# Patient Record
Sex: FEMALE | Race: WHITE | Employment: OTHER
[De-identification: names, ages, dates, MRNs, and addresses within clinical notes are randomized per-mention and may not be internally consistent; named-entity substitution may affect disease eponyms.]

---

## 2018-01-12 LAB
CREAT ?TM UR-SCNC: 117.9 UMOL/L
EXT MICROALBUMIN URINE RANDOM: 10.3
HBA1C MFR BLD HPLC: 7 %
MICROALBUMIN/CREAT UR: 87.4 MG/G{CREAT}

## 2018-04-24 ENCOUNTER — RX ONLY (RX ONLY)
Age: 83
End: 2018-04-24

## 2018-04-24 ENCOUNTER — DOCTOR'S OFFICE (OUTPATIENT)
Dept: URBAN - METROPOLITAN AREA CLINIC 137 | Facility: CLINIC | Age: 83
Setting detail: OPHTHALMOLOGY
End: 2018-04-24
Payer: COMMERCIAL

## 2018-04-24 DIAGNOSIS — Z96.1: ICD-10-CM

## 2018-04-24 DIAGNOSIS — H26.40: ICD-10-CM

## 2018-04-24 DIAGNOSIS — E11.9: ICD-10-CM

## 2018-04-24 DIAGNOSIS — H52.4: ICD-10-CM

## 2018-04-24 PROCEDURE — 92015 DETERMINE REFRACTIVE STATE: CPT | Performed by: OPHTHALMOLOGY

## 2018-04-24 PROCEDURE — 92004 COMPRE OPH EXAM NEW PT 1/>: CPT | Performed by: OPHTHALMOLOGY

## 2018-04-24 ASSESSMENT — REFRACTION_OUTSIDERX
OD_SPHERE: PLANO
OS_VA2: 20/20(J1+)
OU_VA: 20/20+2
OD_AXIS: 30
OD_VA2: 20/20(J1+)
OS_AXIS: 85
OS_VA1: 20/25-1
OS_SPHERE: -1.25
OD_ADD: +2.50
OD_VA3: 20/
OS_ADD: +2.50
OS_CYLINDER: +0.75
OD_VA1: 20/25
OS_VA3: 20/
OD_CYLINDER: +0.75

## 2018-04-24 ASSESSMENT — REFRACTION_MANIFEST
OS_VA3: 20/
OU_VA: 20/
OD_VA2: 20/
OD_VA2: 20/
OU_VA: 20/
OD_VA3: 20/
OS_VA2: 20/
OS_VA3: 20/
OD_VA3: 20/
OS_VA1: 20/
OS_VA2: 20/
OD_VA1: 20/
OD_VA1: 20/
OS_VA1: 20/

## 2018-04-24 ASSESSMENT — REFRACTION_CURRENTRX
OS_AXIS: 85
OS_OVR_VA: 20/
OS_OVR_VA: 20/
OD_ADD: +2.25
OS_CYLINDER: +0.50
OD_VPRISM_DIRECTION: BF
OD_AXIS: 30
OS_VPRISM_DIRECTION: BF
OS_OVR_VA: 20/
OD_OVR_VA: 20/
OD_OVR_VA: 20/
OD_SPHERE: PLANO
OS_SPHERE: -0.75
OD_OVR_VA: 20/
OS_ADD: +2.25
OD_CYLINDER: +0.75

## 2018-04-24 ASSESSMENT — REFRACTION_AUTOREFRACTION
OS_AXIS: 91
OD_SPHERE: +0.25
OS_CYLINDER: +0.75
OS_SPHERE: -1.00
OD_CYLINDER: SPH

## 2018-04-24 ASSESSMENT — CONFRONTATIONAL VISUAL FIELD TEST (CVF)
OS_FINDINGS: FULL
OD_FINDINGS: FULL

## 2018-04-24 ASSESSMENT — VISUAL ACUITY
OS_BCVA: 20/30-2
OD_BCVA: 20/25-2

## 2018-04-24 ASSESSMENT — SPHEQUIV_DERIVED: OS_SPHEQUIV: -0.625

## 2018-05-14 ENCOUNTER — AMBUL SURGICAL CARE (OUTPATIENT)
Dept: URBAN - METROPOLITAN AREA SURGERY 32 | Facility: SURGERY | Age: 83
Setting detail: OPHTHALMOLOGY
End: 2018-05-14
Payer: COMMERCIAL

## 2018-05-14 DIAGNOSIS — H26.492: ICD-10-CM

## 2018-05-14 PROCEDURE — G8918 PT W/O PREOP ORDER IV AB PRO: HCPCS | Performed by: OPHTHALMOLOGY

## 2018-05-14 PROCEDURE — 66821 AFTER CATARACT LASER SURGERY: CPT | Performed by: OPHTHALMOLOGY

## 2018-05-14 PROCEDURE — G8907 PT DOC NO EVENTS ON DISCHARG: HCPCS | Performed by: OPHTHALMOLOGY

## 2018-05-21 ENCOUNTER — DOCTOR'S OFFICE (OUTPATIENT)
Dept: URBAN - METROPOLITAN AREA CLINIC 137 | Facility: CLINIC | Age: 83
Setting detail: OPHTHALMOLOGY
End: 2018-05-21
Payer: COMMERCIAL

## 2018-05-21 ENCOUNTER — RX ONLY (RX ONLY)
Age: 83
End: 2018-05-21

## 2018-05-21 DIAGNOSIS — E11.9: ICD-10-CM

## 2018-05-21 DIAGNOSIS — Z96.1: ICD-10-CM

## 2018-05-21 DIAGNOSIS — H26.40: ICD-10-CM

## 2018-05-21 DIAGNOSIS — H52.4: ICD-10-CM

## 2018-05-21 PROCEDURE — 99024 POSTOP FOLLOW-UP VISIT: CPT | Performed by: OPHTHALMOLOGY

## 2018-05-21 ASSESSMENT — REFRACTION_CURRENTRX
OS_VPRISM_DIRECTION: BF
OD_AXIS: 30
OD_OVR_VA: 20/
OS_AXIS: 85
OD_SPHERE: PLANO
OD_OVR_VA: 20/
OD_OVR_VA: 20/
OD_ADD: +2.25
OS_ADD: +2.25
OS_OVR_VA: 20/
OS_SPHERE: -0.75
OD_VPRISM_DIRECTION: BF
OS_OVR_VA: 20/
OS_CYLINDER: +0.50
OS_OVR_VA: 20/
OD_CYLINDER: +0.75

## 2018-05-21 ASSESSMENT — REFRACTION_OUTSIDERX
OD_VA2: 20/20(J1+)
OU_VA: 20/20+2
OD_SPHERE: PLANO
OS_CYLINDER: +0.75
OS_VA3: 20/
OD_AXIS: 30
OS_ADD: +2.50
OD_ADD: +2.50
OS_VA1: 20/25-1
OS_VA2: 20/20(J1+)
OD_VA1: 20/25
OS_SPHERE: -1.25
OD_VA3: 20/
OD_CYLINDER: +0.75
OS_AXIS: 85

## 2018-05-21 ASSESSMENT — REFRACTION_MANIFEST
OS_VA3: 20/
OS_VA2: 20/
OD_VA2: 20/
OD_VA2: 20/
OD_VA1: 20/
OU_VA: 20/
OD_VA1: 20/
OD_VA3: 20/
OS_VA1: 20/
OS_VA3: 20/
OS_VA2: 20/
OD_VA3: 20/
OU_VA: 20/
OS_VA1: 20/

## 2018-05-21 ASSESSMENT — REFRACTION_AUTOREFRACTION
OS_SPHERE: -1.00
OD_SPHERE: +0.25
OS_AXIS: 91
OS_CYLINDER: +0.75
OD_CYLINDER: SPH

## 2018-05-21 ASSESSMENT — CONFRONTATIONAL VISUAL FIELD TEST (CVF)
OD_FINDINGS: FULL
OS_FINDINGS: FULL

## 2018-05-21 ASSESSMENT — VISUAL ACUITY
OS_BCVA: 20/40
OD_BCVA: 20/30-2

## 2018-05-21 ASSESSMENT — SPHEQUIV_DERIVED: OS_SPHEQUIV: -0.625

## 2018-06-11 ENCOUNTER — AMBUL SURGICAL CARE (OUTPATIENT)
Dept: URBAN - METROPOLITAN AREA SURGERY 32 | Facility: SURGERY | Age: 83
Setting detail: OPHTHALMOLOGY
End: 2018-06-11
Payer: COMMERCIAL

## 2018-06-11 DIAGNOSIS — H26.491: ICD-10-CM

## 2018-06-11 PROCEDURE — G8907 PT DOC NO EVENTS ON DISCHARG: HCPCS | Performed by: OPHTHALMOLOGY

## 2018-06-11 PROCEDURE — G8918 PT W/O PREOP ORDER IV AB PRO: HCPCS | Performed by: OPHTHALMOLOGY

## 2018-06-11 PROCEDURE — 66821 AFTER CATARACT LASER SURGERY: CPT | Performed by: OPHTHALMOLOGY

## 2018-06-18 ENCOUNTER — RX ONLY (RX ONLY)
Age: 83
End: 2018-06-18

## 2018-06-18 ENCOUNTER — DOCTOR'S OFFICE (OUTPATIENT)
Dept: URBAN - METROPOLITAN AREA CLINIC 137 | Facility: CLINIC | Age: 83
Setting detail: OPHTHALMOLOGY
End: 2018-06-18
Payer: COMMERCIAL

## 2018-06-18 DIAGNOSIS — Z96.1: ICD-10-CM

## 2018-06-18 DIAGNOSIS — E11.9: ICD-10-CM

## 2018-06-18 DIAGNOSIS — H26.40: ICD-10-CM

## 2018-06-18 PROCEDURE — 99024 POSTOP FOLLOW-UP VISIT: CPT | Performed by: OPHTHALMOLOGY

## 2018-06-18 ASSESSMENT — CONFRONTATIONAL VISUAL FIELD TEST (CVF)
OS_FINDINGS: FULL
OD_FINDINGS: FULL

## 2018-06-18 ASSESSMENT — REFRACTION_CURRENTRX
OS_OVR_VA: 20/
OS_OVR_VA: 20/
OD_OVR_VA: 20/
OD_ADD: +2.25
OD_OVR_VA: 20/
OD_SPHERE: PLANO
OD_AXIS: 30
OS_SPHERE: -0.75
OD_CYLINDER: +0.75
OD_VPRISM_DIRECTION: BF
OS_OVR_VA: 20/
OS_VPRISM_DIRECTION: BF
OS_CYLINDER: +0.50
OS_AXIS: 85
OS_ADD: +2.25
OD_OVR_VA: 20/

## 2018-06-18 ASSESSMENT — REFRACTION_MANIFEST
OS_VA3: 20/
OD_VA3: 20/
OD_VA1: 20/
OU_VA: 20/
OD_VA2: 20/
OU_VA: 20/
OD_VA2: 20/
OS_VA3: 20/
OS_VA2: 20/
OD_VA3: 20/
OS_VA2: 20/
OS_VA1: 20/
OD_VA1: 20/
OS_VA1: 20/

## 2018-06-18 ASSESSMENT — REFRACTION_OUTSIDERX
OD_VA3: 20/
OD_ADD: +2.50
OD_SPHERE: PLANO
OD_AXIS: 30
OS_AXIS: 85
OS_VA1: 20/25-1
OS_CYLINDER: +0.75
OS_SPHERE: -1.25
OD_VA1: 20/25
OS_VA2: 20/20(J1+)
OD_CYLINDER: +0.75
OU_VA: 20/20+2
OS_ADD: +2.50
OS_VA3: 20/
OD_VA2: 20/20(J1+)

## 2018-06-18 ASSESSMENT — REFRACTION_AUTOREFRACTION
OS_SPHERE: -1.00
OS_AXIS: 91
OD_CYLINDER: SPH
OS_CYLINDER: +0.75
OD_SPHERE: +0.25

## 2018-06-18 ASSESSMENT — SPHEQUIV_DERIVED: OS_SPHEQUIV: -0.625

## 2018-06-18 ASSESSMENT — VISUAL ACUITY
OD_BCVA: 20/30
OS_BCVA: 20/40-2

## 2018-08-31 LAB
CREAT ?TM UR-SCNC: 123 UMOL/L
EXT MICROALBUMIN URINE RANDOM: 9.8
HBA1C MFR BLD HPLC: 6.8 %
MICROALBUMIN/CREAT UR: 79.7 MG/G{CREAT}

## 2018-12-11 ENCOUNTER — DOCTOR'S OFFICE (OUTPATIENT)
Dept: URBAN - METROPOLITAN AREA CLINIC 137 | Facility: CLINIC | Age: 83
Setting detail: OPHTHALMOLOGY
End: 2018-12-11
Payer: COMMERCIAL

## 2018-12-11 ENCOUNTER — RX ONLY (RX ONLY)
Age: 83
End: 2018-12-11

## 2018-12-11 DIAGNOSIS — H26.40: ICD-10-CM

## 2018-12-11 DIAGNOSIS — H04.123: ICD-10-CM

## 2018-12-11 DIAGNOSIS — Z96.1: ICD-10-CM

## 2018-12-11 DIAGNOSIS — E11.9: ICD-10-CM

## 2018-12-11 PROCEDURE — 99214 OFFICE O/P EST MOD 30 MIN: CPT | Performed by: OPTOMETRIST

## 2018-12-11 ASSESSMENT — REFRACTION_AUTOREFRACTION
OS_CYLINDER: +0.75
OS_AXIS: 91
OD_SPHERE: +0.25
OS_SPHERE: -1.00
OD_CYLINDER: SPH

## 2018-12-11 ASSESSMENT — REFRACTION_MANIFEST
OS_VA3: 20/
OD_VA1: 20/
OS_VA2: 20/20(J1+)
OS_VA2: 20/
OD_CYLINDER: +0.75
OD_VA3: 20/
OD_VA3: 20/
OS_VA3: 20/
OD_AXIS: 30
OD_VA1: 20/25
OS_VA1: 20/
OU_VA: 20/
OS_SPHERE: -1.25
OD_VA2: 20/20(J1+)
OD_SPHERE: PLANO
OD_ADD: +2.50
OS_AXIS: 85
OS_CYLINDER: +0.75
OS_VA1: 20/25-1
OU_VA: 20/20+2
OS_ADD: +2.50
OD_VA2: 20/

## 2018-12-11 ASSESSMENT — REFRACTION_CURRENTRX
OD_AXIS: 30
OD_VPRISM_DIRECTION: BF
OD_ADD: +2.25
OS_OVR_VA: 20/
OS_OVR_VA: 20/
OS_CYLINDER: +0.50
OD_OVR_VA: 20/
OS_OVR_VA: 20/
OD_OVR_VA: 20/
OS_SPHERE: -0.75
OS_VPRISM_DIRECTION: BF
OD_SPHERE: PLANO
OD_CYLINDER: +0.75
OS_ADD: +2.25
OD_OVR_VA: 20/
OS_AXIS: 85

## 2018-12-11 ASSESSMENT — VISUAL ACUITY
OS_BCVA: 20/30-1
OD_BCVA: 20/30-2

## 2018-12-11 ASSESSMENT — CONFRONTATIONAL VISUAL FIELD TEST (CVF)
OD_FINDINGS: FULL
OS_FINDINGS: FULL

## 2018-12-11 ASSESSMENT — SPHEQUIV_DERIVED
OS_SPHEQUIV: -0.625
OS_SPHEQUIV: -0.875

## 2019-05-10 LAB — HBA1C MFR BLD HPLC: 6.8 %

## 2019-06-07 ENCOUNTER — DOCTOR'S OFFICE (OUTPATIENT)
Dept: URBAN - METROPOLITAN AREA CLINIC 137 | Facility: CLINIC | Age: 84
Setting detail: OPHTHALMOLOGY
End: 2019-06-07
Payer: COMMERCIAL

## 2019-06-07 ENCOUNTER — OPTICAL OFFICE (OUTPATIENT)
Dept: URBAN - METROPOLITAN AREA CLINIC 146 | Facility: CLINIC | Age: 84
Setting detail: OPHTHALMOLOGY
End: 2019-06-07

## 2019-06-07 DIAGNOSIS — H04.123: ICD-10-CM

## 2019-06-07 DIAGNOSIS — H52.223: ICD-10-CM

## 2019-06-07 DIAGNOSIS — H26.40: ICD-10-CM

## 2019-06-07 DIAGNOSIS — E11.9: ICD-10-CM

## 2019-06-07 DIAGNOSIS — H52.4: ICD-10-CM

## 2019-06-07 DIAGNOSIS — Z96.1: ICD-10-CM

## 2019-06-07 PROCEDURE — V2750 ANTI-REFLECTIVE COATING: HCPCS | Performed by: OPTOMETRIST

## 2019-06-07 PROCEDURE — V2020 VISION SVCS FRAMES PURCHASES: HCPCS | Performed by: OPTOMETRIST

## 2019-06-07 PROCEDURE — V2744 TINT PHOTOCHROMATIC LENS/ES: HCPCS | Performed by: OPTOMETRIST

## 2019-06-07 PROCEDURE — 92015 DETERMINE REFRACTIVE STATE: CPT | Performed by: OPTOMETRIST

## 2019-06-07 PROCEDURE — 92014 COMPRE OPH EXAM EST PT 1/>: CPT | Performed by: OPTOMETRIST

## 2019-06-07 PROCEDURE — V2203 LENS SPHCYL BIFOCAL 4.00D/.1: HCPCS | Performed by: OPTOMETRIST

## 2019-06-07 ASSESSMENT — REFRACTION_MANIFEST
OS_VA1: 20/30
OS_VA3: 20/
OS_VA1: 20/
OS_ADD: +2.50
OD_ADD: +2.50
OU_VA: 20/
OS_VA3: 20/
OD_AXIS: 110
OD_SPHERE: +0.75
OS_CYLINDER: -0.75
OD_VA2: 20/20(J1+)
OD_CYLINDER: -0.75
OS_VA2: 20/
OD_VA1: 20/
OS_SPHERE: -0.75
OU_VA: 20/30
OD_VA3: 20/
OS_VA2: 20/20(J1+)
OD_VA1: 20/30
OS_AXIS: 175
OD_VA3: 20/
OD_VA2: 20/

## 2019-06-07 ASSESSMENT — REFRACTION_CURRENTRX
OD_ADD: +2.25
OD_SPHERE: +0.75
OS_SPHERE: -0.25
OS_OVR_VA: 20/
OS_OVR_VA: 20/
OS_VPRISM_DIRECTION: BF
OD_VPRISM_DIRECTION: BF
OD_OVR_VA: 20/
OS_OVR_VA: 20/
OS_CYLINDER: -0.50
OD_OVR_VA: 20/
OS_ADD: +2.25
OS_AXIS: 175
OD_CYLINDER: -0.75
OD_OVR_VA: 20/
OD_AXIS: 120

## 2019-06-07 ASSESSMENT — CONFRONTATIONAL VISUAL FIELD TEST (CVF)
OS_FINDINGS: FULL
OD_FINDINGS: FULL

## 2019-06-07 ASSESSMENT — REFRACTION_AUTOREFRACTION
OS_CYLINDER: -0.50
OD_AXIS: 103
OD_CYLINDER: -0.25
OS_AXIS: 19
OD_SPHERE: +0.75
OS_SPHERE: PLANO

## 2019-06-07 ASSESSMENT — VISUAL ACUITY
OS_BCVA: 20/60
OD_BCVA: 20/40

## 2019-06-07 ASSESSMENT — SPHEQUIV_DERIVED
OS_SPHEQUIV: -1.125
OD_SPHEQUIV: 0.625
OD_SPHEQUIV: 0.375

## 2019-09-05 ENCOUNTER — TRANSCRIBE ORDERS (OUTPATIENT)
Dept: ADMINISTRATIVE | Facility: HOSPITAL | Age: 84
End: 2019-09-05

## 2019-09-05 ENCOUNTER — APPOINTMENT (OUTPATIENT)
Dept: LAB | Facility: CLINIC | Age: 84
End: 2019-09-05
Payer: MEDICARE

## 2019-09-05 DIAGNOSIS — E78.5 HYPERLIPIDEMIA, UNSPECIFIED HYPERLIPIDEMIA TYPE: ICD-10-CM

## 2019-09-05 DIAGNOSIS — E11.9 TYPE 2 DIABETES MELLITUS WITHOUT COMPLICATION, UNSPECIFIED WHETHER LONG TERM INSULIN USE (HCC): ICD-10-CM

## 2019-09-05 DIAGNOSIS — I10 ESSENTIAL (PRIMARY) HYPERTENSION: ICD-10-CM

## 2019-09-05 DIAGNOSIS — I10 ESSENTIAL (PRIMARY) HYPERTENSION: Primary | ICD-10-CM

## 2019-09-05 LAB
ALBUMIN SERPL BCP-MCNC: 4.1 G/DL (ref 3.5–5)
ALP SERPL-CCNC: 60 U/L (ref 46–116)
ALT SERPL W P-5'-P-CCNC: 14 U/L (ref 12–78)
ANION GAP SERPL CALCULATED.3IONS-SCNC: 10 MMOL/L (ref 4–13)
AST SERPL W P-5'-P-CCNC: 12 U/L (ref 5–45)
BILIRUB SERPL-MCNC: 0.58 MG/DL (ref 0.2–1)
BUN SERPL-MCNC: 19 MG/DL (ref 5–25)
CALCIUM SERPL-MCNC: 10 MG/DL (ref 8.3–10.1)
CHLORIDE SERPL-SCNC: 101 MMOL/L (ref 100–108)
CHOLEST SERPL-MCNC: 145 MG/DL (ref 50–200)
CO2 SERPL-SCNC: 29 MMOL/L (ref 21–32)
CREAT SERPL-MCNC: 0.98 MG/DL (ref 0.6–1.3)
CREAT UR-MCNC: 88.8 MG/DL
ERYTHROCYTE [DISTWIDTH] IN BLOOD BY AUTOMATED COUNT: 14.6 % (ref 11.6–15.1)
GFR SERPL CREATININE-BSD FRML MDRD: 54 ML/MIN/1.73SQ M
GLUCOSE P FAST SERPL-MCNC: 126 MG/DL (ref 65–99)
HCT VFR BLD AUTO: 42.4 % (ref 34.8–46.1)
HDLC SERPL-MCNC: 59 MG/DL (ref 40–60)
HGB BLD-MCNC: 13.2 G/DL (ref 11.5–15.4)
LDLC SERPL CALC-MCNC: 68 MG/DL (ref 0–100)
MCH RBC QN AUTO: 28.3 PG (ref 26.8–34.3)
MCHC RBC AUTO-ENTMCNC: 31.1 G/DL (ref 31.4–37.4)
MCV RBC AUTO: 91 FL (ref 82–98)
MICROALBUMIN UR-MCNC: 119 MG/L (ref 0–20)
MICROALBUMIN/CREAT 24H UR: 134 MG/G CREATININE (ref 0–30)
NONHDLC SERPL-MCNC: 86 MG/DL
PLATELET # BLD AUTO: 139 THOUSANDS/UL (ref 149–390)
PMV BLD AUTO: 11.1 FL (ref 8.9–12.7)
POTASSIUM SERPL-SCNC: 3.9 MMOL/L (ref 3.5–5.3)
PROT SERPL-MCNC: 7.4 G/DL (ref 6.4–8.2)
RBC # BLD AUTO: 4.66 MILLION/UL (ref 3.81–5.12)
SODIUM SERPL-SCNC: 140 MMOL/L (ref 136–145)
TRIGL SERPL-MCNC: 92 MG/DL
TSH SERPL DL<=0.05 MIU/L-ACNC: 3.37 UIU/ML (ref 0.36–3.74)
WBC # BLD AUTO: 3.47 THOUSAND/UL (ref 4.31–10.16)

## 2019-09-05 PROCEDURE — 82570 ASSAY OF URINE CREATININE: CPT | Performed by: INTERNAL MEDICINE

## 2019-09-05 PROCEDURE — 85027 COMPLETE CBC AUTOMATED: CPT

## 2019-09-05 PROCEDURE — 80053 COMPREHEN METABOLIC PANEL: CPT

## 2019-09-05 PROCEDURE — 82043 UR ALBUMIN QUANTITATIVE: CPT | Performed by: INTERNAL MEDICINE

## 2019-09-05 PROCEDURE — 80061 LIPID PANEL: CPT

## 2019-09-05 PROCEDURE — 84443 ASSAY THYROID STIM HORMONE: CPT

## 2019-09-05 PROCEDURE — 36415 COLL VENOUS BLD VENIPUNCTURE: CPT

## 2019-09-13 LAB — HBA1C MFR BLD HPLC: 6.8 %

## 2019-11-24 ENCOUNTER — OFFICE VISIT (OUTPATIENT)
Dept: URGENT CARE | Facility: CLINIC | Age: 84
End: 2019-11-24
Payer: MEDICARE

## 2019-11-24 VITALS
HEIGHT: 62 IN | BODY MASS INDEX: 33.13 KG/M2 | WEIGHT: 180 LBS | SYSTOLIC BLOOD PRESSURE: 154 MMHG | DIASTOLIC BLOOD PRESSURE: 102 MMHG | RESPIRATION RATE: 18 BRPM | TEMPERATURE: 97.7 F | HEART RATE: 81 BPM | OXYGEN SATURATION: 96 %

## 2019-11-24 DIAGNOSIS — M79.602 LEFT ARM PAIN: Primary | ICD-10-CM

## 2019-11-24 PROCEDURE — 99203 OFFICE O/P NEW LOW 30 MIN: CPT | Performed by: PHYSICIAN ASSISTANT

## 2019-11-24 PROCEDURE — G0463 HOSPITAL OUTPT CLINIC VISIT: HCPCS | Performed by: PHYSICIAN ASSISTANT

## 2019-11-24 RX ORDER — METOPROLOL SUCCINATE 50 MG/1
50 TABLET, EXTENDED RELEASE ORAL DAILY
COMMUNITY
Start: 2019-11-21 | End: 2020-12-07

## 2019-11-24 RX ORDER — VALSARTAN AND HYDROCHLOROTHIAZIDE 160; 12.5 MG/1; MG/1
1 TABLET, FILM COATED ORAL DAILY
COMMUNITY
Start: 2019-11-21 | End: 2020-12-07

## 2019-11-24 RX ORDER — POTASSIUM CHLORIDE 20 MEQ/1
20 TABLET, EXTENDED RELEASE ORAL DAILY
COMMUNITY
Start: 2019-11-21 | End: 2020-12-07

## 2019-11-24 RX ORDER — EZETIMIBE AND SIMVASTATIN 10; 20 MG/1; MG/1
1 TABLET ORAL
COMMUNITY
Start: 2019-11-21 | End: 2020-12-07

## 2019-11-24 NOTE — PATIENT INSTRUCTIONS
1  Left arm pain  -EKG shows a normal sinus rhythm with possible age indeterminate lateral infarct  I have no records to compare to at this time  -Given patient's age and hx of DM and HTN I feel that patient requires further work-up than what can be provided here  The pain is somewhat reproducible however patient has significant risk factors and no mechanism of injury that should be causing the pain that she is in  -Therefore I recommend that you go to the ER for further evaluation and management  -Patient refuses ambulance and prefers to go via private vehicle with her    He states they are going to [facility] because that is where her PCP practices

## 2019-11-24 NOTE — PROGRESS NOTES
[facility]        NAME: Leandra Wolff is a 80 y o  female  : 1935    MRN: [id number]  DATE: 2019  TIME: 10:40 AM    Assessment and Plan   Left arm pain [M79 [id number]]  1  Left arm pain           Patient Instructions     Patient Instructions   1  Left arm pain  -EKG shows a normal sinus rhythm with possible age indeterminate lateral infarct  I have no records to compare to at this time  -Given patient's age and hx of DM and HTN I feel that patient requires further work-up than what can be provided here  -Therefore I recommend that you go to the ER for further evaluation and management     Follow up with PCP in 3-5 days  Proceed to  ER if symptoms worsen  Chief Complaint     Chief Complaint   Patient presents with    Arm Pain     pain in her L arm that started last evening while she was making dinner  concerned it may be cardiac related  denies any cardiac history  History of Present Illness       Patient is an 68-year-old female with a medical history of hypertension and diabetes who presents today for evaluation of left arm pain  Patient states that last night while cooking dinner, she suddenly started with left arm pain  She states that the pain starts in her left shoulder and goes all the way down her arm  She rates her pain as a 10/10  She denies any injury or trauma  She states she did not do any heavy lifting  Patient denies any chest pain or shortness of breath  Patient states she is concerned about a possible cardiac cause of the pain  She states that she has had no recent cardiac workup  No recent cardiac stress test       Review of Systems   Review of Systems   Constitutional: Negative for chills and fever  Respiratory: Negative for shortness of breath  Cardiovascular: Negative for chest pain  Gastrointestinal: Negative for abdominal pain and nausea  Musculoskeletal: Positive for arthralgias  Neurological: Negative for weakness and numbness     All other systems reviewed and are negative  Current Medications       Current Outpatient Medications:     ezetimibe-simvastatin (VYTORIN) 10-20 mg per tablet, , Disp: , Rfl:     metFORMIN (GLUCOPHAGE) 850 mg tablet, , Disp: , Rfl:     metoprolol succinate (TOPROL-XL) 50 mg 24 hr tablet, , Disp: , Rfl:     potassium chloride (K-DUR,KLOR-CON) 20 mEq tablet, , Disp: , Rfl:     valsartan-hydrochlorothiazide (DIOVAN-HCT) 160-12 5 MG per tablet, , Disp: , Rfl:     Current Allergies     Allergies as of 11/24/2019 - Reviewed 11/24/2019   Allergen Reaction Noted    Parabens  11/24/2019            The following portions of the patient's history were reviewed and updated as appropriate: allergies, current medications, past family history, past medical history, past social history, past surgical history and problem list      Past Medical History:   Diagnosis Date    Diabetes mellitus ([facility] [address] )     Hypertension        Past Surgical History:   Procedure Laterality Date    HERNIA REPAIR      HYSTERECTOMY         No family history on file  Medications have been verified  Objective   BP (!) 154/102 (BP Location: Left arm, Patient Position: Sitting)   Pulse 81   Temp 97 7 °F (36 5 °C) (Temporal)   Resp 18   Ht 5' 2" (1 575 m)   Wt 81 6 kg (180 lb)   SpO2 96%   BMI 32 92 kg/m²        Physical Exam     Physical Exam   Constitutional: She is oriented to person, place, and time  She appears well-developed and well-nourished  No distress  Cardiovascular: Normal rate, regular rhythm and normal heart sounds  Pulmonary/Chest: Effort normal and breath sounds normal    Musculoskeletal:        Arms:  Neurological: She is alert and oriented to person, place, and time  She has normal strength  No sensory deficit  2+ radial pulse   Skin: Skin is warm and dry  Psychiatric: She has a normal mood and affect  Nursing note and vitals reviewed

## 2019-12-19 ENCOUNTER — DOCTOR'S OFFICE (OUTPATIENT)
Dept: URBAN - METROPOLITAN AREA CLINIC 137 | Facility: CLINIC | Age: 84
Setting detail: OPHTHALMOLOGY
End: 2019-12-19
Payer: COMMERCIAL

## 2019-12-19 DIAGNOSIS — H26.40: ICD-10-CM

## 2019-12-19 DIAGNOSIS — H04.123: ICD-10-CM

## 2019-12-19 PROCEDURE — 92014 COMPRE OPH EXAM EST PT 1/>: CPT | Performed by: OPTOMETRIST

## 2019-12-19 ASSESSMENT — REFRACTION_MANIFEST
OS_VA2: 20/
OD_SPHERE: +0.75
OS_ADD: +2.50
OD_VA2: 20/
OD_VA1: 20/
OD_VA3: 20/
OD_VA1: 20/30
OS_VA1: 20/
OU_VA: 20/
OD_VA3: 20/
OD_AXIS: 110
OS_SPHERE: -0.75
OS_VA3: 20/
OS_AXIS: 175
OU_VA: 20/30
OD_VA2: 20/20(J1+)
OS_VA3: 20/
OS_CYLINDER: -0.75
OS_VA2: 20/20(J1+)
OD_ADD: +2.50
OD_CYLINDER: -0.75
OS_VA1: 20/30

## 2019-12-19 ASSESSMENT — VISUAL ACUITY
OS_BCVA: 20/25-2
OD_BCVA: 20/30-2

## 2019-12-19 ASSESSMENT — REFRACTION_CURRENTRX
OD_SPHERE: +0.75
OD_AXIS: 120
OD_OVR_VA: 20/
OS_SPHERE: -0.25
OS_AXIS: 175
OS_ADD: +2.25
OD_ADD: +2.25
OD_OVR_VA: 20/
OS_OVR_VA: 20/
OS_OVR_VA: 20/
OS_CYLINDER: -0.50
OD_VPRISM_DIRECTION: BF
OD_OVR_VA: 20/
OD_CYLINDER: -0.75
OS_VPRISM_DIRECTION: BF
OS_OVR_VA: 20/

## 2019-12-19 ASSESSMENT — REFRACTION_AUTOREFRACTION
OS_AXIS: 19
OS_CYLINDER: -0.50
OD_AXIS: 103
OD_SPHERE: +0.75
OS_SPHERE: PLANO
OD_CYLINDER: -0.25

## 2019-12-19 ASSESSMENT — SPHEQUIV_DERIVED
OS_SPHEQUIV: -1.125
OD_SPHEQUIV: 0.625
OD_SPHEQUIV: 0.375

## 2019-12-19 ASSESSMENT — CONFRONTATIONAL VISUAL FIELD TEST (CVF)
OD_FINDINGS: FULL
OS_FINDINGS: FULL

## 2020-01-10 LAB — HBA1C MFR BLD HPLC: 7.1 %

## 2020-05-07 ENCOUNTER — TRANSCRIBE ORDERS (OUTPATIENT)
Dept: ADMINISTRATIVE | Facility: HOSPITAL | Age: 85
End: 2020-05-07

## 2020-05-07 ENCOUNTER — APPOINTMENT (OUTPATIENT)
Dept: LAB | Facility: CLINIC | Age: 85
End: 2020-05-07
Payer: MEDICARE

## 2020-05-07 DIAGNOSIS — E78.5 HYPERLIPIDEMIA, UNSPECIFIED HYPERLIPIDEMIA TYPE: ICD-10-CM

## 2020-05-07 DIAGNOSIS — E11.9 DIABETES MELLITUS WITHOUT COMPLICATION (HCC): ICD-10-CM

## 2020-05-07 DIAGNOSIS — E78.5 HYPERLIPIDEMIA, UNSPECIFIED HYPERLIPIDEMIA TYPE: Primary | ICD-10-CM

## 2020-05-07 DIAGNOSIS — I10 ESSENTIAL HYPERTENSION, MALIGNANT: ICD-10-CM

## 2020-05-07 LAB
ALBUMIN SERPL BCP-MCNC: 3.8 G/DL (ref 3.5–5)
ALP SERPL-CCNC: 49 U/L (ref 46–116)
ALT SERPL W P-5'-P-CCNC: 15 U/L (ref 12–78)
ANION GAP SERPL CALCULATED.3IONS-SCNC: 6 MMOL/L (ref 4–13)
AST SERPL W P-5'-P-CCNC: 12 U/L (ref 5–45)
BASOPHILS # BLD AUTO: 0.04 THOUSANDS/ΜL (ref 0–0.1)
BASOPHILS NFR BLD AUTO: 1 % (ref 0–1)
BILIRUB SERPL-MCNC: 0.43 MG/DL (ref 0.2–1)
BUN SERPL-MCNC: 18 MG/DL (ref 5–25)
CALCIUM SERPL-MCNC: 9.4 MG/DL (ref 8.3–10.1)
CHLORIDE SERPL-SCNC: 102 MMOL/L (ref 100–108)
CHOLEST SERPL-MCNC: 129 MG/DL (ref 50–200)
CO2 SERPL-SCNC: 27 MMOL/L (ref 21–32)
CREAT SERPL-MCNC: 0.89 MG/DL (ref 0.6–1.3)
CREAT UR-MCNC: 56 MG/DL
EOSINOPHIL # BLD AUTO: 0.08 THOUSAND/ΜL (ref 0–0.61)
EOSINOPHIL NFR BLD AUTO: 2 % (ref 0–6)
ERYTHROCYTE [DISTWIDTH] IN BLOOD BY AUTOMATED COUNT: 14.4 % (ref 11.6–15.1)
GFR SERPL CREATININE-BSD FRML MDRD: 60 ML/MIN/1.73SQ M
GLUCOSE P FAST SERPL-MCNC: 137 MG/DL (ref 65–99)
HCT VFR BLD AUTO: 42.3 % (ref 34.8–46.1)
HDLC SERPL-MCNC: 56 MG/DL
HGB BLD-MCNC: 13.3 G/DL (ref 11.5–15.4)
IMM GRANULOCYTES # BLD AUTO: 0.01 THOUSAND/UL (ref 0–0.2)
IMM GRANULOCYTES NFR BLD AUTO: 0 % (ref 0–2)
LDLC SERPL CALC-MCNC: 56 MG/DL (ref 0–100)
LYMPHOCYTES # BLD AUTO: 0.94 THOUSANDS/ΜL (ref 0.6–4.47)
LYMPHOCYTES NFR BLD AUTO: 27 % (ref 14–44)
MCH RBC QN AUTO: 29 PG (ref 26.8–34.3)
MCHC RBC AUTO-ENTMCNC: 31.4 G/DL (ref 31.4–37.4)
MCV RBC AUTO: 92 FL (ref 82–98)
MICROALBUMIN UR-MCNC: 35.9 MG/L (ref 0–20)
MICROALBUMIN/CREAT 24H UR: 64 MG/G CREATININE (ref 0–30)
MONOCYTES # BLD AUTO: 0.31 THOUSAND/ΜL (ref 0.17–1.22)
MONOCYTES NFR BLD AUTO: 9 % (ref 4–12)
NEUTROPHILS # BLD AUTO: 2.16 THOUSANDS/ΜL (ref 1.85–7.62)
NEUTS SEG NFR BLD AUTO: 61 % (ref 43–75)
NONHDLC SERPL-MCNC: 73 MG/DL
NRBC BLD AUTO-RTO: 0 /100 WBCS
PLATELET # BLD AUTO: 122 THOUSANDS/UL (ref 149–390)
PMV BLD AUTO: 11.3 FL (ref 8.9–12.7)
POTASSIUM SERPL-SCNC: 3.8 MMOL/L (ref 3.5–5.3)
PROT SERPL-MCNC: 7 G/DL (ref 6.4–8.2)
RBC # BLD AUTO: 4.59 MILLION/UL (ref 3.81–5.12)
SODIUM SERPL-SCNC: 135 MMOL/L (ref 136–145)
TRIGL SERPL-MCNC: 87 MG/DL
TSH SERPL DL<=0.05 MIU/L-ACNC: 4.26 UIU/ML (ref 0.36–3.74)
WBC # BLD AUTO: 3.54 THOUSAND/UL (ref 4.31–10.16)

## 2020-05-07 PROCEDURE — 82570 ASSAY OF URINE CREATININE: CPT | Performed by: INTERNAL MEDICINE

## 2020-05-07 PROCEDURE — 36415 COLL VENOUS BLD VENIPUNCTURE: CPT

## 2020-05-07 PROCEDURE — 80053 COMPREHEN METABOLIC PANEL: CPT

## 2020-05-07 PROCEDURE — 82043 UR ALBUMIN QUANTITATIVE: CPT | Performed by: INTERNAL MEDICINE

## 2020-05-07 PROCEDURE — 85025 COMPLETE CBC W/AUTO DIFF WBC: CPT

## 2020-05-07 PROCEDURE — 84443 ASSAY THYROID STIM HORMONE: CPT

## 2020-05-07 PROCEDURE — 80061 LIPID PANEL: CPT

## 2020-05-08 DIAGNOSIS — R79.89 ELEVATED TSH: Primary | ICD-10-CM

## 2020-05-21 ENCOUNTER — APPOINTMENT (OUTPATIENT)
Dept: LAB | Facility: CLINIC | Age: 85
End: 2020-05-21
Payer: MEDICARE

## 2020-05-21 DIAGNOSIS — R79.89 ELEVATED TSH: ICD-10-CM

## 2020-05-21 LAB
T4 FREE SERPL-MCNC: 1.06 NG/DL (ref 0.76–1.46)
TSH SERPL DL<=0.05 MIU/L-ACNC: 2.97 UIU/ML (ref 0.36–3.74)

## 2020-05-21 PROCEDURE — 84443 ASSAY THYROID STIM HORMONE: CPT

## 2020-05-21 PROCEDURE — 84439 ASSAY OF FREE THYROXINE: CPT

## 2020-05-21 PROCEDURE — 36415 COLL VENOUS BLD VENIPUNCTURE: CPT

## 2020-06-08 ENCOUNTER — OFFICE VISIT (OUTPATIENT)
Dept: FAMILY MEDICINE CLINIC | Facility: CLINIC | Age: 85
End: 2020-06-08
Payer: MEDICARE

## 2020-06-08 VITALS
TEMPERATURE: 98.2 F | SYSTOLIC BLOOD PRESSURE: 128 MMHG | DIASTOLIC BLOOD PRESSURE: 84 MMHG | BODY MASS INDEX: 33.88 KG/M2 | WEIGHT: 184.13 LBS | HEART RATE: 68 BPM | HEIGHT: 62 IN | RESPIRATION RATE: 16 BRPM | OXYGEN SATURATION: 96 %

## 2020-06-08 DIAGNOSIS — R80.9 MICROALBUMINURIA: ICD-10-CM

## 2020-06-08 DIAGNOSIS — E11.9 TYPE 2 DIABETES MELLITUS WITHOUT COMPLICATION, WITHOUT LONG-TERM CURRENT USE OF INSULIN (HCC): Primary | ICD-10-CM

## 2020-06-08 DIAGNOSIS — I10 ESSENTIAL HYPERTENSION: ICD-10-CM

## 2020-06-08 DIAGNOSIS — E78.00 PURE HYPERCHOLESTEROLEMIA: ICD-10-CM

## 2020-06-08 LAB — SL AMB POCT HGB: 6.8

## 2020-06-08 PROCEDURE — 1036F TOBACCO NON-USER: CPT | Performed by: INTERNAL MEDICINE

## 2020-06-08 PROCEDURE — 4040F PNEUMOC VAC/ADMIN/RCVD: CPT | Performed by: INTERNAL MEDICINE

## 2020-06-08 PROCEDURE — 36416 COLLJ CAPILLARY BLOOD SPEC: CPT | Performed by: INTERNAL MEDICINE

## 2020-06-08 PROCEDURE — 85018 HEMOGLOBIN: CPT | Performed by: INTERNAL MEDICINE

## 2020-06-08 PROCEDURE — 3066F NEPHROPATHY DOC TX: CPT | Performed by: INTERNAL MEDICINE

## 2020-06-08 PROCEDURE — 99214 OFFICE O/P EST MOD 30 MIN: CPT | Performed by: INTERNAL MEDICINE

## 2020-06-08 PROCEDURE — 3079F DIAST BP 80-89 MM HG: CPT | Performed by: INTERNAL MEDICINE

## 2020-06-08 PROCEDURE — 3008F BODY MASS INDEX DOCD: CPT | Performed by: INTERNAL MEDICINE

## 2020-06-08 PROCEDURE — 1160F RVW MEDS BY RX/DR IN RCRD: CPT | Performed by: INTERNAL MEDICINE

## 2020-06-08 PROCEDURE — 3060F POS MICROALBUMINURIA REV: CPT | Performed by: INTERNAL MEDICINE

## 2020-06-08 PROCEDURE — 3074F SYST BP LT 130 MM HG: CPT | Performed by: INTERNAL MEDICINE

## 2020-06-18 ENCOUNTER — DOCTOR'S OFFICE (OUTPATIENT)
Dept: URBAN - METROPOLITAN AREA CLINIC 137 | Facility: CLINIC | Age: 85
Setting detail: OPHTHALMOLOGY
End: 2020-06-18
Payer: COMMERCIAL

## 2020-06-18 DIAGNOSIS — E11.9: ICD-10-CM

## 2020-06-18 DIAGNOSIS — H04.123: ICD-10-CM

## 2020-06-18 DIAGNOSIS — Z96.1: ICD-10-CM

## 2020-06-18 PROCEDURE — 92014 COMPRE OPH EXAM EST PT 1/>: CPT | Performed by: OPTOMETRIST

## 2020-06-18 ASSESSMENT — SPHEQUIV_DERIVED
OD_SPHEQUIV: 0.375
OD_SPHEQUIV: 0.625
OS_SPHEQUIV: -1.125

## 2020-06-18 ASSESSMENT — REFRACTION_CURRENTRX
OD_AXIS: 120
OS_SPHERE: -0.25
OD_VPRISM_DIRECTION: BF
OD_CYLINDER: -0.75
OS_AXIS: 175
OD_SPHERE: +0.75
OD_ADD: +2.25
OS_CYLINDER: -0.50
OS_OVR_VA: 20/
OD_OVR_VA: 20/
OS_VPRISM_DIRECTION: BF
OS_ADD: +2.25

## 2020-06-18 ASSESSMENT — REFRACTION_AUTOREFRACTION
OS_SPHERE: PLANO
OD_AXIS: 103
OS_CYLINDER: -0.50
OD_SPHERE: +0.75
OS_AXIS: 19
OD_CYLINDER: -0.25

## 2020-06-18 ASSESSMENT — REFRACTION_MANIFEST
OD_CYLINDER: -0.75
OS_ADD: +2.50
OD_SPHERE: +0.75
OU_VA: 20/30
OS_SPHERE: -0.75
OS_VA2: 20/20(J1+)
OD_VA2: 20/20(J1+)
OS_AXIS: 175
OS_CYLINDER: -0.75
OD_ADD: +2.50
OS_VA1: 20/30
OD_AXIS: 110
OD_VA1: 20/30

## 2020-06-18 ASSESSMENT — CONFRONTATIONAL VISUAL FIELD TEST (CVF)
OS_FINDINGS: FULL
OD_FINDINGS: FULL

## 2020-06-18 ASSESSMENT — VISUAL ACUITY
OS_BCVA: 20/30-1
OD_BCVA: 20/40

## 2020-10-08 ENCOUNTER — OFFICE VISIT (OUTPATIENT)
Dept: FAMILY MEDICINE CLINIC | Facility: CLINIC | Age: 85
End: 2020-10-08
Payer: MEDICARE

## 2020-10-08 VITALS
TEMPERATURE: 97.3 F | DIASTOLIC BLOOD PRESSURE: 70 MMHG | HEART RATE: 68 BPM | WEIGHT: 183.25 LBS | BODY MASS INDEX: 33.72 KG/M2 | HEIGHT: 62 IN | SYSTOLIC BLOOD PRESSURE: 130 MMHG | OXYGEN SATURATION: 97 % | RESPIRATION RATE: 16 BRPM

## 2020-10-08 DIAGNOSIS — I10 ESSENTIAL HYPERTENSION: ICD-10-CM

## 2020-10-08 DIAGNOSIS — E11.9 TYPE 2 DIABETES MELLITUS WITHOUT COMPLICATION, WITHOUT LONG-TERM CURRENT USE OF INSULIN (HCC): ICD-10-CM

## 2020-10-08 DIAGNOSIS — E78.00 PURE HYPERCHOLESTEROLEMIA: ICD-10-CM

## 2020-10-08 DIAGNOSIS — Z23 INFLUENZA VACCINE ADMINISTERED: Primary | ICD-10-CM

## 2020-10-08 LAB — SL AMB POCT HEMOGLOBIN AIC: 7 (ref ?–6.5)

## 2020-10-08 PROCEDURE — G0008 ADMIN INFLUENZA VIRUS VAC: HCPCS | Performed by: INTERNAL MEDICINE

## 2020-10-08 PROCEDURE — 99214 OFFICE O/P EST MOD 30 MIN: CPT | Performed by: INTERNAL MEDICINE

## 2020-10-08 PROCEDURE — 36416 COLLJ CAPILLARY BLOOD SPEC: CPT | Performed by: INTERNAL MEDICINE

## 2020-10-08 PROCEDURE — 83036 HEMOGLOBIN GLYCOSYLATED A1C: CPT | Performed by: INTERNAL MEDICINE

## 2020-10-08 PROCEDURE — 90662 IIV NO PRSV INCREASED AG IM: CPT | Performed by: INTERNAL MEDICINE

## 2020-12-07 DIAGNOSIS — Z76.0 MEDICATION REFILL: Primary | ICD-10-CM

## 2020-12-07 RX ORDER — METOPROLOL SUCCINATE 50 MG/1
TABLET, EXTENDED RELEASE ORAL
Qty: 90 TABLET | Refills: 3 | Status: SHIPPED | OUTPATIENT
Start: 2020-12-07 | End: 2022-01-03

## 2020-12-07 RX ORDER — VALSARTAN AND HYDROCHLOROTHIAZIDE 160; 12.5 MG/1; MG/1
TABLET, FILM COATED ORAL
Qty: 90 TABLET | Refills: 3 | Status: SHIPPED | OUTPATIENT
Start: 2020-12-07 | End: 2022-01-03

## 2020-12-07 RX ORDER — POTASSIUM CHLORIDE 20 MEQ/1
TABLET, EXTENDED RELEASE ORAL
Qty: 90 TABLET | Refills: 3 | Status: SHIPPED | OUTPATIENT
Start: 2020-12-07 | End: 2022-01-03

## 2020-12-07 RX ORDER — EZETIMIBE AND SIMVASTATIN 10; 20 MG/1; MG/1
TABLET ORAL
Qty: 90 TABLET | Refills: 3 | Status: SHIPPED | OUTPATIENT
Start: 2020-12-07 | End: 2022-01-03

## 2020-12-21 ENCOUNTER — DOCTOR'S OFFICE (OUTPATIENT)
Dept: URBAN - METROPOLITAN AREA CLINIC 137 | Facility: CLINIC | Age: 85
Setting detail: OPHTHALMOLOGY
End: 2020-12-21
Payer: COMMERCIAL

## 2020-12-21 VITALS — HEIGHT: 55 IN

## 2020-12-21 DIAGNOSIS — E11.9: ICD-10-CM

## 2020-12-21 DIAGNOSIS — H11.31: ICD-10-CM

## 2020-12-21 DIAGNOSIS — H04.123: ICD-10-CM

## 2020-12-21 PROBLEM — Z96.1 PSEUDOPHAKIA ; BOTH EYES: Status: ACTIVE | Noted: 2018-04-24

## 2020-12-21 PROBLEM — H26.40 POSTERIOR CAPSULAR OPACIFICATION ; BOTH EYES: Status: ACTIVE | Noted: 2018-04-24

## 2020-12-21 PROCEDURE — 92014 COMPRE OPH EXAM EST PT 1/>: CPT | Performed by: OPTOMETRIST

## 2020-12-21 ASSESSMENT — REFRACTION_AUTOREFRACTION
OD_SPHERE: +0.75
OD_AXIS: 103
OD_CYLINDER: -0.25
OS_CYLINDER: -0.50
OS_AXIS: 19
OS_SPHERE: PLANO

## 2020-12-21 ASSESSMENT — REFRACTION_MANIFEST
OS_VA1: 20/30
OD_VA2: 20/20(J1+)
OS_AXIS: 175
OS_VA2: 20/20(J1+)
OS_CYLINDER: -0.75
OD_VA1: 20/30
OU_VA: 20/30
OD_AXIS: 110
OD_SPHERE: +0.75
OS_ADD: +2.50
OD_ADD: +2.50
OS_SPHERE: -0.75
OD_CYLINDER: -0.75

## 2020-12-21 ASSESSMENT — SPHEQUIV_DERIVED
OS_SPHEQUIV: -1.125
OD_SPHEQUIV: 0.625
OD_SPHEQUIV: 0.375

## 2020-12-21 ASSESSMENT — REFRACTION_CURRENTRX
OD_CYLINDER: -0.75
OD_AXIS: 120
OS_OVR_VA: 20/
OD_ADD: +2.25
OS_VPRISM_DIRECTION: BF
OD_OVR_VA: 20/
OS_SPHERE: -0.25
OD_SPHERE: +0.75
OD_VPRISM_DIRECTION: BF
OS_ADD: +2.25
OS_AXIS: 175
OS_CYLINDER: -0.50

## 2020-12-21 ASSESSMENT — CONFRONTATIONAL VISUAL FIELD TEST (CVF)
OD_FINDINGS: FULL
OS_FINDINGS: FULL

## 2020-12-21 ASSESSMENT — VISUAL ACUITY
OS_BCVA: 20/30-2
OD_BCVA: 20/25-1

## 2021-02-08 ENCOUNTER — OFFICE VISIT (OUTPATIENT)
Dept: FAMILY MEDICINE CLINIC | Facility: CLINIC | Age: 86
End: 2021-02-08
Payer: MEDICARE

## 2021-02-08 VITALS
BODY MASS INDEX: 33.17 KG/M2 | DIASTOLIC BLOOD PRESSURE: 72 MMHG | TEMPERATURE: 97.3 F | SYSTOLIC BLOOD PRESSURE: 120 MMHG | HEIGHT: 62 IN | OXYGEN SATURATION: 98 % | HEART RATE: 91 BPM | RESPIRATION RATE: 16 BRPM | WEIGHT: 180.25 LBS

## 2021-02-08 DIAGNOSIS — Z00.00 MEDICARE ANNUAL WELLNESS VISIT, SUBSEQUENT: Primary | ICD-10-CM

## 2021-02-08 DIAGNOSIS — E78.00 PURE HYPERCHOLESTEROLEMIA: ICD-10-CM

## 2021-02-08 DIAGNOSIS — E11.9 TYPE 2 DIABETES MELLITUS WITHOUT COMPLICATION, WITHOUT LONG-TERM CURRENT USE OF INSULIN (HCC): ICD-10-CM

## 2021-02-08 DIAGNOSIS — I10 ESSENTIAL HYPERTENSION: ICD-10-CM

## 2021-02-08 LAB — SL AMB POCT HEMOGLOBIN AIC: 7.4 (ref ?–6.5)

## 2021-02-08 PROCEDURE — G0438 PPPS, INITIAL VISIT: HCPCS | Performed by: INTERNAL MEDICINE

## 2021-02-08 PROCEDURE — 1123F ACP DISCUSS/DSCN MKR DOCD: CPT | Performed by: INTERNAL MEDICINE

## 2021-02-08 PROCEDURE — 83036 HEMOGLOBIN GLYCOSYLATED A1C: CPT | Performed by: INTERNAL MEDICINE

## 2021-02-08 PROCEDURE — 36416 COLLJ CAPILLARY BLOOD SPEC: CPT | Performed by: INTERNAL MEDICINE

## 2021-02-08 NOTE — PROGRESS NOTES
BMI Counseling: Body mass index is 32 97 kg/m²  The BMI is above normal  Nutrition recommendations include reducing portion sizes, decreasing overall calorie intake, 3-5 servings of fruits/vegetables daily, reducing fast food intake, consuming healthier snacks, decreasing soda and/or juice intake and moderation in carbohydrate intake  Assessment and Plan:     Problem List Items Addressed This Visit        Endocrine    Type 2 diabetes mellitus without complication, without long-term current use of insulin ([address] )    Relevant Orders    POCT hemoglobin A1c (Completed)       Cardiovascular and Mediastinum    Essential hypertension       Other    Pure hypercholesterolemia      Other Visit Diagnoses     Medicare annual wellness visit, subsequent    -  Primary    BMI 32 0-32 9,adult          Obinna Ernandez doing very well-not planning on any vacations as of yet with the covid situation but she did receive her first covid vaccine dose and is planning on getting her second one-other conditions well controlled-     Preventive health issues were discussed with patient, and age appropriate screening tests were ordered as noted in patient's After Visit Summary  Personalized health advice and appropriate referrals for health education or preventive services given if needed, as noted in patient's After Visit Summary       History of Present Illness:     Patient presents for Medicare Annual Wellness visit    Patient Care Team:  Nilay Turner MD as PCP - General (Internal Medicine)     Problem List:     Patient Active Problem List   Diagnosis    Type 2 diabetes mellitus without complication, without long-term current use of insulin ([address] )    Essential hypertension    Pure hypercholesterolemia    Microalbuminuria      Past Medical and Surgical History:     Past Medical History:   Diagnosis Date    Allergy to sulfa drugs     H/O multiple allergies     Novocaine,Darvocet, Sulfa, Accupril    Hyperlipidemia     Hypertension     Nocturia     Palpitations     Thrombocytopenic disorder (HCC)     Type 2 diabetes mellitus without complication (HCC)     Wears glasses      Past Surgical History:   Procedure Laterality Date    HERNIA REPAIR      HYSTERECTOMY        Family History:     Family History   Problem Relation Age of Onset    Hypertension Mother     Diabetes Mother     Other Mother     Hypertension Father     Heart disease Father       Social History:     E-Cigarette/Vaping    E-Cigarette Use Never User      E-Cigarette/Vaping Substances    Nicotine No     THC No     CBD No     Flavoring No     Other No     Unknown No      Social History     Socioeconomic History    Marital status: /Civil Union     Spouse name: None    Number of children: None    Years of education: None    Highest education level: None   Occupational History    None   Social Needs    Financial resource strain: None    Food insecurity     Worry: None     Inability: None    Transportation needs     Medical: None     Non-medical: None   Tobacco Use    Smoking status: Never Smoker    Smokeless tobacco: Never Used   Substance and Sexual Activity    Alcohol use: Yes     Frequency: Monthly or less    Drug use: Never    Sexual activity: None   Lifestyle    Physical activity     Days per week: None     Minutes per session: None    Stress: None   Relationships    Social connections     Talks on phone: None     Gets together: None     Attends Sikhism service: None     Active member of club or organization: None     Attends meetings of clubs or organizations: None     Relationship status: None    Intimate partner violence     Fear of current or ex partner: None     Emotionally abused: None     Physically abused: None     Forced sexual activity: None   Other Topics Concern    None   Social History Narrative    Tobacco smoking status:   Never smoker      Smoking - how much:   None      Never used smokeless tobacco  Former smokeless tobacco user  Current snuff user  Currently chews tobacco  Currently uses moist powdered tobacco  ----  Not indicated  Not tolerated  Patient refused       Never used electronic cigarettes  Former user of electronic cigarettes  Current user of electronic cigarettes       Most recent tobacco use screenin-      Occupation:   retired teacher      Marital status:         Exercise level:   Occasional      Diet:   Regular       Alcohol intake:   Occasional      Caffeine intake:   Occasional      Seat belts used routinely:   Yes      Sunscreen used routinely:   Yes       Smoke alarm in home:   Yes      Advance directive:   No      Live alone or with others:   with others                                                                                                                                                                                                                                                                                                                                                                                                                                                                                                                                                                                                                                                                                                                                                                                                                                                                                                  Last modified by [id number]   2019, 03:29       Medications and Allergies:     Current Outpatient Medications   Medication Sig Dispense Refill    ezetimibe-simvastatin (VYTORIN) 10-20 mg per tablet TAKE 1 TABLET BY MOUTH  EVERY DAY AT BEDTIME 90 tablet 3    metFORMIN (GLUCOPHAGE) 850 mg tablet TAKE 1 TABLET BY MOUTH  TWICE A  tablet 3    metoprolol succinate (TOPROL-XL) 50 mg 24 hr tablet TAKE 1 TABLET BY MOUTH  DAILY 90 tablet 3    potassium chloride (K-DUR,KLOR-CON) 20 mEq tablet TAKE 1 TABLET BY MOUTH  EVERY DAY 90 tablet 3    valsartan-hydrochlorothiazide (DIOVAN-HCT) 160-12 5 MG per tablet TAKE 1 TABLET BY MOUTH  EVERY DAY 90 tablet 3     No current facility-administered medications for this visit  Allergies   Allergen Reactions    Accupril [Quinapril Hcl]     Novocain [Procaine]     Parabens     Sulfa Antibiotics       Immunizations:     Immunization History   Administered Date(s) Administered    INFLUENZA 05/04/2010, 08/24/2012, 01/06/2014, 12/18/2014, 09/09/2015, 01/11/2017, 09/15/2017, 09/07/2018, 09/13/2019    Influenza, high dose seasonal 0 7 mL 10/08/2020    Meningococcal MCV4P 09/13/2012    Pneumococcal Conjugate 13-Valent 05/04/2016    SARS-CoV-2 / COVID-19 mRNA IM (Elio Radish) 01/27/2021    Typhoid Live, oral 08/05/2014    influenza, injectable, quadrivalent 09/21/2018      Health Maintenance: There are no preventive care reminders to display for this patient  Topic Date Due    DTaP,Tdap,and Td Vaccines (1 - Tdap) 10/10/1956    Pneumococcal Vaccine: 65+ Years (2 of 2 - PPSV23) 05/04/2017      Medicare Health Risk Assessment:     /72 (BP Location: Left arm, Patient Position: Sitting, Cuff Size: Adult)   Pulse 91   Temp (!) 97 3 °F (36 3 °C) (Temporal)   Resp 16   Ht 5' 2" (1 575 m)   Wt 81 8 kg (180 lb 4 oz)   SpO2 98%   BMI 32 97 kg/m²      Ina Mendenhall is here for her Subsequent Wellness visit  Last Medicare Wellness visit information reviewed, patient interviewed and updates made to the record today  Health Risk Assessment:   Patient rates overall health as fair  Patient feels that their physical health rating is same  Eyesight was rated as same  Hearing was rated as same  Patient feels that their emotional and mental health rating is same  Pain experienced in the last 7 days has been some   Patient's pain rating has been 4/10  Patient states that she has experienced no weight loss or gain in last 6 months  Fall Risk Screening: In the past year, patient has experienced: no history of falling in past year      Urinary Incontinence Screening:   Patient has leaked urine accidently in the last six months  Pt wears a pessary     Home Safety:  Patient has trouble with stairs inside or outside of their home  Patient has working smoke alarms and has working carbon monoxide detector  Home safety hazards include: none  Nutrition:   Current diet is Regular and Diabetic  Medications:   Patient is not currently taking any over-the-counter supplements  Patient is able to manage medications  Activities of Daily Living (ADLs)/Instrumental Activities of Daily Living (IADLs):   Walk and transfer into and out of bed and chair?: Yes  Dress and groom yourself?: Yes    Bathe or shower yourself?: Yes    Feed yourself? Yes  Do your laundry/housekeeping?: Yes  Manage your money, pay your bills and track your expenses?: Yes  Make your own meals?: Yes    Do your own shopping?: Yes    Previous Hospitalizations:   Any hospitalizations or ED visits within the last 12 months?: No      Advance Care Planning:   Living will: No    Durable POA for healthcare:  Yes    Advanced directive: No    Advanced directive counseling given: Yes    Five wishes given: Yes      Cognitive Screening:   Provider or family/friend/caregiver concerned regarding cognition?: No    PREVENTIVE SCREENINGS      Cardiovascular Screening:    General: Screening Not Indicated and History Lipid Disorder      Diabetes Screening:     General: Screening Not Indicated and History Diabetes      Colorectal Cancer Screening:     General: Screening Not Indicated      Breast Cancer Screening:     General: Screening Not Indicated      Cervical Cancer Screening:    General: Screening Not Indicated      Abdominal Aortic Aneurysm (AAA) Screening:        General: Screening Not Indicated Lung Cancer Screening:     General: Screening Not Indicated      Hepatitis C Screening:    General: Screening Not Indicated      To Gonzalez MD

## 2021-02-08 NOTE — PATIENT INSTRUCTIONS
Medicare Preventive Visit Patient Instructions  Thank you for completing your Welcome to Medicare Visit or Medicare Annual Wellness Visit today  Your next wellness visit will be due in one year (2/8/2022)  The screening/preventive services that you may require over the next 5-10 years are detailed below  Some tests may not apply to you based off risk factors and/or age  Screening tests ordered at today's visit but not completed yet may show as past due  Also, please note that scanned in results may not display below  Preventive Screenings:  Service Recommendations Previous Testing/Comments   Colorectal Cancer Screening  * Colonoscopy    * Fecal Occult Blood Test (FOBT)/Fecal Immunochemical Test (FIT)  * Fecal DNA/Cologuard Test  * Flexible Sigmoidoscopy Age: 54-65 years old   Colonoscopy: every 10 years (may be performed more frequently if at higher risk)  OR  FOBT/FIT: every 1 year  OR  Cologuard: every 3 years  OR  Sigmoidoscopy: every 5 years  Screening may be recommended earlier than age 48 if at higher risk for colorectal cancer  Also, an individualized decision between you and your healthcare provider will decide whether screening between the ages of 74-80 would be appropriate  Colonoscopy: Not on file  FOBT/FIT: Not on file  Cologuard: Not on file  Sigmoidoscopy: Not on file         Breast Cancer Screening Age: 36 years old  Frequency: every 1-2 years  Not required if history of left and right mastectomy Mammogram: Not on file       Cervical Cancer Screening Between the ages of 21-29, pap smear recommended once every 3 years  Between the ages of 33-67, can perform pap smear with HPV co-testing every 5 years     Recommendations may differ for women with a history of total hysterectomy, cervical cancer, or abnormal pap smears in past  Pap Smear: Not on file       Hepatitis C Screening Once for adults born between [facility]  More frequently in patients at high risk for Hepatitis C Hep C Antibody: Not on file       Diabetes Screening 1-2 times per year if you're at risk for diabetes or have pre-diabetes Fasting glucose: 137 mg/dL   A1C: 7 0       Cholesterol Screening Once every 5 years if you don't have a lipid disorder  May order more often based on risk factors  Lipid panel: 05/07/2020         Other Preventive Screenings Covered by Medicare:  1  Abdominal Aortic Aneurysm (AAA) Screening: covered once if your at risk  You're considered to be at risk if you have a family history of AAA  2  Lung Cancer Screening: covers low dose CT scan once per year if you meet all of the following conditions: (1) Age 50-69; (2) No signs or symptoms of lung cancer; (3) Current smoker or have quit smoking within the last 15 years; (4) You have a tobacco smoking history of at least 30 pack years (packs per day multiplied by number of years you smoked); (5) You get a written order from a healthcare provider  3  Glaucoma Screening: covered annually if you're considered high risk: (1) You have diabetes OR (2) Family history of glaucoma OR (3)  aged 48 and older OR (3)  American aged 72 and older  3  Osteoporosis Screening: covered every 2 years if you meet one of the following conditions: (1) You're estrogen deficient and at risk for osteoporosis based off medical history and other findings; (2) Have a vertebral abnormality; (3) On glucocorticoid therapy for more than 3 months; (4) Have primary hyperparathyroidism; (5) On osteoporosis medications and need to assess response to drug therapy  · Last bone density test (DXA Scan): Not on file  5  HIV Screening: covered annually if you're between the age of 12-76  Also covered annually if you are younger than 13 and older than 72 with risk factors for HIV infection  For pregnant patients, it is covered up to 3 times per pregnancy      Immunizations:  Immunization Recommendations   Influenza Vaccine Annual influenza vaccination during flu season is recommended for all persons aged >= 6 months who do not have contraindications   Pneumococcal Vaccine (Prevnar and Pneumovax)  * Prevnar = PCV13  * Pneumovax = PPSV23   Adults 25-60 years old: 1-3 doses may be recommended based on certain risk factors  Adults 72 years old: Prevnar (PCV13) vaccine recommended followed by Pneumovax (PPSV23) vaccine  If already received PPSV23 since turning 65, then PCV13 recommended at least one year after PPSV23 dose  Hepatitis B Vaccine 3 dose series if at intermediate or high risk (ex: diabetes, end stage renal disease, liver disease)   Tetanus (Td) Vaccine - COST NOT COVERED BY MEDICARE PART B Following completion of primary series, a booster dose should be given every 10 years to maintain immunity against tetanus  Td may also be given as tetanus wound prophylaxis  Tdap Vaccine - COST NOT COVERED BY MEDICARE PART B Recommended at least once for all adults  For pregnant patients, recommended with each pregnancy  Shingles Vaccine (Shingrix) - COST NOT COVERED BY MEDICARE PART B  2 shot series recommended in those aged 48 and above     Health Maintenance Due:  There are no preventive care reminders to display for this patient  Immunizations Due:      Topic Date Due    DTaP,Tdap,and Td Vaccines (1 - Tdap) 10/10/1956    Pneumococcal Vaccine: 65+ Years (2 of 2 - PPSV23) 05/04/2017     Advance Directives   What are advance directives? Advance directives are legal documents that state your wishes and plans for medical care  These plans are made ahead of time in case you lose your ability to make decisions for yourself  Advance directives can apply to any medical decision, such as the treatments you want, and if you want to donate organs  What are the types of advance directives? There are many types of advance directives, and each state has rules about how to use them  You may choose a combination of any of the following:  · Living will:   This is a written record of the treatment you want  You can also choose which treatments you do not want, which to limit, and which to stop at a certain time  This includes surgery, medicine, IV fluid, and tube feedings  · Durable power of  for healthcare [facility]): This is a written record that states who you want to make healthcare choices for you when you are unable to make them for yourself  This person, called a proxy, is usually a family member or a friend  You may choose more than 1 proxy  · Do not resuscitate (DNR) order:  A DNR order is used in case your heart stops beating or you stop breathing  It is a request not to have certain forms of treatment, such as CPR  A DNR order may be included in other types of advance directives  · Medical directive: This covers the care that you want if you are in a coma, near death, or unable to make decisions for yourself  You can list the treatments you want for each condition  Treatment may include pain medicine, surgery, blood transfusions, dialysis, IV or tube feedings, and a ventilator (breathing machine)  · Values history: This document has questions about your views, beliefs, and how you feel and think about life  This information can help others choose the care that you would choose  Why are advance directives important? An advance directive helps you control your care  Although spoken wishes may be used, it is better to have your wishes written down  Spoken wishes can be misunderstood, or not followed  Treatments may be given even if you do not want them  An advance directive may make it easier for your family to make difficult choices about your care  Weight Management   Why it is important to manage your weight:  Being overweight increases your risk of health conditions such as heart disease, high blood pressure, type 2 diabetes, and certain types of cancer  It can also increase your risk for osteoarthritis, sleep apnea, and other respiratory problems  Aim for a slow, steady weight loss  Even a small amount of weight loss can lower your risk of health problems  How to lose weight safely:  A safe and healthy way to lose weight is to eat fewer calories and get regular exercise  You can lose up about 1 pound a week by decreasing the number of calories you eat by 500 calories each day  Healthy meal plan for weight management:  A healthy meal plan includes a variety of foods, contains fewer calories, and helps you stay healthy  A healthy meal plan includes the following:  · Eat whole-grain foods more often  A healthy meal plan should contain fiber  Fiber is the part of grains, fruits, and vegetables that is not broken down by your body  Whole-grain foods are healthy and provide extra fiber in your diet  Some examples of whole-grain foods are whole-wheat breads and pastas, oatmeal, brown rice, and bulgur  · Eat a variety of vegetables every day  Include dark, leafy greens such as spinach, kale, edward greens, and mustard greens  Eat yellow and orange vegetables such as carrots, sweet potatoes, and winter squash  · Eat a variety of fruits every day  Choose fresh or canned fruit (canned in its own juice or light syrup) instead of juice  Fruit juice has very little or no fiber  · Eat low-fat dairy foods  Drink fat-free (skim) milk or 1% milk  Eat fat-free yogurt and low-fat cottage cheese  Try low-fat cheeses such as mozzarella and other reduced-fat cheeses  · Choose meat and other protein foods that are low in fat  Choose beans or other legumes such as split peas or lentils  Choose fish, skinless poultry (chicken or turkey), or lean cuts of red meat (beef or pork)  Before you cook meat or poultry, cut off any visible fat  · Use less fat and oil  Try baking foods instead of frying them  Add less fat, such as margarine, sour cream, regular salad dressing and mayonnaise to foods  Eat fewer high-fat foods   Some examples of high-fat foods include french fries, doughnuts, ice cream, and cakes   · Eat fewer sweets  Limit foods and drinks that are high in sugar  This includes candy, cookies, regular soda, and sweetened drinks  Exercise:  Exercise at least 30 minutes per day on most days of the week  Some examples of exercise include walking, biking, dancing, and swimming  You can also fit in more physical activity by taking the stairs instead of the elevator or parking farther away from stores  Ask your healthcare provider about the best exercise plan for you  © Copyright [employer] 2018 Information is for End User's use only and may not be sold, redistributed or otherwise used for commercial purposes   All illustrations and images included in CareNotes® are the copyrighted property of [employer]  or [address] [place] [address]

## 2021-03-02 ENCOUNTER — LAB (OUTPATIENT)
Dept: LAB | Facility: CLINIC | Age: 86
End: 2021-03-02
Payer: MEDICARE

## 2021-03-02 DIAGNOSIS — E11.9 TYPE 2 DIABETES MELLITUS WITHOUT COMPLICATION, WITHOUT LONG-TERM CURRENT USE OF INSULIN (HCC): ICD-10-CM

## 2021-03-02 LAB
ALBUMIN SERPL BCP-MCNC: 3.8 G/DL (ref 3.5–5)
ALP SERPL-CCNC: 48 U/L (ref 46–116)
ALT SERPL W P-5'-P-CCNC: 13 U/L (ref 12–78)
ANION GAP SERPL CALCULATED.3IONS-SCNC: 4 MMOL/L (ref 4–13)
AST SERPL W P-5'-P-CCNC: 9 U/L (ref 5–45)
BASOPHILS # BLD AUTO: 0.04 THOUSANDS/ΜL (ref 0–0.1)
BASOPHILS NFR BLD AUTO: 1 % (ref 0–1)
BILIRUB SERPL-MCNC: 0.59 MG/DL (ref 0.2–1)
BUN SERPL-MCNC: 24 MG/DL (ref 5–25)
CALCIUM SERPL-MCNC: 9.6 MG/DL (ref 8.3–10.1)
CHLORIDE SERPL-SCNC: 100 MMOL/L (ref 100–108)
CHOLEST SERPL-MCNC: 130 MG/DL (ref 50–200)
CO2 SERPL-SCNC: 32 MMOL/L (ref 21–32)
CREAT SERPL-MCNC: 0.96 MG/DL (ref 0.6–1.3)
EOSINOPHIL # BLD AUTO: 0.08 THOUSAND/ΜL (ref 0–0.61)
EOSINOPHIL NFR BLD AUTO: 2 % (ref 0–6)
ERYTHROCYTE [DISTWIDTH] IN BLOOD BY AUTOMATED COUNT: 13.9 % (ref 11.6–15.1)
GFR SERPL CREATININE-BSD FRML MDRD: 54 ML/MIN/1.73SQ M
GLUCOSE P FAST SERPL-MCNC: 152 MG/DL (ref 65–99)
HCT VFR BLD AUTO: 44.1 % (ref 34.8–46.1)
HDLC SERPL-MCNC: 48 MG/DL
HGB BLD-MCNC: 13.5 G/DL (ref 11.5–15.4)
IMM GRANULOCYTES # BLD AUTO: 0.01 THOUSAND/UL (ref 0–0.2)
IMM GRANULOCYTES NFR BLD AUTO: 0 % (ref 0–2)
LDLC SERPL CALC-MCNC: 61 MG/DL (ref 0–100)
LYMPHOCYTES # BLD AUTO: 1.2 THOUSANDS/ΜL (ref 0.6–4.47)
LYMPHOCYTES NFR BLD AUTO: 28 % (ref 14–44)
MCH RBC QN AUTO: 28.1 PG (ref 26.8–34.3)
MCHC RBC AUTO-ENTMCNC: 30.6 G/DL (ref 31.4–37.4)
MCV RBC AUTO: 92 FL (ref 82–98)
MONOCYTES # BLD AUTO: 0.36 THOUSAND/ΜL (ref 0.17–1.22)
MONOCYTES NFR BLD AUTO: 8 % (ref 4–12)
NEUTROPHILS # BLD AUTO: 2.61 THOUSANDS/ΜL (ref 1.85–7.62)
NEUTS SEG NFR BLD AUTO: 61 % (ref 43–75)
NONHDLC SERPL-MCNC: 82 MG/DL
NRBC BLD AUTO-RTO: 0 /100 WBCS
PLATELET # BLD AUTO: 128 THOUSANDS/UL (ref 149–390)
PMV BLD AUTO: 11.9 FL (ref 8.9–12.7)
POTASSIUM SERPL-SCNC: 3.7 MMOL/L (ref 3.5–5.3)
PROT SERPL-MCNC: 7 G/DL (ref 6.4–8.2)
RBC # BLD AUTO: 4.81 MILLION/UL (ref 3.81–5.12)
SODIUM SERPL-SCNC: 136 MMOL/L (ref 136–145)
TRIGL SERPL-MCNC: 105 MG/DL
TSH SERPL DL<=0.05 MIU/L-ACNC: 3.47 UIU/ML (ref 0.36–3.74)
WBC # BLD AUTO: 4.3 THOUSAND/UL (ref 4.31–10.16)

## 2021-03-02 PROCEDURE — 80053 COMPREHEN METABOLIC PANEL: CPT

## 2021-03-02 PROCEDURE — 82043 UR ALBUMIN QUANTITATIVE: CPT | Performed by: INTERNAL MEDICINE

## 2021-03-02 PROCEDURE — 82570 ASSAY OF URINE CREATININE: CPT | Performed by: INTERNAL MEDICINE

## 2021-03-02 PROCEDURE — 85025 COMPLETE CBC W/AUTO DIFF WBC: CPT

## 2021-03-02 PROCEDURE — 84443 ASSAY THYROID STIM HORMONE: CPT

## 2021-03-02 PROCEDURE — 80061 LIPID PANEL: CPT

## 2021-03-02 PROCEDURE — 36415 COLL VENOUS BLD VENIPUNCTURE: CPT

## 2021-03-03 LAB
CREAT UR-MCNC: 108 MG/DL
MICROALBUMIN UR-MCNC: 132 MG/L (ref 0–20)
MICROALBUMIN/CREAT 24H UR: 122 MG/G CREATININE (ref 0–30)

## 2021-03-09 DIAGNOSIS — E11.9 TYPE 2 DIABETES MELLITUS WITHOUT COMPLICATION, WITHOUT LONG-TERM CURRENT USE OF INSULIN (HCC): Primary | ICD-10-CM

## 2021-03-09 DIAGNOSIS — Z76.0 MEDICATION REFILL: Primary | ICD-10-CM

## 2021-03-09 DIAGNOSIS — Z76.0 MEDICATION REFILL: ICD-10-CM

## 2021-03-09 RX ORDER — BLOOD SUGAR DIAGNOSTIC
STRIP MISCELLANEOUS
Qty: 100 EACH | Refills: 0 | Status: SHIPPED | OUTPATIENT
Start: 2021-03-09 | End: 2021-03-09 | Stop reason: SDUPTHER

## 2021-03-09 RX ORDER — BLOOD SUGAR DIAGNOSTIC
1 STRIP MISCELLANEOUS DAILY
Qty: 100 EACH | Refills: 5 | Status: SHIPPED | OUTPATIENT
Start: 2021-03-09 | End: 2021-03-16 | Stop reason: SDUPTHER

## 2021-03-16 DIAGNOSIS — E11.9 TYPE 2 DIABETES MELLITUS WITHOUT COMPLICATION, WITHOUT LONG-TERM CURRENT USE OF INSULIN (HCC): ICD-10-CM

## 2021-03-16 RX ORDER — BLOOD SUGAR DIAGNOSTIC
1 STRIP MISCELLANEOUS DAILY
Qty: 100 EACH | Refills: 5 | Status: SHIPPED | OUTPATIENT
Start: 2021-03-16 | End: 2021-03-22 | Stop reason: SDUPTHER

## 2021-03-16 NOTE — TELEPHONE ENCOUNTER
Can you resend a new script to [facility] for her diabetic strips,  You send 1 last week but did not have the correct ICD 10 code on it    Pharmacist wants it resent to them      Patient ph # [phone]

## 2021-03-22 DIAGNOSIS — E11.9 TYPE 2 DIABETES MELLITUS WITHOUT COMPLICATION, WITHOUT LONG-TERM CURRENT USE OF INSULIN (HCC): ICD-10-CM

## 2021-03-22 RX ORDER — BLOOD SUGAR DIAGNOSTIC
1 STRIP MISCELLANEOUS DAILY
Qty: 100 EACH | Refills: 5 | Status: SHIPPED | OUTPATIENT
Start: 2021-03-22 | End: 2022-06-14

## 2021-05-17 ENCOUNTER — TELEPHONE (OUTPATIENT)
Dept: URGENT CARE | Facility: CLINIC | Age: 86
End: 2021-05-17

## 2021-05-17 ENCOUNTER — OFFICE VISIT (OUTPATIENT)
Dept: URGENT CARE | Facility: CLINIC | Age: 86
End: 2021-05-17
Payer: MEDICARE

## 2021-05-17 ENCOUNTER — APPOINTMENT (OUTPATIENT)
Dept: RADIOLOGY | Facility: CLINIC | Age: 86
End: 2021-05-17
Payer: MEDICARE

## 2021-05-17 VITALS
SYSTOLIC BLOOD PRESSURE: 169 MMHG | DIASTOLIC BLOOD PRESSURE: 79 MMHG | OXYGEN SATURATION: 98 % | WEIGHT: 170 LBS | BODY MASS INDEX: 31.28 KG/M2 | TEMPERATURE: 98.1 F | HEART RATE: 94 BPM | RESPIRATION RATE: 18 BRPM | HEIGHT: 62 IN

## 2021-05-17 DIAGNOSIS — M54.41 ACUTE RIGHT-SIDED LOW BACK PAIN WITH RIGHT-SIDED SCIATICA: ICD-10-CM

## 2021-05-17 DIAGNOSIS — M54.41 ACUTE RIGHT-SIDED LOW BACK PAIN WITH RIGHT-SIDED SCIATICA: Primary | ICD-10-CM

## 2021-05-17 DIAGNOSIS — M54.89 MIDLINE BACK PAIN, UNSPECIFIED BACK LOCATION, UNSPECIFIED CHRONICITY: Primary | ICD-10-CM

## 2021-05-17 PROCEDURE — 99213 OFFICE O/P EST LOW 20 MIN: CPT | Performed by: PHYSICIAN ASSISTANT

## 2021-05-17 PROCEDURE — G0463 HOSPITAL OUTPT CLINIC VISIT: HCPCS | Performed by: PHYSICIAN ASSISTANT

## 2021-05-17 PROCEDURE — 72100 X-RAY EXAM L-S SPINE 2/3 VWS: CPT

## 2021-05-17 RX ORDER — NAPROXEN 500 MG/1
500 TABLET ORAL 2 TIMES DAILY WITH MEALS
Qty: 8 TABLET | Refills: 0 | Status: SHIPPED | OUTPATIENT
Start: 2021-05-17 | End: 2021-06-10

## 2021-05-17 NOTE — PROGRESS NOTES
[facility]        NAME: Tyson Guerra is a 80 y o  female  : 1935    MRN: [id number]  DATE: May 17, 2021  TIME: 12:06 PM    Assessment and Plan   Acute right-sided low back pain with right-sided sciatica [M54 41]  1  Acute right-sided low back pain with right-sided sciatica  XR spine lumbar 2 or 3 views injury    Ambulatory referral to Physical Therapy    naproxen (EC NAPROSYN) 500 MG EC tablet         Patient Instructions   Patient Instructions   The first line treatment for low back pain is NSAIDS such as Advil/Naproxen for two-four weeks  You can use Ibuprofen over the counter at the dose of 400-600 4x a day or Naproxen over the counter 250 2x a day  I am prescribing naproxen for 4 days  Take twice a day  Tylenol has not benefit for improving low back pain but may be used as an added pain relief  I recommend staying active and using heat for 20 minutes every 3-4 hours  Begin PT  You can try lidocaine patches over the counter  What are the parts of the back? -- The back is made up of   ? Vertebrae - A stack of bones that sit on top of one another like a stack of coins  Each of these bones has a hole in the center  When stacked, the bones form a hollow tube that protects the spinal cord  ? Discs - Rubbery discs sit in between each of the vertebrae to add cushion and allow movement  ? Spinal cord and nerves - The spinal cord is the highway of nerves that connects the brain to the rest of the body  It runs through the vertebrae within the spinal canal  Nerves branch from the spinal cord and pass in between the vertebrae  From there they connect to the arms, the legs, and the organs  (This is why problems in the back can cause leg pain or bladder or bowel problems )  ? Muscles, tendons, and ligaments - Together the muscles, tendons, and ligaments are called the "soft tissues" of the back  These soft tissues support the back and help hold it together      What causes low back pain? -- Many different things can cause low back pain  Most of the time, doctors do not know the exact cause  Back pain can happen if you strain a muscle  This is often what has happened when a person "throws out" their back  This refers to pain that starts suddenly after physical activity, like lifting something heavy or bending the back  Back pain can also happen if you have:  ?Damaged, bulging, or torn discs  ? Arthritis affecting the joints of the spine  ? Bony growths on the vertebrae that crowd nearby nerves  ? A vertebra out of place  ? Narrowing in the spinal canal    Should I get an imaging test? -- Most people do not need an imaging test such an X-ray, CT scan, or MRI  Most cases of back pain go away a few weeks  Doctors usually do not order imaging tests unless there are signs of something unusual   If your doctor does not order an imaging test, do not worry  They can still learn a lot about your pain just from looking you over and talking with you  How can the doctor or nurse tell what is wrong just by talking to me? -- Your symptoms tell your doctor or nurse a lot about the cause of your pain  For example:  ? If your pain started after you did something specific, like lifting a heavy object or twisting your back, you might have strained a muscle  ?If your pain spreads down the back of one thigh, it could be a sign that one of the nerves that go to your leg is being pinched by a bulging or torn disc  ?If your pain goes all the way down both legs, it could be a sign that you have a narrowed spinal canal  This is most often due to bony growths on your spine  How is back pain treated? -- Most people with an episode of low back pain do not have a serious medical problem, and can try simple treatments such as:  ?Staying active - The best thing you can do is to stay as active as possible  People with low back pain recover faster if they stay active  If your pain is severe, you might need to rest for a day or 2   But it's important to get back to walking and moving as soon as possible  While you should avoid heavy lifting and sports while your back hurts, try to keep doing your normal daily activities  Exercises you can try include walking, swimming, or using an exercise bike  Some people also find that [address] or yoga can help with their back pain  Finding activities you enjoy can help you stay active  ? Heat - Some people find that it helps to use a heating pad or heated wrap  Be careful to avoid high heat settings to prevent skin burns  ?Medicines - First, you can try pain medicines that you can get without a prescription  In many cases, doctors suggest first trying a nonsteroidal antiinflammatory drug, or "NSAID " NSAIDs include ibuprofen (sample brand names: Advil, Motrin) and naproxen (sample brand name: Aleve)  These might work better than acetaminophen (Tylenol) for back pain  If non-prescription medicines do not help, let your doctor or nurse know  In some cases, doctors prescribe a medicine to relax the muscles (called a "muscle relaxant")  But keep in mind that muscle relaxants are not generally used in people older than 65  In older people, these medicines can cause side effects such as trouble urinating or confusion  ? Reducing stress - Some people find that it helps to try something called "mindfulness-based stress reduction " This involves going to a group program to practice relaxation and meditation  If your back pain is making you feel anxious or depressed, talk to your doctor or nurse  There are other treatments that can help with these problems  Some people wonder if injections (shots) can help to relieve back pain  In some cases, doctors might recommend a shot of medicine to numb the area or reduce swelling  But this has only been proven to work in specific situations  Follow up with PCP in 3-5 days  Proceed to  ER if symptoms worsen      Chief Complaint     Chief Complaint   Patient presents with    Back Pain     shottes down right side, was carrying boxes of books yesterday          History of Present Illness       The pt is an 80-year-old female presenting with right sided low back pain with radiation down the right leg  No numbness down the legs or saddle anesthesia  Was carrying boxes yesterday when the pain began  She does have an extensive spinal hx  Review of Systems   Review of Systems   Constitutional: Negative for activity change, appetite change, chills, fatigue and fever  HENT: Negative for congestion, rhinorrhea, sinus pressure, sinus pain and sore throat  Respiratory: Negative for cough, chest tightness and shortness of breath  Cardiovascular: Negative for chest pain and palpitations  Gastrointestinal: Negative for diarrhea, nausea and vomiting  Musculoskeletal: Positive for back pain  Negative for arthralgias and myalgias  Skin: Negative for color change and pallor  Neurological: Negative for headaches           Current Medications       Current Outpatient Medications:     ezetimibe-simvastatin (VYTORIN) 10-20 mg per tablet, TAKE 1 TABLET BY MOUTH  EVERY DAY AT BEDTIME, Disp: 90 tablet, Rfl: 3    metFORMIN (GLUCOPHAGE) 850 mg tablet, TAKE 1 TABLET BY MOUTH  TWICE A DAY, Disp: 180 tablet, Rfl: 3    metoprolol succinate (TOPROL-XL) 50 mg 24 hr tablet, TAKE 1 TABLET BY MOUTH  DAILY, Disp: 90 tablet, Rfl: 3    potassium chloride (K-DUR,KLOR-CON) 20 mEq tablet, TAKE 1 TABLET BY MOUTH  EVERY DAY, Disp: 90 tablet, Rfl: 3    valsartan-hydrochlorothiazide (DIOVAN-HCT) 160-12 5 MG per tablet, TAKE 1 TABLET BY MOUTH  EVERY DAY, Disp: 90 tablet, Rfl: 3    glucose blood (FREESTYLE TEST STRIPS) test strip, Use 1 each daily Use as instructed, Disp: 100 each, Rfl: 5    naproxen (EC NAPROSYN) 500 MG EC tablet, Take 1 tablet (500 mg total) by mouth 2 (two) times a day with meals for 4 days, Disp: 8 tablet, Rfl: 0    Current Allergies     Allergies as of 05/17/2021 - Reviewed 05/17/2021   Allergen Reaction Noted    Accupril [quinapril hcl]  05/08/2020    Novocain [procaine]  06/08/2020    Parabens  11/24/2019    Sulfa antibiotics  05/08/2020            The following portions of the patient's history were reviewed and updated as appropriate: allergies, current medications, past family history, past medical history, past social history, past surgical history and problem list      Past Medical History:   Diagnosis Date    Allergy to sulfa drugs     H/O multiple allergies     Novocaine,Darvocet, Sulfa, Accupril    Hyperlipidemia     Hypertension     Nocturia     Palpitations     Thrombocytopenic disorder ([address] )     Type 2 diabetes mellitus without complication ([address] )     Wears glasses        Past Surgical History:   Procedure Laterality Date    HERNIA REPAIR      HYSTERECTOMY         Family History   Problem Relation Age of Onset    Hypertension Mother     Diabetes Mother     Other Mother     Hypertension Father     Heart disease Father          Medications have been verified  Objective   /79   Pulse 94   Temp 98 1 °F (36 7 °C) (Temporal)   Resp 18   Ht 5' 2" (1 575 m)   Wt 77 1 kg (170 lb)   SpO2 98%   BMI 31 09 kg/m²        Physical Exam     Physical Exam  Constitutional:       General: She is not in acute distress  Appearance: Normal appearance  She is normal weight  She is not ill-appearing, toxic-appearing or diaphoretic  HENT:      Head: Normocephalic and atraumatic  Cardiovascular:      Rate and Rhythm: Normal rate and regular rhythm  Heart sounds: Normal heart sounds  No murmur  No friction rub  No gallop  Pulmonary:      Effort: Pulmonary effort is normal  No respiratory distress  Breath sounds: Normal breath sounds  No stridor  No wheezing, rhonchi or rales  Chest:      Chest wall: No tenderness  Abdominal:      General: Abdomen is flat  Bowel sounds are normal  There is no distension  Palpations: Abdomen is soft  Tenderness: There is no abdominal tenderness  There is no guarding  Musculoskeletal: Normal range of motion  General: No tenderness  Comments: + SLR   No pain to palpation   Skin:     General: Skin is warm and dry  Capillary Refill: Capillary refill takes less than 2 seconds  Neurological:      Mental Status: She is alert

## 2021-05-17 NOTE — PATIENT INSTRUCTIONS
The first line treatment for low back pain is NSAIDS such as Advil/Naproxen for two-four weeks  You can use Ibuprofen over the counter at the dose of 400-600 4x a day or Naproxen over the counter 250 2x a day  I am prescribing naproxen for 4 days  Take twice a day  Tylenol has not benefit for improving low back pain but may be used as an added pain relief  I recommend staying active and using heat for 20 minutes every 3-4 hours  Begin PT  You can try lidocaine patches over the counter  What are the parts of the back? -- The back is made up of   ? Vertebrae - A stack of bones that sit on top of one another like a stack of coins  Each of these bones has a hole in the center  When stacked, the bones form a hollow tube that protects the spinal cord  ? Discs - Rubbery discs sit in between each of the vertebrae to add cushion and allow movement  ? Spinal cord and nerves - The spinal cord is the highway of nerves that connects the brain to the rest of the body  It runs through the vertebrae within the spinal canal  Nerves branch from the spinal cord and pass in between the vertebrae  From there they connect to the arms, the legs, and the organs  (This is why problems in the back can cause leg pain or bladder or bowel problems )  ? Muscles, tendons, and ligaments - Together the muscles, tendons, and ligaments are called the "soft tissues" of the back  These soft tissues support the back and help hold it together  What causes low back pain? -- Many different things can cause low back pain  Most of the time, doctors do not know the exact cause  Back pain can happen if you strain a muscle  This is often what has happened when a person "throws out" their back  This refers to pain that starts suddenly after physical activity, like lifting something heavy or bending the back  Back pain can also happen if you have:  ?Damaged, bulging, or torn discs  ? Arthritis affecting the joints of the spine  ? Bony growths on the vertebrae that crowd nearby nerves  ? A vertebra out of place  ? Narrowing in the spinal canal    Should I get an imaging test? -- Most people do not need an imaging test such an X-ray, CT scan, or MRI  Most cases of back pain go away a few weeks  Doctors usually do not order imaging tests unless there are signs of something unusual   If your doctor does not order an imaging test, do not worry  They can still learn a lot about your pain just from looking you over and talking with you  How can the doctor or nurse tell what is wrong just by talking to me? -- Your symptoms tell your doctor or nurse a lot about the cause of your pain  For example:  ? If your pain started after you did something specific, like lifting a heavy object or twisting your back, you might have strained a muscle  ?If your pain spreads down the back of one thigh, it could be a sign that one of the nerves that go to your leg is being pinched by a bulging or torn disc  ?If your pain goes all the way down both legs, it could be a sign that you have a narrowed spinal canal  This is most often due to bony growths on your spine  How is back pain treated? -- Most people with an episode of low back pain do not have a serious medical problem, and can try simple treatments such as:  ?Staying active - The best thing you can do is to stay as active as possible  People with low back pain recover faster if they stay active  If your pain is severe, you might need to rest for a day or 2  But it's important to get back to walking and moving as soon as possible  While you should avoid heavy lifting and sports while your back hurts, try to keep doing your normal daily activities  Exercises you can try include walking, swimming, or using an exercise bike  Some people also find that [address] or yoga can help with their back pain  Finding activities you enjoy can help you stay active  ? Heat - Some people find that it helps to use a heating pad or heated wrap   Be careful to avoid high heat settings to prevent skin burns  ?Medicines - First, you can try pain medicines that you can get without a prescription  In many cases, doctors suggest first trying a nonsteroidal antiinflammatory drug, or "NSAID " NSAIDs include ibuprofen (sample brand names: Advil, Motrin) and naproxen (sample brand name: Aleve)  These might work better than acetaminophen (Tylenol) for back pain  If non-prescription medicines do not help, let your doctor or nurse know  In some cases, doctors prescribe a medicine to relax the muscles (called a "muscle relaxant")  But keep in mind that muscle relaxants are not generally used in people older than 65  In older people, these medicines can cause side effects such as trouble urinating or confusion  ? Reducing stress - Some people find that it helps to try something called "mindfulness-based stress reduction " This involves going to a group program to practice relaxation and meditation  If your back pain is making you feel anxious or depressed, talk to your doctor or nurse  There are other treatments that can help with these problems  Some people wonder if injections (shots) can help to relieve back pain  In some cases, doctors might recommend a shot of medicine to numb the area or reduce swelling  But this has only been proven to work in specific situations

## 2021-06-10 ENCOUNTER — OFFICE VISIT (OUTPATIENT)
Dept: FAMILY MEDICINE CLINIC | Facility: CLINIC | Age: 86
End: 2021-06-10
Payer: MEDICARE

## 2021-06-10 VITALS
RESPIRATION RATE: 16 BRPM | HEART RATE: 82 BPM | SYSTOLIC BLOOD PRESSURE: 116 MMHG | BODY MASS INDEX: 32.57 KG/M2 | DIASTOLIC BLOOD PRESSURE: 70 MMHG | OXYGEN SATURATION: 99 % | TEMPERATURE: 97.2 F | WEIGHT: 177 LBS | HEIGHT: 62 IN

## 2021-06-10 DIAGNOSIS — E78.00 PURE HYPERCHOLESTEROLEMIA: ICD-10-CM

## 2021-06-10 DIAGNOSIS — I10 ESSENTIAL HYPERTENSION: ICD-10-CM

## 2021-06-10 DIAGNOSIS — Z13.31 STANDARDIZED ADULT DEPRESSION SCREENING TOOL COMPLETED: ICD-10-CM

## 2021-06-10 DIAGNOSIS — E11.9 TYPE 2 DIABETES MELLITUS WITHOUT COMPLICATION, WITHOUT LONG-TERM CURRENT USE OF INSULIN (HCC): Primary | ICD-10-CM

## 2021-06-10 DIAGNOSIS — Z92.29 COVID-19 VACCINE SERIES COMPLETED: ICD-10-CM

## 2021-06-10 LAB — SL AMB POCT HEMOGLOBIN AIC: 7.6 (ref ?–6.5)

## 2021-06-10 PROCEDURE — NC001 PR NO CHARGE: Performed by: INTERNAL MEDICINE

## 2021-06-10 PROCEDURE — 99214 OFFICE O/P EST MOD 30 MIN: CPT | Performed by: INTERNAL MEDICINE

## 2021-06-10 PROCEDURE — 83036 HEMOGLOBIN GLYCOSYLATED A1C: CPT | Performed by: INTERNAL MEDICINE

## 2021-06-10 NOTE — ASSESSMENT & PLAN NOTE
Lab Results   Component Value Date    HGBA1C 7 6 (A) 06/10/2021   Doing well A1c% is 7 6%, continue meds

## 2021-12-13 ENCOUNTER — OFFICE VISIT (OUTPATIENT)
Dept: FAMILY MEDICINE CLINIC | Facility: CLINIC | Age: 86
End: 2021-12-13
Payer: MEDICARE

## 2021-12-13 VITALS
HEIGHT: 61 IN | HEART RATE: 88 BPM | BODY MASS INDEX: 34.17 KG/M2 | OXYGEN SATURATION: 96 % | TEMPERATURE: 97.3 F | SYSTOLIC BLOOD PRESSURE: 120 MMHG | WEIGHT: 181 LBS | DIASTOLIC BLOOD PRESSURE: 80 MMHG | RESPIRATION RATE: 16 BRPM

## 2021-12-13 DIAGNOSIS — E78.00 PURE HYPERCHOLESTEROLEMIA: ICD-10-CM

## 2021-12-13 DIAGNOSIS — E78.5 HYPERLIPIDEMIA, UNSPECIFIED HYPERLIPIDEMIA TYPE: ICD-10-CM

## 2021-12-13 DIAGNOSIS — Z23 INFLUENZA VACCINE ADMINISTERED: ICD-10-CM

## 2021-12-13 DIAGNOSIS — I10 ESSENTIAL HYPERTENSION: ICD-10-CM

## 2021-12-13 DIAGNOSIS — N18.31 STAGE 3A CHRONIC KIDNEY DISEASE (HCC): ICD-10-CM

## 2021-12-13 DIAGNOSIS — E11.9 TYPE 2 DIABETES MELLITUS WITHOUT COMPLICATION, WITHOUT LONG-TERM CURRENT USE OF INSULIN (HCC): Primary | ICD-10-CM

## 2021-12-13 DIAGNOSIS — Z23 23-POLYVALENT PNEUMOCOCCAL POLYSACCHARIDE VACCINE ADMINISTERED: ICD-10-CM

## 2021-12-13 LAB — SL AMB POCT HEMOGLOBIN AIC: 8 (ref ?–6.5)

## 2021-12-13 PROCEDURE — 99214 OFFICE O/P EST MOD 30 MIN: CPT | Performed by: INTERNAL MEDICINE

## 2021-12-13 PROCEDURE — G0008 ADMIN INFLUENZA VIRUS VAC: HCPCS | Performed by: INTERNAL MEDICINE

## 2021-12-13 PROCEDURE — 90662 IIV NO PRSV INCREASED AG IM: CPT | Performed by: INTERNAL MEDICINE

## 2021-12-13 PROCEDURE — 90732 PPSV23 VACC 2 YRS+ SUBQ/IM: CPT | Performed by: INTERNAL MEDICINE

## 2021-12-13 PROCEDURE — G0009 ADMIN PNEUMOCOCCAL VACCINE: HCPCS | Performed by: INTERNAL MEDICINE

## 2021-12-13 PROCEDURE — 83036 HEMOGLOBIN GLYCOSYLATED A1C: CPT | Performed by: INTERNAL MEDICINE

## 2021-12-31 DIAGNOSIS — Z76.0 MEDICATION REFILL: ICD-10-CM

## 2022-01-03 RX ORDER — METOPROLOL SUCCINATE 50 MG/1
TABLET, EXTENDED RELEASE ORAL
Qty: 90 TABLET | Refills: 3 | Status: SHIPPED | OUTPATIENT
Start: 2022-01-03 | End: 2022-06-13 | Stop reason: SDUPTHER

## 2022-01-03 RX ORDER — POTASSIUM CHLORIDE 20 MEQ/1
TABLET, EXTENDED RELEASE ORAL
Qty: 90 TABLET | Refills: 3 | Status: SHIPPED | OUTPATIENT
Start: 2022-01-03 | End: 2022-06-13 | Stop reason: SDUPTHER

## 2022-01-03 RX ORDER — VALSARTAN AND HYDROCHLOROTHIAZIDE 160; 12.5 MG/1; MG/1
TABLET, FILM COATED ORAL
Qty: 90 TABLET | Refills: 3 | Status: SHIPPED | OUTPATIENT
Start: 2022-01-03 | End: 2022-06-13 | Stop reason: SDUPTHER

## 2022-01-03 RX ORDER — EZETIMIBE AND SIMVASTATIN 10; 20 MG/1; MG/1
TABLET ORAL
Qty: 90 TABLET | Refills: 3 | Status: SHIPPED | OUTPATIENT
Start: 2022-01-03 | End: 2022-06-13 | Stop reason: SDUPTHER

## 2022-03-10 ENCOUNTER — OFFICE VISIT (OUTPATIENT)
Dept: URGENT CARE | Facility: CLINIC | Age: 87
End: 2022-03-10
Payer: MEDICARE

## 2022-03-10 VITALS
WEIGHT: 168 LBS | OXYGEN SATURATION: 94 % | SYSTOLIC BLOOD PRESSURE: 175 MMHG | BODY MASS INDEX: 31.74 KG/M2 | DIASTOLIC BLOOD PRESSURE: 83 MMHG | HEART RATE: 77 BPM

## 2022-03-10 DIAGNOSIS — S46.911A MUSCLE STRAIN OF RIGHT UPPER ARM, INITIAL ENCOUNTER: Primary | ICD-10-CM

## 2022-03-10 DIAGNOSIS — M79.601 RIGHT ARM PAIN: ICD-10-CM

## 2022-03-10 PROCEDURE — 99213 OFFICE O/P EST LOW 20 MIN: CPT | Performed by: PHYSICIAN ASSISTANT

## 2022-03-10 PROCEDURE — G0463 HOSPITAL OUTPT CLINIC VISIT: HCPCS | Performed by: PHYSICIAN ASSISTANT

## 2022-03-10 NOTE — PROGRESS NOTES
[facility]      NAME: Lashanda Magallon is a 80 y o  female  : 1935    MRN: [id number]  DATE: March 10, 2022  TIME: 12:06 PM    Assessment and Plan   Muscle strain of right upper arm, initial encounter [O61 446H]  1  Muscle strain of right upper arm, initial encounter     2  Right arm pain         Patient Instructions   Discussed risk vs benefit of xray without any injury and will defer xray at present  Suspect muscle strain  To rest as able  Ice as able  Alternate IBU/Tylenol for pain with food  Follow up with PCP/ortho in next 5-7 days if no improvement  Patient agreeable to plan  To present to the ER if symptoms worsen  Chief Complaint     Chief Complaint   Patient presents with    Arm Pain     pt c/o right arm pain from shoulder down since yesterday, does not recall doing anything to it  History of Present Illness   Lashanda Magallon presents to the clinic c/o    Denies any chest pain or SOB  Arm Pain   Incident onset: yesterday  The incident occurred at home  There was no injury mechanism (unknown but does admit she does daily house chores)  Pain location: right upper arm to elbow  Quality: sharp with movement, no pain with rest  The pain has been intermittent since the incident  Pertinent negatives include no chest pain, numbness or tingling  The symptoms are aggravated by movement  Treatments tried: aspirin last night  The treatment provided no relief  Review of Systems   Review of Systems   Constitutional: Negative for chills, diaphoresis, fatigue and fever  HENT: Negative for congestion, ear discharge, ear pain and facial swelling  Eyes: Negative for photophobia, pain, discharge, redness, itching and visual disturbance  Respiratory: Negative for apnea, cough, chest tightness, shortness of breath and wheezing  Cardiovascular: Negative for chest pain and palpitations  Gastrointestinal: Negative for abdominal pain  Musculoskeletal: Positive for arthralgias     Skin: Negative for color change, rash and wound  Neurological: Negative for dizziness, tingling, numbness and headaches  Hematological: Negative for adenopathy           Current Medications     Long-Term Medications   Medication Sig Dispense Refill    ezetimibe-simvastatin (VYTORIN) 10-20 mg per tablet TAKE 1 TABLET BY MOUTH  DAILY AT BEDTIME 90 tablet 3    metFORMIN (GLUCOPHAGE) 850 mg tablet TAKE 1 TABLET BY MOUTH  TWICE DAILY 180 tablet 3    metoprolol succinate (TOPROL-XL) 50 mg 24 hr tablet TAKE 1 TABLET BY MOUTH  DAILY 90 tablet 3    potassium chloride (K-DUR,KLOR-CON) 20 mEq tablet TAKE 1 TABLET BY MOUTH  DAILY 90 tablet 3    valsartan-hydrochlorothiazide (DIOVAN-HCT) 160-12 5 MG per tablet TAKE 1 TABLET BY MOUTH  DAILY 90 tablet 3       Current Allergies     Allergies as of 03/10/2022 - Reviewed 03/10/2022   Allergen Reaction Noted    Accupril [quinapril hcl]  05/08/2020    Novocain [procaine]  06/08/2020    Parabens  11/24/2019    Sulfa antibiotics  05/08/2020            The following portions of the patient's history were reviewed and updated as appropriate: allergies, current medications, past family history, past medical history, past social history, past surgical history and problem list   Past Medical History:   Diagnosis Date    Allergy to sulfa drugs     H/O multiple allergies     Novocaine,Darvocet, Sulfa, Accupril    Hyperlipidemia     Hypertension     Nocturia     Palpitations     Thrombocytopenic disorder (HCC)     Type 2 diabetes mellitus without complication ([facility] [address] )     Wears glasses      Past Surgical History:   Procedure Laterality Date    HERNIA REPAIR      HYSTERECTOMY       Social History     Socioeconomic History    Marital status: /Civil Union     Spouse name: Not on file    Number of children: Not on file    Years of education: Not on file    Highest education level: Not on file   Occupational History    Not on file   Tobacco Use    Smoking status: Never Smoker    Smokeless tobacco: Never Used   Vaping Use    Vaping Use: Never used   Substance and Sexual Activity    Alcohol use:  Yes    Drug use: Never    Sexual activity: Not on file   Other Topics Concern    Not on file   Social History Narrative    Tobacco smoking status:   Never smoker      Smoking - how much:   None      Never used smokeless tobacco  Former smokeless tobacco user  Current snuff user  Currently chews tobacco  Currently uses moist powdered tobacco  ----  Not indicated  Not tolerated  Patient refused       Never used electronic cigarettes  Former user of electronic cigarettes  Current user of electronic cigarettes       Occupation:   retired teacher      Marital status:         Exercise level:   Occasional      Diet:   Regular       Alcohol intake:   Occasional      Caffeine intake:   Occasional      Seat belts used routinely:   Yes      Sunscreen used routinely:   Yes       Smoke alarm in home:   Yes      Advance directive:   No      Live alone or with others:   with others                                                                                                                                                                                                                                                                                                                                                                                                                                                                                                                                                                                                                                                                                                                                                                                                                                                                                                  Last modified by [id number]   07-, 03:29 Social Determinants of Health     Financial Resource Strain: Not on file   Food Insecurity: Not on file   Transportation Needs: Not on file   Physical Activity: Not on file   Stress: Not on file   Social Connections: Not on file   Intimate Partner Violence: Not on file   Housing Stability: Not on file       Objective   BP (!) 175/83   Pulse 77   Wt 76 2 kg (168 lb)   SpO2 94%   BMI 31 74 kg/m²      Physical Exam     Physical Exam  Vitals and nursing note reviewed  Constitutional:       General: She is not in acute distress  Appearance: She is well-developed  She is not diaphoretic  HENT:      Head: Normocephalic and atraumatic  Right Ear: External ear normal       Left Ear: External ear normal       Nose: Nose normal    Eyes:      General: No scleral icterus  Right eye: No discharge  Left eye: No discharge  Conjunctiva/sclera: Conjunctivae normal    Cardiovascular:      Rate and Rhythm: Normal rate and regular rhythm  Heart sounds: Normal heart sounds  No murmur heard  No friction rub  No gallop  Pulmonary:      Effort: Pulmonary effort is normal  No respiratory distress  Breath sounds: Normal breath sounds  No decreased breath sounds, wheezing, rhonchi or rales  Musculoskeletal:      Right shoulder: Normal  No tenderness or bony tenderness  Normal range of motion  Normal strength  Normal pulse  Right upper arm: Normal  No swelling, deformity, tenderness or bony tenderness  Right elbow: Normal  No swelling, deformity or effusion  Normal range of motion  No tenderness  Comments: Mild tenderness to palpation of bicep muscle and pain worsened with arm flexion   Skin:     General: Skin is warm and dry  Coloration: Skin is not pale  Findings: No erythema or rash  Neurological:      Mental Status: She is alert and oriented to person, place, and time  Psychiatric:         Behavior: Behavior normal          Thought Content:  Thought content normal          Judgment: Judgment normal          [employer] PA-C

## 2022-03-15 ENCOUNTER — OFFICE VISIT (OUTPATIENT)
Dept: FAMILY MEDICINE CLINIC | Facility: CLINIC | Age: 87
End: 2022-03-15
Payer: MEDICARE

## 2022-03-15 VITALS
DIASTOLIC BLOOD PRESSURE: 88 MMHG | OXYGEN SATURATION: 98 % | HEIGHT: 61 IN | TEMPERATURE: 97.1 F | RESPIRATION RATE: 16 BRPM | HEART RATE: 91 BPM | SYSTOLIC BLOOD PRESSURE: 140 MMHG | WEIGHT: 175.38 LBS | BODY MASS INDEX: 33.11 KG/M2

## 2022-03-15 DIAGNOSIS — M79.601 RIGHT ARM PAIN: Primary | ICD-10-CM

## 2022-03-15 DIAGNOSIS — F03.90 DEMENTIA WITHOUT BEHAVIORAL DISTURBANCE, UNSPECIFIED DEMENTIA TYPE (HCC): ICD-10-CM

## 2022-03-15 DIAGNOSIS — D69.6 PLATELETS DECREASED (HCC): ICD-10-CM

## 2022-03-15 DIAGNOSIS — R41.89 COGNITIVE DECLINE: ICD-10-CM

## 2022-03-15 DIAGNOSIS — E11.9 TYPE 2 DIABETES MELLITUS WITHOUT COMPLICATION, WITHOUT LONG-TERM CURRENT USE OF INSULIN (HCC): ICD-10-CM

## 2022-03-15 DIAGNOSIS — H91.93 BILATERAL HEARING LOSS, UNSPECIFIED HEARING LOSS TYPE: ICD-10-CM

## 2022-03-15 DIAGNOSIS — N18.31 STAGE 3A CHRONIC KIDNEY DISEASE (HCC): ICD-10-CM

## 2022-03-15 PROCEDURE — 99214 OFFICE O/P EST MOD 30 MIN: CPT | Performed by: INTERNAL MEDICINE

## 2022-03-15 RX ORDER — SENNOSIDES 8.6 MG
650 CAPSULE ORAL EVERY 8 HOURS PRN
Qty: 30 TABLET | Refills: 3 | Status: SHIPPED | OUTPATIENT
Start: 2022-03-15

## 2022-03-15 RX ORDER — NAPROXEN 375 MG/1
375 TABLET ORAL 2 TIMES DAILY WITH MEALS
Qty: 40 TABLET | Refills: 3 | Status: SHIPPED | OUTPATIENT
Start: 2022-03-15 | End: 2022-05-02 | Stop reason: SDUPTHER

## 2022-03-15 NOTE — ASSESSMENT & PLAN NOTE
Administered MMSE today and got 26/30-pt would like to hold off on MRI, PT/OT referral and Neurology referral for now-has been referred to Audiology for her hearing loss

## 2022-03-15 NOTE — PROGRESS NOTES
BMI Counseling: Body mass index is 33 14 kg/m²  The BMI is above normal  Nutrition recommendations include reducing portion sizes, decreasing overall calorie intake, 3-5 servings of fruits/vegetables daily, reducing fast food intake, consuming healthier snacks, decreasing soda and/or juice intake, moderation in carbohydrate intake, increasing intake of lean protein, reducing intake of saturated fat and trans fat and reducing intake of cholesterol  Exercise recommendations include strength training exercises  Assessment/Plan:    Cognitive decline  Administered MMSE today and got 26/30-pt would like to hold off on MRI, PT/OT referral and Neurology referral for now-has been referred to Audiology for her hearing loss    Essential hypertension  BP running a bit high today-will monitor       Diagnoses and all orders for this visit:    Right arm pain  Comments:  Xrays shoulder/arm done, tylenol, naproxen, and voltaren gel ordered -went over with patient how to take  Orders:  -     XR humerus right; Future  -     XR shoulder 2+ vw right; Future  -     acetaminophen (Acetaminophen 8 Hour) 650 mg CR tablet; Take 1 tablet (650 mg total) by mouth every 8 (eight) hours as needed for mild pain  -     naproxen (NAPROSYN) 375 mg tablet; Take 1 tablet (375 mg total) by mouth 2 (two) times a day with meals  -     Diclofenac Sodium (VOLTAREN) 1 %; Apply 2 g topically 3 (three) times a day    BMI 33 0-33 9,adult    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HCC)    Type 2 diabetes mellitus without complication, without long-term current use of insulin (HCC)    Stage 3a chronic kidney disease (HCC)    Cognitive decline  Comments:  Pt wants to hold off on MRI, neuro referral-reads, does puzzles, etc-will contact her daughter David Good to go over things  Orders:  -     Vitamin B12; Future  -     TSH, 3rd generation; Future  -     Folate; Future  -     RPR;  Future    Dementia without behavioral disturbance, unspecified dementia type ([facility] )   -     Vitamin B12; Future  -     TSH, 3rd generation; Future  -     Folate; Future    Bilateral hearing loss, unspecified hearing loss type  Comments:  Referred to Audiology          Subjective:      Patient ID: Jaswant Freire is a 80 y o  female  Lillian Araiza here with pain in her right arm and shoulder and neck-didn't fall or injure her arm recently-went to urgent care but hasn't had any imaging-I also adressed with Lillian Araiza her daughter Hazel's concerns about her balance being off and her short term memory being off-causing confusion and frustration-Dayana agrees that there are issues with short term memory-although she is very well traveled, and reads a lot and tries to do crosswords and soduko-acknowledges that there are issues with her hearing as well-she admits to one fall-bp a bit elevated today as well      The following portions of the patient's history were reviewed and updated as appropriate: current medications, past family history, past medical history, past social history, past surgical history and problem list     Review of Systems   Constitutional: Negative for fatigue  Respiratory: Negative  Cardiovascular: Negative  Gastrointestinal: Negative  Genitourinary: Negative  Musculoskeletal: Positive for arthralgias  Skin: Negative  Neurological: Negative for dizziness, tremors and weakness  Falls X 1   Psychiatric/Behavioral: Positive for behavioral problems  Negative for sleep disturbance  Objective:      /88   Pulse 91   Temp (!) 97 1 °F (36 2 °C) (Temporal)   Resp 16   Ht 5' 1" (1 549 m)   Wt 79 5 kg (175 lb 6 oz)   SpO2 98%   BMI 33 14 kg/m²          Physical Exam  Constitutional:       Appearance: She is obese  HENT:      Head: Normocephalic and atraumatic  Right Ear: Tympanic membrane, ear canal and external ear normal  There is no impacted cerumen        Left Ear: Tympanic membrane, ear canal and external ear normal       Nose: Nose normal       Mouth/Throat: Mouth: Mucous membranes are moist    Eyes:      Extraocular Movements: Extraocular movements intact  Cardiovascular:      Rate and Rhythm: Normal rate and regular rhythm  Pulmonary:      Effort: Pulmonary effort is normal    Musculoskeletal:         General: Normal range of motion  Cervical back: Normal range of motion  Neurological:      General: No focal deficit present  Mental Status: She is alert and oriented to person, place, and time  Sensory: No sensory deficit  Motor: No weakness  Gait: Gait normal    Psychiatric:         Mood and Affect: Mood normal          Thought Content:  Thought content normal

## 2022-03-16 ENCOUNTER — APPOINTMENT (OUTPATIENT)
Dept: LAB | Facility: CLINIC | Age: 87
End: 2022-03-16
Payer: MEDICARE

## 2022-03-16 ENCOUNTER — APPOINTMENT (OUTPATIENT)
Dept: RADIOLOGY | Facility: CLINIC | Age: 87
End: 2022-03-16
Payer: MEDICARE

## 2022-03-16 DIAGNOSIS — R41.89 COGNITIVE DECLINE: ICD-10-CM

## 2022-03-16 DIAGNOSIS — F03.90 DEMENTIA WITHOUT BEHAVIORAL DISTURBANCE, UNSPECIFIED DEMENTIA TYPE (HCC): ICD-10-CM

## 2022-03-16 DIAGNOSIS — E11.9 TYPE 2 DIABETES MELLITUS WITHOUT COMPLICATION, WITHOUT LONG-TERM CURRENT USE OF INSULIN (HCC): ICD-10-CM

## 2022-03-16 DIAGNOSIS — M79.601 RIGHT ARM PAIN: ICD-10-CM

## 2022-03-16 DIAGNOSIS — E78.5 HYPERLIPIDEMIA, UNSPECIFIED HYPERLIPIDEMIA TYPE: ICD-10-CM

## 2022-03-16 LAB
CREAT UR-MCNC: 103 MG/DL
FOLATE SERPL-MCNC: 15.2 NG/ML (ref 3.1–17.5)
MICROALBUMIN UR-MCNC: 222 MG/L (ref 0–20)
MICROALBUMIN/CREAT 24H UR: 216 MG/G CREATININE (ref 0–30)
TSH SERPL DL<=0.05 MIU/L-ACNC: 1.97 UIU/ML (ref 0.36–3.74)
VIT B12 SERPL-MCNC: 195 PG/ML (ref 100–900)

## 2022-03-16 PROCEDURE — 82607 VITAMIN B-12: CPT

## 2022-03-16 PROCEDURE — 82746 ASSAY OF FOLIC ACID SERUM: CPT

## 2022-03-16 PROCEDURE — 84443 ASSAY THYROID STIM HORMONE: CPT

## 2022-03-16 PROCEDURE — 73060 X-RAY EXAM OF HUMERUS: CPT

## 2022-03-16 PROCEDURE — 36415 COLL VENOUS BLD VENIPUNCTURE: CPT

## 2022-03-16 PROCEDURE — 73030 X-RAY EXAM OF SHOULDER: CPT

## 2022-03-16 PROCEDURE — 86592 SYPHILIS TEST NON-TREP QUAL: CPT

## 2022-03-16 PROCEDURE — 82043 UR ALBUMIN QUANTITATIVE: CPT | Performed by: INTERNAL MEDICINE

## 2022-03-16 PROCEDURE — 82570 ASSAY OF URINE CREATININE: CPT | Performed by: INTERNAL MEDICINE

## 2022-03-17 LAB — RPR SER QL: NORMAL

## 2022-05-02 DIAGNOSIS — M79.601 RIGHT ARM PAIN: ICD-10-CM

## 2022-05-02 RX ORDER — NAPROXEN 375 MG/1
375 TABLET ORAL 2 TIMES DAILY WITH MEALS
Qty: 180 TABLET | Refills: 1 | Status: SHIPPED | OUTPATIENT
Start: 2022-05-02 | End: 2022-06-13 | Stop reason: SDUPTHER

## 2022-06-13 ENCOUNTER — OFFICE VISIT (OUTPATIENT)
Dept: FAMILY MEDICINE CLINIC | Facility: CLINIC | Age: 87
End: 2022-06-13
Payer: MEDICARE

## 2022-06-13 VITALS
RESPIRATION RATE: 16 BRPM | TEMPERATURE: 97.2 F | HEART RATE: 85 BPM | SYSTOLIC BLOOD PRESSURE: 130 MMHG | BODY MASS INDEX: 33.25 KG/M2 | DIASTOLIC BLOOD PRESSURE: 98 MMHG | WEIGHT: 176.13 LBS | OXYGEN SATURATION: 98 % | HEIGHT: 61 IN

## 2022-06-13 DIAGNOSIS — Z76.0 MEDICATION REFILL: ICD-10-CM

## 2022-06-13 DIAGNOSIS — Z00.00 MEDICARE ANNUAL WELLNESS VISIT, SUBSEQUENT: Primary | ICD-10-CM

## 2022-06-13 DIAGNOSIS — E11.9 ENCOUNTER FOR DIABETIC FOOT EXAM (HCC): ICD-10-CM

## 2022-06-13 DIAGNOSIS — M79.601 RIGHT ARM PAIN: ICD-10-CM

## 2022-06-13 DIAGNOSIS — N18.31 STAGE 3A CHRONIC KIDNEY DISEASE (HCC): ICD-10-CM

## 2022-06-13 DIAGNOSIS — E11.9 TYPE 2 DIABETES MELLITUS WITHOUT COMPLICATION, WITHOUT LONG-TERM CURRENT USE OF INSULIN (HCC): ICD-10-CM

## 2022-06-13 DIAGNOSIS — I10 ESSENTIAL HYPERTENSION: ICD-10-CM

## 2022-06-13 DIAGNOSIS — Z13.31 STANDARDIZED ADULT DEPRESSION SCREENING TOOL COMPLETED: ICD-10-CM

## 2022-06-13 DIAGNOSIS — E78.00 PURE HYPERCHOLESTEROLEMIA: ICD-10-CM

## 2022-06-13 PROBLEM — H91.93 BILATERAL HEARING LOSS: Status: ACTIVE | Noted: 2022-06-13

## 2022-06-13 PROBLEM — M15.9 OSTEOARTHRITIS OF MULTIPLE JOINTS: Status: ACTIVE | Noted: 2022-06-13

## 2022-06-13 LAB — SL AMB POCT HEMOGLOBIN AIC: 7.7 (ref ?–6.5)

## 2022-06-13 PROCEDURE — 99213 OFFICE O/P EST LOW 20 MIN: CPT | Performed by: INTERNAL MEDICINE

## 2022-06-13 PROCEDURE — 83036 HEMOGLOBIN GLYCOSYLATED A1C: CPT | Performed by: INTERNAL MEDICINE

## 2022-06-13 PROCEDURE — 1123F ACP DISCUSS/DSCN MKR DOCD: CPT | Performed by: INTERNAL MEDICINE

## 2022-06-13 PROCEDURE — G0439 PPPS, SUBSEQ VISIT: HCPCS | Performed by: INTERNAL MEDICINE

## 2022-06-13 RX ORDER — VALSARTAN AND HYDROCHLOROTHIAZIDE 160; 12.5 MG/1; MG/1
1 TABLET, FILM COATED ORAL DAILY
Qty: 90 TABLET | Refills: 3 | Status: SHIPPED | OUTPATIENT
Start: 2022-06-13

## 2022-06-13 RX ORDER — NAPROXEN 375 MG/1
375 TABLET ORAL 2 TIMES DAILY WITH MEALS
Qty: 180 TABLET | Refills: 1 | Status: SHIPPED | OUTPATIENT
Start: 2022-06-13

## 2022-06-13 RX ORDER — EZETIMIBE AND SIMVASTATIN 10; 20 MG/1; MG/1
1 TABLET ORAL
Qty: 90 TABLET | Refills: 3 | Status: SHIPPED | OUTPATIENT
Start: 2022-06-13

## 2022-06-13 RX ORDER — POTASSIUM CHLORIDE 20 MEQ/1
20 TABLET, EXTENDED RELEASE ORAL DAILY
Qty: 90 TABLET | Refills: 3 | Status: SHIPPED | OUTPATIENT
Start: 2022-06-13

## 2022-06-13 RX ORDER — METOPROLOL SUCCINATE 50 MG/1
50 TABLET, EXTENDED RELEASE ORAL DAILY
Qty: 90 TABLET | Refills: 3 | Status: SHIPPED | OUTPATIENT
Start: 2022-06-13

## 2022-06-13 NOTE — ASSESSMENT & PLAN NOTE
Lab Results   Component Value Date    HGBA1C 7 7 (A) 06/13/2022   A1c went down to 7 7%, which is improved from 8 0%-continue metformin

## 2022-06-13 NOTE — PROGRESS NOTES
Assessment and Plan:     Problem List Items Addressed This Visit        Endocrine    Type 2 diabetes mellitus without complication, without long-term current use of insulin ([facility] )       Lab Results   Component Value Date    HGBA1C 7 7 (A) 06/13/2022   A1c went down to 7 7%, which is improved from 8 0%-continue metformin           Relevant Medications    metFORMIN (GLUCOPHAGE) 850 mg tablet    Other Relevant Orders    POCT hemoglobin A1c (Completed)    CBC and differential    Comprehensive metabolic panel    Lipid panel    TSH, 3rd generation    Microalbumin / creatinine urine ratio       Cardiovascular and Mediastinum    Essential hypertension     BP a bit elevated today but pt says it's from ride [place] [address]-will continue bp meds for now           Relevant Medications    valsartan-hydrochlorothiazide (DIOVAN-HCT) 160-12 5 MG per tablet    metoprolol succinate (TOPROL-XL) 50 mg 24 hr tablet    Other Relevant Orders    CBC and differential    Comprehensive metabolic panel    Lipid panel    TSH, 3rd generation       Genitourinary    Stage 3a chronic kidney disease ([place] [address] )       Other    Pure hypercholesterolemia    Relevant Medications    ezetimibe-simvastatin (VYTORIN) 10-20 mg per tablet      Other Visit Diagnoses     Medicare annual wellness visit, subsequent    -  Primary    Encounter for diabetic foot exam ([place] [address] )        Standardized adult depression screening tool completed        BMI 33 0-33 9,adult        Medication refill        Relevant Medications    valsartan-hydrochlorothiazide (DIOVAN-HCT) 160-12 5 MG per tablet    potassium chloride (K-DUR,KLOR-CON) 20 mEq tablet    metFORMIN (GLUCOPHAGE) 850 mg tablet    ezetimibe-simvastatin (VYTORIN) 10-20 mg per tablet    metoprolol succinate (TOPROL-XL) 50 mg 24 hr tablet    Right arm pain        Xrays shoulder/arm done, tylenol, naproxen, and voltaren gel ordered -went over with patient how to take    Relevant Medications    naproxen (NAPROSYN) 375 mg tablet Preventive health issues were discussed with patient, and age appropriate screening tests were ordered as noted in patient's After Visit Summary  Personalized health advice and appropriate referrals for health education or preventive services given if needed, as noted in patient's After Visit Summary  History of Present Illness:     Patient presents for Medicare Annual Wellness visit    Patient Care Team:  Gregory Goldstein MD as PCP - General (Internal Medicine)     Problem List:     Patient Active Problem List   Diagnosis    Type 2 diabetes mellitus without complication, without long-term current use of insulin ([facility] )    Essential hypertension    Pure hypercholesterolemia    Microalbuminuria    Stage 3a chronic kidney disease ([facility] )    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[facility] )    Cognitive decline      Past Medical and Surgical History:     Past Medical History:   Diagnosis Date    Allergy to sulfa drugs     H/O multiple allergies     Novocaine,Darvocet, Sulfa, Accupril    HL (hearing loss)     Hyperlipidemia     Hypertension     Nocturia     Palpitations     Thrombocytopenic disorder ([facility] )     Type 2 diabetes mellitus without complication ([facility] )     Wears glasses      Past Surgical History:   Procedure Laterality Date    HERNIA REPAIR      HYSTERECTOMY        Family History:     Family History   Problem Relation Age of Onset    Hypertension Mother     Diabetes Mother     Other Mother     Hypertension Father     Heart disease Father       Social History:     Social History     Socioeconomic History    Marital status: /Civil Union     Spouse name: None    Number of children: None    Years of education: None    Highest education level: None   Occupational History    None   Tobacco Use    Smoking status: Never Smoker    Smokeless tobacco: Never Used   Vaping Use    Vaping Use: Never used   Substance and Sexual Activity    Alcohol use:  Yes    Drug use: Never    Sexual activity: None   Other Topics Concern    None   Social History Narrative    Tobacco smoking status:   Never smoker      Smoking - how much:   None      Never used smokeless tobacco  Former smokeless tobacco user  Current snuff user  Currently chews tobacco  Currently uses moist powdered tobacco  ----  Not indicated  Not tolerated  Patient refused       Never used electronic cigarettes  Former user of electronic cigarettes  Current user of electronic cigarettes       Occupation:   retired teacher      Marital status:         Exercise level:   Occasional      Diet:   Regular       Alcohol intake:   Occasional      Caffeine intake:   Occasional      Seat belts used routinely:   Yes      Sunscreen used routinely:   Yes       Smoke alarm in home:   Yes      Advance directive:   No      Live alone or with others:   with others                                                                                                                                                                                                                                                                                                                                                                                                                                                                                                                                                                                                                                                                                                                                                                                                                                                                                                  Last modified by [id number]   07-, 03:29      Social Determinants of Health     Financial Resource Strain: Not on file   Food Insecurity: Not on file   Transportation Needs: Not on file   Physical Activity: Not on file   Stress: Not on file Social Connections: Not on file   Intimate Partner Violence: Not on file   Housing Stability: Not on file      Medications and Allergies:     Current Outpatient Medications   Medication Sig Dispense Refill    acetaminophen (Acetaminophen 8 Hour) 650 mg CR tablet Take 1 tablet (650 mg total) by mouth every 8 (eight) hours as needed for mild pain 30 tablet 3    Diclofenac Sodium (VOLTAREN) 1 % Apply 2 g topically 3 (three) times a day 50 g 3    ezetimibe-simvastatin (VYTORIN) 10-20 mg per tablet Take 1 tablet by mouth daily at bedtime 90 tablet 3    Glucosamine HCl (GLUCOSAMINE PO) 1 po daily      glucose blood (FREESTYLE TEST STRIPS) test strip Use 1 each daily Use as instructed 100 each 5    metFORMIN (GLUCOPHAGE) 850 mg tablet Take 1 tablet (850 mg total) by mouth 2 (two) times a day 180 tablet 3    metoprolol succinate (TOPROL-XL) 50 mg 24 hr tablet Take 1 tablet (50 mg total) by mouth daily 90 tablet 3    naproxen (NAPROSYN) 375 mg tablet Take 1 tablet (375 mg total) by mouth 2 (two) times a day with meals 180 tablet 1    potassium chloride (K-DUR,KLOR-CON) 20 mEq tablet Take 1 tablet (20 mEq total) by mouth daily 90 tablet 3    valsartan-hydrochlorothiazide (DIOVAN-HCT) 160-12 5 MG per tablet Take 1 tablet by mouth daily 90 tablet 3     No current facility-administered medications for this visit       Allergies   Allergen Reactions    Accupril [Quinapril Hcl]     Novocain [Procaine]     Parabens     Sulfa Antibiotics       Immunizations:     Immunization History   Administered Date(s) Administered    COVID-19 MODERNA VACC 0 5 ML IM 01/27/2021, 02/24/2021, 10/29/2021, 04/21/2022    INFLUENZA 05/04/2010, 08/24/2012, 01/06/2014, 12/18/2014, 09/09/2015, 01/11/2017, 09/15/2017, 09/07/2018, 09/13/2019    Influenza, high dose seasonal 0 7 mL 10/08/2020, 12/13/2021    Meningococcal MCV4P 09/13/2012    Pneumococcal Conjugate 13-Valent 05/04/2016    Pneumococcal Polysaccharide PPV23 12/13/2021    Typhoid Live, oral 08/05/2014    influenza, injectable, quadrivalent 09/21/2018      Health Maintenance: There are no preventive care reminders to display for this patient  There are no preventive care reminders to display for this patient  Medicare Health Risk Assessment:     /98   Pulse 85   Temp (!) 97 2 °F (36 2 °C) (Temporal)   Resp 16   Ht 5' 1" (1 549 m)   Wt 79 9 kg (176 lb 2 oz)   SpO2 98%   BMI 33 28 kg/m²      Betsy Nunez is here for her Subsequent Wellness visit  Last Medicare Wellness visit information reviewed, patient interviewed and updates made to the record today  Health Risk Assessment:   Patient rates overall health as good  Patient feels that their physical health rating is slightly worse  Patient is satisfied with their life  Eyesight was rated as same  Hearing was rated as slightly worse  Patient feels that their emotional and mental health rating is same  Patients states they are never, rarely angry  Patient states they are often unusually tired/fatigued  Pain experienced in the last 7 days has been some  Patient's pain rating has been 4/10  Patient states that she has experienced no weight loss or gain in last 6 months  Depression Screening:   PHQ-2 Score: 0      Fall Risk Screening: In the past year, patient has experienced: history of falling in past year    Number of falls: 2 or more  Injured during fall?: No    Feels unsteady when standing or walking?: Yes    Worried about falling?: Yes      Urinary Incontinence Screening:   Patient has leaked urine accidently in the last six months  Home Safety:  Patient has trouble with stairs inside or outside of their home  Patient has working smoke alarms and has no working carbon monoxide detector  Home safety hazards include: none  Nutrition:   Current diet is Diabetic, Low Cholesterol, No Added Salt and Limited junk food  Medications:   Patient is currently taking over-the-counter supplements   OTC medications include: 2000mg Glucosamine, 600 mg Calcium  Patient is able to manage medications  Activities of Daily Living (ADLs)/Instrumental Activities of Daily Living (IADLs):   Walk and transfer into and out of bed and chair?: Yes  Dress and groom yourself?: Yes    Bathe or shower yourself?: Yes    Feed yourself? Yes  Do your laundry/housekeeping?: Yes  Manage your money, pay your bills and track your expenses?: Yes  Make your own meals?: Yes    Do your own shopping?: Yes    ADL comments: hosuekeeping and laundry are tiring    Previous Hospitalizations:   Any hospitalizations or ED visits within the last 12 months?: No      Advance Care Planning:   Living will: Yes    Durable POA for healthcare: Yes    Advanced directive: Yes    Advanced directive counseling given: No      Cognitive Screening:   Provider or family/friend/caregiver concerned regarding cognition?: No    PREVENTIVE SCREENINGS      Cardiovascular Screening:    General: History Lipid Disorder    Due for: Lipid Panel      Diabetes Screening:     General: History Diabetes    Due for: Blood Glucose      Colorectal Cancer Screening:     General: Screening Not Indicated      Breast Cancer Screening:     General: Patient Declines      Cervical Cancer Screening:    General: Screening Not Indicated      Abdominal Aortic Aneurysm (AAA) Screening:        General: Screening Not Indicated      Lung Cancer Screening:     General: Screening Not Indicated    Screening, Brief Intervention, and Referral to Treatment (SBIRT)    Screening  Typical number of drinks in a day: 0  Typical number of drinks in a week: 1  Interpretation: Low risk drinking behavior  AUDIT-C Screenin) How often did you have a drink containing alcohol in the past year? monthly or less  2) How many drinks did you have on a typical day when you were drinking in the past year?  1 to 2  3) How often did you have 6 or more drinks on one occasion in the past year? never    AUDIT-C Score: 1  Interpretation: Score 0-2 (female): Negative screen for alcohol misuse    Single Item Drug Screening:  How often have you used an illegal drug (including marijuana) or a prescription medication for non-medical reasons in the past year? never    Single Item Drug Screen Score: 0  Interpretation: Negative screen for possible drug use disorder  Patient's shoes and socks removed  Right Foot/Ankle   Right Foot Inspection  Skin Exam: skin normal and skin intact  No dry skin, no warmth, no callus, no erythema, no maceration, no abnormal color, no pre-ulcer, no ulcer and no callus  Toe Exam: ROM and strength within normal limits  Sensory   Proprioception: intact  Monofilament testing: intact    Vascular  Capillary refills: < 3 seconds  The right DP pulse is 2+  The right PT pulse is 2+  Left Foot/Ankle  Left Foot Inspection  Skin Exam: skin normal and skin intact  No dry skin, no warmth, no erythema, no maceration, normal color, no pre-ulcer, no ulcer and no callus  Toe Exam: ROM and strength within normal limits  Sensory   Proprioception: intact  Monofilament testing: intact    Vascular  Capillary refills: < 3 seconds  The left DP pulse is 2+  The left PT pulse is 2+       Assign Risk Category  No deformity present  No loss of protective sensation  No weak pulses  Risk: 0      Serge Gallegos MD

## 2022-06-13 NOTE — ASSESSMENT & PLAN NOTE
Cautious use of naproxen-has some baseline renal insufficiency-will continue to monitor-advised to alternate with 8 hour tylenol and take with food-only take if needed

## 2022-06-13 NOTE — PATIENT INSTRUCTIONS
Medicare Preventive Visit Patient Instructions  Thank you for completing your Welcome to Medicare Visit or Medicare Annual Wellness Visit today  Your next wellness visit will be due in one year (6/14/2023)  The screening/preventive services that you may require over the next 5-10 years are detailed below  Some tests may not apply to you based off risk factors and/or age  Screening tests ordered at today's visit but not completed yet may show as past due  Also, please note that scanned in results may not display below  Preventive Screenings:  Service Recommendations Previous Testing/Comments   Colorectal Cancer Screening  * Colonoscopy    * Fecal Occult Blood Test (FOBT)/Fecal Immunochemical Test (FIT)  * Fecal DNA/Cologuard Test  * Flexible Sigmoidoscopy Age: 54-65 years old   Colonoscopy: every 10 years (may be performed more frequently if at higher risk)  OR  FOBT/FIT: every 1 year  OR  Cologuard: every 3 years  OR  Sigmoidoscopy: every 5 years  Screening may be recommended earlier than age 48 if at higher risk for colorectal cancer  Also, an individualized decision between you and your healthcare provider will decide whether screening between the ages of 74-80 would be appropriate  Colonoscopy: Not on file  FOBT/FIT: Not on file  Cologuard: Not on file  Sigmoidoscopy: Not on file          Breast Cancer Screening Age: 36 years old  Frequency: every 1-2 years  Not required if history of left and right mastectomy Mammogram: 08/05/2011        Cervical Cancer Screening Between the ages of 21-29, pap smear recommended once every 3 years  Between the ages of 33-67, can perform pap smear with HPV co-testing every 5 years     Recommendations may differ for women with a history of total hysterectomy, cervical cancer, or abnormal pap smears in past  Pap Smear: Not on file        Hepatitis C Screening Once for adults born between [facility]  More frequently in patients at high risk for Hepatitis C Hep C Antibody: Not on file        Diabetes Screening 1-2 times per year if you're at risk for diabetes or have pre-diabetes Fasting glucose: 152 mg/dL   A1C: 8 0        Cholesterol Screening Once every 5 years if you don't have a lipid disorder  May order more often based on risk factors  Lipid panel: 03/02/2021          Other Preventive Screenings Covered by Medicare:  1  Abdominal Aortic Aneurysm (AAA) Screening: covered once if your at risk  You're considered to be at risk if you have a family history of AAA  2  Lung Cancer Screening: covers low dose CT scan once per year if you meet all of the following conditions: (1) Age 50-69; (2) No signs or symptoms of lung cancer; (3) Current smoker or have quit smoking within the last 15 years; (4) You have a tobacco smoking history of at least 30 pack years (packs per day multiplied by number of years you smoked); (5) You get a written order from a healthcare provider  3  Glaucoma Screening: covered annually if you're considered high risk: (1) You have diabetes OR (2) Family history of glaucoma OR (3)  aged 48 and older OR (3)  American aged 72 and older  3  Osteoporosis Screening: covered every 2 years if you meet one of the following conditions: (1) You're estrogen deficient and at risk for osteoporosis based off medical history and other findings; (2) Have a vertebral abnormality; (3) On glucocorticoid therapy for more than 3 months; (4) Have primary hyperparathyroidism; (5) On osteoporosis medications and need to assess response to drug therapy  · Last bone density test (DXA Scan): Not on file  5  HIV Screening: covered annually if you're between the age of 12-76  Also covered annually if you are younger than 13 and older than 72 with risk factors for HIV infection  For pregnant patients, it is covered up to 3 times per pregnancy      Immunizations:  Immunization Recommendations   Influenza Vaccine Annual influenza vaccination during flu season is recommended for all persons aged >= 6 months who do not have contraindications   Pneumococcal Vaccine (Prevnar and Pneumovax)  * Prevnar = PCV13  * Pneumovax = PPSV23   Adults 25-60 years old: 1-3 doses may be recommended based on certain risk factors  Adults 72 years old: Prevnar (PCV13) vaccine recommended followed by Pneumovax (PPSV23) vaccine  If already received PPSV23 since turning 65, then PCV13 recommended at least one year after PPSV23 dose  Hepatitis B Vaccine 3 dose series if at intermediate or high risk (ex: diabetes, end stage renal disease, liver disease)   Tetanus (Td) Vaccine - COST NOT COVERED BY MEDICARE PART B Following completion of primary series, a booster dose should be given every 10 years to maintain immunity against tetanus  Td may also be given as tetanus wound prophylaxis  Tdap Vaccine - COST NOT COVERED BY MEDICARE PART B Recommended at least once for all adults  For pregnant patients, recommended with each pregnancy  Shingles Vaccine (Shingrix) - COST NOT COVERED BY MEDICARE PART B  2 shot series recommended in those aged 48 and above     Health Maintenance Due:  There are no preventive care reminders to display for this patient  Immunizations Due:      Topic Date Due    DTaP,Tdap,and Td Vaccines (1 - Tdap) Never done     Advance Directives   What are advance directives? Advance directives are legal documents that state your wishes and plans for medical care  These plans are made ahead of time in case you lose your ability to make decisions for yourself  Advance directives can apply to any medical decision, such as the treatments you want, and if you want to donate organs  What are the types of advance directives? There are many types of advance directives, and each state has rules about how to use them  You may choose a combination of any of the following:  · Living will: This is a written record of the treatment you want   You can also choose which treatments you do not want, which to limit, and which to stop at a certain time  This includes surgery, medicine, IV fluid, and tube feedings  · Durable power of  for healthcare [facility]): This is a written record that states who you want to make healthcare choices for you when you are unable to make them for yourself  This person, called a proxy, is usually a family member or a friend  You may choose more than 1 proxy  · Do not resuscitate (DNR) order:  A DNR order is used in case your heart stops beating or you stop breathing  It is a request not to have certain forms of treatment, such as CPR  A DNR order may be included in other types of advance directives  · Medical directive: This covers the care that you want if you are in a coma, near death, or unable to make decisions for yourself  You can list the treatments you want for each condition  Treatment may include pain medicine, surgery, blood transfusions, dialysis, IV or tube feedings, and a ventilator (breathing machine)  · Values history: This document has questions about your views, beliefs, and how you feel and think about life  This information can help others choose the care that you would choose  Why are advance directives important? An advance directive helps you control your care  Although spoken wishes may be used, it is better to have your wishes written down  Spoken wishes can be misunderstood, or not followed  Treatments may be given even if you do not want them  An advance directive may make it easier for your family to make difficult choices about your care  Weight Management   Why it is important to manage your weight:  Being overweight increases your risk of health conditions such as heart disease, high blood pressure, type 2 diabetes, and certain types of cancer  It can also increase your risk for osteoarthritis, sleep apnea, and other respiratory problems  Aim for a slow, steady weight loss   Even a small amount of weight loss can lower your risk of health problems  How to lose weight safely:  A safe and healthy way to lose weight is to eat fewer calories and get regular exercise  You can lose up about 1 pound a week by decreasing the number of calories you eat by 500 calories each day  Healthy meal plan for weight management:  A healthy meal plan includes a variety of foods, contains fewer calories, and helps you stay healthy  A healthy meal plan includes the following:  · Eat whole-grain foods more often  A healthy meal plan should contain fiber  Fiber is the part of grains, fruits, and vegetables that is not broken down by your body  Whole-grain foods are healthy and provide extra fiber in your diet  Some examples of whole-grain foods are whole-wheat breads and pastas, oatmeal, brown rice, and bulgur  · Eat a variety of vegetables every day  Include dark, leafy greens such as spinach, kale, edward greens, and mustard greens  Eat yellow and orange vegetables such as carrots, sweet potatoes, and winter squash  · Eat a variety of fruits every day  Choose fresh or canned fruit (canned in its own juice or light syrup) instead of juice  Fruit juice has very little or no fiber  · Eat low-fat dairy foods  Drink fat-free (skim) milk or 1% milk  Eat fat-free yogurt and low-fat cottage cheese  Try low-fat cheeses such as mozzarella and other reduced-fat cheeses  · Choose meat and other protein foods that are low in fat  Choose beans or other legumes such as split peas or lentils  Choose fish, skinless poultry (chicken or turkey), or lean cuts of red meat (beef or pork)  Before you cook meat or poultry, cut off any visible fat  · Use less fat and oil  Try baking foods instead of frying them  Add less fat, such as margarine, sour cream, regular salad dressing and mayonnaise to foods  Eat fewer high-fat foods  Some examples of high-fat foods include french fries, doughnuts, ice cream, and cakes  · Eat fewer sweets    Limit foods and drinks that are high in sugar  This includes candy, cookies, regular soda, and sweetened drinks  Exercise:  Exercise at least 30 minutes per day on most days of the week  Some examples of exercise include walking, biking, dancing, and swimming  You can also fit in more physical activity by taking the stairs instead of the elevator or parking farther away from stores  Ask your healthcare provider about the best exercise plan for you  © Copyright [employer] 2018 Information is for End User's use only and may not be sold, redistributed or otherwise used for commercial purposes   All illustrations and images included in CareNotes® are the copyrighted property of A [employer] , [employer]  or [address] [place] [address]

## 2022-06-21 LAB
LEFT EYE DIABETIC RETINOPATHY: NORMAL
RIGHT EYE DIABETIC RETINOPATHY: NORMAL

## 2022-09-03 DIAGNOSIS — M25.562 CHRONIC PAIN OF LEFT KNEE: Primary | ICD-10-CM

## 2022-09-03 DIAGNOSIS — G89.29 CHRONIC PAIN OF LEFT KNEE: Primary | ICD-10-CM

## 2022-09-06 ENCOUNTER — APPOINTMENT (OUTPATIENT)
Dept: RADIOLOGY | Facility: CLINIC | Age: 87
End: 2022-09-06
Payer: MEDICARE

## 2022-09-06 ENCOUNTER — OFFICE VISIT (OUTPATIENT)
Dept: OBGYN CLINIC | Facility: CLINIC | Age: 87
End: 2022-09-06
Payer: MEDICARE

## 2022-09-06 VITALS
DIASTOLIC BLOOD PRESSURE: 76 MMHG | SYSTOLIC BLOOD PRESSURE: 128 MMHG | WEIGHT: 176.6 LBS | BODY MASS INDEX: 33.34 KG/M2 | HEART RATE: 88 BPM | HEIGHT: 61 IN

## 2022-09-06 DIAGNOSIS — M17.12 PRIMARY OSTEOARTHRITIS OF LEFT KNEE: Primary | ICD-10-CM

## 2022-09-06 DIAGNOSIS — G89.29 CHRONIC PAIN OF LEFT KNEE: ICD-10-CM

## 2022-09-06 DIAGNOSIS — M25.562 CHRONIC PAIN OF LEFT KNEE: ICD-10-CM

## 2022-09-06 PROCEDURE — 73562 X-RAY EXAM OF KNEE 3: CPT

## 2022-09-06 PROCEDURE — 99203 OFFICE O/P NEW LOW 30 MIN: CPT | Performed by: ORTHOPAEDIC SURGERY

## 2022-09-06 PROCEDURE — 20610 DRAIN/INJ JOINT/BURSA W/O US: CPT | Performed by: ORTHOPAEDIC SURGERY

## 2022-09-06 RX ORDER — BETAMETHASONE SODIUM PHOSPHATE AND BETAMETHASONE ACETATE 3; 3 MG/ML; MG/ML
12 INJECTION, SUSPENSION INTRA-ARTICULAR; INTRALESIONAL; INTRAMUSCULAR; SOFT TISSUE
Status: COMPLETED | OUTPATIENT
Start: 2022-09-06 | End: 2022-09-06

## 2022-09-06 RX ADMIN — BETAMETHASONE SODIUM PHOSPHATE AND BETAMETHASONE ACETATE 12 MG: 3; 3 INJECTION, SUSPENSION INTRA-ARTICULAR; INTRALESIONAL; INTRAMUSCULAR; SOFT TISSUE at 15:58

## 2022-09-06 NOTE — PROGRESS NOTES
Orthopaedics Office Visit - 1st Patient Visit    ASSESSMENT/PLAN:    Assessment:   Left knee severe osteoarthritis      Plan:   - Discussed conservative treatment with patient at length  - Offered patient cortisone injection  Patient accepted and tolerated well  - Offered patient brace and physical therapy  Patient declined at this time  - Over the counter analgesics as needed / directed   - Ice / heat as directed   - discussed that patient showed monitor blood glucose level does after cortisone injection  - Discussed that cortisone injections can be repeated every 3 months as needed  - Follow up 6 weeks       To Do Next Visit:  Evaluate knee pain     _____________________________________________________  CHIEF COMPLAINT:  Chief Complaint   Patient presents with    Left Knee - Pain         SUBJECTIVE:  Latia Azevedo is a 80 y o  female who presents to the office for a initial evaluation of her left knee  Patient states that her symptoms have been ongoing the past 3 weeks in duration with no injury of note  Patient states that her pain is on the lateral aspect of the knee with that becomes worse with walking  Patient states she had a arthroscopic knee surgery performed approximately 20 years ago for the left knee  Patient states she has been using a over-the-counter pain cream for her knee which has provided minimal relief of symptoms  Patient has not been icing or heating the knee at all  Patient has a history of type 2 diabetes hypertension stage 3 kidney disease  Patient offers no other complaints at this time        PAST MEDICAL HISTORY:  Past Medical History:   Diagnosis Date    Allergy to sulfa drugs     H/O multiple allergies     Novocaine,Darvocet, Sulfa, Accupril    HL (hearing loss)     Hyperlipidemia     Hypertension     Nocturia     Palpitations     Thrombocytopenic disorder (HCC)     Type 2 diabetes mellitus without complication (HCC)     Wears glasses        PAST SURGICAL HISTORY:  Past Surgical History:   Procedure Laterality Date    HERNIA REPAIR      HYSTERECTOMY         FAMILY HISTORY:  Family History   Problem Relation Age of Onset    Hypertension Mother     Diabetes Mother     Other Mother     Hypertension Father     Heart disease Father        SOCIAL HISTORY:  Social History     Tobacco Use    Smoking status: Never Smoker    Smokeless tobacco: Never Used   Vaping Use    Vaping Use: Never used   Substance Use Topics    Alcohol use: Yes    Drug use: Never       MEDICATIONS:    Current Outpatient Medications:     acetaminophen (Acetaminophen 8 Hour) 650 mg CR tablet, Take 1 tablet (650 mg total) by mouth every 8 (eight) hours as needed for mild pain, Disp: 30 tablet, Rfl: 3    Diclofenac Sodium (VOLTAREN) 1 %, Apply 2 g topically 3 (three) times a day, Disp: 50 g, Rfl: 3    ezetimibe-simvastatin (VYTORIN) 10-20 mg per tablet, Take 1 tablet by mouth daily at bedtime, Disp: 90 tablet, Rfl: 3    FREESTYLE TEST STRIPS test strip, TEST DAILY  AS DIRECTED, Disp: 100 each, Rfl: 5    Glucosamine HCl (GLUCOSAMINE PO), 1 po daily, Disp: , Rfl:     metFORMIN (GLUCOPHAGE) 850 mg tablet, Take 1 tablet (850 mg total) by mouth 2 (two) times a day, Disp: 180 tablet, Rfl: 3    metoprolol succinate (TOPROL-XL) 50 mg 24 hr tablet, Take 1 tablet (50 mg total) by mouth daily, Disp: 90 tablet, Rfl: 3    naproxen (NAPROSYN) 375 mg tablet, Take 1 tablet (375 mg total) by mouth 2 (two) times a day with meals, Disp: 180 tablet, Rfl: 1    potassium chloride (K-DUR,KLOR-CON) 20 mEq tablet, Take 1 tablet (20 mEq total) by mouth daily, Disp: 90 tablet, Rfl: 3    valsartan-hydrochlorothiazide (DIOVAN-HCT) 160-12 5 MG per tablet, Take 1 tablet by mouth daily, Disp: 90 tablet, Rfl: 3    ALLERGIES:  Allergies   Allergen Reactions    Accupril [Quinapril Hcl]     Novocain [Procaine]     Parabens     Sulfa Antibiotics        REVIEW OF SYSTEMS:  MSK: left knee pain   Neuro:  WNL   Pertinent items are otherwise noted in HPI  A comprehensive review of systems was otherwise negative  LABS:  HgA1c:   Lab Results   Component Value Date    HGBA1C 7 7 (A) 06/13/2022     BMP:   Lab Results   Component Value Date    CALCIUM 9 6 03/02/2021    K 3 7 03/02/2021    CO2 32 03/02/2021     03/02/2021    BUN 24 03/02/2021    CREATININE 0 96 03/02/2021     CBC: No components found for: CBC    _____________________________________________________  PHYSICAL EXAMINATION:  Vital signs: /76   Pulse 88   Ht 5' 1" (1 549 m)   Wt 80 1 kg (176 lb 9 6 oz)   BMI 33 37 kg/m²   General: No acute distress, awake and alert  Psychiatric: Mood and affect appear appropriate  HEENT: Trachea Midline, No torticollis, no apparent facial trauma  Cardiovascular: No audible murmurs; Extremities appear perfused  Pulmonary: No audible wheezing or stridor  Skin: No open lesions; see further details (if any) below      MUSCULOSKELETAL EXAMINATION:  Left knee examination:  - Patient sitting comfortably in the office in no acute distress   - Soft tissue swelling present in the medial left knee with no erythema or ecchymosis present  The extremity appears well-perfused overall   - tenderness palpation noted over the medial and lateral joint lines  No other bony or soft tissue tenderness to palpation noted at this time  - 0-105 degrees range of motion present with minimal pain  - NV intact    _____________________________________________________  STUDIES REVIEWED:  I personally reviewed the images and interpretation is as follows:  Left knee XR 3 views:  Severe medial and patellofemoral joint space narrowing with osteophyte formation present  No acute osseous abnormalities present  PROCEDURES PERFORMED:  Large joint arthrocentesis: L knee  Universal Protocol:  Consent: Verbal consent obtained    Risks and benefits: risks, benefits and alternatives were discussed  Consent given by: patient  Patient understanding: patient states understanding of the procedure being performed  Site marked: the operative site was marked  Patient identity confirmed: verbally with patient    Supporting Documentation  Indications: pain   Procedure Details  Location: knee - L knee  Preparation: Patient was prepped and draped in the usual sterile fashion  Needle size: 22 G  Ultrasound guidance: no  Approach: anterolateral  Medications administered: 12 mg betamethasone acetate-betamethasone sodium phosphate 6 (3-3) mg/mL    Patient tolerance: patient tolerated the procedure well with no immediate complications  Dressing:  Sterile dressing applied              ALAN Avila MD

## 2022-09-06 NOTE — PATIENT INSTRUCTIONS
- Discussed conservative treatment with patient at length  - Offered patient cortisone injection  Patient accepted and tolerated well  - Offered patient brace and physical therapy    Patient declined at this time  - Over the counter analgesics as needed / directed   - Ice / heat as directed   - discussed that patient showed monitor blood glucose level does after cortisone injection  - Discussed that cortisone injections can be repeated every 3 months as needed  - Follow up 6 weeks

## 2022-09-27 DIAGNOSIS — E11.9 TYPE 2 DIABETES MELLITUS WITHOUT COMPLICATION, WITHOUT LONG-TERM CURRENT USE OF INSULIN (HCC): ICD-10-CM

## 2022-09-27 RX ORDER — BLOOD-GLUCOSE METER
KIT MISCELLANEOUS
Qty: 100 STRIP | Refills: 5 | Status: SHIPPED | OUTPATIENT
Start: 2022-09-27

## 2022-10-18 ENCOUNTER — OFFICE VISIT (OUTPATIENT)
Dept: OBGYN CLINIC | Facility: CLINIC | Age: 87
End: 2022-10-18
Payer: MEDICARE

## 2022-10-18 VITALS
HEIGHT: 61 IN | WEIGHT: 175 LBS | DIASTOLIC BLOOD PRESSURE: 105 MMHG | HEART RATE: 75 BPM | BODY MASS INDEX: 33.04 KG/M2 | SYSTOLIC BLOOD PRESSURE: 172 MMHG

## 2022-10-18 DIAGNOSIS — M17.12 PRIMARY OSTEOARTHRITIS OF LEFT KNEE: Primary | ICD-10-CM

## 2022-10-18 PROCEDURE — 99213 OFFICE O/P EST LOW 20 MIN: CPT | Performed by: ORTHOPAEDIC SURGERY

## 2022-10-18 NOTE — PROGRESS NOTES
Orthopaedics Office Visit - Follow Up Patient Visit    ASSESSMENT/PLAN:    Assessment:   Severe osteoarthritis of the left knee   Pain refractory to CSI    Plan:   I explained my findings with the patient  At this point, she may try a euflexxa injection or meet with a total joint replacement surgeon  She would like to proceed with the euflexxa  We will call patient following authorization  To Do Next Visit:  Ferny Coral injection; first dose     _____________________________________________________  CHIEF COMPLAINT:  Chief Complaint   Patient presents with   • Left Knee - Follow-up         SUBJECTIVE:  Obed Morley is a 80 y o  female who presents today for 6 week f/u regarding left knee pain  She previously has a cortisone injection performed on 09/06/2022  She states it helped relieve a majority of her pain for the first few weeks and now she feels like it is wearing off  Patient has a hx of type II diabetes, hypertension and stage III kidney disease  PAST MEDICAL HISTORY:  Past Medical History:   Diagnosis Date   • Allergy to sulfa drugs    • H/O multiple allergies     Novocaine,Darvocet, Sulfa, Accupril   • HL (hearing loss)    • Hyperlipidemia    • Hypertension    • Nocturia    • Palpitations    • Thrombocytopenic disorder (HCC)    • Type 2 diabetes mellitus without complication (HCC)    • Wears glasses        PAST SURGICAL HISTORY:  Past Surgical History:   Procedure Laterality Date   • HERNIA REPAIR     • HYSTERECTOMY         FAMILY HISTORY:  Family History   Problem Relation Age of Onset   • Hypertension Mother    • Diabetes Mother    • Other Mother    • Hypertension Father    • Heart disease Father        SOCIAL HISTORY:  Social History     Tobacco Use   • Smoking status: Never Smoker   • Smokeless tobacco: Never Used   Vaping Use   • Vaping Use: Never used   Substance Use Topics   • Alcohol use:  Yes   • Drug use: Never       MEDICATIONS:    Current Outpatient Medications:   •  acetaminophen (Acetaminophen 8 Hour) 650 mg CR tablet, Take 1 tablet (650 mg total) by mouth every 8 (eight) hours as needed for mild pain, Disp: 30 tablet, Rfl: 3  •  Diclofenac Sodium (VOLTAREN) 1 %, Apply 2 g topically 3 (three) times a day, Disp: 50 g, Rfl: 3  •  ezetimibe-simvastatin (VYTORIN) 10-20 mg per tablet, Take 1 tablet by mouth daily at bedtime, Disp: 90 tablet, Rfl: 3  •  FREESTYLE LITE test strip, USE 1 EACH DAILY USE AS INSTRUCTED, Disp: 100 strip, Rfl: 5  •  Glucosamine HCl (GLUCOSAMINE PO), 1 po daily, Disp: , Rfl:   •  metFORMIN (GLUCOPHAGE) 850 mg tablet, Take 1 tablet (850 mg total) by mouth 2 (two) times a day, Disp: 180 tablet, Rfl: 3  •  metoprolol succinate (TOPROL-XL) 50 mg 24 hr tablet, Take 1 tablet (50 mg total) by mouth daily, Disp: 90 tablet, Rfl: 3  •  naproxen (NAPROSYN) 375 mg tablet, Take 1 tablet (375 mg total) by mouth 2 (two) times a day with meals, Disp: 180 tablet, Rfl: 1  •  potassium chloride (K-DUR,KLOR-CON) 20 mEq tablet, Take 1 tablet (20 mEq total) by mouth daily, Disp: 90 tablet, Rfl: 3  •  valsartan-hydrochlorothiazide (DIOVAN-HCT) 160-12 5 MG per tablet, Take 1 tablet by mouth daily, Disp: 90 tablet, Rfl: 3    ALLERGIES:  Allergies   Allergen Reactions   • Accupril [Quinapril Hcl]    • Novocain [Procaine]    • Parabens    • Sulfa Antibiotics        REVIEW OF SYSTEMS:  MSK: left knee   Neuro: intact  Pertinent items are otherwise noted in HPI  A comprehensive review of systems was otherwise negative      LABS:  HgA1c:   Lab Results   Component Value Date    HGBA1C 7 7 (A) 06/13/2022     BMP:   Lab Results   Component Value Date    CALCIUM 9 6 03/02/2021    K 3 7 03/02/2021    CO2 32 03/02/2021     03/02/2021    BUN 24 03/02/2021    CREATININE 0 96 03/02/2021     CBC: No components found for: CBC    _____________________________________________________  PHYSICAL EXAMINATION:  Vital signs: BP (!) 172/105   Pulse 75   Ht 5' 1" (1 549 m)   Wt 79 4 kg (175 lb)   BMI 33 07 kg/m²   General: No acute distress, awake and alert  Psychiatric: Mood and affect appear appropriate  HEENT: Trachea Midline, No torticollis, no apparent facial trauma  Cardiovascular: No audible murmurs; Extremities appear perfused  Pulmonary: No audible wheezing or stridor  Skin: No open lesions; see further details (if any) below    MUSCULOSKELETAL EXAMINATION:  Extremities: Left Knee   - swelling present over the left knee joint   - sensation intact  Medial joint line tednerness present  Expected bony enlargement present  - pulse intact   + ankl df/pf, ehl/fhl motor function      _____________________________________________________  STUDIES REVIEWED:  I personally reviewed the images and interpretation is as follows: No new images performed during today's visit  XR performed on 09/06/2022 were reviewed and XR demonstrates severe arthritis of the left knee     PROCEDURES PERFORMED:  No procedures performed during today's visit           Scribe Attestation    I,:  Phylliss Goldberg am acting as a scribe while in the presence of the attending physician :       I,:  Phill Villanueva MD personally performed the services described in this documentation    as scribed in my presence :

## 2022-11-08 ENCOUNTER — PROCEDURE VISIT (OUTPATIENT)
Dept: OBGYN CLINIC | Facility: CLINIC | Age: 87
End: 2022-11-08

## 2022-11-08 VITALS
BODY MASS INDEX: 32.89 KG/M2 | DIASTOLIC BLOOD PRESSURE: 84 MMHG | HEIGHT: 61 IN | HEART RATE: 80 BPM | WEIGHT: 174.2 LBS | SYSTOLIC BLOOD PRESSURE: 158 MMHG

## 2022-11-08 DIAGNOSIS — M17.12 PRIMARY OSTEOARTHRITIS OF LEFT KNEE: Primary | ICD-10-CM

## 2022-11-08 RX ORDER — HYALURONATE SODIUM 10 MG/ML
20 SYRINGE (ML) INTRAARTICULAR
Status: COMPLETED | OUTPATIENT
Start: 2022-11-08 | End: 2022-11-08

## 2022-11-08 RX ADMIN — Medication 20 MG: at 10:49

## 2022-11-08 NOTE — PROGRESS NOTES
[facility]      NAME: Darleen Cardozo is a 80 y o  female  : 1935    MRN: [id number]  DATE: 2022  TIME: 10:49 AM    Assessment and Plan   Primary osteoarthritis of left knee [M17 12]  1  Primary osteoarthritis of left knee  Large joint arthrocentesis: L knee         Large joint arthrocentesis: L knee  Universal Protocol:  Consent: Verbal consent obtained  Risks and benefits: risks, benefits and alternatives were discussed  Consent given by: patient  Patient understanding: patient states understanding of the procedure being performed  Site marked: the operative site was marked  Patient identity confirmed: verbally with patient    Supporting Documentation  Indications: pain   Procedure Details  Location: knee - L knee  Preparation: Patient was prepped and draped in the usual sterile fashion  Needle size: 22 G  Ultrasound guidance: no  Approach: anterolateral  Medications administered: 20 mg Sodium Hyaluronate 20 MG/2ML    Patient tolerance: patient tolerated the procedure well with no immediate complications  Dressing:  Sterile dressing applied            Patient Instructions     - Follow up 1 week for Euflexxa #2 left knee   - Over the counter analgesics as needed / directed   - Ice / heat as directed       Chief Complaint     Chief Complaint   Patient presents with   • Left Knee - Follow-up         History of Present Illness       Patient is a an 29-year-old female presenting to the office for viscosupplementation  Euflexxa #1 left knee  Patient denies any new worsening symptoms in the knee  Patient offers no other complaints at this time            Current Medications       Current Outpatient Medications:   •  acetaminophen (Acetaminophen 8 Hour) 650 mg CR tablet, Take 1 tablet (650 mg total) by mouth every 8 (eight) hours as needed for mild pain, Disp: 30 tablet, Rfl: 3  •  Diclofenac Sodium (VOLTAREN) 1 %, Apply 2 g topically 3 (three) times a day, Disp: 50 g, Rfl: 3  • ezetimibe-simvastatin (VYTORIN) 10-20 mg per tablet, Take 1 tablet by mouth daily at bedtime, Disp: 90 tablet, Rfl: 3  •  FREESTYLE LITE test strip, USE 1 EACH DAILY USE AS INSTRUCTED, Disp: 100 strip, Rfl: 5  •  Glucosamine HCl (GLUCOSAMINE PO), 1 po daily, Disp: , Rfl:   •  metFORMIN (GLUCOPHAGE) 850 mg tablet, Take 1 tablet (850 mg total) by mouth 2 (two) times a day, Disp: 180 tablet, Rfl: 3  •  metoprolol succinate (TOPROL-XL) 50 mg 24 hr tablet, Take 1 tablet (50 mg total) by mouth daily, Disp: 90 tablet, Rfl: 3  •  naproxen (NAPROSYN) 375 mg tablet, Take 1 tablet (375 mg total) by mouth 2 (two) times a day with meals, Disp: 180 tablet, Rfl: 1  •  potassium chloride (K-DUR,KLOR-CON) 20 mEq tablet, Take 1 tablet (20 mEq total) by mouth daily, Disp: 90 tablet, Rfl: 3  •  valsartan-hydrochlorothiazide (DIOVAN-HCT) 160-12 5 MG per tablet, Take 1 tablet by mouth daily, Disp: 90 tablet, Rfl: 3    Current Allergies     Allergies as of 11/08/2022 - Reviewed 11/08/2022   Allergen Reaction Noted   • Accupril [quinapril hcl]  05/08/2020   • Novocain [procaine]  06/08/2020   • Parabens  11/24/2019   • Sulfa antibiotics  05/08/2020            The following portions of the patient's history were reviewed and updated as appropriate: allergies, current medications, past family history, past medical history, past social history, past surgical history and problem list      Past Medical History:   Diagnosis Date   • Allergy to sulfa drugs    • H/O multiple allergies     Novocaine,Darvocet, Sulfa, Accupril   • HL (hearing loss)    • Hyperlipidemia    • Hypertension    • Nocturia    • Palpitations    • Thrombocytopenic disorder (HCC)    • Type 2 diabetes mellitus without complication (HCC)    • Wears glasses        Past Surgical History:   Procedure Laterality Date   • HERNIA REPAIR     • HYSTERECTOMY         Family History   Problem Relation Age of Onset   • Hypertension Mother    • Diabetes Mother    • Other Mother    • Hypertension Father    • Heart disease Father          Medications have been verified  Objective   /84   Pulse 80   Ht 5' 1" (1 549 m)   Wt 79 kg (174 lb 3 2 oz)   BMI 32 91 kg/m²   No LMP recorded  Patient has had a hysterectomy

## 2022-11-15 ENCOUNTER — PROCEDURE VISIT (OUTPATIENT)
Dept: OBGYN CLINIC | Facility: CLINIC | Age: 87
End: 2022-11-15

## 2022-11-15 VITALS — BODY MASS INDEX: 32.85 KG/M2 | HEIGHT: 61 IN | WEIGHT: 174 LBS

## 2022-11-15 DIAGNOSIS — M79.601 RIGHT ARM PAIN: ICD-10-CM

## 2022-11-15 DIAGNOSIS — M17.12 PRIMARY OSTEOARTHRITIS OF LEFT KNEE: Primary | ICD-10-CM

## 2022-11-15 RX ORDER — HYALURONATE SODIUM 10 MG/ML
20 SYRINGE (ML) INTRAARTICULAR
Status: COMPLETED | OUTPATIENT
Start: 2022-11-15 | End: 2022-11-15

## 2022-11-15 RX ORDER — NAPROXEN 375 MG/1
TABLET ORAL
Qty: 180 TABLET | Refills: 1 | Status: SHIPPED | OUTPATIENT
Start: 2022-11-15

## 2022-11-15 RX ADMIN — Medication 20 MG: at 14:08

## 2022-11-15 NOTE — PROGRESS NOTES
[facility]      NAME: Obed Morley is a 80 y o  female  : 1935    MRN: [id number]  DATE: November 15, 2022  TIME: 2:09 PM    Assessment and Plan   Primary osteoarthritis of left knee [M17 12]  1  Primary osteoarthritis of left knee  Large joint arthrocentesis: L knee       Large joint arthrocentesis: L knee  Universal Protocol:  Consent: Verbal consent obtained  Risks and benefits: risks, benefits and alternatives were discussed  Consent given by: patient  Patient understanding: patient states understanding of the procedure being performed  Site marked: the operative site was marked  Patient identity confirmed: verbally with patient    Supporting Documentation  Indications: pain   Procedure Details  Location: knee - L knee  Preparation: Patient was prepped and draped in the usual sterile fashion  Needle size: 22 G  Ultrasound guidance: no  Approach: anterolateral  Medications administered: 20 mg Sodium Hyaluronate 20 MG/2ML    Patient tolerance: patient tolerated the procedure well with no immediate complications  Dressing:  Sterile dressing applied        Patient Instructions     - Follow up Euflexxa #3 left knee   - Over the counter analgesics as needed / directed   - Ice / heat as directed       Chief Complaint     Chief Complaint   Patient presents with   • Left Knee - Follow-up         History of Present Illness       Patient is 77-year-old female presenting to the office for viscosupplementation  Euflexxa #2 left knee  Patient denies any new worsening symptoms in the knee  Patient offers no other complaints at this time        Current Medications       Current Outpatient Medications:   •  acetaminophen (Acetaminophen 8 Hour) 650 mg CR tablet, Take 1 tablet (650 mg total) by mouth every 8 (eight) hours as needed for mild pain, Disp: 30 tablet, Rfl: 3  •  ezetimibe-simvastatin (VYTORIN) 10-20 mg per tablet, Take 1 tablet by mouth daily at bedtime, Disp: 90 tablet, Rfl: 3  •  FREESTYLE LITE test strip, USE 1 EACH DAILY USE AS INSTRUCTED, Disp: 100 strip, Rfl: 5  •  Glucosamine HCl (GLUCOSAMINE PO), 1 po daily, Disp: , Rfl:   •  metFORMIN (GLUCOPHAGE) 850 mg tablet, Take 1 tablet (850 mg total) by mouth 2 (two) times a day, Disp: 180 tablet, Rfl: 3  •  metoprolol succinate (TOPROL-XL) 50 mg 24 hr tablet, Take 1 tablet (50 mg total) by mouth daily, Disp: 90 tablet, Rfl: 3  •  naproxen (NAPROSYN) 375 mg tablet, TAKE 1 TABLET BY MOUTH  TWICE DAILY WITH MEALS, Disp: 180 tablet, Rfl: 1  •  potassium chloride (K-DUR,KLOR-CON) 20 mEq tablet, Take 1 tablet (20 mEq total) by mouth daily, Disp: 90 tablet, Rfl: 3  •  valsartan-hydrochlorothiazide (DIOVAN-HCT) 160-12 5 MG per tablet, Take 1 tablet by mouth daily, Disp: 90 tablet, Rfl: 3  •  Diclofenac Sodium (VOLTAREN) 1 %, Apply 2 g topically 3 (three) times a day, Disp: 50 g, Rfl: 3    Current Allergies     Allergies as of 11/15/2022 - Reviewed 11/15/2022   Allergen Reaction Noted   • Accupril [quinapril hcl]  05/08/2020   • Novocain [procaine]  06/08/2020   • Parabens  11/24/2019   • Sulfa antibiotics  05/08/2020            The following portions of the patient's history were reviewed and updated as appropriate: allergies, current medications, past family history, past medical history, past social history, past surgical history and problem list      Past Medical History:   Diagnosis Date   • Allergy to sulfa drugs    • H/O multiple allergies     Novocaine,Darvocet, Sulfa, Accupril   • HL (hearing loss)    • Hyperlipidemia    • Hypertension    • Nocturia    • Palpitations    • Thrombocytopenic disorder (HCC)    • Type 2 diabetes mellitus without complication (HCC)    • Wears glasses        Past Surgical History:   Procedure Laterality Date   • HERNIA REPAIR     • HYSTERECTOMY         Family History   Problem Relation Age of Onset   • Hypertension Mother    • Diabetes Mother    • Other Mother    • Hypertension Father    • Heart disease Father Medications have been verified  Objective   Ht 5' 1" (1 549 m)   Wt 78 9 kg (174 lb)   BMI 32 88 kg/m²   No LMP recorded  Patient has had a hysterectomy

## 2022-11-22 ENCOUNTER — PROCEDURE VISIT (OUTPATIENT)
Dept: OBGYN CLINIC | Facility: CLINIC | Age: 87
End: 2022-11-22

## 2022-11-22 VITALS
DIASTOLIC BLOOD PRESSURE: 98 MMHG | SYSTOLIC BLOOD PRESSURE: 159 MMHG | BODY MASS INDEX: 32.85 KG/M2 | HEART RATE: 99 BPM | HEIGHT: 61 IN | WEIGHT: 174 LBS

## 2022-11-22 DIAGNOSIS — M17.12 PRIMARY OSTEOARTHRITIS OF LEFT KNEE: Primary | ICD-10-CM

## 2022-11-22 RX ORDER — HYALURONATE SODIUM 10 MG/ML
20 SYRINGE (ML) INTRAARTICULAR
Status: COMPLETED | OUTPATIENT
Start: 2022-11-22 | End: 2022-11-22

## 2022-11-22 RX ADMIN — Medication 20 MG: at 13:23

## 2022-11-22 NOTE — PROGRESS NOTES
[facility]      NAME: Zuleyma Hurtado is a 80 y o  female  : 1935    MRN: [id number]  DATE: 2022  TIME: 1:23 PM    Assessment and Plan   Primary osteoarthritis of left knee [M17 12]  1  Primary osteoarthritis of left knee  Large joint arthrocentesis: L knee          Large joint arthrocentesis: L knee  Universal Protocol:  Consent: Verbal consent obtained  Risks and benefits: risks, benefits and alternatives were discussed  Consent given by: patient  Patient understanding: patient states understanding of the procedure being performed  Site marked: the operative site was marked  Patient identity confirmed: verbally with patient    Supporting Documentation  Indications: pain   Procedure Details  Location: knee - L knee  Preparation: Patient was prepped and draped in the usual sterile fashion  Needle size: 22 G  Ultrasound guidance: no  Approach: anterolateral  Medications administered: 20 mg Sodium Hyaluronate 20 MG/2ML    Patient tolerance: patient tolerated the procedure well with no immediate complications  Dressing:  Sterile dressing applied          Patient Instructions     - follow-up 6 weeks  - discussed that Visco injections can be repeated every 6 months as needed  - Over the counter analgesics as needed / directed   - Ice / heat as directed       Chief Complaint     Chief Complaint   Patient presents with   • Left Knee - Follow-up         History of Present Illness       Patient is an 59-year-old female presenting to the office for viscosupplementation  Euflexxa #3 left knee  Patient denies any new worsening symptoms in the knee  Patient offers no other complaints at this time            Current Medications       Current Outpatient Medications:   •  acetaminophen (Acetaminophen 8 Hour) 650 mg CR tablet, Take 1 tablet (650 mg total) by mouth every 8 (eight) hours as needed for mild pain, Disp: 30 tablet, Rfl: 3  •  ezetimibe-simvastatin (VYTORIN) 10-20 mg per tablet, Take 1 tablet by mouth daily at bedtime, Disp: 90 tablet, Rfl: 3  •  FREESTYLE LITE test strip, USE 1 EACH DAILY USE AS INSTRUCTED, Disp: 100 strip, Rfl: 5  •  Glucosamine HCl (GLUCOSAMINE PO), 1 po daily, Disp: , Rfl:   •  metFORMIN (GLUCOPHAGE) 850 mg tablet, Take 1 tablet (850 mg total) by mouth 2 (two) times a day, Disp: 180 tablet, Rfl: 3  •  metoprolol succinate (TOPROL-XL) 50 mg 24 hr tablet, Take 1 tablet (50 mg total) by mouth daily, Disp: 90 tablet, Rfl: 3  •  naproxen (NAPROSYN) 375 mg tablet, TAKE 1 TABLET BY MOUTH  TWICE DAILY WITH MEALS, Disp: 180 tablet, Rfl: 1  •  potassium chloride (K-DUR,KLOR-CON) 20 mEq tablet, Take 1 tablet (20 mEq total) by mouth daily, Disp: 90 tablet, Rfl: 3  •  valsartan-hydrochlorothiazide (DIOVAN-HCT) 160-12 5 MG per tablet, Take 1 tablet by mouth daily, Disp: 90 tablet, Rfl: 3  •  Diclofenac Sodium (VOLTAREN) 1 %, Apply 2 g topically 3 (three) times a day, Disp: 50 g, Rfl: 3    Current Allergies     Allergies as of 11/22/2022 - Reviewed 11/22/2022   Allergen Reaction Noted   • Accupril [quinapril hcl]  05/08/2020   • Novocain [procaine]  06/08/2020   • Parabens  11/24/2019   • Sulfa antibiotics  05/08/2020            The following portions of the patient's history were reviewed and updated as appropriate: allergies, current medications, past family history, past medical history, past social history, past surgical history and problem list      Past Medical History:   Diagnosis Date   • Allergy to sulfa drugs    • H/O multiple allergies     Novocaine,Darvocet, Sulfa, Accupril   • HL (hearing loss)    • Hyperlipidemia    • Hypertension    • Nocturia    • Palpitations    • Thrombocytopenic disorder (HCC)    • Type 2 diabetes mellitus without complication (HCC)    • Wears glasses        Past Surgical History:   Procedure Laterality Date   • HERNIA REPAIR     • HYSTERECTOMY         Family History   Problem Relation Age of Onset   • Hypertension Mother    • Diabetes Mother    • Other Mother    • Hypertension Father    • Heart disease Father          Medications have been verified  Objective   /98   Pulse 99   Ht 5' 1" (1 549 m)   Wt 78 9 kg (174 lb)   BMI 32 88 kg/m²   No LMP recorded  Patient has had a hysterectomy

## 2022-12-08 ENCOUNTER — APPOINTMENT (OUTPATIENT)
Dept: LAB | Facility: CLINIC | Age: 87
End: 2022-12-08

## 2022-12-08 DIAGNOSIS — I10 ESSENTIAL HYPERTENSION: ICD-10-CM

## 2022-12-08 DIAGNOSIS — E11.9 TYPE 2 DIABETES MELLITUS WITHOUT COMPLICATION, WITHOUT LONG-TERM CURRENT USE OF INSULIN (HCC): ICD-10-CM

## 2022-12-08 LAB
ALBUMIN SERPL BCP-MCNC: 3.7 G/DL (ref 3.5–5)
ALP SERPL-CCNC: 55 U/L (ref 46–116)
ALT SERPL W P-5'-P-CCNC: 18 U/L (ref 12–78)
ANION GAP SERPL CALCULATED.3IONS-SCNC: 5 MMOL/L (ref 4–13)
AST SERPL W P-5'-P-CCNC: 11 U/L (ref 5–45)
BASOPHILS # BLD AUTO: 0.03 THOUSANDS/ÂΜL (ref 0–0.1)
BASOPHILS NFR BLD AUTO: 1 % (ref 0–1)
BILIRUB SERPL-MCNC: 0.56 MG/DL (ref 0.2–1)
BUN SERPL-MCNC: 21 MG/DL (ref 5–25)
CALCIUM SERPL-MCNC: 9 MG/DL (ref 8.3–10.1)
CHLORIDE SERPL-SCNC: 100 MMOL/L (ref 96–108)
CHOLEST SERPL-MCNC: 113 MG/DL
CO2 SERPL-SCNC: 29 MMOL/L (ref 21–32)
CREAT SERPL-MCNC: 0.92 MG/DL (ref 0.6–1.3)
CREAT UR-MCNC: 37.8 MG/DL
EOSINOPHIL # BLD AUTO: 0.1 THOUSAND/ÂΜL (ref 0–0.61)
EOSINOPHIL NFR BLD AUTO: 2 % (ref 0–6)
ERYTHROCYTE [DISTWIDTH] IN BLOOD BY AUTOMATED COUNT: 14.2 % (ref 11.6–15.1)
GFR SERPL CREATININE-BSD FRML MDRD: 56 ML/MIN/1.73SQ M
GLUCOSE P FAST SERPL-MCNC: 159 MG/DL (ref 65–99)
HCT VFR BLD AUTO: 41.1 % (ref 34.8–46.1)
HDLC SERPL-MCNC: 53 MG/DL
HGB BLD-MCNC: 12.5 G/DL (ref 11.5–15.4)
IMM GRANULOCYTES # BLD AUTO: 0.01 THOUSAND/UL (ref 0–0.2)
IMM GRANULOCYTES NFR BLD AUTO: 0 % (ref 0–2)
LDLC SERPL CALC-MCNC: 43 MG/DL (ref 0–100)
LYMPHOCYTES # BLD AUTO: 0.98 THOUSANDS/ÂΜL (ref 0.6–4.47)
LYMPHOCYTES NFR BLD AUTO: 19 % (ref 14–44)
MCH RBC QN AUTO: 28.9 PG (ref 26.8–34.3)
MCHC RBC AUTO-ENTMCNC: 30.4 G/DL (ref 31.4–37.4)
MCV RBC AUTO: 95 FL (ref 82–98)
MICROALBUMIN UR-MCNC: 150 MG/L (ref 0–20)
MICROALBUMIN/CREAT 24H UR: 397 MG/G CREATININE (ref 0–30)
MONOCYTES # BLD AUTO: 0.43 THOUSAND/ÂΜL (ref 0.17–1.22)
MONOCYTES NFR BLD AUTO: 8 % (ref 4–12)
NEUTROPHILS # BLD AUTO: 3.57 THOUSANDS/ÂΜL (ref 1.85–7.62)
NEUTS SEG NFR BLD AUTO: 70 % (ref 43–75)
NONHDLC SERPL-MCNC: 60 MG/DL
NRBC BLD AUTO-RTO: 0 /100 WBCS
PLATELET # BLD AUTO: 114 THOUSANDS/UL (ref 149–390)
PMV BLD AUTO: 12.6 FL (ref 8.9–12.7)
POTASSIUM SERPL-SCNC: 3.8 MMOL/L (ref 3.5–5.3)
PROT SERPL-MCNC: 6.7 G/DL (ref 6.4–8.4)
RBC # BLD AUTO: 4.33 MILLION/UL (ref 3.81–5.12)
SODIUM SERPL-SCNC: 134 MMOL/L (ref 135–147)
TRIGL SERPL-MCNC: 84 MG/DL
TSH SERPL DL<=0.05 MIU/L-ACNC: 4.09 UIU/ML (ref 0.45–4.5)
WBC # BLD AUTO: 5.12 THOUSAND/UL (ref 4.31–10.16)

## 2022-12-10 LAB
EST. AVERAGE GLUCOSE BLD GHB EST-MCNC: 186 MG/DL
HBA1C MFR BLD: 8.1 %

## 2022-12-13 ENCOUNTER — OFFICE VISIT (OUTPATIENT)
Dept: FAMILY MEDICINE CLINIC | Facility: CLINIC | Age: 87
End: 2022-12-13

## 2022-12-13 VITALS
TEMPERATURE: 97.3 F | WEIGHT: 173.25 LBS | RESPIRATION RATE: 16 BRPM | DIASTOLIC BLOOD PRESSURE: 80 MMHG | HEIGHT: 61 IN | SYSTOLIC BLOOD PRESSURE: 120 MMHG | OXYGEN SATURATION: 98 % | HEART RATE: 74 BPM | BODY MASS INDEX: 32.71 KG/M2

## 2022-12-13 DIAGNOSIS — R41.89 COGNITIVE DECLINE: ICD-10-CM

## 2022-12-13 DIAGNOSIS — E78.00 PURE HYPERCHOLESTEROLEMIA: ICD-10-CM

## 2022-12-13 DIAGNOSIS — E11.9 TYPE 2 DIABETES MELLITUS WITHOUT COMPLICATION, WITHOUT LONG-TERM CURRENT USE OF INSULIN (HCC): Primary | ICD-10-CM

## 2022-12-13 DIAGNOSIS — E78.5 HYPERLIPIDEMIA, UNSPECIFIED HYPERLIPIDEMIA TYPE: ICD-10-CM

## 2022-12-13 DIAGNOSIS — D17.1 LIPOMA OF TORSO: ICD-10-CM

## 2022-12-13 DIAGNOSIS — I10 ESSENTIAL HYPERTENSION: ICD-10-CM

## 2022-12-13 DIAGNOSIS — M15.9 OSTEOARTHRITIS OF MULTIPLE JOINTS, UNSPECIFIED OSTEOARTHRITIS TYPE: ICD-10-CM

## 2022-12-13 DIAGNOSIS — R80.9 MICROALBUMINURIA: ICD-10-CM

## 2022-12-13 DIAGNOSIS — D69.6 PLATELETS DECREASED (HCC): ICD-10-CM

## 2022-12-13 DIAGNOSIS — N18.31 STAGE 3A CHRONIC KIDNEY DISEASE (HCC): ICD-10-CM

## 2022-12-13 NOTE — ASSESSMENT & PLAN NOTE
Lab Results   Component Value Date    EGFR 56 12/08/2022    EGFR 54 03/02/2021    EGFR 60 05/07/2020    CREATININE 0 92 12/08/2022    CREATININE 0 96 03/02/2021    CREATININE 0 89 05/07/2020   At baseline-does have elevated alb/cr and is on ARB

## 2022-12-13 NOTE — ASSESSMENT & PLAN NOTE
Lab Results   Component Value Date    HGBA1C 8 1 (H) 12/08/2022   Dayana's A1c on most recent labs was 8 1%, not perfect, but for her being almost 89 not bad either-advised to watch sweets intake and will continue metformin

## 2022-12-13 NOTE — PATIENT INSTRUCTIONS
Fall Prevention   AMBULATORY CARE:   Fall prevention  includes ways to make your home and other areas safer  It also includes ways you can move more carefully to prevent a fall  Health conditions that cause changes in your blood pressure, vision, or muscle strength and coordination may increase your risk for falls  Medicines may also increase your risk for falls if they make you dizzy, weak, or sleepy  Call 911 or have someone else call if:   · You have fallen and are unconscious  · You have fallen and cannot move part of your body  Contact your healthcare provider if:   · You have fallen and have pain or a headache  · You have questions or concerns about your condition or care  Fall prevention tips:   · Stand or sit up slowly  This may help you keep your balance and prevent falls  · Use assistive devices as directed  Your healthcare provider may suggest that you use a cane or walker to help you keep your balance  You may need to have grab bars put in your bathroom near the toilet or in the shower  · Wear shoes that fit well and have soles that   Wear shoes both inside and outside  Use slippers with good   Do not wear shoes with high heels  · Wear a personal alarm  This is a device that allows you to call 911 if you fall and need help  Ask your healthcare provider for more information  · Stay active  Exercise can help strengthen your muscles and improve your balance  Your healthcare provider may recommend water aerobics or walking  He or she may also recommend physical therapy to improve your coordination  Never start an exercise program without talking to your healthcare provider first          · Manage your medical conditions  Keep all appointments with your healthcare providers  Visit your eye doctor as directed  Home safety tips:       · Add items to prevent falls in the bathroom  Put nonslip strips on your bath or shower floor to prevent you from slipping   Use a bath mat if you do not have carpet in the bathroom  This will prevent you from falling when you step out of the bath or shower  Use a shower seat so you do not need to stand while you shower  Sit on the toilet or a chair in your bathroom to dry yourself and put on clothing  This will prevent you from losing your balance from drying or dressing yourself while you are standing  · Keep paths clear  Remove books, shoes, and other objects from walkways and stairs  Place cords for telephones and lamps out of the way so that you do not need to walk over them  Tape them down if you cannot move them  Remove small rugs  If you cannot remove a rug, secure it with double-sided tape  This will prevent you from tripping  · Install bright lights in your home  Use night lights to help light paths to the bathroom or kitchen  Always turn on the light before you start walking  · Keep items you use often on shelves within reach  Do not use a step stool to help you reach an item  · Paint or place reflective tape on the edges of your stairs  This will help you see the stairs better  Follow up with your doctor as directed:  Write down your questions so you remember to ask them during your visits  © Copyright [employer] 2022 Information is for End User's use only and may not be sold, redistributed or otherwise used for commercial purposes  All illustrations and images included in CareNotes® are the copyrighted property of A D A M , Inc  or Alix Carnes   The above information is an  only  It is not intended as medical advice for individual conditions or treatments  Talk to your doctor, nurse or pharmacist before following any medical regimen to see if it is safe and effective for you

## 2022-12-13 NOTE — PROGRESS NOTES
Name: Madison       : 1935      MRN: [id number]  Encounter Provider: Amaury Swann MD  Encounter Date: 2022   Encounter department: [address] [place] [address] MEDICINE    Assessment & Plan     1  Type 2 diabetes mellitus without complication, without long-term current use of insulin ([facility])  Assessment & Plan:    Lab Results   Component Value Date    HGBA1C 8 1 (H) 2022   Dayana's A1c on most recent labs was 8 1%, not perfect, but for her being almost 89 not bad either-advised to watch sweets intake and will continue metformin      2  Essential hypertension  Assessment & Plan:  BP is good today is on valsartan-hctz      3  Hyperlipidemia, unspecified hyperlipidemia type  Comments:  On Vytorin and well controlled    4  Stage 3a chronic kidney disease [facility])  Assessment & Plan:  Lab Results   Component Value Date    EGFR 56 2022    EGFR 54 2021    EGFR 60 2020    CREATININE 0 92 2022    CREATININE 0 96 2021    CREATININE 0 89 2020   At baseline-does have elevated alb/cr and is on ARB      5  Osteoarthritis of multiple joints, unspecified osteoarthritis type    6  Microalbuminuria    7  Pure hypercholesterolemia  Assessment & Plan:  On vytorin and is well controlled      8  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[address] )  Assessment & Plan:  chronic      9  Cognitive decline  Assessment & Plan:  Had rather long discussion with Parmjit Berrios and Keely Anand about this-I've gotten My Chart messages from Dayana's daughter Abdias Nobles in the past as well regarding some concerns about her memory and mood swings  Keely Anand says he's noticed Parmjit Berrios getting a bit more memory impaired, and mixing up days etc-we discussed her driving and Keely Anand says she is only allowed to drive on very local roads that she knows well, such as to the grocery store, and to her friend's house, but otherwise she's not driving distance at all  Parmjit Berrios recognizes her forgetfullness too    She does get tired anymore when she has to make a large meal like thanksgiving as well  We discussed possible use of meds like Aricept etc as well as natural supplements but Fazal Forman is not interested  Also decline Neurology referral at this time  They are hoping to travel out [facility] this summer to visit friends and go birding, as they were always very active and avid travelers      10  Lipoma of torso  Comments: On her right upper back-not bothering her-declined Surgical referral at this time-also declined Derm referral for dark keratosis on back/bra line         Subjective      Fazal Forman here with a hx of multiple chronic issues-here with her  Gene Guzman this time-Dayana has a hx of DM II, obesity, HTN, HL, OA-just had labwork done-her  brought up a lump that she's had on her back for awhile and he asked me to look at it-all recently done labs discussed with patient    Review of Systems   Constitutional: Positive for fatigue  HENT: Negative  Respiratory: Negative  Cardiovascular: Negative  Gastrointestinal: Negative  Neurological: Negative  Psychiatric/Behavioral: Positive for confusion and decreased concentration         Current Outpatient Medications on File Prior to Visit   Medication Sig   • ezetimibe-simvastatin (VYTORIN) 10-20 mg per tablet Take 1 tablet by mouth daily at bedtime   • Glucosamine HCl (GLUCOSAMINE PO) 1 po daily   • metFORMIN (GLUCOPHAGE) 850 mg tablet Take 1 tablet (850 mg total) by mouth 2 (two) times a day   • metoprolol succinate (TOPROL-XL) 50 mg 24 hr tablet Take 1 tablet (50 mg total) by mouth daily   • naproxen (NAPROSYN) 375 mg tablet TAKE 1 TABLET BY MOUTH  TWICE DAILY WITH MEALS (Patient taking differently: Take 375 mg by mouth once as needed)   • potassium chloride (K-DUR,KLOR-CON) 20 mEq tablet Take 1 tablet (20 mEq total) by mouth daily   • valsartan-hydrochlorothiazide (DIOVAN-HCT) 160-12 5 MG per tablet Take 1 tablet by mouth daily   • FREESTYLE LITE test strip USE 1 EACH DAILY USE AS INSTRUCTED   • [DISCONTINUED] acetaminophen (Acetaminophen 8 Hour) 650 mg CR tablet Take 1 tablet (650 mg total) by mouth every 8 (eight) hours as needed for mild pain (Patient not taking: Reported on 12/13/2022)   • [DISCONTINUED] Diclofenac Sodium (VOLTAREN) 1 % Apply 2 g topically 3 (three) times a day       Objective     /80 (BP Location: Right arm, Patient Position: Sitting, Cuff Size: Large)   Pulse 74   Temp (!) 97 3 °F (36 3 °C) (Temporal)   Resp 16   Ht 5' 1" (1 549 m)   Wt 78 6 kg (173 lb 4 oz)   SpO2 98%   BMI 32 74 kg/m²     Physical Exam  Constitutional:       Appearance: She is obese  HENT:      Head: Normocephalic and atraumatic  Right Ear: External ear normal       Left Ear: External ear normal       Nose: Nose normal       Mouth/Throat:      Mouth: Mucous membranes are moist    Eyes:      Extraocular Movements: Extraocular movements intact  Pupils: Pupils are equal, round, and reactive to light  Neck:      Vascular: No carotid bruit  Cardiovascular:      Rate and Rhythm: Normal rate and regular rhythm  Heart sounds: Normal heart sounds  Pulmonary:      Effort: Pulmonary effort is normal       Breath sounds: Normal breath sounds  Abdominal:      Palpations: Abdomen is soft  Musculoskeletal:         General: Normal range of motion  Cervical back: Normal range of motion and neck supple  Right lower leg: No edema  Left lower leg: No edema  Skin:     Comments: Lump on back consistent with lipoma-also has keratosis at bra line with darkened area -did discuss/offer derm referral but pt declines at this time   Neurological:      General: No focal deficit present  Mental Status: She is alert and oriented to person, place, and time  Mental status is at baseline  Psychiatric:         Mood and Affect: Mood normal          Behavior: Behavior normal        John Talbot MD     BMI Counseling: Body mass index is 32 74 kg/m²   The BMI is above normal  Nutrition recommendations include reducing portion sizes, decreasing overall calorie intake, 3-5 servings of fruits/vegetables daily, reducing fast food intake, consuming healthier snacks, decreasing soda and/or juice intake, moderation in carbohydrate intake, increasing intake of lean protein, reducing intake of saturated fat and trans fat and reducing intake of cholesterol  Exercise recommendations include strength training exercises  Falls Plan of Care: Balance, strength, and gait training instructions were provided

## 2022-12-13 NOTE — ASSESSMENT & PLAN NOTE
Had rather long discussion with Geovanna Whitaker and Diamond Keith about this-I've gotten My Chart messages from Dayana's daughter Candace Husbands in the past as well regarding some concerns about her memory and mood swings  Diamond Keith says he's noticed Geovanna Whitaker getting a bit more memory impaired, and mixing up days etc-we discussed her driving and Diamond Keith says she is only allowed to drive on very local roads that she knows well, such as to the grocery store, and to her friend's house, but otherwise she's not driving distance at all  Geovanna Whitaker recognizes her forgetfullness too  She does get tired anymore when she has to make a large meal like thanksgiving as well  We discussed possible use of meds like Aricept etc as well as natural supplements but Geovanna Whitaker is not interested  Also decline Neurology referral at this time    They are hoping to travel out [facility] this summer to visit friends and go birding, as they were always very active and avid travelers

## 2023-01-03 ENCOUNTER — OFFICE VISIT (OUTPATIENT)
Dept: OBGYN CLINIC | Facility: CLINIC | Age: 88
End: 2023-01-03

## 2023-01-03 VITALS — HEIGHT: 61 IN | WEIGHT: 172.4 LBS | BODY MASS INDEX: 32.55 KG/M2

## 2023-01-03 DIAGNOSIS — M17.12 PRIMARY OSTEOARTHRITIS OF LEFT KNEE: Primary | ICD-10-CM

## 2023-01-03 NOTE — PROGRESS NOTES
Orthopaedics Office Visit - Follow up Patient Visit    ASSESSMENT/PLAN:    Assessment:   Severe left knee osteoarthritis   Pain refractory to CSI and Euflexxa injections     Plan:   · Referral provided to Dr Cynthia Garcia to discuss a TKA, she understands her Hemoglobin A1C must be under 7 in order to proceed with surgical intervention   · She was fit with a hinged brace for comfort and support   · Follow up with Dr Cynthia Garcia in [facility] for further discussion     To Do Next Visit:  PRN    _____________________________________________________  CHIEF COMPLAINT:  Chief Complaint   Patient presents with   • Left Knee - Follow-up         SUBJECTIVE:  Colton Parnell is a 80 y o  female who presents to the office today for a follow up regarding left knee pain secondary to severe osteoarthritis  She was last seen in the office on 11/22/22, at which time she completed Euflexxa # 3 series  She feels that the first 2 Euflexxa injections were beneficial for her  She feels the 3rd injection caused increased pain, like she never underwent the injections at all  She notes that her knee feels like it wants to give out at times  She is not currently taking anything for pain control        PAST MEDICAL HISTORY:  Past Medical History:   Diagnosis Date   • Allergy to sulfa drugs    • Arthritis 2000   • Diabetes mellitus ([address] ) 2000   • H/O multiple allergies     Novocaine,Darvocet, Sulfa, Accupril   • HL (hearing loss)    • Hyperlipidemia    • Hypertension    • Memory loss 2020   • Nocturia    • Palpitations    • Thrombocytopenic disorder ([address] )    • Type 2 diabetes mellitus without complication ([address] )    • Wears glasses        PAST SURGICAL HISTORY:  Past Surgical History:   Procedure Laterality Date   • HERNIA REPAIR     • HYSTERECTOMY         FAMILY HISTORY:  Family History   Problem Relation Age of Onset   • Hypertension Mother    • Diabetes Mother    • Other Mother    • Hypertension Father    • Heart disease Father        SOCIAL HISTORY:  Social History     Tobacco Use   • Smoking status: Never   • Smokeless tobacco: Never   Vaping Use   • Vaping Use: Never used   Substance Use Topics   • Alcohol use: Yes     Alcohol/week: 1 0 standard drink     Types: 1 Glasses of wine per week     Comment: on holidays add an extra glass of wine   • Drug use: Never       MEDICATIONS:    Current Outpatient Medications:   •  ezetimibe-simvastatin (VYTORIN) 10-20 mg per tablet, Take 1 tablet by mouth daily at bedtime, Disp: 90 tablet, Rfl: 3  •  FREESTYLE LITE test strip, USE 1 EACH DAILY USE AS INSTRUCTED, Disp: 100 strip, Rfl: 5  •  Glucosamine HCl (GLUCOSAMINE PO), 1 po daily, Disp: , Rfl:   •  metFORMIN (GLUCOPHAGE) 850 mg tablet, Take 1 tablet (850 mg total) by mouth 2 (two) times a day, Disp: 180 tablet, Rfl: 3  •  metoprolol succinate (TOPROL-XL) 50 mg 24 hr tablet, Take 1 tablet (50 mg total) by mouth daily, Disp: 90 tablet, Rfl: 3  •  naproxen (NAPROSYN) 375 mg tablet, TAKE 1 TABLET BY MOUTH  TWICE DAILY WITH MEALS (Patient taking differently: Take 375 mg by mouth once as needed), Disp: 180 tablet, Rfl: 1  •  potassium chloride (K-DUR,KLOR-CON) 20 mEq tablet, Take 1 tablet (20 mEq total) by mouth daily, Disp: 90 tablet, Rfl: 3  •  valsartan-hydrochlorothiazide (DIOVAN-HCT) 160-12 5 MG per tablet, Take 1 tablet by mouth daily, Disp: 90 tablet, Rfl: 3    ALLERGIES:  Allergies   Allergen Reactions   • Accupril [Quinapril Hcl]    • Novocain [Procaine]    • Parabens    • Sulfa Antibiotics        REVIEW OF SYSTEMS:  MSK: as noted in HPI  Neuro: WNL  Pertinent items are otherwise noted in HPI  A comprehensive review of systems was otherwise negative      LABS:  HgA1c:   Lab Results   Component Value Date    HGBA1C 8 1 (H) 12/08/2022     BMP:   Lab Results   Component Value Date    CALCIUM 9 0 12/08/2022    K 3 8 12/08/2022    CO2 29 12/08/2022     12/08/2022    BUN 21 12/08/2022    CREATININE 0 92 12/08/2022     CBC: No components found for: CBC    _____________________________________________________  PHYSICAL EXAMINATION:  Vital signs: Ht 5' 1" (1 549 m)   Wt 78 2 kg (172 lb 6 4 oz)   BMI 32 57 kg/m²   General: No acute distress, awake and alert  Psychiatric: Mood and affect appear appropriate  HEENT: Trachea Midline, No torticollis, no apparent facial trauma  Cardiovascular: No audible murmurs; Extremities appear perfused  Pulmonary: No audible wheezing or stridor  Skin: No open lesions; see further details (if any) below    MUSCULOSKELETAL EXAMINATION:    Extremities:  Left knee     No erythema, ecchymosis or edema  Joint line tenderness   Bony enlargement noted   ROM 0-100 degrees   Ambulates with a cane   Extremity appears warm and well perfused     _____________________________________________________  STUDIES REVIEWED:  I personally reviewed the images and interpretation is as follows:   No new imaging to review       PROCEDURES PERFORMED:  Procedures    Scribe Attestation    I,:  Darryle Filippo am acting as a scribe while in the presence of the attending physician :       I,:  Faith Fry MD personally performed the services described in this documentation    as scribed in my presence :

## 2023-02-13 ENCOUNTER — HOSPITAL ENCOUNTER (INPATIENT)
Facility: HOSPITAL | Age: 88
LOS: 2 days | Discharge: HOME HEALTH | End: 2023-02-15
Attending: EMERGENCY MEDICINE | Admitting: STUDENT IN AN ORGANIZED HEALTH CARE EDUCATION/TRAINING PROGRAM

## 2023-02-13 ENCOUNTER — APPOINTMENT (EMERGENCY)
Dept: RADIOLOGY | Facility: HOSPITAL | Age: 88
End: 2023-02-13

## 2023-02-13 ENCOUNTER — APPOINTMENT (EMERGENCY)
Dept: CT IMAGING | Facility: HOSPITAL | Age: 88
End: 2023-02-13

## 2023-02-13 ENCOUNTER — OFFICE VISIT (OUTPATIENT)
Dept: URGENT CARE | Facility: CLINIC | Age: 88
End: 2023-02-13

## 2023-02-13 VITALS
WEIGHT: 169.5 LBS | HEART RATE: 113 BPM | TEMPERATURE: 97.1 F | DIASTOLIC BLOOD PRESSURE: 82 MMHG | OXYGEN SATURATION: 98 % | BODY MASS INDEX: 32.03 KG/M2 | SYSTOLIC BLOOD PRESSURE: 140 MMHG

## 2023-02-13 DIAGNOSIS — R73.9 HYPERGLYCEMIA: ICD-10-CM

## 2023-02-13 DIAGNOSIS — R81 GLUCOSE FOUND IN URINE ON EXAMINATION: ICD-10-CM

## 2023-02-13 DIAGNOSIS — R41.82 ALTERED MENTAL STATUS: Primary | ICD-10-CM

## 2023-02-13 DIAGNOSIS — N39.0 UTI (URINARY TRACT INFECTION): ICD-10-CM

## 2023-02-13 DIAGNOSIS — E86.0 DEHYDRATION: ICD-10-CM

## 2023-02-13 DIAGNOSIS — R41.0 CONFUSION: Primary | ICD-10-CM

## 2023-02-13 PROBLEM — I16.0 HYPERTENSIVE URGENCY: Status: ACTIVE | Noted: 2023-02-13

## 2023-02-13 PROBLEM — E87.1 HYPONATREMIA: Status: ACTIVE | Noted: 2023-02-13

## 2023-02-13 LAB
2HR DELTA HS TROPONIN: 1 NG/L
4HR DELTA HS TROPONIN: 2 NG/L
ALBUMIN SERPL BCP-MCNC: 4.1 G/DL (ref 3.5–5)
ALP SERPL-CCNC: 76 U/L (ref 46–116)
ALT SERPL W P-5'-P-CCNC: 18 U/L (ref 12–78)
ANION GAP SERPL CALCULATED.3IONS-SCNC: 11 MMOL/L (ref 4–13)
APTT PPP: 22 SECONDS (ref 23–37)
AST SERPL W P-5'-P-CCNC: 23 U/L (ref 5–45)
BACTERIA UR QL AUTO: ABNORMAL /HPF
BASE EX.OXY STD BLDV CALC-SCNC: 58.9 % (ref 60–80)
BASE EXCESS BLDV CALC-SCNC: 2.4 MMOL/L
BASOPHILS # BLD AUTO: 0.04 THOUSANDS/ÂΜL (ref 0–0.1)
BASOPHILS NFR BLD AUTO: 1 % (ref 0–1)
BILIRUB SERPL-MCNC: 0.6 MG/DL (ref 0.2–1)
BILIRUB UR QL STRIP: NEGATIVE
BUN SERPL-MCNC: 33 MG/DL (ref 5–25)
CALCIUM SERPL-MCNC: 9.7 MG/DL (ref 8.3–10.1)
CARDIAC TROPONIN I PNL SERPL HS: 13 NG/L
CARDIAC TROPONIN I PNL SERPL HS: 14 NG/L
CARDIAC TROPONIN I PNL SERPL HS: 15 NG/L
CHLORIDE SERPL-SCNC: 92 MMOL/L (ref 96–108)
CLARITY UR: ABNORMAL
CO2 SERPL-SCNC: 26 MMOL/L (ref 21–32)
COLOR UR: YELLOW
CREAT SERPL-MCNC: 1.21 MG/DL (ref 0.6–1.3)
EOSINOPHIL # BLD AUTO: 0.05 THOUSAND/ÂΜL (ref 0–0.61)
EOSINOPHIL NFR BLD AUTO: 1 % (ref 0–6)
ERYTHROCYTE [DISTWIDTH] IN BLOOD BY AUTOMATED COUNT: 13.8 % (ref 11.6–15.1)
FLUAV RNA RESP QL NAA+PROBE: NEGATIVE
FLUBV RNA RESP QL NAA+PROBE: NEGATIVE
GFR SERPL CREATININE-BSD FRML MDRD: 40 ML/MIN/1.73SQ M
GLUCOSE SERPL-MCNC: 253 MG/DL (ref 65–140)
GLUCOSE SERPL-MCNC: 288 MG/DL (ref 65–140)
GLUCOSE SERPL-MCNC: 316 MG/DL (ref 65–140)
GLUCOSE SERPL-MCNC: 324 MG/DL (ref 65–140)
GLUCOSE SERPL-MCNC: 370 MG/DL (ref 65–140)
GLUCOSE UR STRIP-MCNC: ABNORMAL MG/DL
HCO3 BLDV-SCNC: 29.5 MMOL/L (ref 24–30)
HCT VFR BLD AUTO: 50.1 % (ref 34.8–46.1)
HGB BLD-MCNC: 15.9 G/DL (ref 11.5–15.4)
HGB UR QL STRIP.AUTO: NEGATIVE
IMM GRANULOCYTES # BLD AUTO: 0.02 THOUSAND/UL (ref 0–0.2)
IMM GRANULOCYTES NFR BLD AUTO: 0 % (ref 0–2)
INR PPP: 1.05 (ref 0.84–1.19)
KETONES UR STRIP-MCNC: NEGATIVE MG/DL
LACTATE SERPL-SCNC: 1.8 MMOL/L (ref 0.5–2)
LEUKOCYTE ESTERASE UR QL STRIP: ABNORMAL
LYMPHOCYTES # BLD AUTO: 1.21 THOUSANDS/ÂΜL (ref 0.6–4.47)
LYMPHOCYTES NFR BLD AUTO: 21 % (ref 14–44)
MCH RBC QN AUTO: 28.9 PG (ref 26.8–34.3)
MCHC RBC AUTO-ENTMCNC: 31.7 G/DL (ref 31.4–37.4)
MCV RBC AUTO: 91 FL (ref 82–98)
MONOCYTES # BLD AUTO: 0.59 THOUSAND/ÂΜL (ref 0.17–1.22)
MONOCYTES NFR BLD AUTO: 10 % (ref 4–12)
MUCOUS THREADS UR QL AUTO: ABNORMAL
NEUTROPHILS # BLD AUTO: 3.77 THOUSANDS/ÂΜL (ref 1.85–7.62)
NEUTS SEG NFR BLD AUTO: 67 % (ref 43–75)
NITRITE UR QL STRIP: NEGATIVE
NON-SQ EPI CELLS URNS QL MICRO: ABNORMAL /HPF
NRBC BLD AUTO-RTO: 0 /100 WBCS
O2 CT BLDV-SCNC: 13 ML/DL
PCO2 BLDV: 55.1 MM HG (ref 42–50)
PH BLDV: 7.35 [PH] (ref 7.3–7.4)
PH UR STRIP.AUTO: 5.5 [PH]
PLATELET # BLD AUTO: 138 THOUSANDS/UL (ref 149–390)
PMV BLD AUTO: 11.5 FL (ref 8.9–12.7)
PO2 BLDV: 32.7 MM HG (ref 35–45)
POTASSIUM SERPL-SCNC: 4 MMOL/L (ref 3.5–5.3)
PROCALCITONIN SERPL-MCNC: <0.05 NG/ML
PROT SERPL-MCNC: 7.9 G/DL (ref 6.4–8.4)
PROT UR STRIP-MCNC: ABNORMAL MG/DL
PROTHROMBIN TIME: 13.5 SECONDS (ref 11.6–14.5)
RBC # BLD AUTO: 5.5 MILLION/UL (ref 3.81–5.12)
RBC #/AREA URNS AUTO: ABNORMAL /HPF
RSV RNA RESP QL NAA+PROBE: NEGATIVE
SARS-COV-2 RNA RESP QL NAA+PROBE: NEGATIVE
SL AMB  POCT GLUCOSE, UA: 2000
SL AMB LEUKOCYTE ESTERASE,UA: ABNORMAL
SL AMB POCT BILIRUBIN,UA: ABNORMAL
SL AMB POCT BLOOD,UA: ABNORMAL
SL AMB POCT CLARITY,UA: ABNORMAL
SL AMB POCT COLOR,UA: YELLOW
SL AMB POCT GLUCOSE BLD: 370
SL AMB POCT KETONES,UA: ABNORMAL
SL AMB POCT NITRITE,UA: ABNORMAL
SL AMB POCT PH,UA: 5
SL AMB POCT SPECIFIC GRAVITY,UA: 1.02
SL AMB POCT URINE PROTEIN: 100
SL AMB POCT UROBILINOGEN: 0.2
SODIUM SERPL-SCNC: 129 MMOL/L (ref 135–147)
SP GR UR STRIP.AUTO: 1.02 (ref 1–1.03)
UROBILINOGEN UR STRIP-ACNC: <2 MG/DL
WBC # BLD AUTO: 5.68 THOUSAND/UL (ref 4.31–10.16)
WBC #/AREA URNS AUTO: ABNORMAL /HPF
WBC CLUMPS # UR AUTO: PRESENT /UL

## 2023-02-13 RX ORDER — ONDANSETRON 2 MG/ML
4 INJECTION INTRAMUSCULAR; INTRAVENOUS EVERY 6 HOURS PRN
Status: DISCONTINUED | OUTPATIENT
Start: 2023-02-13 | End: 2023-02-15 | Stop reason: HOSPADM

## 2023-02-13 RX ORDER — HYDROCHLOROTHIAZIDE 12.5 MG/1
12.5 TABLET ORAL DAILY
Status: DISCONTINUED | OUTPATIENT
Start: 2023-02-14 | End: 2023-02-15 | Stop reason: HOSPADM

## 2023-02-13 RX ORDER — HEPARIN SODIUM 5000 [USP'U]/ML
5000 INJECTION, SOLUTION INTRAVENOUS; SUBCUTANEOUS EVERY 8 HOURS SCHEDULED
Status: DISCONTINUED | OUTPATIENT
Start: 2023-02-13 | End: 2023-02-15 | Stop reason: HOSPADM

## 2023-02-13 RX ORDER — METOPROLOL SUCCINATE 50 MG/1
50 TABLET, EXTENDED RELEASE ORAL DAILY
Status: DISCONTINUED | OUTPATIENT
Start: 2023-02-14 | End: 2023-02-15 | Stop reason: HOSPADM

## 2023-02-13 RX ORDER — INSULIN GLARGINE 100 [IU]/ML
10 INJECTION, SOLUTION SUBCUTANEOUS
Status: DISCONTINUED | OUTPATIENT
Start: 2023-02-13 | End: 2023-02-15 | Stop reason: HOSPADM

## 2023-02-13 RX ORDER — INSULIN LISPRO 100 [IU]/ML
5 INJECTION, SOLUTION INTRAVENOUS; SUBCUTANEOUS
Status: DISCONTINUED | OUTPATIENT
Start: 2023-02-13 | End: 2023-02-15 | Stop reason: HOSPADM

## 2023-02-13 RX ORDER — CALCIUM CARBONATE 200(500)MG
1000 TABLET,CHEWABLE ORAL DAILY PRN
Status: DISCONTINUED | OUTPATIENT
Start: 2023-02-13 | End: 2023-02-15 | Stop reason: HOSPADM

## 2023-02-13 RX ORDER — INSULIN LISPRO 100 [IU]/ML
1-6 INJECTION, SOLUTION INTRAVENOUS; SUBCUTANEOUS
Status: DISCONTINUED | OUTPATIENT
Start: 2023-02-13 | End: 2023-02-15 | Stop reason: HOSPADM

## 2023-02-13 RX ORDER — SODIUM CHLORIDE 9 MG/ML
3 INJECTION INTRAVENOUS EVERY 12 HOURS SCHEDULED
Status: DISCONTINUED | OUTPATIENT
Start: 2023-02-13 | End: 2023-02-15 | Stop reason: HOSPADM

## 2023-02-13 RX ORDER — SENNOSIDES 8.6 MG
1 TABLET ORAL DAILY
Status: DISCONTINUED | OUTPATIENT
Start: 2023-02-14 | End: 2023-02-15 | Stop reason: HOSPADM

## 2023-02-13 RX ORDER — DOCUSATE SODIUM 100 MG/1
100 CAPSULE, LIQUID FILLED ORAL 2 TIMES DAILY
Status: DISCONTINUED | OUTPATIENT
Start: 2023-02-13 | End: 2023-02-15 | Stop reason: HOSPADM

## 2023-02-13 RX ORDER — LOSARTAN POTASSIUM 50 MG/1
100 TABLET ORAL DAILY
Status: DISCONTINUED | OUTPATIENT
Start: 2023-02-14 | End: 2023-02-15 | Stop reason: HOSPADM

## 2023-02-13 RX ORDER — ACETAMINOPHEN 325 MG/1
650 TABLET ORAL EVERY 6 HOURS PRN
Status: DISCONTINUED | OUTPATIENT
Start: 2023-02-13 | End: 2023-02-15 | Stop reason: HOSPADM

## 2023-02-13 RX ADMIN — INSULIN GLARGINE 10 UNITS: 100 INJECTION, SOLUTION SUBCUTANEOUS at 23:26

## 2023-02-13 RX ADMIN — CEFTRIAXONE SODIUM 1000 MG: 10 INJECTION, POWDER, FOR SOLUTION INTRAVENOUS at 18:38

## 2023-02-13 RX ADMIN — HEPARIN SODIUM 5000 UNITS: 5000 INJECTION INTRAVENOUS; SUBCUTANEOUS at 23:26

## 2023-02-13 RX ADMIN — SODIUM CHLORIDE 1000 ML: 0.9 INJECTION, SOLUTION INTRAVENOUS at 17:17

## 2023-02-13 RX ADMIN — INSULIN LISPRO 4 UNITS: 100 INJECTION, SOLUTION INTRAVENOUS; SUBCUTANEOUS at 20:18

## 2023-02-13 RX ADMIN — INSULIN LISPRO 3 UNITS: 100 INJECTION, SOLUTION INTRAVENOUS; SUBCUTANEOUS at 23:27

## 2023-02-13 RX ADMIN — INSULIN LISPRO 5 UNITS: 100 INJECTION, SOLUTION INTRAVENOUS; SUBCUTANEOUS at 20:18

## 2023-02-13 NOTE — ASSESSMENT & PLAN NOTE
· Pseudohyponatremia in setting of hyperglycemia  · Monitor sodium levels with correction of hyperglycemia

## 2023-02-13 NOTE — ASSESSMENT & PLAN NOTE
· Present on admission- UA with leuk esterase and bacteria  · With associated confusion, referred from urgent care  · Given dose of IV ceftriaxone in ED  · Continue antibiotics, follow up infectious work up

## 2023-02-13 NOTE — PROGRESS NOTES
[address] [employer] [facility]        NAME: Ramiro Campoverde is a 80 y o  female  : 1935    MRN: [id number]  DATE: 2023  TIME: 3:00 PM    Assessment and Plan   Confusion [R41 0]  1  Confusion  POCT urine dip      2  Glucose found in urine on examination  POCT blood glucose            Patient Instructions   There are no Patient Instructions on file for this visit  Follow up with PCP in 3-5 days  Proceed to  ER if symptoms worsen  Chief Complaint     Chief Complaint   Patient presents with   • Confusion     Forgetful and confusion x 2 weeks  Spouse states its getting worse  Denies illness  Denies pain/discomfort  History of Present Illness       Patient presents with confusion that has been getting progressively worse since last week  Also having a difficult time remembering anything  Did have one dizzy spell that caused her to fall onto carpet next to the bed  Fall occurred 5 days ago  Denies hitting head, LOC  Fall was unwitnessed  Has been drinking normal amounts of fluid  Has not been eating as much recently  Denies excessive thirst, abdominal pains, urinary frequency, dysuria, changes to color or odor of urine  Has a PCP visit but soonest was on the   Unsure what her sugars were at this morning  Review of Systems   Review of Systems   Gastrointestinal: Negative for abdominal pain  Genitourinary: Negative for difficulty urinating, dysuria and frequency  Neurological: Positive for dizziness and light-headedness  Negative for facial asymmetry, speech difficulty and numbness  Psychiatric/Behavioral: Positive for confusion           Current Medications       Current Outpatient Medications:   •  ezetimibe-simvastatin (VYTORIN) 10-20 mg per tablet, Take 1 tablet by mouth daily at bedtime, Disp: 90 tablet, Rfl: 3  •  FREESTYLE LITE test strip, USE 1 EACH DAILY USE AS INSTRUCTED, Disp: 100 strip, Rfl: 5  •  Glucosamine HCl (GLUCOSAMINE PO), 1 po daily, Disp: , Rfl:   •  metFORMIN (GLUCOPHAGE) 850 mg tablet, Take 1 tablet (850 mg total) by mouth 2 (two) times a day, Disp: 180 tablet, Rfl: 3  •  metoprolol succinate (TOPROL-XL) 50 mg 24 hr tablet, Take 1 tablet (50 mg total) by mouth daily, Disp: 90 tablet, Rfl: 3  •  naproxen (NAPROSYN) 375 mg tablet, TAKE 1 TABLET BY MOUTH  TWICE DAILY WITH MEALS (Patient taking differently: Take 375 mg by mouth once as needed), Disp: 180 tablet, Rfl: 1  •  potassium chloride (K-DUR,KLOR-CON) 20 mEq tablet, Take 1 tablet (20 mEq total) by mouth daily, Disp: 90 tablet, Rfl: 3  •  valsartan-hydrochlorothiazide (DIOVAN-HCT) 160-12 5 MG per tablet, Take 1 tablet by mouth daily, Disp: 90 tablet, Rfl: 3    Current Allergies     Allergies as of 02/13/2023 - Reviewed 02/13/2023   Allergen Reaction Noted   • Accupril [quinapril hcl]  05/08/2020   • Novocain [procaine]  06/08/2020   • Parabens  11/24/2019   • Sulfa antibiotics  05/08/2020            The following portions of the patient's history were reviewed and updated as appropriate: allergies, current medications, past family history, past medical history, past social history, past surgical history and problem list      Past Medical History:   Diagnosis Date   • Allergy to sulfa drugs    • Arthritis 2000   • Diabetes mellitus ([facility] [address] ) 2000   • H/O multiple allergies     Novocaine,Darvocet, Sulfa, Accupril   • HL (hearing loss)    • Hyperlipidemia    • Hypertension    • Memory loss 2020   • Nocturia    • Palpitations    • Thrombocytopenic disorder (HCC)    • Type 2 diabetes mellitus without complication (HCC)    • Wears glasses        Past Surgical History:   Procedure Laterality Date   • HERNIA REPAIR     • HYSTERECTOMY         Family History   Problem Relation Age of Onset   • Hypertension Mother    • Diabetes Mother    • Other Mother    • Hypertension Father    • Heart disease Father          Medications have been verified          Objective   /82   Pulse (!) 113   Temp (!) 97 1 °F (36 2 °C)   Wt 76 9 kg (169 lb 8 oz)   SpO2 98%   BMI 32 03 kg/m²        Physical Exam     Physical Exam  Constitutional:       Appearance: Normal appearance  Eyes:      Extraocular Movements: Extraocular movements intact  Conjunctiva/sclera: Conjunctivae normal       Pupils: Pupils are equal, round, and reactive to light  Cardiovascular:      Rate and Rhythm: Regular rhythm  Tachycardia present  Heart sounds: Normal heart sounds  Pulmonary:      Effort: Pulmonary effort is normal       Breath sounds: Normal breath sounds  Abdominal:      General: Abdomen is flat  Bowel sounds are normal       Palpations: Abdomen is soft  Tenderness: There is no abdominal tenderness  There is no right CVA tenderness, left CVA tenderness, guarding or rebound  Musculoskeletal:      Cervical back: Normal range of motion  Right lower leg: No edema  Left lower leg: No edema  Neurological:      General: No focal deficit present  Mental Status: She is alert  Cranial Nerves: Cranial nerves 2-12 are intact  Sensory: Sensation is intact  Motor: Motor function is intact  Coordination: Coordination is intact  Deep Tendon Reflexes: Reflexes are normal and symmetric     Psychiatric:         Mood and Affect: Mood normal          Behavior: Behavior normal

## 2023-02-13 NOTE — ASSESSMENT & PLAN NOTE
Lab Results   Component Value Date    HGBA1C 8 1 (H) 12/08/2022       Recent Labs     02/13/23  1432 02/13/23  1557   POCGLU 370* 316*       Blood Sugar Average: Last 72 hrs:  (P) 316   · Uncontrolled on arrival  · Start sliding scale #3, add lantus 10 units qhs, add lispro 5 units with meals  · Monitor glucose levels

## 2023-02-13 NOTE — ED PROVIDER NOTES
Pt Name: Nataliia Lyons  MRN: [id number]  [place] 1935  Age/Sex: 80 y o  female  Date of evaluation: 2/13/2023  PCP: Ronni Cummins MD    [address] [place] [address]    Chief Complaint   Patient presents with   • Altered Mental Status     Pt sent here by urgent care for confusion that has been progressively getting worse  Pt has been having dizzy spells and might have a possible uti  Pt A & O x's 3 upon arrival            HPI    80 y o  female presenting with confusion  Patient was seen by an urgent care earlier today due to progressive confusion as well as an episode of lightheadedness, some concern for possible UTI  Patient states that over the past week she has had gradually worsening confusion, with periods where she has difficulty understanding where she is  She also had an episode of lightheadedness 2 days ago, states that she stood up from bed, felt very lightheaded and fell although she did not strike her head or pass out  Patient notes that she has had some urinary frequency and her blood sugar has been increased over the past 1 to 2 days    She denies fever, nausea, vomiting, diarrhea, chest pain, shortness of breath, focal numbness or weakness, change in speech or vision other symptoms      HPI      Past Medical and Surgical History    Past Medical History:   Diagnosis Date   • Allergy to sulfa drugs    • Arthritis 2000   • Diabetes mellitus ([facility] [address] ) 2000   • H/O multiple allergies     Novocaine,Darvocet, Sulfa, Accupril   • HL (hearing loss)    • Hyperlipidemia    • Hypertension    • Memory loss 2020   • Nocturia    • Palpitations    • Thrombocytopenic disorder (HCC)    • Type 2 diabetes mellitus without complication ([address] )    • Wears glasses        Past Surgical History:   Procedure Laterality Date   • HERNIA REPAIR     • HYSTERECTOMY         Family History   Problem Relation Age of Onset   • Hypertension Mother    • Diabetes Mother    • Other Mother    • Hypertension Father    • Heart disease Father Social History     Tobacco Use   • Smoking status: Never     Passive exposure: Never   • Smokeless tobacco: Never   Vaping Use   • Vaping Use: Never used   Substance Use Topics   • Alcohol use: Not Currently     Alcohol/week: 1 0 standard drink     Types: 1 Glasses of wine per week     Comment: on holidays add an extra glass of wine   • Drug use: Never           Allergies    Allergies   Allergen Reactions   • Accupril [Quinapril Hcl]    • Novocain [Procaine]    • Parabens    • Sulfa Antibiotics        Home Medications    Prior to Admission medications    Medication Sig Start Date End Date Taking? Authorizing Provider   ezetimibe-simvastatin (VYTORIN) 10-20 mg per tablet Take 1 tablet by mouth daily at bedtime 6/13/22   Amandeep Aranda MD   FREESTYLE LITE test strip USE 1 EACH DAILY USE AS INSTRUCTED 9/27/22   Amandeep Aranda MD   Glucosamine HCl (GLUCOSAMINE PO) 1 po daily    Historical Provider, MD   metFORMIN (GLUCOPHAGE) 850 mg tablet Take 1 tablet (850 mg total) by mouth 2 (two) times a day 6/13/22   Amandeep Aranda MD   metoprolol succinate (TOPROL-XL) 50 mg 24 hr tablet Take 1 tablet (50 mg total) by mouth daily 6/13/22   Amandeep Aranda MD   naproxen (NAPROSYN) 375 mg tablet TAKE 1 TABLET BY MOUTH  TWICE DAILY WITH MEALS  Patient taking differently: Take 375 mg by mouth once as needed 11/15/22   Amandeep Aranda MD   potassium chloride (K-DUR,KLOR-CON) 20 mEq tablet Take 1 tablet (20 mEq total) by mouth daily 6/13/22   Amandeep Aranda MD   valsartan-hydrochlorothiazide (DIOVAN-HCT) 160-12 5 MG per tablet Take 1 tablet by mouth daily 6/13/22   Amandeep Aranda MD           Review of Systems    Review of Systems   Constitutional: Positive for fatigue  Negative for activity change, chills and fever  HENT: Negative for drooling and facial swelling  Eyes: Negative for pain, discharge and visual disturbance  Respiratory: Negative for apnea, cough, chest tightness, shortness of breath and wheezing  Cardiovascular: Negative for chest pain and leg swelling  Gastrointestinal: Negative for abdominal pain, constipation, diarrhea, nausea and vomiting  Genitourinary: Positive for frequency  Negative for difficulty urinating, dysuria and urgency  Musculoskeletal: Negative for arthralgias, back pain and gait problem  Skin: Negative for color change and rash  Neurological: Positive for light-headedness  Negative for dizziness, speech difficulty, weakness and headaches  Psychiatric/Behavioral: Positive for confusion  Negative for agitation and behavioral problems  All other systems reviewed and negative  Physical Exam      ED Triage Vitals   Temperature Pulse Respirations Blood Pressure SpO2   02/13/23 1534 02/13/23 1534 02/13/23 1534 02/13/23 1534 02/13/23 1534   98 °F (36 7 °C) 77 18 (!) 189/93 100 %      Temp Source Heart Rate Source Patient Position - Orthostatic VS BP Location FiO2 (%)   02/13/23 1534 02/13/23 1534 02/13/23 1534 02/13/23 1732 --   Tympanic Monitor Sitting Left arm       Pain Score       --                      Physical Exam  Vitals and nursing note reviewed  Constitutional:       General: She is not in acute distress  Appearance: She is well-developed  She is not ill-appearing, toxic-appearing or diaphoretic  HENT:      Head: Normocephalic and atraumatic  Right Ear: External ear normal       Left Ear: External ear normal       Nose: Nose normal       Mouth/Throat:      Mouth: Mucous membranes are dry  Pharynx: Oropharynx is clear  Eyes:      Conjunctiva/sclera: Conjunctivae normal       Pupils: Pupils are equal, round, and reactive to light  Cardiovascular:      Rate and Rhythm: Normal rate and regular rhythm  Heart sounds: Normal heart sounds  Pulmonary:      Effort: Pulmonary effort is normal  No respiratory distress  Breath sounds: Normal breath sounds  No wheezing or rales  Abdominal:      General: There is no distension  Palpations: Abdomen is soft  Tenderness: There is no abdominal tenderness  There is no guarding or rebound  Musculoskeletal:         General: No deformity  Normal range of motion  Cervical back: Normal range of motion and neck supple  Skin:     General: Skin is warm and dry  Capillary Refill: Capillary refill takes less than 2 seconds  Findings: No erythema or rash  Neurological:      Mental Status: She is alert and oriented to person, place, and time  Cranial Nerves: No cranial nerve deficit  Sensory: No sensory deficit  Motor: No weakness  Coordination: Coordination normal    Psychiatric:         Behavior: Behavior normal          Thought Content: Thought content normal          Judgment: Judgment normal               Diagnostic Results      Labs:    Results Reviewed     Procedure Component Value Units Date/Time    Fingerstick Glucose (POCT) [[id number]]  (Abnormal) Collected: 02/13/23 2008    Lab Status: Final result Updated: 02/13/23 2010     POC Glucose 288 mg/dl     HS Troponin I 2hr [[id number]]  (Normal) Collected: 02/13/23 1840    Lab Status: Final result Specimen: Blood from Arm, Right Updated: 02/13/23 1924     hs TnI 2hr 14 ng/L      Delta 2hr hsTnI 1 ng/L     Urine Microscopic [[id number]]  (Abnormal) Collected: 02/13/23 1732    Lab Status: Final result Specimen: Urine, Clean Catch Updated: 02/13/23 1753     RBC, UA None Seen /hpf      WBC, UA Innumerable /hpf      Epithelial Cells Occasional /hpf      Bacteria, UA Innumerable /hpf      MUCUS THREADS Occasional     WBC Clumps Present    Urine culture [[id number]] Collected: 02/13/23 1732    Lab Status:  In process Specimen: Urine, Clean Catch Updated: 02/13/23 1753    UA w Reflex to Microscopic w Reflex to Culture [[id number]]  (Abnormal) Collected: 02/13/23 1732    Lab Status: Final result Specimen: Urine, Clean Catch Updated: 02/13/23 1747     Color, UA Yellow     Clarity, UA Turbid     Specific Gravity, UA 1 018     pH, UA 5 5     Leukocytes, UA Large     Nitrite, UA Negative     Protein, UA 50 (1+) mg/dl      Glucose,  (1/2%) mg/dl      Ketones, UA Negative mg/dl      Urobilinogen, UA <2 0 mg/dl      Bilirubin, UA Negative     Occult Blood, UA Negative    FLU/RSV/COVID - if FLU/RSV clinically relevant [[id number]]  (Normal) Collected: 02/13/23 1559    Lab Status: Final result Specimen: Nares from Nose Updated: 02/13/23 1645     SARS-CoV-2 Negative     INFLUENZA A PCR Negative     INFLUENZA B PCR Negative     RSV PCR Negative    Narrative:      FOR PEDIATRIC PATIENTS - copy/paste COVID Guidelines URL to browser: [email or web address]  [email or web address]    SARS-CoV-2 assay is a Nucleic Acid Amplification assay intended for the  qualitative detection of nucleic acid from SARS-CoV-2 in nasopharyngeal  swabs  Results are for the presumptive identification of SARS-CoV-2 RNA  Positive results are indicative of infection with SARS-CoV-2, the virus  causing COVID-19, but do not rule out bacterial infection or co-infection  with other viruses  Laboratories within the [place] and its  territories are required to report all positive results to the appropriate  public health authorities  Negative results do not preclude SARS-CoV-2  infection and should not be used as the sole basis for treatment or other  patient management decisions  Negative results must be combined with  clinical observations, patient history, and epidemiological information  This test has not been FDA cleared or approved  This test has been authorized by FDA under an Emergency Use Authorization  (EUA)  This test is only authorized for the duration of time the  declaration that circumstances exist justifying the authorization of the  emergency use of an in vitro diagnostic tests for detection of SARS-CoV-2  virus and/or diagnosis of COVID-19 infection under section 564(b)(1) of  the Act, 21 U  [place] C  360bbb-3(b)(1), unless the authorization is terminated  or revoked sooner  The test has been validated but independent review by FDA  and CLIA is pending  Test performed using [employer]: This RT-PCR assay targets N2,  a region unique to SARS-CoV-2  A conserved region in the E-gene was chosen  for pan-Sarbecovirus detection which includes SARS-CoV-2  According to CMS-2020-01-R, this platform meets the definition of high-throughput technology      Procalcitonin [[id number]]  (Normal) Collected: 02/13/23 1557    Lab Status: Final result Specimen: Blood from Arm, Left Updated: 02/13/23 1638     Procalcitonin <0 05 ng/ml     Comprehensive metabolic panel [[id number]]  (Abnormal) Collected: 02/13/23 1557    Lab Status: Final result Specimen: Blood from Arm, Left Updated: 02/13/23 1637     Sodium 129 mmol/L      Potassium 4 0 mmol/L      Chloride 92 mmol/L      CO2 26 mmol/L      ANION GAP 11 mmol/L      BUN 33 mg/dL      Creatinine 1 21 mg/dL      Glucose 324 mg/dL      Calcium 9 7 mg/dL      AST 23 U/L      ALT 18 U/L      Alkaline Phosphatase 76 U/L      Total Protein 7 9 g/dL      Albumin 4 1 g/dL      Total Bilirubin 0 60 mg/dL      eGFR 40 ml/min/1 73sq m     Narrative:      [place] guidelines for Chronic Kidney Disease (CKD):   •  Stage 1 with normal or high GFR (GFR > 90 mL/min/1 73 square meters)  •  Stage 2 Mild CKD (GFR = 60-89 mL/min/1 73 square meters)  •  Stage 3A Moderate CKD (GFR = 45-59 mL/min/1 73 square meters)  •  Stage 3B Moderate CKD (GFR = 30-44 mL/min/1 73 square meters)  •  Stage 4 Severe CKD (GFR = 15-29 mL/min/1 73 square meters)  •  Stage 5 End Stage CKD (GFR <15 mL/min/1 73 square meters)  Note: GFR calculation is accurate only with a steady state creatinine    HS Troponin I 4hr [[id number]]     Lab Status: No result Specimen: Blood     HS Troponin 0hr (reflex protocol) [[id number]]  (Normal) Collected: 02/13/23 1557    Lab Status: Final result Specimen: Blood from Arm, Left Updated: 02/13/23 1636     hs TnI 0hr 13 ng/L     Blood gas, Venous [[id number]]  (Abnormal) Collected: 02/13/23 1557    Lab Status: Final result Specimen: Blood from Arm, Left Updated: 02/13/23 1631     pH, Dmitriy 7 347     pCO2, Dmitriy 55 1 mm Hg      pO2, Dmitriy 32 7 mm Hg      HCO3, Dmitriy 29 5 mmol/L      Base Excess, Dmitriy 2 4 mmol/L      O2 Content, Dmitriy 13 0 ml/dL      O2 HGB, VENOUS 58 9 %     Lactic acid [[id number]]  (Normal) Collected: 02/13/23 1557    Lab Status: Final result Specimen: Blood from Arm, Left Updated: 02/13/23 1628     LACTIC ACID 1 8 mmol/L     Narrative:      Result may be elevated if tourniquet was used during collection  Protime-INR [[id number]]  (Normal) Collected: 02/13/23 1556    Lab Status: Final result Specimen: Blood from Arm, Left Updated: 02/13/23 1624     Protime 13 5 seconds      INR 1 05    APTT [[id number]]  (Abnormal) Collected: 02/13/23 1556    Lab Status: Final result Specimen: Blood from Arm, Left Updated: 02/13/23 1624     PTT 22 seconds     CBC and differential [[id number]]  (Abnormal) Collected: 02/13/23 1557    Lab Status: Final result Specimen: Blood from Arm, Left Updated: 02/13/23 1609     WBC 5 68 Thousand/uL      RBC 5 50 Million/uL      Hemoglobin 15 9 g/dL      Hematocrit 50 1 %      MCV 91 fL      MCH 28 9 pg      MCHC 31 7 g/dL      RDW 13 8 %      MPV 11 5 fL      Platelets 905 Thousands/uL      nRBC 0 /100 WBCs      Neutrophils Relative 67 %      Immat GRANS % 0 %      Lymphocytes Relative 21 %      Monocytes Relative 10 %      Eosinophils Relative 1 %      Basophils Relative 1 %      Neutrophils Absolute 3 77 Thousands/µL      Immature Grans Absolute 0 02 Thousand/uL      Lymphocytes Absolute 1 21 Thousands/µL      Monocytes Absolute 0 59 Thousand/µL      Eosinophils Absolute 0 05 Thousand/µL      Basophils Absolute 0 04 Thousands/µL     Blood culture #2 [[id number]] Collected: 02/13/23 1556    Lab Status:  In process Specimen: Blood from Arm, Left Updated: 02/13/23 1602    Blood culture #1 [[id number]] Collected: 02/13/23 1557    Lab Status: In process Specimen: Blood from Arm, Left Updated: 02/13/23 1602    Fingerstick Glucose (POCT) [[id number]]  (Abnormal) Collected: 02/13/23 1557    Lab Status: Final result Updated: 02/13/23 1600     POC Glucose 316 mg/dl           All labs reviewed and utilized in the medical decision making process    Radiology:    CT head without contrast   Final Result      No acute intracranial abnormality  Workstation performed: [id number]         XR chest portable    (Results Pending)       All radiology studies independently viewed by me and interpreted by the radiologist     Procedure    Procedures        ED Course of Care and Re-Assessments      Resuscitation begun with IV fluids in emergency department    Started on ceftriaxone for presumed UTI based on symptoms and results of UA    Medications   sodium chloride (PF) 0 9 % injection 3 mL (has no administration in time range)   acetaminophen (TYLENOL) tablet 650 mg (has no administration in time range)   calcium carbonate (TUMS) chewable tablet 1,000 mg (has no administration in time range)   docusate sodium (COLACE) capsule 100 mg (100 mg Oral Not Given 2/13/23 1956)   senna (SENOKOT) tablet 8 6 mg (has no administration in time range)   ondansetron (ZOFRAN) injection 4 mg (has no administration in time range)   heparin (porcine) subcutaneous injection 5,000 Units (has no administration in time range)   insulin lispro (HumaLOG) 100 units/mL subcutaneous injection 1-6 Units (has no administration in time range)   insulin lispro (HumaLOG) 100 units/mL subcutaneous injection 1-6 Units (has no administration in time range)   insulin glargine (LANTUS) subcutaneous injection 10 Units 0 1 mL (has no administration in time range)   insulin lispro (HumaLOG) 100 units/mL subcutaneous injection 5 Units (has no administration in time range)   metoprolol succinate (TOPROL-XL) 24 hr tablet 50 mg (has no administration in time range)   losartan (COZAAR) tablet 100 mg (has no administration in time range)     And   hydrochlorothiazide (HYDRODIURIL) tablet 12 5 mg (has no administration in time range)   sodium chloride 0 9 % bolus 1,000 mL (1,000 mL Intravenous New Bag 2/13/23 1717)   ceftriaxone (ROCEPHIN) 1 g/50 mL in dextrose IVPB (0 mg Intravenous Stopped 2/13/23 1908)           FINAL IMPRESSION    Final diagnoses: Altered mental status   UTI (urinary tract infection)   Hyperglycemia   Dehydration         DISPOSITION/PLAN    Presentation as above with altered mental status in the setting of UTI hyperglycemia and dehydration  Vital signs reassuring, examination nonspecific but being concerning for dehydration  Low suspicion for stroke based on nonfocal neurologic exam, likewise low suspicion for meningitis or encephalitis  Strong suspicion for delirium due to urinary tract infection and dehydration, with hyperglycemia likely secondary to infection a potential contributor  After initial resuscitation and treatment in the emergency department, admitted to internal medicine for further care, hemodynamically stable and comfortable at that time  Time reflects when diagnosis was documented in both MDM as applicable and the Disposition within this note     Time User Action Codes Description Comment    2/13/2023  6:19 PM Chalo Gentleman T Add [R41 82] Altered mental status     2/13/2023  6:19 PM Chalo Gentleman T Add [N39 0] UTI (urinary tract infection)     2/13/2023  6:20 PM Chalo Gentleman T Add [R73 9] Hyperglycemia     2/13/2023  6:20 PM Papi Nash Add [E86 0] Dehydration       ED Disposition     ED Disposition   Admit    Condition   Stable    Date/Time   Mon Feb 13, 2023  6:19 PM    Comment   Case was discussed with AMANDA and the patient's admission status was agreed to be Admission Status: inpatient status to the service of Dr Scarlet Otero              Follow-up Information    None           PATIENT REFERRED TO:    No follow-up provider specified  DISCHARGE MEDICATIONS:    Patient's Medications   Discharge Prescriptions    No medications on file       No discharge procedures on file  Jose Peterson MD    Portions of the record may have been created with voice recognition software  Occasional wrong word or "sound alike" substitutions may have occurred due to the inherent limitations of voice recognition software    Please read the chart carefully and recognize, using context, where substitutions have occurred     Jose Peterson MD  02/13/23 2017

## 2023-02-13 NOTE — H&P
[facility]  H&P- Evelin Meeks 1935, 80 y o  female MRN: [id number]  Unit/Bed#: S[facility]y [facility] 18 Encounter: [id number]  Primary Care Provider: Gisselle Watts MD   Date and time admitted to hospital: 2/13/2023  3:38 PM    * UTI (urinary tract infection)  Assessment & Plan  · Present on admission- UA with leuk esterase and bacteria  · With associated confusion, referred from urgent care  · Given dose of IV ceftriaxone in ED  · Continue antibiotics, follow up infectious work up    Hyponatremia  Assessment & Plan  · Pseudohyponatremia in setting of hyperglycemia  · Monitor sodium levels with correction of hyperglycemia     Hypertensive urgency  Assessment & Plan  · Present on admission  · Now within normal limitis  · Resume home meds  · Monitor blood pressure     Type 2 diabetes mellitus without complication, without long-term current use of insulin ([facility] )  Assessment & Plan  Lab Results   Component Value Date    HGBA1C 8 1 (H) 12/08/2022       Recent Labs     02/13/23  1432 02/13/23  1557   POCGLU 370* 316*       Blood Sugar Average: Last 72 hrs:  (P) 316   · Uncontrolled on arrival  · Start sliding scale #3, add lantus 10 units qhs, add lispro 5 units with meals  · Monitor glucose levels     VTE Pharmacologic Prophylaxis: VTE Score: 5 High Risk (Score >/= 5) - Pharmacological DVT Prophylaxis Ordered: heparin  Sequential Compression Devices Ordered  Code Status: Level 1 - Full Code     Anticipated Length of Stay: Patient will be admitted on an inpatient basis with an anticipated length of stay of greater than 2 midnights secondary to UTI  Total Time for Visit, including Counseling / Coordination of Care: 30 minutes Greater than 50% of this total time spent on direct patient counseling and coordination of care  Chief Complaint: confusion    History of Present Illness:  Evelin Meeks is a 80 y o  female with a PMH of htn, diabetes who presents with confusion from urgent care   Reports of confusion that has been worsening over last 1 week in association with dizziness and mechanical falls  Went to urgent care for further evaluation, found to have hyperglycemia and hypertensive urgency  There was concerns for UTI and patient was admitted to internal medicine for further management  Review of Systems:  Review of Systems   Constitutional: Negative for chills and fever  HENT: Negative for ear pain and sore throat  Eyes: Negative for pain and visual disturbance  Respiratory: Negative for cough and shortness of breath  Cardiovascular: Negative for chest pain and palpitations  Gastrointestinal: Negative for abdominal pain and vomiting  Genitourinary: Negative for dysuria and hematuria  Musculoskeletal: Negative for arthralgias and back pain  Skin: Negative for color change and rash  Neurological: Negative for seizures and syncope  Psychiatric/Behavioral: Positive for confusion  All other systems reviewed and are negative  Past Medical and Surgical History:   Past Medical History:   Diagnosis Date   • Allergy to sulfa drugs    • Arthritis 2000   • Diabetes mellitus ([facility] ) 2000   • H/O multiple allergies     Novocaine,Darvocet, Sulfa, Accupril   • HL (hearing loss)    • Hyperlipidemia    • Hypertension    • Memory loss 2020   • Nocturia    • Palpitations    • Thrombocytopenic disorder ([facility] )    • Type 2 diabetes mellitus without complication ([facility] )    • Wears glasses        Past Surgical History:   Procedure Laterality Date   • HERNIA REPAIR     • HYSTERECTOMY         Meds/Allergies:  Prior to Admission medications    Medication Sig Start Date End Date Taking?  Authorizing Provider   ezetimibe-simvastatin (VYTORIN) 10-20 mg per tablet Take 1 tablet by mouth daily at bedtime 6/13/22   Lionel Hernadez MD   FREESTYLE LITE test strip USE 1 EACH DAILY USE AS INSTRUCTED 9/27/22   Lionel Hernadez MD   Glucosamine HCl (GLUCOSAMINE PO) 1 po daily    Historical Provider, MD   metFORMIN (GLUCOPHAGE) 850 mg tablet Take 1 tablet (850 mg total) by mouth 2 (two) times a day 6/13/22   Willem Sauer MD   metoprolol succinate (TOPROL-XL) 50 mg 24 hr tablet Take 1 tablet (50 mg total) by mouth daily 6/13/22   Willem Sauer MD   naproxen (NAPROSYN) 375 mg tablet TAKE 1 TABLET BY MOUTH  TWICE DAILY WITH MEALS  Patient taking differently: Take 375 mg by mouth once as needed 11/15/22   Willem Sauer MD   potassium chloride (K-DUR,KLOR-CON) 20 mEq tablet Take 1 tablet (20 mEq total) by mouth daily 6/13/22   Willem Sauer MD   valsartan-hydrochlorothiazide (DIOVAN-HCT) 160-12 5 MG per tablet Take 1 tablet by mouth daily 6/13/22   Willem Sauer MD     I have reviewed home medications using recent Epic encounter  Allergies:    Allergies   Allergen Reactions   • Accupril [Quinapril Hcl]    • Novocain [Procaine]    • Parabens    • Sulfa Antibiotics        Social History:  Marital Status: /Civil Union   Occupation: na  Patient Pre-hospital Living Situation: Home  Patient Pre-hospital Level of Mobility: unable to be assessed at time of evaluation  Patient Pre-hospital Diet Restrictions: diabetic diet  Substance Use History:   Social History     Substance and Sexual Activity   Alcohol Use Not Currently   • Alcohol/week: 1 0 standard drink   • Types: 1 Glasses of wine per week    Comment: on holidays add an extra glass of wine     Social History     Tobacco Use   Smoking Status Never   • Passive exposure: Never   Smokeless Tobacco Never     Social History     Substance and Sexual Activity   Drug Use Never       Family History:  Family History   Problem Relation Age of Onset   • Hypertension Mother    • Diabetes Mother    • Other Mother    • Hypertension Father    • Heart disease Father        Physical Exam:     Vitals:   Blood Pressure: 143/81 (02/13/23 1732)  Pulse: 78 (02/13/23 1732)  Temperature: 98 °F (36 7 °C) (02/13/23 1534)  Temp Source: Tympanic (02/13/23 1534)  Respirations: 16 (02/13/23 1732)  Height: 5' 2" (157 5 cm) (02/13/23 1534)  Weight - Scale: 77 1 kg (170 lb) (02/13/23 1534)  SpO2: 97 % (02/13/23 1732)    Physical Exam  Constitutional:       General: She is not in acute distress  Appearance: Normal appearance  She is not toxic-appearing  Cardiovascular:      Rate and Rhythm: Normal rate and regular rhythm  Heart sounds: Normal heart sounds  No murmur heard  Pulmonary:      Effort: Pulmonary effort is normal  No respiratory distress  Breath sounds: Normal breath sounds  No wheezing  Abdominal:      General: Abdomen is flat  There is no distension  Palpations: Abdomen is soft  Tenderness: There is no abdominal tenderness  Neurological:      General: No focal deficit present  Mental Status: She is alert  Mental status is at baseline  Motor: No weakness            Additional Data:     Lab Results:  Results from last 7 days   Lab Units 02/13/23  1557   WBC Thousand/uL 5 68   HEMOGLOBIN g/dL 15 9*   HEMATOCRIT % 50 1*   PLATELETS Thousands/uL 138*   NEUTROS PCT % 67   LYMPHS PCT % 21   MONOS PCT % 10   EOS PCT % 1     Results from last 7 days   Lab Units 02/13/23  1557   SODIUM mmol/L 129*   POTASSIUM mmol/L 4 0   CHLORIDE mmol/L 92*   CO2 mmol/L 26   BUN mg/dL 33*   CREATININE mg/dL 1 21   ANION GAP mmol/L 11   CALCIUM mg/dL 9 7   ALBUMIN g/dL 4 1   TOTAL BILIRUBIN mg/dL 0 60   ALK PHOS U/L 76   ALT U/L 18   AST U/L 23   GLUCOSE RANDOM mg/dL 324*     Results from last 7 days   Lab Units 02/13/23  1556   INR  1 05     Results from last 7 days   Lab Units 02/13/23 2008 02/13/23  1557 02/13/23  1432   POC GLUCOSE mg/dl 288* 316* 370*         Results from last 7 days   Lab Units 02/13/23  1557   LACTIC ACID mmol/L 1 8   PROCALCITONIN ng/ml <0 05       Lines/Drains:  Invasive Devices     Peripheral Intravenous Line  Duration           Peripheral IV 02/13/23 Right Antecubital <1 day                    Imaging: Reviewed radiology reports from this admission including: CT head  CT head without contrast   Final Result by Marli Hatfield MD (02/13 1747)      No acute intracranial abnormality  Workstation performed: [id number]         XR chest portable    (Results Pending)       EKG and Other Studies Reviewed on Admission:   · EKG: pending scan into epic chart  ** Please Note: This note has been constructed using a voice recognition system   **

## 2023-02-14 PROBLEM — G93.41 METABOLIC ENCEPHALOPATHY: Status: ACTIVE | Noted: 2023-02-14

## 2023-02-14 LAB
ANION GAP SERPL CALCULATED.3IONS-SCNC: 11 MMOL/L (ref 4–13)
BUN SERPL-MCNC: 29 MG/DL (ref 5–25)
CALCIUM SERPL-MCNC: 9.3 MG/DL (ref 8.3–10.1)
CHLORIDE SERPL-SCNC: 98 MMOL/L (ref 96–108)
CO2 SERPL-SCNC: 29 MMOL/L (ref 21–32)
CREAT SERPL-MCNC: 1.09 MG/DL (ref 0.6–1.3)
ERYTHROCYTE [DISTWIDTH] IN BLOOD BY AUTOMATED COUNT: 13.8 % (ref 11.6–15.1)
GFR SERPL CREATININE-BSD FRML MDRD: 45 ML/MIN/1.73SQ M
GLUCOSE SERPL-MCNC: 164 MG/DL (ref 65–140)
GLUCOSE SERPL-MCNC: 169 MG/DL (ref 65–140)
GLUCOSE SERPL-MCNC: 176 MG/DL (ref 65–140)
GLUCOSE SERPL-MCNC: 259 MG/DL (ref 65–140)
GLUCOSE SERPL-MCNC: 278 MG/DL (ref 65–140)
GLUCOSE SERPL-MCNC: 69 MG/DL (ref 65–140)
HCT VFR BLD AUTO: 44.4 % (ref 34.8–46.1)
HGB BLD-MCNC: 14.5 G/DL (ref 11.5–15.4)
MAGNESIUM SERPL-MCNC: 1.7 MG/DL (ref 1.6–2.6)
MCH RBC QN AUTO: 28.7 PG (ref 26.8–34.3)
MCHC RBC AUTO-ENTMCNC: 32.7 G/DL (ref 31.4–37.4)
MCV RBC AUTO: 88 FL (ref 82–98)
PLATELET # BLD AUTO: 147 THOUSANDS/UL (ref 149–390)
PMV BLD AUTO: 11.3 FL (ref 8.9–12.7)
POTASSIUM SERPL-SCNC: 3.7 MMOL/L (ref 3.5–5.3)
RBC # BLD AUTO: 5.06 MILLION/UL (ref 3.81–5.12)
SODIUM SERPL-SCNC: 138 MMOL/L (ref 135–147)
WBC # BLD AUTO: 4.86 THOUSAND/UL (ref 4.31–10.16)

## 2023-02-14 RX ADMIN — METOPROLOL SUCCINATE 50 MG: 50 TABLET, EXTENDED RELEASE ORAL at 09:51

## 2023-02-14 RX ADMIN — HEPARIN SODIUM 5000 UNITS: 5000 INJECTION INTRAVENOUS; SUBCUTANEOUS at 22:01

## 2023-02-14 RX ADMIN — SODIUM CHLORIDE, PRESERVATIVE FREE 3 ML: 5 INJECTION INTRAVENOUS at 10:08

## 2023-02-14 RX ADMIN — INSULIN LISPRO 5 UNITS: 100 INJECTION, SOLUTION INTRAVENOUS; SUBCUTANEOUS at 12:33

## 2023-02-14 RX ADMIN — SODIUM CHLORIDE, PRESERVATIVE FREE 3 ML: 5 INJECTION INTRAVENOUS at 22:02

## 2023-02-14 RX ADMIN — HEPARIN SODIUM 5000 UNITS: 5000 INJECTION INTRAVENOUS; SUBCUTANEOUS at 09:57

## 2023-02-14 RX ADMIN — INSULIN GLARGINE 10 UNITS: 100 INJECTION, SOLUTION SUBCUTANEOUS at 22:01

## 2023-02-14 RX ADMIN — HEPARIN SODIUM 5000 UNITS: 5000 INJECTION INTRAVENOUS; SUBCUTANEOUS at 14:44

## 2023-02-14 RX ADMIN — INSULIN LISPRO 3 UNITS: 100 INJECTION, SOLUTION INTRAVENOUS; SUBCUTANEOUS at 10:05

## 2023-02-14 RX ADMIN — INSULIN LISPRO 1 UNITS: 100 INJECTION, SOLUTION INTRAVENOUS; SUBCUTANEOUS at 12:33

## 2023-02-14 RX ADMIN — HYDROCHLOROTHIAZIDE 12.5 MG: 12.5 TABLET ORAL at 09:51

## 2023-02-14 RX ADMIN — LOSARTAN POTASSIUM 100 MG: 50 TABLET, FILM COATED ORAL at 09:51

## 2023-02-14 RX ADMIN — INSULIN LISPRO 5 UNITS: 100 INJECTION, SOLUTION INTRAVENOUS; SUBCUTANEOUS at 10:06

## 2023-02-14 RX ADMIN — INSULIN LISPRO 4 UNITS: 100 INJECTION, SOLUTION INTRAVENOUS; SUBCUTANEOUS at 23:00

## 2023-02-14 RX ADMIN — CEFTRIAXONE SODIUM 1000 MG: 10 INJECTION, POWDER, FOR SOLUTION INTRAVENOUS at 14:44

## 2023-02-14 NOTE — PHYSICAL THERAPY NOTE
Physical Therapy Evaluation     Patient's Name: Haydee Bey    Admitting Diagnosis  Altered mental status [R41 82]    Problem List  Patient Active Problem List   Diagnosis    Type 2 diabetes mellitus without complication, without long-term current use of insulin ([address] )    Essential hypertension    Pure hypercholesterolemia    Microalbuminuria    Stage 3a chronic kidney disease (HCC)    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HCC)    Cognitive decline    Osteoarthritis of multiple joints    Bilateral hearing loss    Primary osteoarthritis of left knee    Hypertensive urgency    UTI (urinary tract infection)    Hyponatremia    Metabolic encephalopathy       Past Medical History  Past Medical History:   Diagnosis Date    Allergy to sulfa drugs     Arthritis 2000    Diabetes mellitus ([address] ) 2000    H/O multiple allergies     Novocaine,Darvocet, Sulfa, Accupril    HL (hearing loss)     Hyperlipidemia     Hypertension     Memory loss 2020    Nocturia     Palpitations     Thrombocytopenic disorder (HCC)     Type 2 diabetes mellitus without complication ([address] )     Wears glasses        Past Surgical History  Past Surgical History:   Procedure Laterality Date    HERNIA REPAIR      HYSTERECTOMY            02/14/23 1254   PT Last Visit   PT Visit Date 02/14/23   Note Type   Note type Evaluation   Pain Assessment   Pain Assessment Tool 0-10   Pain Score No Pain   Restrictions/Precautions   Weight Bearing Precautions Per Order No   Other Precautions Cognitive; Chair Alarm; Bed Alarm;Multiple lines; Fall Risk   Home Living   Type of [address] [place] [address] One level; Laundry in basement;Stairs to enter without rails  (1 half step front door)   Bathroom Shower/Tub Tub/shower unit   Bathroom Equipment   (no per pt)   Home Equipment [facility] used indoors/outdoors at baseline prn)   Prior Function   Level of [place] Independent with ADLs; Independent with functional mobility; Needs assistance with IADLS   Lives With Spouse   Receives Help From [facility] IADLs Family/Friend/Other provides transportation; Independent with meal prep; Family/Friend/Other provides medication management   Falls in the last 6 months 1 to 4  (4 falls per pte)   Vocational Retired  (teacher)   Comments  reports pt has been needing increased supervision/assistance for medications   at bedside assisted with home set up confirmation   General   Family/Caregiver Present Yes   Cognition   Overall Cognitive Status Impaired   Arousal/Participation Alert   Orientation Level Oriented to person;Disoriented to place; Disoriented to time;Disoriented to situation   Memory Decreased short term memory;Decreased recall of recent events;Decreased recall of precautions   Following Commands Follows one step commands with increased time or repetition   Comments pt agreeable to PT evaluation   RLE Assessment   RLE Assessment   (grossly 4-/5 observed with functional mobility)   LLE Assessment   LLE Assessment   (grossly 4-/5 observed with functional mobility)   Coordination   Movements are Fluid and Coordinated 1   Sensation WFL   Bed Mobility   Supine to Sit 5  Supervision   Additional items Assist x 1; Increased time required;Verbal cues;HOB elevated   Sit to Supine 5  Supervision   Additional items Assist x 1; Increased time required;Verbal cues;HOB elevated   Transfers   Sit to Stand 4  Minimal assistance  (CGA)   Additional items Assist x 1; Increased time required;Verbal cues;Armrests   Stand to Sit 4  Minimal assistance  (CGA)   Additional items Assist x 1; Increased time required;Verbal cues;Armrests   Ambulation/Elevation   Gait pattern Decreased foot clearance; Short stride; Inconsistent jeny  (no overt LOB)   Gait Assistance 4  Minimal assist  (CGA)   Additional items Assist x 1;Verbal cues   Assistive Device Straight cane   Distance 50'   Balance   Static Sitting Good   Dynamic Sitting Fair +   Static Standing Fair   Dynamic Standing Fair -   Ambulatory Fair -   Endurance Deficit Endurance Deficit Yes   Activity Tolerance   Activity Tolerance Patient limited by fatigue   Medical Staff Made Aware ALMA Mo[place]   Nurse Made Aware ASPEN Beltre   Assessment   Prognosis Good   Problem List Decreased strength;Decreased endurance; Impaired balance;Decreased mobility; Decreased cognition   Assessment Pt is 80 y o  female seen for PT evaluation on 2/14/2023 s/p admit to [facility] on 2/13/2023 w/ UTI (urinary tract infection)  PT was consulted to assess pt's functional mobility and d/c needs  Order placed for PT eval and tx  PTA, pt resides with spouse in [facility] with 1 DAVID, ambulates with SPC, +fall history  At time of eval, pt performing bed mobility SBA, transfers and level surface household distance gait trial CGA with use of SPC  Upon evaluation, pt presenting with impaired functional mobility d/t decreased strength, decreased endurance, impaired balance, decreased mobility, decreased cognition and activity intolerance  Pertinent PMHx and current co-morbidities affecting pt's physical performance at time of assessment include: UTI, type 2DM, hyponatremia, hypertensive urgency, metabolic encephalopathy, microalbuminuria, platelets decreased, cognitive decline, OA, b/l hearing loss  Personal factors affecting pt at time of eval include: ambulating w/ assistive device, stairs to enter home, inability to navigate level surfaces w/o external assistance, decreased cognition and positive fall history  The following objective measures performed on IE also reveal limitations: Barthel Index: 50/100, Modified Hurricane: 3 (moderate disability) and AM-PAC 6-Clicks: 47/12  Pt's clinical presentation is currently unstable/unpredictable seen in pt's presentation of advanced age, abnormal lab value(s), need for input for task focus and mobility technique and ongoing medical assessment   Overall, pt's rehab potential and prognosis to return to PLOF is good as impacted by objective findings, warranting pt to receive further skilled PT interventions to address identified impairments, activity limitation(s), and participation restriction(s)  Pt to benefit from continued PT tx to address deficits as defined above and maximize level of functional independent mobility  From PT/mobility standpoint, recommendation at time of d/c would be home with home health rehabilitation vs OPPT pending progress in order to facilitate return to PLOF  Barriers to Discharge None   Goals   Patient Goals to go home   STG Expiration Date 02/24/23   Short Term Goal #1 In 7-10 days: Increase bilateral LE strength 1/2 grade to facilitate independent mobility, Perform all bed mobility tasks modified independent to decrease caregiver burden, Perform all transfers modified independent to improve independence, Ambulate > 150 ft  with least restrictive assistive device modified independent w/o LOB and w/ normalized gait pattern 100% of the time, Navigate 1 stair modified independent with unilateral handrail to facilitate return to previous living environment, Increase all balance 1/2 grade to decrease risk for falls, Improve Barthel Index score to 65 or greater to facilitate independence and PT provider will perform functional balance assessment to determine fall risk   PT Treatment Day 0   Plan   Treatment/Interventions Functional transfer training;LE strengthening/ROM; Elevations; Therapeutic exercise; Endurance training;Patient/family training;Equipment eval/education; Bed mobility;Gait training;Continued evaluation;Spoke to nursing;Cognitive reorientation;Spoke to case management   PT Frequency 2-3x/wk   Recommendation   PT Discharge Recommendation Home with home health rehabilitation  (vs OPPT pending progress/ transportation)   AM-PAC Basic Mobility Inpatient   Turning in Flat Bed Without Bedrails 3   Lying on Back to Sitting on Edge of Flat Bed Without Bedrails 3   Moving Bed to Chair 3   Standing Up From Chair Using Arms 3   Walk in Room 3   Climb 3-5 Stairs With Railing 3   Basic Mobility Inpatient Raw Score 18   Basic Mobility Standardized Score 41 05   Highest Level Of Mobility   -HLM Goal 6: Walk 10 steps or more   -HLM Achieved 7: Walk 25 feet or more   Modified Justin Scale   Modified [place] Scale 3   Barthel Index   Feeding 10   Bathing 0   Grooming Score 5   Dressing Score 5   Bladder Score 5   Bowels Score 10   Toilet Use Score 5   Transfers (Bed/Chair) Score 10   Mobility (Level Surface) Score 0   Stairs Score 0   Barthel Index Score 50         Darryl Murray PT

## 2023-02-14 NOTE — PROGRESS NOTES
[facility]  Progress Note - Kayden Ruffin 1935, 80 y o  female MRN: [id number]  Unit/Bed#: FT 05 Encounter: [id number]  Primary Care Provider: Pooja Lanier MD   Date and time admitted to hospital: 2/13/2023  3:38 PM    * UTI (urinary tract infection)  Assessment & Plan  · Present on admission- UA with leuk esterase and bacteria  · With associated confusion, referred from urgent care  · Given dose of IV ceftriaxone in ED  · Continue antibiotics, follow up infectious work up    Metabolic encephalopathy  Assessment & Plan  · Likely secondary to UTI  · Initially presented with confusion according to , continues to have intermittent confusion  · Head CT obtained, showed no acute abnormalities  · Pending blood and urine culture  · 1 L normal saline bolus  · Given IV Rocephin, will continue    Hyponatremia  Assessment & Plan  · Pseudohyponatremia in setting of hyperglycemia  · Monitor sodium levels with correction of hyperglycemia     Hypertensive urgency  Assessment & Plan  · Present on admission  · Now within normal limitis  · Resume home meds  · Monitor blood pressure     Type 2 diabetes mellitus without complication, without long-term current use of insulin [facility])  Assessment & Plan  Lab Results   Component Value Date    HGBA1C 8 1 (H) 12/08/2022       Recent Labs     02/13/23  2219 02/14/23  0744 02/14/23  1005 02/14/23  1222   POCGLU 253* 176* 259* 164*       Blood Sugar Average: Last 72 hrs:  (P) 375 [id number]   · Uncontrolled on arrival  · Start sliding scale #3, add lantus 10 units qhs, add lispro 5 units with meals  · Monitor glucose levels         VTE Pharmacologic Prophylaxis: VTE Score: 5 High Risk (Score >/= 5) - Pharmacological DVT Prophylaxis Ordered: heparin  Sequential Compression Devices Ordered  Patient Centered Rounds: I performed bedside rounds with nursing staff today    Discussions with Specialists or Other Care Team Provider: Cm    Education and Discussions with Family / Patient: Attempted to update  () via phone  Unable to contact  Called both cell phone and home phone    Total Time Spent on Date of Encounter in care of patient: 35 minutes This time was spent on one or more of the following: performing physical exam; counseling and coordination of care; obtaining or reviewing history; documenting in the medical record; reviewing/ordering tests, medications or procedures; communicating with other healthcare professionals and discussing with patient's family/caregivers  Current Length of Stay: 1 day(s)  Current Patient Status: Inpatient   Certification Statement: The patient will continue to require additional inpatient hospital stay due to Continues to have intermittent confusion, additional day for IV antibiotics and awaiting urine culture  Discharge Plan: Anticipate discharge tomorrow to home  Code Status: Level 1 - Full Code    Subjective:   Patient reports no physical complaints at this time  She is frustrated that her breakfast arrived at 1030 which she believes is too late for breakfast   She is unsure why she is admitted to the hospital   She was informed the current time 7:45 AM   She denies lightheadedness, nausea, chest pressure/pain, or palpitations  Objective:     Vitals:   Temp (24hrs), Av 6 °F (36 4 °C), Min:97 1 °F (36 2 °C), Max:98 °F (36 7 °C)    Temp:  [97 1 °F (36 2 °C)-98 °F (36 7 °C)] 98 °F (36 7 °C)  HR:  [] 77  Resp:  [16-19] 19  BP: (140-189)/() 152/76  SpO2:  [95 %-100 %] 95 %  Body mass index is 31 09 kg/m²  Input and Output Summary (last 24 hours): Intake/Output Summary (Last 24 hours) at 2023 1358  Last data filed at 2023 0532  Gross per 24 hour   Intake 1050 ml   Output --   Net 1050 ml       Physical Exam:   Physical Exam  Vitals and nursing note reviewed  Constitutional:       Appearance: She is normal weight  HENT:      Head: Normocephalic        Nose: Nose normal       Mouth/Throat:      Mouth: Mucous membranes are moist       Pharynx: Oropharynx is clear  Eyes:      General: No scleral icterus  Conjunctiva/sclera: Conjunctivae normal       Pupils: Pupils are equal, round, and reactive to light  Cardiovascular:      Rate and Rhythm: Normal rate and regular rhythm  Heart sounds: No murmur heard  No friction rub  No gallop  Pulmonary:      Effort: Pulmonary effort is normal  No respiratory distress  Breath sounds: Normal breath sounds  No stridor  No wheezing, rhonchi or rales  Abdominal:      General: Abdomen is flat  Palpations: Abdomen is soft  Musculoskeletal:         General: Normal range of motion  Cervical back: Normal range of motion and neck supple  Right lower leg: No edema  Left lower leg: No edema  Lymphadenopathy:      Cervical: No cervical adenopathy  Skin:     General: Skin is warm  Coloration: Skin is not jaundiced or pale  Findings: No bruising, erythema or lesion  Neurological:      Mental Status: She is alert  Cranial Nerves: No cranial nerve deficit  Motor: No weakness  Comments: She was initially under the assumption that it was much later in the day then actually was  She was unsure why she was admitted to the hospital   After brief discussion she remembered her  believes she was confused  Psychiatric:         Mood and Affect: Mood normal          Behavior: Behavior normal          Thought Content:  Thought content normal           Additional Data:     Labs:  Results from last 7 days   Lab Units 02/14/23  0533 02/13/23  1557   WBC Thousand/uL 4 86 5 68   HEMOGLOBIN g/dL 14 5 15 9*   HEMATOCRIT % 44 4 50 1*   PLATELETS Thousands/uL 147* 138*   NEUTROS PCT %  --  67   LYMPHS PCT %  --  21   MONOS PCT %  --  10   EOS PCT %  --  1     Results from last 7 days   Lab Units 02/14/23  0533 02/13/23  1557   SODIUM mmol/L 138 129*   POTASSIUM mmol/L 3 7 4 0   CHLORIDE mmol/L 98 92*   CO2 mmol/L 29 26   BUN mg/dL 29* 33*   CREATININE mg/dL 1 09 1 21   ANION GAP mmol/L 11 11   CALCIUM mg/dL 9 3 9 7   ALBUMIN g/dL  --  4 1   TOTAL BILIRUBIN mg/dL  --  0 60   ALK PHOS U/L  --  76   ALT U/L  --  18   AST U/L  --  23   GLUCOSE RANDOM mg/dL 169* 324*     Results from last 7 days   Lab Units 02/13/23  1556   INR  1 05     Results from last 7 days   Lab Units 02/14/23  1222 02/14/23  1005 02/14/23  0744 02/13/23  2219 02/13/23  2008 02/13/23  1557 02/13/23  1432   POC GLUCOSE mg/dl 164* 259* 176* 253* 288* 316* 370*         Results from last 7 days   Lab Units 02/13/23  1557   LACTIC ACID mmol/L 1 8   PROCALCITONIN ng/ml <0 05       Lines/Drains:  Invasive Devices     Peripheral Intravenous Line  Duration           Peripheral IV 02/14/23 Left Antecubital <1 day                      Imaging: Reviewed radiology reports from this admission including: chest xray and CT head    Recent Cultures (last 7 days):   Results from last 7 days   Lab Units 02/13/23  1557 02/13/23  1556   BLOOD CULTURE  Received in Microbiology Lab  Culture in Progress  Received in Microbiology Lab  Culture in Progress         Last 24 Hours Medication List:   Current Facility-Administered Medications   Medication Dose Route Frequency Provider Last Rate   • acetaminophen  650 mg Oral Q6H PRN Tara Favre, MD     • calcium carbonate  1,000 mg Oral Daily PRN Tara Favre, MD     • cefTRIAXone  1,000 mg Intravenous Q24H Jason Perales PA-C     • docusate sodium  100 mg Oral BID Tara Favre, MD     • heparin (porcine)  5,000 Units Subcutaneous [facility] Tara Favre, MD     • losartan  100 mg Oral Daily Tara Favre, MD      And   • hydrochlorothiazide  12 5 mg Oral Daily Tara Favre, MD     • insulin glargine  10 Units Subcutaneous HS Tara Favre, MD     • insulin lispro  1-6 Units Subcutaneous TID With Meals Tara Favre, MD     • insulin lispro  1-6 Units Subcutaneous HS Albino Torres Velia Sorto MD     • insulin lispro  5 Units Subcutaneous TID With Meals Sarah Castillo MD     • metoprolol succinate  50 mg Oral Daily Sarah Castillo MD     • ondansetron  4 mg Intravenous Q6H PRN Sarah Castillo MD     • senna  1 tablet Oral Daily Sarah Castillo MD     • sodium chloride (PF)  3 mL Intravenous Q12H [facility] Luis Wright MD          Today, Patient Was Seen By: Ramirez Perales PA-C    **Please Note: This note may have been constructed using a voice recognition system  **

## 2023-02-14 NOTE — PLAN OF CARE
Problem: PHYSICAL THERAPY ADULT  Goal: Performs mobility at highest level of function for planned discharge setting  See evaluation for individualized goals  Description: Treatment/Interventions: Functional transfer training, LE strengthening/ROM, Elevations, Therapeutic exercise, Endurance training, Patient/family training, Equipment eval/education, Bed mobility, Gait training, Continued evaluation, Spoke to nursing, Cognitive reorientation, Spoke to case management          See flowsheet documentation for full assessment, interventions and recommendations  2/14/2023 1423 by Renae Gallegos, PT  Note: Prognosis: Good  Problem List: Decreased strength, Decreased endurance, Impaired balance, Decreased mobility, Decreased cognition  Assessment: Pt is 80 y o  female seen for PT evaluation on 2/14/2023 s/p admit to [facility] on 2/13/2023 w/ UTI (urinary tract infection)  PT was consulted to assess pt's functional mobility and d/c needs  Order placed for PT eval and tx  PTA, pt resides with spouse in [facility] with 1 DAVID, ambulates with SPC, +fall history  At time of eval, pt performing bed mobility SBA, transfers and level surface household distance gait trial CGA with use of SPC  Upon evaluation, pt presenting with impaired functional mobility d/t decreased strength, decreased endurance, impaired balance, decreased mobility, decreased cognition and activity intolerance  Pertinent PMHx and current co-morbidities affecting pt's physical performance at time of assessment include: UTI, type 2DM, hyponatremia, hypertensive urgency, metabolic encephalopathy, microalbuminuria, platelets decreased, cognitive decline, OA, b/l hearing loss  Personal factors affecting pt at time of eval include: ambulating w/ assistive device, stairs to enter home, inability to navigate level surfaces w/o external assistance, decreased cognition and positive fall history   The following objective measures performed on IE also reveal limitations: Barthel Index: 50/100, Modified [place]: 3 (moderate disability) and AM-PAC 6-Clicks: 66/10  Pt's clinical presentation is currently unstable/unpredictable seen in pt's presentation of advanced age, abnormal lab value(s), need for input for task focus and mobility technique and ongoing medical assessment  Overall, pt's rehab potential and prognosis to return to PLOF is good as impacted by objective findings, warranting pt to receive further skilled PT interventions to address identified impairments, activity limitation(s), and participation restriction(s)  Pt to benefit from continued PT tx to address deficits as defined above and maximize level of functional independent mobility  From PT/mobility standpoint, recommendation at time of d/c would be home with home health rehabilitation vs OPPT pending progress in order to facilitate return to PLOF  Barriers to Discharge: None     PT Discharge Recommendation: Home with home health rehabilitation (vs OPPT pending progress/ transportation)    See flowsheet documentation for full assessment  2/14/2023 1422 by Lloyd Fisher PT  Note: Prognosis: Good  Problem List: Decreased strength, Decreased endurance, Impaired balance, Decreased mobility, Decreased cognition  Assessment: Pt is 80 y o  female seen for PT evaluation on 2/14/2023 s/p admit to [facility] on 2/13/2023 w/ UTI (urinary tract infection)  PT was consulted to assess pt's functional mobility and d/c needs  Order placed for PT eval and tx  PTA, pt resides with spouse in [facility] with 1 DAVID, ambulates with SPC, +fall history  At time of eval, pt performing bed mobility SBA, transfers and level surface household distance gait trial CGA with use of SPC  Upon evaluation, pt presenting with impaired functional mobility d/t decreased strength, decreased endurance, impaired balance, decreased mobility, decreased cognition and activity intolerance   Pertinent PMHx and current co-morbidities affecting pt's physical performance at time of assessment include: UTI, type 2DM, hyponatremia, hypertensive urgency, metabolic encephalopathy, microalbuminuria, platelets decreased, cognitive decline, OA, b/l hearing loss  Personal factors affecting pt at time of eval include: ambulating w/ assistive device, stairs to enter home, inability to navigate level surfaces w/o external assistance, decreased cognition and positive fall history  The following objective measures performed on IE also reveal limitations: Barthel Index: 50/100, Modified [place]: 3 (moderate disability) and AM-PAC 6-Clicks: 23/96  Pt's clinical presentation is currently unstable/unpredictable seen in pt's presentation of advanced age, abnormal lab value(s), need for input for task focus and mobility technique and ongoing medical assessment  Overall, pt's rehab potential and prognosis to return to PLOF is good as impacted by objective findings, warranting pt to receive further skilled PT interventions to address identified impairments, activity limitation(s), and participation restriction(s)  Pt to benefit from continued PT tx to address deficits as defined above and maximize level of functional independent mobility  From PT/mobility standpoint, recommendation at time of d/c would be home with home health rehabilitation vs OPPT pending progress in order to facilitate return to PLOF  Barriers to Discharge: None     PT Discharge Recommendation: Home with home health rehabilitation (vs OPPT pending progress/ transportation)    See flowsheet documentation for full assessment

## 2023-02-14 NOTE — CASE MANAGEMENT
Case Management Assessment & Discharge Planning Note    Patient name Christal Rank  Location / MRN [id number]  : 1935 Date 2023       Current Admission Date: 2023  Current Admission Diagnosis:UTI (urinary tract infection)   Patient Active Problem List    Diagnosis Date Noted   • Hypertensive urgency 2023   • UTI (urinary tract infection) 2023   • Hyponatremia 2023   • Primary osteoarthritis of left knee 2022   • Osteoarthritis of multiple joints 2022   • Bilateral hearing loss 2022   • 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[facility] ) 03/15/2022   • Cognitive decline 03/15/2022   • Stage 3a chronic kidney disease ([facility] ) 2021   • Type 2 diabetes mellitus without complication, without long-term current use of insulin ([facility] ) 2020   • Essential hypertension 2020   • Pure hypercholesterolemia 2020   • Microalbuminuria 2020      LOS (days): 1  Geometric Mean LOS (GMLOS) (days):   Days to GMLOS:     OBJECTIVE:    Risk of Unplanned Readmission Score: 8 19         Current admission status: Inpatient       Preferred Pharmacy:   [facility] [address] [facility], [address] [facility]  [address] [address]  Phone: [phone] Fax: [phone]    [facility] ([address] [place] [address],   [address] [place] [address]  [address] [place] [id number]  Phone: [phone] Fax: [phone]    [facility] ([facility] ) - [address] [place] [address]  Phone: [phone] Fax: [phone]    Primary Care Provider: Sorin Devlin MD    Primary Insurance: MEDICARE  Secondary Insurance: [facility] HEALTH OPTIONS PROGRAM    ASSESSMENT:  [address] Proxies    There are no active Health Care Proxies on file         Advance Directives  Does patient have a Health Care POA?: Yes  Does patient have Advance Directives?: Yes  Primary Contact: RAJ Kennedy         Readmission Root Cause  30 Day Readmission: No    Patient Information  Mental Status: Alert  During Assessment patient was accompanied by: Spouse  Assessment information provided by[de-identified] Spouse, Patient  Primary Caregiver: Self  Support Systems: Spouse/significant other, Family members, Daughter  [place] of Residence: [place] [address] do you live in?: [address] entry access options   Select all that apply : Stairs  Number of steps to enter home : 1  Type of Current Residence: 2 story home  Upon entering residence, is there a bedroom on the main floor (no further steps)?: Yes  Upon entering residence, is there a bathroom on the main floor (no further steps)?: Yes  In the last 12 months, was there a time when you were not able to pay the mortgage or rent on time?: No  In the last 12 months, how many places have you lived?: 1  In the last 12 months, was there a time when you did not have a steady place to sleep or slept in a shelter (including now)?: No  Homeless/housing insecurity resource given?: N/A  Living Arrangements: Lives w/ Spouse/significant other  Is patient a ?: No    Activities of Daily Living Prior to Admission  Functional Status: Independent  Completes ADLs independently?: Yes  Ambulates independently?: Yes  Does patient use assisted devices?: Yes  Assisted Devices (DME) used: [employer]  Does patient currently own DME?: Yes  What DME does the patient currently own?: [employer]  Does patient have a history of Outpatient Therapy (PT/OT)?: No  Does the patient have a history of Short-Term Rehab?: No  Does patient have a history of HHC?: No  Does patient currently have [facility]?: No         Patient Information Continued  Income Source: Pension/[employer]  Does patient have prescription coverage?: Yes  Within the past 12 months, you worried that your food would run out before you got the money to buy more : Never true  Within the past 12 months, the food you bought just didn't last and you didn't have money to get more : Never true  Food insecurity resource given?: N/A  Does patient receive dialysis treatments?: No  Does patient have a history of substance abuse?: No  Does patient have a history of Mental Health Diagnosis?: No    PHQ 2/9 Screening   Reviewed PHQ 2/9 Depression Screening Score?: No    Means of Transportation  Means of Transport to Appts[de-identified] Family transport  In the past 12 months, has lack of transportation kept you from medical appointments or from getting medications?: No  In the past 12 months, has lack of transportation kept you from meetings, work, or from getting things needed for daily living?: No  Was application for public transport provided?: N/A        DISCHARGE DETAILS:    Discharge planning discussed with[de-identified] Patient and spouse at bedside  Freedom of Choice: Yes  Comments - Freedom of Choice: CM discussed freedom of choice as it pertains to discharge planning,  CM contacted family/caregiver?: Yes  Were Treatment Team discharge recommendations reviewed with patient/caregiver?: Yes  Did patient/caregiver verbalize understanding of patient care needs?: Yes  Were patient/caregiver advised of the risks associated with not following Treatment Team discharge recommendations?: Yes         Requested [address]         Is the patient interested in [address] at discharge?: Yes  [address] requested[de-identified] [address], Nursing, Occupational Therapy, Physical [address] Name[de-identified] Other (TBD)  [address] Provider[de-identified] PCP  Home Health Services Needed[de-identified] Evaluate Functional Status and Safety, Strengthening/Theraputic Exercises to Improve Function, Diabetes Management  Supporting Clincal Findings[de-identified] Limited Endurance, Fatigues Easliy in Short Distances    DME Referral Provided  Referral made for DME?: No    Other Referral/Resources/Interventions Provided:  Interventions: HHC  Referral Comments: CM sent referral for [facility]    Would you like to participate in our [address] service program?  : No - Declined    Treatment Team Recommendation: Other (TBD)  Discharge Destination Plan[de-identified] Home with [place] at Discharge : Family

## 2023-02-14 NOTE — ASSESSMENT & PLAN NOTE
· Likely secondary to UTI  · Initially presented with confusion according to , continues to have intermittent confusion  · Head CT obtained, showed no acute abnormalities  · Pending blood and urine culture  · 1 L normal saline bolus  · Given IV Rocephin, will continue

## 2023-02-14 NOTE — ASSESSMENT & PLAN NOTE
Lab Results   Component Value Date    HGBA1C 8 1 (H) 12/08/2022       Recent Labs     02/13/23  2219 02/14/23  0744 02/14/23  1005 02/14/23  1222   POCGLU 253* 176* 259* 164*       Blood Sugar Average: Last 72 hrs:  (P) 625 [id number]   · Uncontrolled on arrival  · Start sliding scale #3, add lantus 10 units qhs, add lispro 5 units with meals  · Monitor glucose levels

## 2023-02-14 NOTE — ASSESSMENT & PLAN NOTE
· Present on admission - UA with leuk esterase and bacteria  · With associated confusion, referred from urgent care  · Given dose of IV ceftriaxone in ED  · Continue antibiotics, follow up infectious work up

## 2023-02-15 ENCOUNTER — HOME HEALTH ADMISSION (OUTPATIENT)
Dept: HOME HEALTH SERVICES | Facility: HOME HEALTHCARE | Age: 88
End: 2023-02-15

## 2023-02-15 VITALS
SYSTOLIC BLOOD PRESSURE: 145 MMHG | RESPIRATION RATE: 17 BRPM | HEART RATE: 84 BPM | WEIGHT: 167.33 LBS | DIASTOLIC BLOOD PRESSURE: 95 MMHG | BODY MASS INDEX: 30.79 KG/M2 | TEMPERATURE: 97.5 F | OXYGEN SATURATION: 96 % | HEIGHT: 62 IN

## 2023-02-15 LAB
ANION GAP SERPL CALCULATED.3IONS-SCNC: 8 MMOL/L (ref 4–13)
BACTERIA UR CULT: NORMAL
BUN SERPL-MCNC: 30 MG/DL (ref 5–25)
CALCIUM SERPL-MCNC: 9.5 MG/DL (ref 8.4–10.2)
CHLORIDE SERPL-SCNC: 99 MMOL/L (ref 96–108)
CO2 SERPL-SCNC: 30 MMOL/L (ref 21–32)
CREAT SERPL-MCNC: 1.01 MG/DL (ref 0.6–1.3)
ERYTHROCYTE [DISTWIDTH] IN BLOOD BY AUTOMATED COUNT: 13.7 % (ref 11.6–15.1)
GFR SERPL CREATININE-BSD FRML MDRD: 50 ML/MIN/1.73SQ M
GLUCOSE SERPL-MCNC: 120 MG/DL (ref 65–140)
GLUCOSE SERPL-MCNC: 148 MG/DL (ref 65–140)
GLUCOSE SERPL-MCNC: 178 MG/DL (ref 65–140)
GLUCOSE SERPL-MCNC: 208 MG/DL (ref 65–140)
HCT VFR BLD AUTO: 41.9 % (ref 34.8–46.1)
HGB BLD-MCNC: 13.7 G/DL (ref 11.5–15.4)
MCH RBC QN AUTO: 28.8 PG (ref 26.8–34.3)
MCHC RBC AUTO-ENTMCNC: 32.7 G/DL (ref 31.4–37.4)
MCV RBC AUTO: 88 FL (ref 82–98)
PLATELET # BLD AUTO: 127 THOUSANDS/UL (ref 149–390)
PMV BLD AUTO: 11 FL (ref 8.9–12.7)
POTASSIUM SERPL-SCNC: 3.7 MMOL/L (ref 3.5–5.3)
RBC # BLD AUTO: 4.75 MILLION/UL (ref 3.81–5.12)
SODIUM SERPL-SCNC: 137 MMOL/L (ref 135–147)
WBC # BLD AUTO: 4.73 THOUSAND/UL (ref 4.31–10.16)

## 2023-02-15 RX ORDER — CEPHALEXIN 500 MG/1
500 CAPSULE ORAL EVERY 6 HOURS SCHEDULED
Qty: 16 CAPSULE | Refills: 0 | Status: SHIPPED | OUTPATIENT
Start: 2023-02-15 | End: 2023-02-19

## 2023-02-15 RX ADMIN — HEPARIN SODIUM 5000 UNITS: 5000 INJECTION INTRAVENOUS; SUBCUTANEOUS at 06:12

## 2023-02-15 RX ADMIN — DOCUSATE SODIUM 100 MG: 100 CAPSULE, LIQUID FILLED ORAL at 17:23

## 2023-02-15 RX ADMIN — HEPARIN SODIUM 5000 UNITS: 5000 INJECTION INTRAVENOUS; SUBCUTANEOUS at 16:15

## 2023-02-15 RX ADMIN — SODIUM CHLORIDE, PRESERVATIVE FREE 3 ML: 5 INJECTION INTRAVENOUS at 08:37

## 2023-02-15 RX ADMIN — METOPROLOL SUCCINATE 50 MG: 50 TABLET, EXTENDED RELEASE ORAL at 08:32

## 2023-02-15 RX ADMIN — CEFTRIAXONE SODIUM 1000 MG: 10 INJECTION, POWDER, FOR SOLUTION INTRAVENOUS at 16:04

## 2023-02-15 RX ADMIN — HYDROCHLOROTHIAZIDE 12.5 MG: 12.5 TABLET ORAL at 08:33

## 2023-02-15 RX ADMIN — SENNOSIDES 8.6 MG: 8.6 TABLET, FILM COATED ORAL at 08:33

## 2023-02-15 RX ADMIN — INSULIN LISPRO 5 UNITS: 100 INJECTION, SOLUTION INTRAVENOUS; SUBCUTANEOUS at 08:37

## 2023-02-15 RX ADMIN — INSULIN LISPRO 5 UNITS: 100 INJECTION, SOLUTION INTRAVENOUS; SUBCUTANEOUS at 12:01

## 2023-02-15 RX ADMIN — INSULIN LISPRO 1 UNITS: 100 INJECTION, SOLUTION INTRAVENOUS; SUBCUTANEOUS at 16:18

## 2023-02-15 RX ADMIN — INSULIN LISPRO 2 UNITS: 100 INJECTION, SOLUTION INTRAVENOUS; SUBCUTANEOUS at 12:01

## 2023-02-15 RX ADMIN — INSULIN LISPRO 5 UNITS: 100 INJECTION, SOLUTION INTRAVENOUS; SUBCUTANEOUS at 17:20

## 2023-02-15 RX ADMIN — DOCUSATE SODIUM 100 MG: 100 CAPSULE, LIQUID FILLED ORAL at 08:32

## 2023-02-15 RX ADMIN — LOSARTAN POTASSIUM 100 MG: 50 TABLET, FILM COATED ORAL at 08:31

## 2023-02-15 NOTE — ASSESSMENT & PLAN NOTE
· Resolved  · Present on admission  · Now within normal limitis  · Resume home meds  · Monitor blood pressure

## 2023-02-15 NOTE — PLAN OF CARE
Problem: INFECTION - ADULT  Goal: Absence or prevention of progression during hospitalization  Description: INTERVENTIONS:  - Assess and monitor for signs and symptoms of infection  - Monitor lab/diagnostic results  - Monitor all insertion sites, i e  indwelling lines, tubes, and drains  - Monitor endotracheal if appropriate and nasal secretions for changes in amount and color  - [place] appropriate cooling/warming therapies per order  - Administer medications as ordered  - Instruct and encourage patient and family to use good hand hygiene technique  - Identify and instruct in appropriate isolation precautions for identified infection/condition  Outcome: Progressing     Problem: SAFETY ADULT  Goal: Patient will remain free of falls  Description: INTERVENTIONS:  - Educate patient/family on patient safety including physical limitations  - Instruct patient to call for assistance with activity   - Consult OT/PT to assist with strengthening/mobility   - Keep Call bell within reach  - Keep bed low and locked with side rails adjusted as appropriate  - Keep care items and personal belongings within reach  Problem: DISCHARGE PLANNING  Goal: Discharge to home or other facility with appropriate resources  Description: INTERVENTIONS:  - Identify barriers to discharge w/patient and caregiver  - Arrange for needed discharge resources and transportation as appropriate  - Identify discharge learning needs (meds, wound care, etc )  - Arrange for interpretive services to assist at discharge as needed  - Refer to Case Management Department for coordinating discharge planning if the patient needs post-hospital services based on physician/advanced practitioner order or complex needs related to functional status, cognitive ability, or social support system  Outcome: Progressing     - Consider moving patient to room near nurses station  Outcome: Progressing

## 2023-02-15 NOTE — PLAN OF CARE
Problem: INFECTION - ADULT  Goal: Absence or prevention of progression during hospitalization  Description: INTERVENTIONS:  - Assess and monitor for signs and symptoms of infection  - Monitor lab/diagnostic results  - Monitor all insertion sites, i e  indwelling lines, tubes, and drains  - Monitor endotracheal if appropriate and nasal secretions for changes in amount and color  - Stephenson appropriate cooling/warming therapies per order  - Administer medications as ordered  - Instruct and encourage patient and family to use good hand hygiene technique  - Identify and instruct in appropriate isolation precautions for identified infection/condition  Outcome: Progressing  Goal: Absence of fever/infection during neutropenic period  Description: INTERVENTIONS:  - Monitor WBC    Outcome: Progressing

## 2023-02-15 NOTE — DISCHARGE SUMMARY
[facility]  Discharge- Delaney Dolan 1935, 80 y o  female MRN: [id number]  Unit/Bed#: -01 Encounter: [id number]  Primary Care Provider: Zay Gonzales MD   Date and time admitted to hospital: 2/13/2023  3:38 PM    * UTI (urinary tract infection)  Assessment & Plan  · Present on admission - UA with leuk esterase and bacteria  · With associated confusion, referred from urgent care  · Given dose of IV ceftriaxone in ED  · Continue antibiotics, follow up infectious work up    Metabolic encephalopathy  Assessment & Plan  · Likely secondary to UTI  · Initially presented with confusion according to , continues to have intermittent confusion  · Head CT obtained, showed no acute abnormalities  · Pending blood and urine culture  · 1 L normal saline bolus  · Given IV Rocephin, will continue    Hyponatremia  Assessment & Plan  · Pseudohyponatremia in setting of hyperglycemia  · Monitor sodium levels with correction of hyperglycemia     Hypertensive urgency  Assessment & Plan  · Resolved  · Present on admission  · Now within normal limitis  · Resume home meds  · Monitor blood pressure     Type 2 diabetes mellitus without complication, without long-term current use of insulin [facility])  Assessment & Plan  Lab Results   Component Value Date    HGBA1C 8 1 (H) 12/08/2022       Recent Labs     02/14/23  1005 02/14/23  1222 02/14/23  1729 02/14/23  2050   POCGLU 259* 164* 69 278*       Blood Sugar Average: Last 72 hrs:  (P) 225 375   · Uncontrolled on arrival  · Start sliding scale #3, add lantus 10 units qhs, add lispro 5 units with meals  · Monitor glucose levels     Medical Problems     Resolved Problems  Date Reviewed: 2/15/2023   None       Discharging Physician / Practitioner: JOÃO Harper  PCP: Zay Gonzales MD  Admission Date:   Admission Orders (From admission, onward)     Ordered        02/13/23 [address]  Inpatient Admission  Once                      Discharge Date: 02/15/23    Consultations During Hospital Stay:  None    Procedures Performed:   · None    Significant Findings / Test Results:   · Chest x-ray (2/13/23); No active pulmonary disease  · CT Head without contrast (2/13/23): No acute intracranial abnormality  Incidental Findings:   · None    Test Results Pending at Discharge (will require follow up): · None     Outpatient Tests Requested:  · Follow up with PCP within 1 week    Complications:  None    Reason for Admission: Altered Mental Status, UTI    Hospital Course:   Christal Kaplan is a 80 y o  female patient with past medical history of DM, HLD, HTN with who originally presented to the hospital on 2/13/2023 due to confusion from urgent care  On arrival to the ER, UA was noted to be positive for leukocytes and bacteria and was initiated on IV Ceftriaxone with improvement of her symptoms  She was transitioned to PO Keflex for the rest of her duration and discharged home with her   Please see above list of diagnoses and related plan for additional information  Condition at Discharge: stable    Discharge Day Visit / Exam:   Subjective:  Patient resting in the recliner, denies complaints of chest pain, shortness of breath, fever, or chills  Vitals: Blood Pressure: 145/95 (02/15/23 1522)  Pulse: 84 (02/15/23 1522)  Temperature: 97 5 °F (36 4 °C) (02/15/23 1522)  Temp Source: Tympanic (02/13/23 1534)  Respirations: 17 (02/15/23 0732)  Height: 5' 2" (157 5 cm) (02/13/23 1534)  Weight - Scale: 75 9 kg (167 lb 5 3 oz) (02/14/23 1920)  SpO2: 96 % (02/15/23 1522)     Exam:   Physical Exam  Vitals and nursing note reviewed  Constitutional:       General: She is not in acute distress  Appearance: She is normal weight  She is not ill-appearing  Cardiovascular:      Rate and Rhythm: Normal rate  Pulses: Normal pulses     Pulmonary:      Effort: Pulmonary effort is normal    Abdominal:      General: Bowel sounds are normal    Musculoskeletal: General: Normal range of motion  Skin:     General: Skin is warm and dry  Coloration: Skin is pale  Neurological:      Mental Status: She is alert  Mental status is at baseline  Psychiatric:         Mood and Affect: Mood normal         Discussion with Family: Attempted to update  () via phone  Left voicemail  Discharge instructions/Information to patient and family:   See after visit summary for information provided to patient and family  Provisions for Follow-Up Care:  See after visit summary for information related to follow-up care and any pertinent home health orders  Disposition:   Home    Planned Readmission: None     Discharge Statement:  I spent 35 minutes discharging the patient  This time was spent on the day of discharge  I had direct contact with the patient on the day of discharge  Greater than 50% of the total time was spent examining patient, answering all patient questions, arranging and discussing plan of care with patient as well as directly providing post-discharge instructions  Additional time then spent on discharge activities  Discharge Medications:  See after visit summary for reconciled discharge medications provided to patient and/or family        **Please Note: This note may have been constructed using a voice recognition system**

## 2023-02-15 NOTE — CASE MANAGEMENT
Case Management Discharge Planning Note    Patient name Roselyn Gutierrez  Location /-94 MRN [id number]  : 1935 Date 2/15/2023       Current Admission Date: 2023  Current Admission Diagnosis:UTI (urinary tract infection)   Patient Active Problem List    Diagnosis Date Noted   • Metabolic encephalopathy 22/10/0014   • Hypertensive urgency 2023   • UTI (urinary tract infection) 2023   • Hyponatremia 2023   • Primary osteoarthritis of left knee 2022   • Osteoarthritis of multiple joints 2022   • Bilateral hearing loss 2022   • 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[facility] ) 03/15/2022   • Cognitive decline 03/15/2022   • Stage 3a chronic kidney disease ([facility] ) 2021   • Type 2 diabetes mellitus without complication, without long-term current use of insulin ([facility] ) 2020   • Essential hypertension 2020   • Pure hypercholesterolemia 2020   • Microalbuminuria 2020      LOS (days): 2  Geometric Mean LOS (GMLOS) (days): 2 80  Days to GMLOS:1     OBJECTIVE:  Risk of Unplanned Readmission Score: 8 44         Current admission status: Inpatient   Preferred Pharmacy:   [facility], [address] [place] [address]  [place] [address]  Phone: [phone] Fax: [phone]    [facility] ([address] [facility]  [address] [facility]  [address] [place] [address]  Phone: [phone] Fax: [phone]     [address] [facility] ([facility] ) - [address]ie [address]  Phone: [phone] Fax: [phone]    Primary Care Provider: Hayley Kinsey MD    Primary Insurance: MEDICARE  Secondary Insurance: [facility] HEALTH OPTIONS PROGRAM    DISCHARGE DETAILS:                                          Other Referral/Resources/Interventions Provided:  Referral Comments: Pt for d/c today to home    Visited pt at bedside; reviewed IMM and provided copy  Discussed home health and asked pt for her street address;  pt unable to provide  Told her I would call spouse to discuss d/c;  pt reports he is down in cafeteria for lunch  Returned to room later;  spouse present  Discussed home health, obtained street address ([place], [place], [address]) and reviewed IMM  Made pt and spouse aware that CM would send address to all home health referrals and return w available agencies  Both agreeable                                               IMM Given (Date):: 02/15/23  IMM Given to[de-identified] Patient  Family notified[de-identified] spouse at bedside

## 2023-02-15 NOTE — ASSESSMENT & PLAN NOTE
Lab Results   Component Value Date    HGBA1C 8 1 (H) 12/08/2022       Recent Labs     02/14/23  1005 02/14/23  1222 02/14/23  1729 02/14/23 2050   POCGLU 259* 164* 69 278*       Blood Sugar Average: Last 72 hrs:  (P) 225 375   · Uncontrolled on arrival  · Start sliding scale #3, add lantus 10 units qhs, add lispro 5 units with meals  · Monitor glucose levels

## 2023-02-16 ENCOUNTER — HOME CARE VISIT (OUTPATIENT)
Dept: HOME HEALTH SERVICES | Facility: HOME HEALTHCARE | Age: 88
End: 2023-02-16

## 2023-02-16 LAB
BACTERIA UR CULT: ABNORMAL
BACTERIA UR CULT: ABNORMAL

## 2023-02-17 ENCOUNTER — TRANSITIONAL CARE MANAGEMENT (OUTPATIENT)
Dept: FAMILY MEDICINE CLINIC | Facility: CLINIC | Age: 88
End: 2023-02-17

## 2023-02-17 ENCOUNTER — HOME CARE VISIT (OUTPATIENT)
Dept: HOME HEALTH SERVICES | Facility: HOME HEALTHCARE | Age: 88
End: 2023-02-17

## 2023-02-17 NOTE — OCCUPATIONAL THERAPY NOTE
Occupational Therapy Evaluation        Patient Name: Courtenay Alpers ZULOK'N Date: 2/17/2023       02/15/23 1046   OT Last Visit   OT Visit Date 02/15/23   Note Type   Note type Evaluation   Pain Assessment   Pain Assessment Tool 0-10   Pain Score No Pain   Restrictions/Precautions   Weight Bearing Precautions Per Order No   Braces or Orthoses Other (Comment)  (none at baseline)   Other Precautions Cognitive; Fall Risk   Home Living   Type of [address] One level; Laundry in basement;Stairs to enter without rails  (1 [address])   Bathroom Shower/Tub Tub/shower unit   Bathroom Toilet Standard   Bathroom Equipment Other (Comment)  (none at baseline)   [address]   Additional Comments ambulatory with [facility]   Prior Function   Level of [place] Independent with ADLs; Independent with functional mobility; Needs assistance with IADLS   Lives With Spouse   Receives Help From Family   IADLs Family/Friend/Other provides transportation; Independent with meal prep; Family/Friend/Other provides medication management   Falls in the last 6 months 1 to 4  (4 falls per pte)   Vocational Retired  (teacher)   Lifestyle   Autonomy Patient and  reported independent with basic self care, ambulatory with a Cane  Patient lives with her  in a one story house, 1 [address]     Reciprocal Relationships Supportive family   Service to Others Retired teacher   Intrinsic Gratification enjoys sewing, not engaged lately   ADL   Eating Assistance 7  Independent   Grooming Assistance 6  [address] [place] [address]  5  [address] [facility] 5  [address]  5  [address] [facility] 4  Minimal Assistance   Bed Mobility   Additional Comments received patient OOB to [facility] to Stand 4  Minimal assistance   Additional items Other  (contact guard)   Stand to Sit 4  Minimal assistance   Additional items Assist x 1; Armrests; Verbal cues  (contact guard)   Functional Mobility   Functional Mobility 4  Minimal assistance   Additional items SPC   Balance   Static Sitting Good   Dynamic Sitting Fair +   Static Standing Fair   Dynamic Standing Fair -   Activity Tolerance   Activity Tolerance Patient limited by fatigue;Patient tolerated treatment well   RUE Assessment   RUE Assessment WFL   LUE Assessment   LUE Assessment WFL   Hand Function   Gross Motor Coordination Functional   Fine Motor Coordination Functional   Sensation   Light Touch No apparent deficits  (BUEs)   Vision-Basic Assessment   Current Vision Wears glasses all the time   Psychosocial   Psychosocial (WDL) WDL   Cognition   Overall Cognitive Status Impaired   Arousal/Participation Alert; Responsive; Cooperative   Attention Within functional limits   Orientation Level Oriented to person;Disoriented to place; Disoriented to time;Disoriented to situation   Memory Decreased short term memory;Decreased recall of recent events;Decreased recall of precautions   Following Commands Follows one step commands with increased time or repetition   Comments Therapist completed Short Blessed test, patient obtained a score of 9 (5-9) Questionable impairment- evaluate for early dementing disorder) Neuro assessemnt recommended  Assessment   Limitation Decreased ADL status; Decreased endurance;Decreased self-care trans;Decreased high-level ADLs   Prognosis Good   Assessment Patient is a 80 y o  female seen for OT evaluation s/p admit to [address] [facility] on 2/13/2023 w/UTI (urinary tract infection)  Commorbidities affecting patient's functional performance at time of assessment include: Hypertensive urgency, hyponatremia, Type 2 DM, UTI, presented to ED with confusion  Orders placed for OT evaluation and treatment    Performed at least two patient identifiers during session including name and wristband  Prior to admission, Patient and  reported independent with basic self care, ambulatory with a Cane  Patient lives with her  in a one story house, 1 [address]  Personal factors affecting patient at time of initial evaluation include: limited insight into deficits, difficulty performing ADLs and difficulty performing IADLs  Upon evaluation, patient requires supervision and set up assist for UB ADLs, minimal  assist for LB ADLs, transfers and functional ambulation in room and bathroom with contact guard and minimal  assist, with the use of SPC  Patient is alert and oriented to name,, presents with limited ability to focus on a task over time (attention span), limited ability to recall information after a brief period of time (short term memory) / working memory  and limited ability to place information, concepts and actions in order (sequencing)  Therapist completed Short Blessed test, patient obtained a score of 9 (5-9) Questionable impairment- evaluate for early dementing disorder) Neuro assessemnt recommended  Occupational performance is affected by the following deficits: attention span, dynamic sit/ stand balance deficit with poor standing tolerance time for self care and functional mobility, decreased activity tolerance, impaired memory, impaired problem solving and decreased safety awareness  Patient to benefit from continued Occupational Therapy treatment while in the hospital to address deficits as defined above and maximize level of functional independence with ADLs and functional mobility  Occupational Performance areas to address include: transfer to all surfaces, functional mobility, emergency response, health maintenance and IADLs: safety procedures  From OT standpoint, recommendation at time of d/c would be Home with family support, [address] and Home OT  / OP Neuro evaluation     Plan   Treatment Interventions ADL retraining;Functional transfer training; Endurance training;Cognitive reorientation;Patient/family training;Equipment evaluation/education; Compensatory technique education; Energy conservation; Activityengagement;Continued evaluation   Goal Expiration Date 03/01/23   OT Frequency 2-3x/wk   Recommendation   OT Discharge Recommendation Home with home health rehabilitation   AM-PAC Daily Activity Inpatient   Lower Body Dressing 3   Bathing 3   Toileting 3   Upper Body Dressing 3   Grooming 4   Eating 4   Daily Activity Raw Score 20   Daily Activity Standardized Score (Calc for Raw Score >=11) 42 03   AM-PAC Applied Cognition Inpatient   Following a Speech/Presentation 3   Understanding Ordinary Conversation 3   Taking Medications 2   Remembering Where Things Are Placed or Put Away 2   Remembering List of 4-5 Errands 2   Taking Care of Complicated Tasks 2   Applied Cognition Raw Score 14   Applied Cognition Standardized Score 32 02   Barthel Index   Feeding 10   Bathing 0   Grooming Score 5   Dressing Score 5   Bladder Score 10   Bowels Score 10   Toilet Use Score 5   Transfers (Bed/Chair) Score 10   Mobility (Level Surface) Score 0   Stairs Score 0   Barthel Index Score 55       Patient  will engage in ongoing cognitive assessment  to assist with safe d/c planning/recommendations  Patient will identify 3 positive coping strategies to assist with emotional regulation and management  Patient will engage in depression screen/ leisure interest check list to identify s/s of depression and facilitate patients involvement in play/ leisure tasks  Patient will engage in ongoing visual perceptual assessments, screens and activities to r/o visual perceptual impairments affecting functional performance  Patient  will engage in activity configuration activity with good participation and mod I to increase time management skills and improve participation in a structured routine to improve overall quality of life

## 2023-02-17 NOTE — PLAN OF CARE
Problem: OCCUPATIONAL THERAPY ADULT  Goal: Performs self-care activities at highest level of function for planned discharge setting  See evaluation for individualized goals  Description: Treatment Interventions: ADL retraining, Functional transfer training, Endurance training, Cognitive reorientation, Patient/family training, Equipment evaluation/education, Compensatory technique education, Energy conservation, Activityengagement, Continued evaluation          See flowsheet documentation for full assessment, interventions and recommendations  Note: Limitation: Decreased ADL status, Decreased endurance, Decreased self-care trans, Decreased high-level ADLs  Prognosis: Good  Assessment: Patient is a 80 y o  female seen for OT evaluation s/p admit to [id number] [place] [address] on 2/13/2023 w/UTI (urinary tract infection)  Commorbidities affecting patient's functional performance at time of assessment include: Hypertensive urgency, hyponatremia, Type 2 DM, UTI, presented to ED with confusion  Orders placed for OT evaluation and treatment  Performed at least two patient identifiers during session including name and wristband  Prior to admission, Patient and  reported independent with basic self care, ambulatory with a Cane  Patient lives with her  in a one story house, [facility]  Personal factors affecting patient at time of initial evaluation include: limited insight into deficits, difficulty performing ADLs and difficulty performing IADLs  Upon evaluation, patient requires supervision and set up assist for UB ADLs, minimal  assist for LB ADLs, transfers and functional ambulation in room and bathroom with contact guard and minimal  assist, with the use of SPC    Patient is alert and oriented to name,, presents with limited ability to focus on a task over time (attention span), limited ability to recall information after a brief period of time (short term memory) / working memory  and limited ability to place information, concepts and actions in order (sequencing)  Therapist completed Short Blessed test, patient obtained a score of 9 (5-9) Questionable impairment- evaluate for early dementing disorder) Neuro assessemnt recommended  Occupational performance is affected by the following deficits: attention span, dynamic sit/ stand balance deficit with poor standing tolerance time for self care and functional mobility, decreased activity tolerance, impaired memory, impaired problem solving and decreased safety awareness  Patient to benefit from continued Occupational Therapy treatment while in the hospital to address deficits as defined above and maximize level of functional independence with ADLs and functional mobility  Occupational Performance areas to address include: transfer to all surfaces, functional mobility, emergency response, health maintenance and IADLs: safety procedures  From OT standpoint, recommendation at time of d/c would be Home with family support, [address] and Home OT  / OP Neuro evaluation       OT Discharge Recommendation: Home with home health rehabilitation

## 2023-02-17 NOTE — HOME HEALTH
BS today was 260     Normally runs between upper and mid 100s    1 W 1, 2 W 2 1 W 2    SEE PATIENT NEXT mONDAY AND THURSDAY

## 2023-02-18 VITALS — HEART RATE: 72 BPM | OXYGEN SATURATION: 97 % | DIASTOLIC BLOOD PRESSURE: 68 MMHG | SYSTOLIC BLOOD PRESSURE: 112 MMHG

## 2023-02-18 LAB
BACTERIA BLD CULT: NORMAL
BACTERIA BLD CULT: NORMAL

## 2023-02-18 NOTE — CASE COMMUNICATION
[facility] PT evaluation completed with  present  History of mental decline and mobility decline discussed as well as recent falls  Patient did not recall falls and much of history taken from   Patient noted to have narrow base of support and instability with transfers and with gait  Fall risk high with recent history of falling, tinettis assessment score 17/28 and Single leg stand test less kirk n1 sec on either leg  SPC introdu tram and balance immediately improved so patient was encouraged to use SPC at all times  Will plan to work to improve safety in home and teach HEP for strength, endurance, balance  1 x 1 week, then 2 x 2 weeks to achieve goals

## 2023-02-19 VITALS
SYSTOLIC BLOOD PRESSURE: 110 MMHG | OXYGEN SATURATION: 96 % | HEART RATE: 72 BPM | DIASTOLIC BLOOD PRESSURE: 64 MMHG | TEMPERATURE: 97 F | RESPIRATION RATE: 20 BRPM

## 2023-02-19 LAB
ATRIAL RATE: 77 BPM
QRS AXIS: -68 DEGREES
QRSD INTERVAL: 80 MS
QT INTERVAL: 420 MS
QTC INTERVAL: 450 MS
T WAVE AXIS: 66 DEGREES
VENTRICULAR RATE: 69 BPM

## 2023-02-20 ENCOUNTER — HOME CARE VISIT (OUTPATIENT)
Dept: HOME HEALTH SERVICES | Facility: HOME HEALTHCARE | Age: 88
End: 2023-02-20

## 2023-02-20 VITALS
RESPIRATION RATE: 20 BRPM | DIASTOLIC BLOOD PRESSURE: 72 MMHG | HEART RATE: 64 BPM | TEMPERATURE: 97.2 F | SYSTOLIC BLOOD PRESSURE: 110 MMHG | OXYGEN SATURATION: 96 %

## 2023-02-20 VITALS — HEART RATE: 61 BPM | OXYGEN SATURATION: 100 % | DIASTOLIC BLOOD PRESSURE: 62 MMHG | SYSTOLIC BLOOD PRESSURE: 108 MMHG

## 2023-02-20 VITALS — DIASTOLIC BLOOD PRESSURE: 82 MMHG | OXYGEN SATURATION: 98 % | HEART RATE: 67 BPM | SYSTOLIC BLOOD PRESSURE: 124 MMHG

## 2023-02-20 NOTE — CASE COMMUNICATION
[facility] has admitted your patient to [address] [place] [address] service with the following disciplines:      SN, PT and OT  This report is informational only, no responses is needed  Primary focus of home health care    Pt recently hospitalized with UTI  Patient stated goals of care To travel again  Anticipated visit pattern and next visit date 2 week 2, 1week 2  See medication list - All medications reviewed and reconciled  Significant clini manny findings  Increased forgetfulness  Potential barriers to goal achievement  FOrgetful  Other pertinent information  NA    Thank you for allowing us to participate in the care of your patient        Brgiid Holloway RN

## 2023-02-23 ENCOUNTER — HOME CARE VISIT (OUTPATIENT)
Dept: HOME HEALTH SERVICES | Facility: HOME HEALTHCARE | Age: 88
End: 2023-02-23

## 2023-02-27 ENCOUNTER — HOME CARE VISIT (OUTPATIENT)
Dept: HOME HEALTH SERVICES | Facility: HOME HEALTHCARE | Age: 88
End: 2023-02-27

## 2023-02-27 VITALS
SYSTOLIC BLOOD PRESSURE: 130 MMHG | DIASTOLIC BLOOD PRESSURE: 74 MMHG | OXYGEN SATURATION: 96 % | TEMPERATURE: 97.1 F | RESPIRATION RATE: 18 BRPM | HEART RATE: 66 BPM

## 2023-02-27 VITALS — DIASTOLIC BLOOD PRESSURE: 78 MMHG | SYSTOLIC BLOOD PRESSURE: 126 MMHG

## 2023-03-02 ENCOUNTER — HOME CARE VISIT (OUTPATIENT)
Dept: HOME HEALTH SERVICES | Facility: HOME HEALTHCARE | Age: 88
End: 2023-03-02

## 2023-03-02 ENCOUNTER — TELEPHONE (OUTPATIENT)
Dept: HOME HEALTH SERVICES | Facility: HOME HEALTHCARE | Age: 88
End: 2023-03-02

## 2023-03-03 ENCOUNTER — OFFICE VISIT (OUTPATIENT)
Dept: FAMILY MEDICINE CLINIC | Facility: CLINIC | Age: 88
End: 2023-03-03

## 2023-03-03 ENCOUNTER — HOME CARE VISIT (OUTPATIENT)
Dept: HOME HEALTH SERVICES | Facility: HOME HEALTHCARE | Age: 88
End: 2023-03-03

## 2023-03-03 VITALS
RESPIRATION RATE: 16 BRPM | DIASTOLIC BLOOD PRESSURE: 100 MMHG | SYSTOLIC BLOOD PRESSURE: 170 MMHG | HEIGHT: 62 IN | OXYGEN SATURATION: 96 % | WEIGHT: 180 LBS | TEMPERATURE: 97.9 F | BODY MASS INDEX: 33.13 KG/M2 | HEART RATE: 52 BPM

## 2023-03-03 VITALS — SYSTOLIC BLOOD PRESSURE: 134 MMHG | DIASTOLIC BLOOD PRESSURE: 84 MMHG

## 2023-03-03 DIAGNOSIS — N18.31 STAGE 3A CHRONIC KIDNEY DISEASE (HCC): ICD-10-CM

## 2023-03-03 DIAGNOSIS — Z86.79 HISTORY OF ATRIAL FIBRILLATION: ICD-10-CM

## 2023-03-03 DIAGNOSIS — I10 ESSENTIAL HYPERTENSION: ICD-10-CM

## 2023-03-03 DIAGNOSIS — F03.90 DEMENTIA WITHOUT BEHAVIORAL DISTURBANCE (HCC): ICD-10-CM

## 2023-03-03 DIAGNOSIS — N39.0 URINARY TRACT INFECTION WITHOUT HEMATURIA, SITE UNSPECIFIED: ICD-10-CM

## 2023-03-03 DIAGNOSIS — E11.9 TYPE 2 DIABETES MELLITUS WITHOUT COMPLICATION, WITHOUT LONG-TERM CURRENT USE OF INSULIN (HCC): ICD-10-CM

## 2023-03-03 DIAGNOSIS — R00.2 PALPITATIONS: ICD-10-CM

## 2023-03-03 DIAGNOSIS — Z76.89 ENCOUNTER FOR SUPPORT AND COORDINATION OF TRANSITION OF CARE: Primary | ICD-10-CM

## 2023-03-03 NOTE — ASSESSMENT & PLAN NOTE
BP ok at home when PT/OT has been measuring it-here today it was high-may end up having to adjust the bp meds she's on but we'll see how it goes

## 2023-03-03 NOTE — ASSESSMENT & PLAN NOTE
We repeated the EKG here today as she was showing a fib while in the ER and it does show slow rate controlled atrial fib  Had a long conversation with Josias Perkins and her daughter about to anticoagulate or not, and I did tell them that by way of her CHADS-VASC score of 5 she probably should be on something but it's a bit complicated with her history of dementia and falls-we are going to refer to Cardiology (as well as Neurology) to see what their recommendations are    In the meantime she'll take a baby ASA (81 mg) daily which may help with the microangiopathic changes too

## 2023-03-03 NOTE — ASSESSMENT & PLAN NOTE
Not sure if this is vascular dementia or Alzheimer's but will refer to Neurology to see if they have any recommendations

## 2023-03-03 NOTE — PROGRESS NOTES
Assessment & Plan     1  Encounter for support and coordination of transition of care  Comments:  UTI seems to have resolved-will repeat u/a and culture    2  History of atrial fibrillation  Assessment & Plan: We repeated the EKG here today as she was showing a fib while in the ER and it does show slow rate controlled atrial fib  Had a long conversation with Miller Ronquillo and her daughter about to anticoagulate or not, and I did tell them that by way of her CHADS-VASC score of 5 she probably should be on something but it's a bit complicated with her history of dementia and falls-we are going to refer to Cardiology (as well as Neurology) to see what their recommendations are  In the meantime she'll take a baby ASA (81 mg) daily which may help with the microangiopathic changes too    Orders:  -     POCT ECG  -     Ambulatory Referral to Cardiology; Future  -     CBC and Platelet; Future  -     TSH, 3rd generation; Future    3  Stage 3a chronic kidney disease ([facility] )    4  Dementia without behavioral disturbance [facility])  Assessment & Plan:  Not sure if this is vascular dementia or Alzheimer's but will refer to Neurology to see if they have any recommendations    Orders:  -     Ambulatory Referral to Neurology; Future    5  Type 2 diabetes mellitus without complication, without long-term current use of insulin ([facility] )  Assessment & Plan:    Lab Results   Component Value Date    HGBA1C 8 1 (H) 12/08/2022   Relatively well controlled for her age-continue metformin and try to watch diet-will be moving to [facility] in April where they will cook for her and give her a diabetic diet    Orders:  -     CBC and Platelet; Future    6  Essential hypertension  Assessment & Plan:  BP ok at home when PT/OT has been measuring it-here today it was high-may end up having to adjust the bp meds she's on but we'll see how it goes    Orders:  -     CBC and Platelet; Future  -     Comprehensive metabolic panel; Future    7   Urinary tract infection without hematuria, site unspecified  Assessment & Plan:  Was hospitalized for AMS and s/p fall and seems to have cleared up-will repeat urine    Orders:  -     UA w Reflex to Microscopic w Reflex to Culture -Lab Collect; Future; Expected date: 03/03/2023    8  Palpitations  -     TSH, 3rd generation; Future       Subjective     Transitional Care Management Review:   Karla Kurtz is a 80 y o  female here for TCM follow up  During the TCM phone call patient stated:  TCM Call     Date and time call was made  2/17/2023  4:02 PM    Hospital care reviewed  Records reviewed    Patient was hospitialized at  [facility]    Date of Admission  02/13/23    Date of discharge  02/15/23    Diagnosis  UTI (urinary tract infection)    Disposition  Home    Were the patients medications reviewed and updated  Yes    Current Symptoms  None      TCM Call     Post hospital issues  None    Scheduled for follow up? Yes    Did you obtain your prescribed medications  Yes    Do you need help managing your prescriptions or medications  No    Is transportation to your appointment needed  No    I have advised the patient to call PCP with any new or worsening symptoms  Jed Whitlock        Claribel Gordon here with her daughter, Ashely Esteban, for TCM -was hospitalized for altered mental status, UTI, metabolic encephalopathy-hx of obesity, DM II, dementia, HTN, CKD-has been doing ok at home-her daughter is staying with her and her  for a little while, and, ultimately in April they are moving to [place]  Claribel Gordon has PT/OT and VNA coming to the house and she's been doing well with that  BP when they take it is usually pretty good, although today here it was elevated  Claribel Gordon has had a lot of memory decline over the past year and her daughter is requesting a Neurology consult    In looking at the information from her hospitalization, it looks like her CT head didn't show anything acute but did show evidence of microangiopathic changes and the EKG from the ER does show rate controlled atrial fibrillation  Urine culture showed >100,000 E coli and she was treated with IV Rocephin and outpatient Keflex with good response-she did also have a fall at home prior to the hospitalization    Review of Systems   Constitutional: Positive for fatigue  HENT: Negative  Respiratory: Negative  Cardiovascular: Negative  Gastrointestinal: Negative  Genitourinary: Negative  Musculoskeletal: Negative  Neurological: Negative for dizziness, facial asymmetry, speech difficulty, weakness and headaches  Psychiatric/Behavioral: Positive for confusion  Objective     /100   Pulse (!) 52   Temp 97 9 °F (36 6 °C) (Tympanic)   Resp 16   Ht 5' 2" (1 575 m)   Wt 81 6 kg (180 lb)   SpO2 96%   BMI 32 92 kg/m²      Physical Exam  Constitutional:       Appearance: She is obese  HENT:      Head: Normocephalic and atraumatic  Right Ear: External ear normal       Left Ear: External ear normal       Nose: Nose normal       Mouth/Throat:      Mouth: Mucous membranes are moist    Eyes:      Pupils: Pupils are equal, round, and reactive to light  Cardiovascular:      Rate and Rhythm: Normal rate  Pulmonary:      Effort: Pulmonary effort is normal       Breath sounds: Normal breath sounds  Musculoskeletal:         General: Normal range of motion  Cervical back: Normal range of motion and neck supple  Neurological:      Mental Status: She is alert  Mental status is at baseline     Psychiatric:         Mood and Affect: Mood normal        Medications have been reviewed by provider in current encounter    Scarlett Logan MD

## 2023-03-03 NOTE — ASSESSMENT & PLAN NOTE
Lab Results   Component Value Date    HGBA1C 8 1 (H) 12/08/2022   Relatively well controlled for her age-continue metformin and try to watch diet-will be moving to [address] in April where they will cook for her and give her a diabetic diet

## 2023-03-08 ENCOUNTER — HOME CARE VISIT (OUTPATIENT)
Dept: HOME HEALTH SERVICES | Facility: HOME HEALTHCARE | Age: 88
End: 2023-03-08

## 2023-03-10 VITALS
TEMPERATURE: 95.8 F | SYSTOLIC BLOOD PRESSURE: 142 MMHG | HEART RATE: 71 BPM | OXYGEN SATURATION: 100 % | RESPIRATION RATE: 20 BRPM | DIASTOLIC BLOOD PRESSURE: 80 MMHG

## 2023-03-26 ENCOUNTER — APPOINTMENT (OUTPATIENT)
Dept: RADIOLOGY | Facility: CLINIC | Age: 88
End: 2023-03-26

## 2023-03-26 ENCOUNTER — OFFICE VISIT (OUTPATIENT)
Dept: URGENT CARE | Facility: CLINIC | Age: 88
End: 2023-03-26

## 2023-03-26 VITALS — DIASTOLIC BLOOD PRESSURE: 101 MMHG | HEART RATE: 81 BPM | SYSTOLIC BLOOD PRESSURE: 175 MMHG | OXYGEN SATURATION: 97 %

## 2023-03-26 DIAGNOSIS — M54.50 ACUTE LEFT-SIDED LOW BACK PAIN WITHOUT SCIATICA: Primary | ICD-10-CM

## 2023-03-26 DIAGNOSIS — M54.50 ACUTE LEFT-SIDED LOW BACK PAIN WITHOUT SCIATICA: ICD-10-CM

## 2023-03-26 DIAGNOSIS — S32.020K CLOSED COMPRESSION FRACTURE OF L2 LUMBAR VERTEBRA WITH NONUNION, SUBSEQUENT ENCOUNTER: ICD-10-CM

## 2023-03-26 RX ORDER — LIDOCAINE 50 MG/G
1 PATCH TOPICAL DAILY
Qty: 30 PATCH | Refills: 0 | Status: SHIPPED | OUTPATIENT
Start: 2023-03-26

## 2023-03-26 NOTE — PATIENT INSTRUCTIONS
Your blood pressure is extremely high today but you are in pain so it could be from that  Follow-up with your family doctor about this  Apply the lidocaine patch to the area that hurts  Follow-up with orthopedics since you have a history of worsening compression fracture in the lumbar spine

## 2023-03-26 NOTE — PROGRESS NOTES
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, subsequent encounter  Ambulatory referral to Orthopedic Surgery        Slight worsening of the compression fracture  F/u with Ortho  Patient Instructions   Patient Instructions   Your blood pressure is extremely high today but you are in pain so it could be from that  Follow-up with your family doctor about this  Apply the lidocaine patch to the area that hurts  Follow-up with orthopedics since you have a history of worsening compression fracture in the lumbar spine  Follow up with PCP in 3-5 days  Proceed to  ER if symptoms worsen  Chief Complaint     Chief Complaint   Patient presents with   • Fall     Pt c/o fell on either Monday or Tuesday, she has lower back hurts  Pain is 5/10  Depending on the way she moves depends on the type of pain, can be sharp and shooting  History of Present Illness       The patient is an 80-year-old female presenting today for left sided low back pain  She reports a fall either 5 or 6 days ago  She now has pain in the left lower back but no spinal tenderness  Depending on the way she moves causes different types of pain  Occasionally it is sharp and shooting  She does have a history of a L2 compression fracture  She is unsure how she fell but thinks she went from standing to sitting on the floor  Saddle anesthesia or incontinence  Review of Systems   Review of Systems   Constitutional: Negative for activity change, appetite change, chills, fatigue and fever     HENT: Negative for congestion, ear pain, rhinorrhea, sinus pressure, sinus pain and sore throat  Eyes: Negative for pain and visual disturbance  Respiratory: Negative for cough, chest tightness and shortness of breath  Cardiovascular: Negative for chest pain and palpitations  Gastrointestinal: Negative for abdominal pain, diarrhea, nausea and vomiting  Genitourinary: Negative for dysuria and hematuria  Musculoskeletal: Positive for back pain  Negative for arthralgias and myalgias  Skin: Negative for color change, pallor and rash  Neurological: Negative for seizures, syncope and headaches  All other systems reviewed and are negative  Current Medications       Current Outpatient Medications:   •  lidocaine (Lidoderm) 5 %, Apply 1 patch topically over 12 hours daily Remove & Discard patch within 12 hours or as directed by MD, Disp: 30 patch, Rfl: 0  •  ASPIRIN 81 PO, Take 1 tablet by mouth daily   Indications: antiplatelet, Disp: , Rfl:   •  ezetimibe-simvastatin (VYTORIN) 10-20 mg per tablet, Take 1 tablet by mouth daily at bedtime, Disp: 90 tablet, Rfl: 3  •  FREESTYLE LITE test strip, USE 1 EACH DAILY USE AS INSTRUCTED, Disp: 100 strip, Rfl: 5  •  Glucosamine HCl (GLUCOSAMINE PO), 1 po daily, Disp: , Rfl:   •  metFORMIN (GLUCOPHAGE) 850 mg tablet, Take 1 tablet (850 mg total) by mouth 2 (two) times a day, Disp: 180 tablet, Rfl: 3  •  metoprolol succinate (TOPROL-XL) 50 mg 24 hr tablet, Take 1 tablet (50 mg total) by mouth daily, Disp: 90 tablet, Rfl: 3  •  naproxen (NAPROSYN) 375 mg tablet, TAKE 1 TABLET BY MOUTH  TWICE DAILY WITH MEALS (Patient taking differently: Take 375 mg by mouth once as needed), Disp: 180 tablet, Rfl: 1  •  potassium chloride (K-DUR,KLOR-CON) 20 mEq tablet, Take 1 tablet (20 mEq total) by mouth daily, Disp: 90 tablet, Rfl: 3  •  valsartan-hydrochlorothiazide (DIOVAN-HCT) 160-12 5 MG per tablet, Take 1 tablet by mouth daily, Disp: 90 tablet, Rfl: 3    Current Allergies     Allergies as of 03/26/2023 - Reviewed 03/26/2023   Allergen Reaction Noted   • Accupril [quinapril hcl]  05/08/2020   • Novocain [procaine]  06/08/2020   • Parabens  11/24/2019   • Sulfa antibiotics  05/08/2020            The following portions of the patient's history were reviewed and updated as appropriate: allergies, current medications, past family history, past medical history, past social history, past surgical history and problem list      Past Medical History:   Diagnosis Date   • Allergy to sulfa drugs    • Arthritis 2000   • Diabetes mellitus ([facility] [address] ) 2000   • H/O multiple allergies     Novocaine,Darvocet, Sulfa, Accupril   • HL (hearing loss)    • Hyperlipidemia    • Hypertension    • Memory loss 2020   • Nocturia    • Palpitations    • Thrombocytopenic disorder ([facility] [address] )    • Type 2 diabetes mellitus without complication ([facility] )    • Wears glasses        Past Surgical History:   Procedure Laterality Date   • HERNIA REPAIR     • HYSTERECTOMY         Family History   Problem Relation Age of Onset   • Hypertension Mother    • Diabetes Mother    • Other Mother    • Hypertension Father    • Heart disease Father          Medications have been verified  Objective   BP (!) 175/101   Pulse 81   SpO2 97%        Physical Exam     Physical Exam  Vitals and nursing note reviewed  Constitutional:       General: She is not in acute distress  Appearance: Normal appearance  She is normal weight  She is not ill-appearing, toxic-appearing or diaphoretic  HENT:      Head: Normocephalic and atraumatic  Cardiovascular:      Rate and Rhythm: Normal rate and regular rhythm  Heart sounds: Normal heart sounds  No murmur heard  No friction rub  No gallop  Pulmonary:      Effort: Pulmonary effort is normal  No respiratory distress  Breath sounds: Normal breath sounds  No stridor  No wheezing, rhonchi or rales  Chest:      Chest wall: No tenderness  Musculoskeletal:         General: Tenderness (lumbar spine and left side of back  - SLR) present  No swelling   Normal range of motion  Skin:     General: Skin is warm and dry  Capillary Refill: Capillary refill takes less than 2 seconds  Neurological:      Mental Status: She is alert

## 2023-03-27 ENCOUNTER — TELEPHONE (OUTPATIENT)
Dept: PAIN MEDICINE | Facility: MEDICAL CENTER | Age: 88
End: 2023-03-27

## 2023-03-27 NOTE — TELEPHONE ENCOUNTER
Caller: patient spouse    Doctor/Office:     Call regarding :  orth     Call was transferred to: ortho

## 2023-04-03 ENCOUNTER — OFFICE VISIT (OUTPATIENT)
Dept: FAMILY MEDICINE CLINIC | Facility: CLINIC | Age: 88
End: 2023-04-03

## 2023-04-03 VITALS
DIASTOLIC BLOOD PRESSURE: 80 MMHG | BODY MASS INDEX: 32.76 KG/M2 | OXYGEN SATURATION: 96 % | WEIGHT: 178 LBS | HEIGHT: 62 IN | TEMPERATURE: 97.6 F | SYSTOLIC BLOOD PRESSURE: 120 MMHG | HEART RATE: 91 BPM | RESPIRATION RATE: 16 BRPM

## 2023-04-03 DIAGNOSIS — F33.9 DEPRESSION, RECURRENT (HCC): ICD-10-CM

## 2023-04-03 DIAGNOSIS — S32.039A CLOSED FRACTURE OF THIRD LUMBAR VERTEBRA, UNSPECIFIED FRACTURE MORPHOLOGY, INITIAL ENCOUNTER (HCC): ICD-10-CM

## 2023-04-03 DIAGNOSIS — Z09 HOSPITAL DISCHARGE FOLLOW-UP: Primary | ICD-10-CM

## 2023-04-03 DIAGNOSIS — S32.030A COMPRESSION FRACTURE OF L3 VERTEBRA, INITIAL ENCOUNTER (HCC): ICD-10-CM

## 2023-04-03 DIAGNOSIS — N18.31 STAGE 3A CHRONIC KIDNEY DISEASE (HCC): ICD-10-CM

## 2023-04-03 DIAGNOSIS — D69.6 PLATELETS DECREASED (HCC): ICD-10-CM

## 2023-04-03 DIAGNOSIS — E55.9 VITAMIN D DEFICIENCY: ICD-10-CM

## 2023-04-03 DIAGNOSIS — Z86.79 HISTORY OF ATRIAL FIBRILLATION: ICD-10-CM

## 2023-04-03 DIAGNOSIS — I48.0 PAROXYSMAL ATRIAL FIBRILLATION (HCC): ICD-10-CM

## 2023-04-03 DIAGNOSIS — F03.90 DEMENTIA WITHOUT BEHAVIORAL DISTURBANCE (HCC): ICD-10-CM

## 2023-04-03 RX ORDER — AMOXICILLIN 250 MG
1 CAPSULE ORAL 2 TIMES DAILY
COMMUNITY
Start: 2023-03-31 | End: 2023-04-03

## 2023-04-03 RX ORDER — WARFARIN SODIUM 5 MG/1
5 TABLET ORAL
Qty: 30 TABLET | Refills: 5 | Status: SHIPPED | OUTPATIENT
Start: 2023-04-03 | End: 2023-04-03

## 2023-04-03 RX ORDER — OXYCODONE HYDROCHLORIDE 5 MG/1
5 TABLET ORAL EVERY 6 HOURS PRN
Qty: 60 TABLET | Refills: 0 | Status: SHIPPED | OUTPATIENT
Start: 2023-04-03

## 2023-04-03 RX ORDER — WARFARIN SODIUM 5 MG/1
5 TABLET ORAL
COMMUNITY
Start: 2023-03-31 | End: 2023-04-03 | Stop reason: SDUPTHER

## 2023-04-03 RX ORDER — OXYCODONE HYDROCHLORIDE 5 MG/1
2.5 TABLET ORAL EVERY 6 HOURS PRN
COMMUNITY
Start: 2023-04-01 | End: 2023-04-03

## 2023-04-03 NOTE — PROGRESS NOTES
Assessment/Plan:had lengthy discussion with patient and her  regarding recent fall, compression fracture, atrial fib, their adjustment to [employer] try to switch to Eliquis, discussed pain regimen         Problem List Items Addressed This Visit        Nervous and Auditory    Dementia without behavioral disturbance (HCC)       Musculoskeletal and Integument    Compression fracture of L3 vertebra (HCC)     Due to fall, and most likely osteoporosis-will up the roxicodone to 5 mg every 6 hours PRN, and advised use of 650 mg tylenol and lidocaine patchest-will order vitamin D level as well as DXA scan-advised oscal D tid            Genitourinary    Stage 3a chronic kidney disease ([facility] [address] )       Other    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[facility] [address] )    History of atrial fibrillation     Noted to have PAF in the hospital-due to high CHADSVAS score was started on anticoagulation-coumadin started but pt and her  would like to switch to Eliquis bacause of better safety data and her hx of falls -will order INR to be done tomorrow and if below 2 will start Eliquis-gave them some samples and hopefully it will be covered at the pharmacy-dosage of metoprolol was increased in the hospital for better rate control-also had Echo done showing EF 50-55%         Depression, recurrent ([facility] [address] )   Other Visit Diagnoses     Hospital discharge follow-up    -  Primary    Paroxysmal atrial fibrillation (HCC)        Relevant Medications    apixaban (ELIQUIS) 5 mg    Other Relevant Orders    Protime-INR    Closed fracture of third lumbar vertebra, unspecified fracture morphology, initial encounter ([facility] [address] )        Relevant Medications    oxyCODONE (Roxicodone) 5 immediate release tablet    Other Relevant Orders    DXA bone density spine hip and pelvis    Vitamin D deficiency        Relevant Orders    Vitamin D 25 hydroxy            Subjective:      Patient ID: Sorin Neri is a 80 y o  female      Dayana Phan here s/p hospitalization for fall, s/p fracture of lumbar vertebrae-burst fracture-while in the hospital found to have PAF also-was started on Coumadin-she and her  Wanda Durham recently moved to [place] and [place] fell in the shower-they have a walk in shower there with grab bars etc but she wasn't using the shower chair-slipped and fell-doing ok now that she's home-she's in a fair bit of pain secondary to back fracture-Dayana also has a hx of DM II, obesity, dementia, HTN, HL, has had several falls in the past year      The following portions of the patient's history were reviewed and updated as appropriate:   Past Medical History:  She has a past medical history of Allergy to sulfa drugs, Arthritis (2000), Closed fracture of third lumbar vertebra ([address] ), Closed wedge compression fracture of T12 vertebra ([address] ), Diabetes mellitus ([address] ) (2000), H/O multiple allergies, HL (hearing loss), Hyperlipidemia, Hypertension, Memory loss (2020), Nocturia, Palpitations, Paroxysmal atrial fibrillation ([address] ), Thrombocytopenic disorder ([address] ), Type 2 diabetes mellitus without complication ([address] ), and Wears glasses  ,  _______________________________________________________________________  Medical Problems:  does not have any pertinent problems on file ,  _______________________________________________________________________  Past Surgical History:   has a past surgical history that includes Hysterectomy and Hernia repair  ,  _______________________________________________________________________  Family History:  family history includes Diabetes in her mother; Heart disease in her father; Hypertension in her father and mother; Other in her mother ,  _______________________________________________________________________  Social History:   reports that she has never smoked  She has never been exposed to tobacco smoke   She has never used smokeless tobacco  She reports that she does not currently use alcohol after a past usage of about 1 0 standard drink per week  She reports that she does not use drugs  ,  _______________________________________________________________________  Allergies:  is allergic to accupril [quinapril hcl], novocain [procaine], parabens, and sulfa antibiotics     _______________________________________________________________________  Current Outpatient Medications   Medication Sig Dispense Refill   • apixaban (ELIQUIS) 5 mg Take 1 tablet (5 mg total) by mouth 2 (two) times a day 180 tablet 3   • ezetimibe-simvastatin (VYTORIN) 10-20 mg per tablet Take 1 tablet by mouth daily at bedtime 90 tablet 3   • FREESTYLE LITE test strip USE 1 EACH DAILY USE AS INSTRUCTED 100 strip 5   • Glucosamine HCl (GLUCOSAMINE PO) 1 po daily     • lidocaine (Lidoderm) 5 % Apply 1 patch topically over 12 hours daily Remove & Discard patch within 12 hours or as directed by MD 30 patch 0   • metFORMIN (GLUCOPHAGE) 850 mg tablet Take 1 tablet (850 mg total) by mouth 2 (two) times a day 180 tablet 3   • metoprolol succinate (TOPROL-XL) 50 mg 24 hr tablet Take 1 tablet (50 mg total) by mouth daily 90 tablet 3   • naproxen (NAPROSYN) 375 mg tablet TAKE 1 TABLET BY MOUTH  TWICE DAILY WITH MEALS (Patient taking differently: Take 375 mg by mouth once as needed) 180 tablet 1   • oxyCODONE (Roxicodone) 5 immediate release tablet Take 1 tablet (5 mg total) by mouth every 6 (six) hours as needed for moderate pain Max Daily Amount: 20 mg 60 tablet 0   • potassium chloride (K-DUR,KLOR-CON) 20 mEq tablet Take 1 tablet (20 mEq total) by mouth daily 90 tablet 3   • valsartan-hydrochlorothiazide (DIOVAN-HCT) 160-12 5 MG per tablet Take 1 tablet by mouth daily 90 tablet 3     No current facility-administered medications for this visit      _______________________________________________________________________  Review of Systems   Constitutional: Negative  HENT: Negative  Eyes: Negative  Respiratory: Negative  Cardiovascular: Negative  Gastrointestinal: Negative  "  Musculoskeletal: Positive for back pain and gait problem  Psychiatric/Behavioral: Positive for confusion  Objective:  Vitals:    04/03/23 0921   BP: 120/80   BP Location: Left arm   Patient Position: Sitting   Cuff Size: Large   Pulse: 91   Resp: 16   Temp: 97 6 °F (36 4 °C)   TempSrc: Tympanic   SpO2: 96%   Weight: 80 7 kg (178 lb)   Height: 5' 2\" (1 575 m)     Body mass index is 32 56 kg/m²  Physical Exam  Constitutional:       Appearance: She is obese  HENT:      Head: Normocephalic and atraumatic  Right Ear: External ear normal       Left Ear: External ear normal       Nose: Nose normal       Mouth/Throat:      Mouth: Mucous membranes are moist    Eyes:      Extraocular Movements: Extraocular movements intact  Cardiovascular:      Rate and Rhythm: Normal rate and regular rhythm  Heart sounds: Normal heart sounds  Pulmonary:      Effort: Pulmonary effort is normal       Breath sounds: Normal breath sounds  Musculoskeletal:         General: Signs of injury present  Cervical back: Normal range of motion and neck supple  Skin:     General: Skin is warm  Capillary Refill: Capillary refill takes less than 2 seconds  Neurological:      Mental Status: She is alert  Mental status is at baseline           "

## 2023-04-03 NOTE — ASSESSMENT & PLAN NOTE
Noted to have PAF in the hospital-due to high CHADSVAS score was started on anticoagulation-coumadin started but pt and her  would like to switch to Eliquis bacause of better safety data and her hx of falls -will order INR to be done tomorrow and if below 2 will start Eliquis-gave them some samples and hopefully it will be covered at the pharmacy-dosage of metoprolol was increased in the hospital for better rate control-also had Echo done showing EF 50-55%

## 2023-04-03 NOTE — ASSESSMENT & PLAN NOTE
Due to fall, and most likely osteoporosis-will up the roxicodone to 5 mg every 6 hours PRN, and advised use of 650 mg tylenol and lidocaine patchest-will order vitamin D level as well as DXA scan-advised oscal D tid

## 2023-04-05 LAB — INR PPP: 2.4 (ref 0.84–1.19)

## 2023-04-06 ENCOUNTER — ANTICOAG VISIT (OUTPATIENT)
Dept: FAMILY MEDICINE CLINIC | Facility: CLINIC | Age: 88
End: 2023-04-06

## 2023-04-06 NOTE — PROGRESS NOTES
INR is 2 4, still a little bit high to start Eliquis, pts  made aware to continue to hold coumadin and we'll get INR on Monday 4/10/23 and if below 2, can start Eliquis 5 mg BID

## 2023-04-07 DIAGNOSIS — I48.0 PAROXYSMAL ATRIAL FIBRILLATION (HCC): Primary | ICD-10-CM

## 2023-04-13 ENCOUNTER — APPOINTMENT (EMERGENCY)
Dept: CT IMAGING | Facility: HOSPITAL | Age: 88
End: 2023-04-13

## 2023-04-13 ENCOUNTER — HOSPITAL ENCOUNTER (EMERGENCY)
Facility: HOSPITAL | Age: 88
Discharge: HOME | End: 2023-04-14
Attending: STUDENT IN AN ORGANIZED HEALTH CARE EDUCATION/TRAINING PROGRAM | Admitting: STUDENT IN AN ORGANIZED HEALTH CARE EDUCATION/TRAINING PROGRAM
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
## 2023-04-13 LAB
BASE EXCESS BLDA CALC-SCNC: 6 MMOL/L (ref -2–3)
BASOPHILS # BLD AUTO: 0.04 THOUSANDS/ÂΜL (ref 0–0.1)
BASOPHILS NFR BLD AUTO: 1 % (ref 0–1)
CA-I BLD-SCNC: 1.27 MMOL/L (ref 1.12–1.32)
EOSINOPHIL # BLD AUTO: 0.1 THOUSAND/ÂΜL (ref 0–0.61)
EOSINOPHIL NFR BLD AUTO: 2 % (ref 0–6)
ERYTHROCYTE [DISTWIDTH] IN BLOOD BY AUTOMATED COUNT: 14.6 % (ref 11.6–15.1)
GLUCOSE SERPL-MCNC: 328 MG/DL (ref 65–140)
HCO3 BLDA-SCNC: 32.4 MMOL/L (ref 24–30)
HCT VFR BLD AUTO: 41.1 % (ref 34.8–46.1)
HCT VFR BLD CALC: 38 % (ref 34.8–46.1)
HGB BLD-MCNC: 13 G/DL (ref 11.5–15.4)
HGB BLDA-MCNC: 12.9 G/DL (ref 11.5–15.4)
IMM GRANULOCYTES # BLD AUTO: 0.01 THOUSAND/UL (ref 0–0.2)
IMM GRANULOCYTES NFR BLD AUTO: 0 % (ref 0–2)
LYMPHOCYTES # BLD AUTO: 1.07 THOUSANDS/ÂΜL (ref 0.6–4.47)
LYMPHOCYTES NFR BLD AUTO: 22 % (ref 14–44)
MCH RBC QN AUTO: 28.4 PG (ref 26.8–34.3)
MCHC RBC AUTO-ENTMCNC: 31.6 G/DL (ref 31.4–37.4)
MCV RBC AUTO: 90 FL (ref 82–98)
MONOCYTES # BLD AUTO: 0.5 THOUSAND/ÂΜL (ref 0.17–1.22)
MONOCYTES NFR BLD AUTO: 10 % (ref 4–12)
NEUTROPHILS # BLD AUTO: 3.07 THOUSANDS/ÂΜL (ref 1.85–7.62)
NEUTS SEG NFR BLD AUTO: 65 % (ref 43–75)
NRBC BLD AUTO-RTO: 0 /100 WBCS
PCO2 BLD: 34 MMOL/L (ref 21–32)
PCO2 BLD: 54.3 MM HG (ref 42–50)
PH BLD: 7.38 [PH] (ref 7.3–7.4)
PLATELET # BLD AUTO: 177 THOUSANDS/UL (ref 149–390)
PMV BLD AUTO: 10.8 FL (ref 8.9–12.7)
PO2 BLD: 23 MM HG (ref 35–45)
POTASSIUM BLD-SCNC: 4.1 MMOL/L (ref 3.5–5.3)
RBC # BLD AUTO: 4.57 MILLION/UL (ref 3.81–5.12)
SAO2 % BLD FROM PO2: 39 % (ref 60–85)
SODIUM BLD-SCNC: 134 MMOL/L (ref 136–145)
SPECIMEN SOURCE: ABNORMAL
WBC # BLD AUTO: 4.79 THOUSAND/UL (ref 4.31–10.16)

## 2023-04-13 RX ADMIN — TETANUS TOXOID, REDUCED DIPHTHERIA TOXOID AND ACELLULAR PERTUSSIS VACCINE, ADSORBED 0.5 ML: 5; 2.5; 8; 8; 2.5 SUSPENSION INTRAMUSCULAR at 23:43

## 2023-04-14 ENCOUNTER — APPOINTMENT (EMERGENCY)
Dept: RADIOLOGY | Facility: HOSPITAL | Age: 88
End: 2023-04-14

## 2023-04-14 ENCOUNTER — HOSPITAL ENCOUNTER (INPATIENT)
Facility: HOSPITAL | Age: 88
LOS: 4 days | Discharge: NURSING, REHAB OR LONG-TERM CARE | End: 2023-04-19
Attending: EMERGENCY MEDICINE | Admitting: INTERNAL MEDICINE

## 2023-04-14 VITALS
TEMPERATURE: 98.1 F | DIASTOLIC BLOOD PRESSURE: 94 MMHG | WEIGHT: 170.19 LBS | RESPIRATION RATE: 20 BRPM | OXYGEN SATURATION: 94 % | SYSTOLIC BLOOD PRESSURE: 149 MMHG | HEART RATE: 83 BPM

## 2023-04-14 DIAGNOSIS — E11.65 HYPERGLYCEMIA DUE TO TYPE 2 DIABETES MELLITUS (HCC): ICD-10-CM

## 2023-04-14 DIAGNOSIS — M25.462 EFFUSION OF LEFT KNEE JOINT: ICD-10-CM

## 2023-04-14 DIAGNOSIS — F03.90 DEMENTIA (HCC): ICD-10-CM

## 2023-04-14 DIAGNOSIS — M17.12 PRIMARY OSTEOARTHRITIS OF LEFT KNEE: ICD-10-CM

## 2023-04-14 DIAGNOSIS — R26.2 AMBULATORY DYSFUNCTION: Primary | ICD-10-CM

## 2023-04-14 DIAGNOSIS — E87.1 HYPONATREMIA: ICD-10-CM
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
## 2023-04-14 LAB
ALBUMIN SERPL BCP-MCNC: 3.8 G/DL (ref 3.5–5)
ALP SERPL-CCNC: 169 U/L (ref 34–104)
ALT SERPL W P-5'-P-CCNC: 11 U/L (ref 7–52)
ANION GAP SERPL CALCULATED.3IONS-SCNC: 10 MMOL/L (ref 4–13)
ANION GAP SERPL CALCULATED.3IONS-SCNC: 6 MMOL/L (ref 4–13)
AST SERPL W P-5'-P-CCNC: 13 U/L (ref 13–39)
BACTERIA UR QL AUTO: NORMAL /HPF
BASOPHILS # BLD AUTO: 0.05 THOUSANDS/ÂΜL (ref 0–0.1)
BASOPHILS NFR BLD AUTO: 1 % (ref 0–1)
BILIRUB SERPL-MCNC: 0.42 MG/DL (ref 0.2–1)
BILIRUB UR QL STRIP: NEGATIVE
BUN SERPL-MCNC: 34 MG/DL (ref 5–25)
BUN SERPL-MCNC: 34 MG/DL (ref 5–25)
CALCIUM SERPL-MCNC: 9.4 MG/DL (ref 8.4–10.2)
CALCIUM SERPL-MCNC: 9.7 MG/DL (ref 8.4–10.2)
CHLORIDE SERPL-SCNC: 94 MMOL/L (ref 96–108)
CHLORIDE SERPL-SCNC: 95 MMOL/L (ref 96–108)
CLARITY UR: CLEAR
CO2 SERPL-SCNC: 28 MMOL/L (ref 21–32)
CO2 SERPL-SCNC: 31 MMOL/L (ref 21–32)
COLOR UR: ABNORMAL
CREAT SERPL-MCNC: 1.24 MG/DL (ref 0.6–1.3)
CREAT SERPL-MCNC: 1.45 MG/DL (ref 0.6–1.3)
EOSINOPHIL # BLD AUTO: 0.12 THOUSAND/ÂΜL (ref 0–0.61)
EOSINOPHIL NFR BLD AUTO: 2 % (ref 0–6)
ERYTHROCYTE [DISTWIDTH] IN BLOOD BY AUTOMATED COUNT: 14.6 % (ref 11.6–15.1)
GFR SERPL CREATININE-BSD FRML MDRD: 32 ML/MIN/1.73SQ M
GFR SERPL CREATININE-BSD FRML MDRD: 39 ML/MIN/1.73SQ M
GLUCOSE SERPL-MCNC: 274 MG/DL (ref 65–140)
GLUCOSE SERPL-MCNC: 346 MG/DL (ref 65–140)
GLUCOSE SERPL-MCNC: 363 MG/DL (ref 65–140)
GLUCOSE UR STRIP-MCNC: ABNORMAL MG/DL
HCT VFR BLD AUTO: 40.5 % (ref 34.8–46.1)
HGB BLD-MCNC: 13.1 G/DL (ref 11.5–15.4)
HGB UR QL STRIP.AUTO: NEGATIVE
IMM GRANULOCYTES # BLD AUTO: 0.02 THOUSAND/UL (ref 0–0.2)
IMM GRANULOCYTES NFR BLD AUTO: 0 % (ref 0–2)
KETONES UR STRIP-MCNC: NEGATIVE MG/DL
LEUKOCYTE ESTERASE UR QL STRIP: ABNORMAL
LYMPHOCYTES # BLD AUTO: 0.82 THOUSANDS/ÂΜL (ref 0.6–4.47)
LYMPHOCYTES NFR BLD AUTO: 12 % (ref 14–44)
MCH RBC QN AUTO: 29.5 PG (ref 26.8–34.3)
MCHC RBC AUTO-ENTMCNC: 32.3 G/DL (ref 31.4–37.4)
MCV RBC AUTO: 91 FL (ref 82–98)
MONOCYTES # BLD AUTO: 0.7 THOUSAND/ÂΜL (ref 0.17–1.22)
MONOCYTES NFR BLD AUTO: 10 % (ref 4–12)
NEUTROPHILS # BLD AUTO: 5.1 THOUSANDS/ÂΜL (ref 1.85–7.62)
NEUTS SEG NFR BLD AUTO: 75 % (ref 43–75)
NITRITE UR QL STRIP: NEGATIVE
NON-SQ EPI CELLS URNS QL MICRO: NORMAL /HPF
NRBC BLD AUTO-RTO: 0 /100 WBCS
PH UR STRIP.AUTO: 6.5 [PH]
PLATELET # BLD AUTO: 193 THOUSANDS/UL (ref 149–390)
PMV BLD AUTO: 11.3 FL (ref 8.9–12.7)
POTASSIUM SERPL-SCNC: 4.1 MMOL/L (ref 3.5–5.3)
POTASSIUM SERPL-SCNC: 4.1 MMOL/L (ref 3.5–5.3)
PROT SERPL-MCNC: 6.6 G/DL (ref 6.4–8.4)
PROT UR STRIP-MCNC: ABNORMAL MG/DL
RBC # BLD AUTO: 4.44 MILLION/UL (ref 3.81–5.12)
RBC #/AREA URNS AUTO: NORMAL /HPF
SODIUM SERPL-SCNC: 132 MMOL/L (ref 135–147)
SODIUM SERPL-SCNC: 132 MMOL/L (ref 135–147)
SP GR UR STRIP.AUTO: 1.02 (ref 1–1.03)
UROBILINOGEN UR STRIP-ACNC: <2 MG/DL
WBC # BLD AUTO: 6.81 THOUSAND/UL (ref 4.31–10.16)
WBC #/AREA URNS AUTO: NORMAL /HPF

## 2023-04-14 NOTE — QUICK NOTE
Patient's initial blood glucose indicated hyperglycemia  UA also indicated elevated glucose levels without ketonuria  No evidence of UTI or other infection to cause acute altered mental status  Pt remained GCS 14, at her baseline due to dementia and disorientation to time    Patient is asymptomatic hyperglycemia and blood glucose decreased while in ED due to NPO  Patient and her  at bedside indicated that they will make an appointment with PCP as soon as possible due to elevated blood glucose  They report that Dr Evelia Amaya recently increased the patient's Metformin and she has been taking it as prescribed  Patient will be discharged back to independent living facility with  if she can ambulate independently  She is instructed to return to ED if she develops the following symptoms: blurry vision, shortness of breath, abdominal pain, nausea/ vomiting  She is in agreement with this plan

## 2023-04-14 NOTE — ED PROVIDER NOTES
Emergency Department Airway Evaluation and Management Form    History  Obtained from:   Patient has no allergy information on record  No chief complaint on file  HPI    80-year-old female brought by EMS for evaluation after a trip and fall with head strike on Eliquis this evening  She is awake and alert on arrival   Breathing comfortably on room air  No acute airway intervention needed  Further management per trauma team     No past medical history on file  No past surgical history on file  No family history on file  I have reviewed and agree with the history as documented  Review of Systems    Physical Exam  There were no vitals taken for this visit      Physical Exam    ED Medications  Medications - No data to display    Intubation  Procedures    Notes      Final Diagnosis  Final diagnoses:   None       ED Provider  Electronically Signed by     Shannan Sam MD  04/13/23 8243

## 2023-04-14 NOTE — H&P
H&P - Trauma   Aristeo Bradford 80 y o  female MRN: [id number]  Unit/Bed#: ED-40 Encounter: [id number]    Trauma Alert: Level B   Model of Arrival: Ambulance    Trauma Team: Attending Carloina Nicholson and TRISTAN [address] [place] [address]  Consultants:     None     Assessment/Plan   Active Problems / Assessment:   - Pt fell out of bed when she was attempting to ambulate to the bathroom  - Head strike on the floor, no LOC, takes Eliquis daily  - Abrasion to left tatianna-orbital region  - GCS 14, disoriented to time  Known history of dementia     Plan:   - CT head, C-spine  - FAST negative  - UA to rule out urine infection  - Blood work benign  - If able to ambulate and tolerate a diet, she may be discharged back to her independent living facility      Cervical Collar Clearance: The patient had a CT scan of the cervical spine demonstrating no acute injury  On exam, the patient had no midline point tenderness or paresthesias/numbness/weakness in the extremities  The patient had full range of motion (was then able to flex, extend, and rotate head laterally) without pain  There were no distracting injuries and the patient was not intoxicated  The patient's cervical spine was cleared radiologically and clinically  Cervical collar removed at this time  Wally Nixon PA-C  4/13/2023 11:53 PM       History of Present Illness     Chief Complaint: Fall  Mechanism:Fall     HPI:    Aristeo Bradford is a 80 y o  female who presents after a fall  She reports she was attempting to get out of bed to go to the bathroom when she lost her balance and fell  She reports head strike on the carpet, no loss of consciousness, and takes Eliquis daily  She has history of dementia and is a poor historian, but also reports that she falls frequently  Review of Systems   Unable to perform ROS: Dementia   HENT: Negative for facial swelling  Eyes: Negative for photophobia  Respiratory: Negative for shortness of breath  Cardiovascular: Negative for chest pain  Gastrointestinal: Negative for abdominal pain and nausea  Musculoskeletal: Negative for back pain and neck pain  Skin: Negative for wound  Neurological: Negative for dizziness, syncope, weakness, light-headedness, numbness and headaches  Psychiatric/Behavioral: Negative for confusion  All other systems reviewed and are negative  12-point, complete review of systems was reviewed and negative except as stated above  Historical Information     Past Medical History:   Diagnosis Date   • Closed T12 spinal fracture ([facility] [address] )    • Dementia ([facility] )    • DM (diabetes mellitus), type 2 ([facility] )    • Lumbar compression fracture (HCC)      Past Surgical History:   Procedure Laterality Date   • HYSTERECTOMY        Unable to obtain history due to none    Social History     Tobacco Use   • Smoking status: Never   • Smokeless tobacco: Never   Substance Use Topics   • Alcohol use: Not Currently   • Drug use: Never     Immunization History   Administered Date(s) Administered   • Tdap 04/13/2023     Last Tetanus: updated today  Family History: Non-contributory    1  Before the illness or injury that brought you to the Emergency, did you need someone to help you on a regular basis? 1=Yes   2  Since the illness or injury that brought you to the Emergency, have you needed more help than usual to take care of yourself? 0=No   3  Have you been hospitalized for one or more nights during the past 6 months (excluding a stay in the Emergency Department)? 1=Yes   4  In general, do you see well? 1=No   5  In general, do you have serious problems with your memory? 1=Yes   6  Do you take more than three different medications everyday? 1=Yes   TOTAL   5     Did you order a geriatric consult if the score was 2 or greater?: yes     Meds/Allergies   all current active meds have been reviewed  Allergies have not been reviewed;   Not on File    Objective   Initial Vitals:   Temperature: 98 1 °F (36 7 °C) (04/13/23 2321)  Pulse: 83 (04/13/23 2321)  Respirations: 20 (04/13/23 2321)  Blood Pressure: (!) 199/93 (04/13/23 2321)    Primary Survey:   Airway:        Status: patent;        Pre-hospital Interventions: none        Hospital Interventions: none  Breathing:        Pre-hospital Interventions: none       Effort: normal       Right breath sounds: normal       Left breath sounds: normal  Circulation:        Rhythm: regular       Rate: regular   Right Pulses Left Pulses    R radial: 2+    R pedal: 2+     L radial: 2+    L pedal: 2+       Disability:        GCS: Eye: 4; Verbal: 4 Motor: 6 Total: 14       Right Pupil: 4 mm;  round;  reactive         Left Pupil:  4 mm;  round;  reactive      R Motor Strength L Motor Strength    R : 5/5  R dorsiflex: 5/5  R plantarflex: 5/5 L : 5/5  L dorsiflex: 5/5  L plantarflex: 5/5        Sensory:  No sensory deficit  Exposure:       Completed: Yes      Secondary Survey:  Physical Exam  Vitals reviewed  Constitutional:       General: She is not in acute distress  Appearance: Normal appearance  HENT:      Head: Normocephalic  Comments: Left periorbital abrasion     Right Ear: External ear normal       Left Ear: External ear normal       Nose: Nose normal       Mouth/Throat:      Mouth: Mucous membranes are moist       Pharynx: Oropharynx is clear  Eyes:      Extraocular Movements: Extraocular movements intact  Conjunctiva/sclera: Conjunctivae normal       Pupils: Pupils are equal, round, and reactive to light  Cardiovascular:      Rate and Rhythm: Normal rate  Rhythm irregular  Pulses: Normal pulses  Heart sounds: Normal heart sounds  Pulmonary:      Effort: Pulmonary effort is normal  No respiratory distress  Breath sounds: Normal breath sounds  Chest:      Chest wall: No tenderness  Abdominal:      General: Abdomen is flat  Bowel sounds are normal  There is no distension  Palpations: Abdomen is soft  There is no mass  Tenderness:  There is no abdominal tenderness  There is no guarding or rebound  Hernia: No hernia is present  Musculoskeletal:         General: No swelling, tenderness, deformity or signs of injury  Normal range of motion  Cervical back: No tenderness  Skin:     General: Skin is warm and dry  Capillary Refill: Capillary refill takes less than 2 seconds  Findings: No bruising or lesion  Neurological:      General: No focal deficit present  Mental Status: She is alert  Mental status is at baseline  She is disoriented  Sensory: No sensory deficit  Motor: No weakness  Psychiatric:         Mood and Affect: Mood normal          Behavior: Behavior normal          Invasive Devices     Peripheral Intravenous Line  Duration           Peripheral IV 04/13/23 Right Antecubital <1 day              Lab Results:   Results: I have personally reviewed all pertinent laboratory/tests results, BMP/CMP:   Lab Results   Component Value Date    CO2 34 (H) 04/13/2023    GLUCOSE 328 (H) 04/13/2023    and CBC:   Lab Results   Component Value Date    WBC 4 79 04/13/2023    HGB 12 9 04/13/2023    HCT 38 04/13/2023    MCV 90 04/13/2023     04/13/2023    MCH 28 4 04/13/2023    MCHC 31 6 04/13/2023    RDW 14 6 04/13/2023    MPV 10 8 04/13/2023    NRBC 0 04/13/2023       Imaging Results: I have personally reviewed pertinent reports  Chest Xray(s): negative for acute findings   FAST exam(s): negative for acute findings   CT Scan(s): negative for acute findings   Additional Xray(s): N/A     Other Studies:   TRAUMA - CT head wo contrast   Final Result by Storm Sandifer, MD (04/13 2351)      No acute intracranial abnormality  Mild chronic small vessel ischemic changes and volume loss                     I personally discussed this study with Mackenzie Perez on 4/13/2023 at 11:44 PM                Workstation performed: [id number]         XR Trauma multiple ([facility] trauma bay ONLY)    (Results Pending)   XR chest 1 view (Results Pending)   TRAUMA - CT spine cervical wo contrast    (Results Pending)         Code Status: No Order  Advance Directive and Living Will:      Power of :    POLST:    I have spent 30 minutes with Patient and family today in which greater than 50% of this time was spent in counseling/coordination of care regarding Diagnostic results, Prognosis, Risks and benefits of tx options, Instructions for management, Patient and family education, Importance of tx compliance, Risk factor reductions, Impressions, Counseling / Coordination of care, Documenting in the medical record, Reviewing / ordering tests, medicine, procedures  , Obtaining or reviewing history   and Communicating with other healthcare professionals

## 2023-04-14 NOTE — TRAUMA DOCUMENTATION
Pt was able to ambulate with walker with a steady gait, has no complaints of dizziness or lightheadedness

## 2023-04-14 NOTE — PROCEDURES
POC FAST US    Date/Time: 4/13/2023 11:53 PM  Performed by: Ghassan Alexis PA-C  Authorized by: Ghassan Alexis PA-C     Patient location:  Trauma  Procedure details:     Exam Type:  Diagnostic    Indications: blunt abdominal trauma and blunt chest trauma      Assess for:  Intra-abdominal fluid and pericardial effusion    Technique: FAST      Views obtained:  Heart - Pericardial sac, LUQ - Splenorenal space, Suprapubic - Pouch of Dariusz and RUQ - Servin's Pouch    Image quality: diagnostic      Image availability:  Images available in PACS and video obtained  FAST Findings:     RUQ (Hepatorenal) free fluid: absent      LUQ (Splenorenal) free fluid: absent      Suprapubic free fluid: absent      Cardiac wall motion: identified      Pericardial effusion: absent    Interpretation:     Impressions: negative

## 2023-04-15 ENCOUNTER — APPOINTMENT (EMERGENCY)
Dept: RADIOLOGY | Facility: HOSPITAL | Age: 88
End: 2023-04-15

## 2023-04-15 PROBLEM — R53.1 GENERALIZED WEAKNESS: Status: ACTIVE | Noted: 2023-04-15

## 2023-04-15 LAB
ANION GAP SERPL CALCULATED.3IONS-SCNC: 8 MMOL/L (ref 4–13)
ANION GAP SERPL CALCULATED.3IONS-SCNC: 9 MMOL/L (ref 4–13)
BACTERIA UR QL AUTO: NORMAL /HPF
BILIRUB UR QL STRIP: NEGATIVE
BUN SERPL-MCNC: 29 MG/DL (ref 5–25)
BUN SERPL-MCNC: 29 MG/DL (ref 5–25)
CALCIUM SERPL-MCNC: 9.2 MG/DL (ref 8.4–10.2)
CALCIUM SERPL-MCNC: 9.3 MG/DL (ref 8.4–10.2)
CHLORIDE SERPL-SCNC: 96 MMOL/L (ref 96–108)
CHLORIDE SERPL-SCNC: 97 MMOL/L (ref 96–108)
CLARITY UR: CLEAR
CO2 SERPL-SCNC: 27 MMOL/L (ref 21–32)
CO2 SERPL-SCNC: 29 MMOL/L (ref 21–32)
COLOR UR: ABNORMAL
CREAT SERPL-MCNC: 1.03 MG/DL (ref 0.6–1.3)
CREAT SERPL-MCNC: 1.05 MG/DL (ref 0.6–1.3)
ERYTHROCYTE [DISTWIDTH] IN BLOOD BY AUTOMATED COUNT: 14.5 % (ref 11.6–15.1)
EST. AVERAGE GLUCOSE BLD GHB EST-MCNC: 212 MG/DL
GFR SERPL CREATININE-BSD FRML MDRD: 47 ML/MIN/1.73SQ M
GFR SERPL CREATININE-BSD FRML MDRD: 48 ML/MIN/1.73SQ M
GLUCOSE SERPL-MCNC: 237 MG/DL (ref 65–140)
GLUCOSE SERPL-MCNC: 242 MG/DL (ref 65–140)
GLUCOSE SERPL-MCNC: 288 MG/DL (ref 65–140)
GLUCOSE SERPL-MCNC: 294 MG/DL (ref 65–140)
GLUCOSE SERPL-MCNC: 309 MG/DL (ref 65–140)
GLUCOSE SERPL-MCNC: 398 MG/DL (ref 65–140)
GLUCOSE SERPL-MCNC: 398 MG/DL (ref 65–140)
GLUCOSE UR STRIP-MCNC: ABNORMAL MG/DL
HBA1C MFR BLD: 9 %
HCT VFR BLD AUTO: 40.6 % (ref 34.8–46.1)
HGB BLD-MCNC: 12.9 G/DL (ref 11.5–15.4)
HGB UR QL STRIP.AUTO: NEGATIVE
KETONES UR STRIP-MCNC: NEGATIVE MG/DL
LEUKOCYTE ESTERASE UR QL STRIP: ABNORMAL
MCH RBC QN AUTO: 28.4 PG (ref 26.8–34.3)
MCHC RBC AUTO-ENTMCNC: 31.8 G/DL (ref 31.4–37.4)
MCV RBC AUTO: 89 FL (ref 82–98)
NITRITE UR QL STRIP: NEGATIVE
NON-SQ EPI CELLS URNS QL MICRO: NORMAL /HPF
PH UR STRIP.AUTO: 6.5 [PH]
PLATELET # BLD AUTO: 171 THOUSANDS/UL (ref 149–390)
PMV BLD AUTO: 11.4 FL (ref 8.9–12.7)
POTASSIUM SERPL-SCNC: 3.7 MMOL/L (ref 3.5–5.3)
POTASSIUM SERPL-SCNC: 4.1 MMOL/L (ref 3.5–5.3)
PROT UR STRIP-MCNC: ABNORMAL MG/DL
RBC # BLD AUTO: 4.54 MILLION/UL (ref 3.81–5.12)
RBC #/AREA URNS AUTO: NORMAL /HPF
SODIUM SERPL-SCNC: 133 MMOL/L (ref 135–147)
SODIUM SERPL-SCNC: 133 MMOL/L (ref 135–147)
SP GR UR STRIP.AUTO: 1.02 (ref 1–1.03)
UROBILINOGEN UR STRIP-ACNC: <2 MG/DL
WBC # BLD AUTO: 6.98 THOUSAND/UL (ref 4.31–10.16)
WBC #/AREA URNS AUTO: NORMAL /HPF

## 2023-04-15 RX ORDER — ACETAMINOPHEN 325 MG/1
975 TABLET ORAL EVERY 8 HOURS SCHEDULED
Status: DISCONTINUED | OUTPATIENT
Start: 2023-04-15 | End: 2023-04-19 | Stop reason: HOSPADM

## 2023-04-15 RX ORDER — POLYETHYLENE GLYCOL 3350 17 G/17G
17 POWDER, FOR SOLUTION ORAL DAILY PRN
Status: DISCONTINUED | OUTPATIENT
Start: 2023-04-15 | End: 2023-04-19 | Stop reason: HOSPADM

## 2023-04-15 RX ORDER — METOPROLOL SUCCINATE 50 MG/1
50 TABLET, EXTENDED RELEASE ORAL DAILY
Status: DISCONTINUED | OUTPATIENT
Start: 2023-04-15 | End: 2023-04-19 | Stop reason: HOSPADM

## 2023-04-15 RX ORDER — INSULIN LISPRO 100 [IU]/ML
1-5 INJECTION, SOLUTION INTRAVENOUS; SUBCUTANEOUS
Status: DISCONTINUED | OUTPATIENT
Start: 2023-04-15 | End: 2023-04-19 | Stop reason: HOSPADM

## 2023-04-15 RX ORDER — INSULIN LISPRO 100 [IU]/ML
1-6 INJECTION, SOLUTION INTRAVENOUS; SUBCUTANEOUS
Status: DISCONTINUED | OUTPATIENT
Start: 2023-04-15 | End: 2023-04-19 | Stop reason: HOSPADM

## 2023-04-15 RX ORDER — OXYCODONE HYDROCHLORIDE 5 MG/1
5 TABLET ORAL EVERY 6 HOURS PRN
Status: DISCONTINUED | OUTPATIENT
Start: 2023-04-15 | End: 2023-04-19 | Stop reason: HOSPADM

## 2023-04-15 RX ORDER — LOSARTAN POTASSIUM 50 MG/1
100 TABLET ORAL DAILY
Status: DISCONTINUED | OUTPATIENT
Start: 2023-04-15 | End: 2023-04-19 | Stop reason: HOSPADM

## 2023-04-15 RX ORDER — ACETAMINOPHEN 325 MG/1
975 TABLET ORAL ONCE
Status: DISCONTINUED | OUTPATIENT
Start: 2023-04-15 | End: 2023-04-19 | Stop reason: HOSPADM

## 2023-04-15 RX ORDER — METOPROLOL TARTRATE 5 MG/5ML
2.5 INJECTION INTRAVENOUS EVERY 8 HOURS PRN
Status: DISCONTINUED | OUTPATIENT
Start: 2023-04-15 | End: 2023-04-19 | Stop reason: HOSPADM

## 2023-04-15 RX ORDER — LANOLIN ALCOHOL/MO/W.PET/CERES
3 CREAM (GRAM) TOPICAL
Status: DISCONTINUED | OUTPATIENT
Start: 2023-04-15 | End: 2023-04-19 | Stop reason: HOSPADM

## 2023-04-15 RX ORDER — EZETIMIBE 10 MG/1
10 TABLET ORAL
Status: DISCONTINUED | OUTPATIENT
Start: 2023-04-15 | End: 2023-04-19 | Stop reason: HOSPADM

## 2023-04-15 RX ORDER — LIDOCAINE 50 MG/G
1 PATCH TOPICAL DAILY
Status: DISCONTINUED | OUTPATIENT
Start: 2023-04-15 | End: 2023-04-18

## 2023-04-15 RX ORDER — PRAVASTATIN SODIUM 40 MG
40 TABLET ORAL
Status: DISCONTINUED | OUTPATIENT
Start: 2023-04-15 | End: 2023-04-19 | Stop reason: HOSPADM

## 2023-04-15 RX ADMIN — INSULIN LISPRO 4 UNITS: 100 INJECTION, SOLUTION INTRAVENOUS; SUBCUTANEOUS at 21:24

## 2023-04-15 RX ADMIN — METOPROLOL SUCCINATE 50 MG: 50 TABLET, EXTENDED RELEASE ORAL at 09:36

## 2023-04-15 RX ADMIN — MELATONIN 3 MG: at 21:23

## 2023-04-15 RX ADMIN — ACETAMINOPHEN 975 MG: 325 TABLET ORAL at 21:23

## 2023-04-15 RX ADMIN — APIXABAN 5 MG: 5 TABLET, FILM COATED ORAL at 17:34

## 2023-04-15 RX ADMIN — INSULIN LISPRO 4 UNITS: 100 INJECTION, SOLUTION INTRAVENOUS; SUBCUTANEOUS at 18:10

## 2023-04-15 RX ADMIN — APIXABAN 5 MG: 5 TABLET, FILM COATED ORAL at 09:36

## 2023-04-15 RX ADMIN — EZETIMIBE 10 MG: 10 TABLET ORAL at 17:34

## 2023-04-15 RX ADMIN — LOSARTAN POTASSIUM 100 MG: 50 TABLET, FILM COATED ORAL at 09:37

## 2023-04-15 RX ADMIN — INSULIN LISPRO 3 UNITS: 100 INJECTION, SOLUTION INTRAVENOUS; SUBCUTANEOUS at 02:04

## 2023-04-15 RX ADMIN — PRAVASTATIN SODIUM 40 MG: 40 TABLET ORAL at 17:34

## 2023-04-15 RX ADMIN — LIDOCAINE 5% 1 PATCH: 700 PATCH TOPICAL at 02:04

## 2023-04-15 RX ADMIN — INSULIN LISPRO 6 UNITS: 100 INJECTION, SOLUTION INTRAVENOUS; SUBCUTANEOUS at 11:46

## 2023-04-15 RX ADMIN — INSULIN LISPRO 3 UNITS: 100 INJECTION, SOLUTION INTRAVENOUS; SUBCUTANEOUS at 07:11

## 2023-04-15 RX ADMIN — ACETAMINOPHEN 975 MG: 325 TABLET ORAL at 13:29

## 2023-04-15 NOTE — ASSESSMENT & PLAN NOTE
Lab Results   Component Value Date    HGBA1C 8 1 (H) 12/08/2022     · Update a1c  · Accucheck, SSI  · Resume metformin on discharge

## 2023-04-15 NOTE — ED PROCEDURE NOTE
PROCEDURE  ECG 12 Lead Documentation Only    Date/Time: 4/14/2023 10:44 PM  Performed by: Gucci Draper MD  Authorized by: Gucci Draper MD     Indications / Diagnosis:  Gen weakness   ECG reviewed by me, the ED Provider: yes    Patient location:  ED and bedside  Previous ECG:     Previous ECG:  Compared to current    Comparison ECG info:  Compared to previous  2/13/23-    Similarity:  No change    Comparison to cardiac monitor: Yes    Interpretation:     Interpretation: non-specific    Rate:     ECG rate:  79    ECG rate assessment: normal    Rhythm:     Rhythm: atrial fibrillation    Ectopy:     Ectopy: none    QRS:     QRS axis:  Left    QRS intervals:  Normal  Conduction:     Conduction: normal    ST segments:     ST segments:  Normal  T waves:     T waves: flattening      Flattening:  III and V1  Q waves:     Q waves:  II, III, aVF, V1, V2 and V3  Comments:      Low voltage - no ecg signs of ischemia/ injury          Gucci Draper MD  04/14/23 6611

## 2023-04-15 NOTE — H&P
[facility]  H&P  Name: Mary Carmen Galvan 80 y o  female I MRN: [id number]  Unit/Bed#: ED-37 I Date of Admission: 4/14/2023   Date of Service: 4/15/2023 I Hospital Day: 0      Assessment/Plan   * Generalized weakness  Assessment & Plan  · Patient from independent living with    no longer able to care for her  ED attempted contacting Urgent SNF line but did not receive a call back  · Pain control  · Fall precaution  · PT OT  · 4/13 - discharged from ED / trauma service following fall while on Eliquis  No traumatic injuries noted  Was ambulatory prior to discharge  Compression fracture of L3 vertebra (HCC)  Assessment & Plan  · Compression fx following fall 3/26  · Following with orthopedic surgery as outpatient    Osteoarthritis of multiple joints  Assessment & Plan  · Complaining of ongoing left knee pain   · xrays pending, but no acute fx noted   · Pain control    History of atrial fibrillation  Assessment & Plan  · Maintained on eliquis, toprol XL    Dementia without behavioral disturbance ([facility] [address] )  Assessment & Plan  · Currently alert to self and place  Disoriented to time  Was at independent living with   · Now likely needing STR    Essential hypertension  Assessment & Plan  · Elevated on arrival, improving without intervention   · PTA  · Toprol XL 50 mg qd  · Valsartan HCTZ 160-12 5 mg qd  · Substitute ARB  Hold HCTZ until we can evaluate oral intake    Type 2 diabetes mellitus without complication, without long-term current use of insulin ([facility])  Assessment & Plan  Lab Results   Component Value Date    HGBA1C 8 1 (H) 12/08/2022     · Update a1c  · Accucheck, SSI  · Resume metformin on discharge       VTE Pharmacologic Prophylaxis: VTE Score: 4 Moderate Risk (Score 3-4) - Pharmacological DVT Prophylaxis Ordered: apixaban (Eliquis)  Code Status: Prior full  Discussion with family: Patient declined call to        Anticipated Length of Stay: Patient will be admitted on an inpatient basis with an anticipated length of stay of greater than 2 midnights secondary to placement  Total Time Spent on Date of Encounter in care of patient: 75 minutes This time was spent on one or more of the following: performing physical exam; counseling and coordination of care; obtaining or reviewing history; documenting in the medical record; reviewing/ordering tests, medications or procedures; communicating with other healthcare professionals and discussing with patient's family/caregivers  Chief Complaint: generalized weakness    History of Present Illness:  Liza Sr is a 80 y o  female with a PMH of A fib on eliquis, HTN, CKD, DM, HLD who presents with generalized weakness, chronic pain  Patient is currently at independent living with her   Earlier today,  was unable to get her out of bed  Currently patient complains of chronic left knee and chronic back pain  Patient has had multiple falls over the past week  She was last evaluated in the ED by trauma on 4/13  She was discharged back to independent living after unremarkable trauma work up  She was able to ambulate with walker prior to discharge on 4/13  Patient is agreeable to PT OT eval but does wish to return to independent living with her   Review of Systems:  Review of Systems   Constitutional: Positive for activity change  Negative for appetite change, chills, fatigue and fever  Musculoskeletal: Positive for arthralgias and back pain  Hematological: Bruises/bleeds easily  All other systems reviewed and are negative        Past Medical and Surgical History:   Past Medical History:   Diagnosis Date   • Allergy to sulfa drugs    • Arthritis 2000   • Closed fracture of third lumbar vertebra ([address] )    • Closed wedge compression fracture of T12 vertebra ([address] )    • Diabetes mellitus ([address] ) 2000   • H/O multiple allergies     Novocaine,Darvocet, Sulfa, Accupril   • HL (hearing loss)    • Hyperlipidemia    • Hypertension    • Memory loss 2020   • Nocturia    • Palpitations    • Paroxysmal atrial fibrillation (HCC)    • Thrombocytopenic disorder ([facility] [address] )    • Type 2 diabetes mellitus without complication ([facility] )    • Wears glasses        Past Surgical History:   Procedure Laterality Date   • HERNIA REPAIR     • HYSTERECTOMY         Meds/Allergies:  Prior to Admission medications    Medication Sig Start Date End Date Taking?  Authorizing Provider   apixaban (ELIQUIS) 5 mg Take 1 tablet (5 mg total) by mouth 2 (two) times a day 4/3/23 3/28/24  Soumya Cooper MD   ezetimibe-simvastatin (VYTORIN) 10-20 mg per tablet Take 1 tablet by mouth daily at bedtime 6/13/22   Soumya Cooper MD   FREESTYLE LITE test strip USE 1 EACH DAILY USE AS INSTRUCTED 9/27/22   Soumya Cooper MD   Glucosamine HCl (GLUCOSAMINE PO) 1 po daily    Historical Provider, MD   lidocaine (Lidoderm) 5 % Apply 1 patch topically over 12 hours daily Remove & Discard patch within 12 hours or as directed by MD 3/26/23   Eyal Faust PA-C   metFORMIN (GLUCOPHAGE) 850 mg tablet Take 1 tablet (850 mg total) by mouth 2 (two) times a day 6/13/22   Soumya Cooper MD   metoprolol succinate (TOPROL-XL) 50 mg 24 hr tablet Take 1 tablet (50 mg total) by mouth daily 6/13/22   Soumya Cooper MD   naproxen (NAPROSYN) 375 mg tablet TAKE 1 TABLET BY MOUTH  TWICE DAILY WITH MEALS  Patient taking differently: Take 375 mg by mouth once as needed 11/15/22   Soumya Cooper MD   oxyCODONE (Roxicodone) 5 immediate release tablet Take 1 tablet (5 mg total) by mouth every 6 (six) hours as needed for moderate pain Max Daily Amount: 20 mg 4/3/23   Soumya Cooper MD   potassium chloride (K-DUR,KLOR-CON) 20 mEq tablet Take 1 tablet (20 mEq total) by mouth daily 6/13/22   Soumya Cooper MD   valsartan-hydrochlorothiazide (DIOVAN-HCT) 160-12 5 MG per tablet Take 1 tablet by mouth daily 6/13/22   Soumya Cooper MD     I have reviewed home medications with a medical source (PCP, Pharmacy, other)  Allergies: Allergies   Allergen Reactions   • Accupril [Quinapril Hcl]    • Novocain [Procaine]    • Parabens    • Sulfa Antibiotics        Social History:  Marital Status: /Civil Union   Occupation:   Patient Pre-hospital Living Situation: Home independent living  Patient Pre-hospital Level of Mobility: walks with walker  Patient Pre-hospital Diet Restrictions:   Substance Use History:   Social History     Substance and Sexual Activity   Alcohol Use Not Currently   • Alcohol/week: 1 0 standard drink   • Types: 1 Glasses of wine per week    Comment: on holidays add an extra glass of wine     Social History     Tobacco Use   Smoking Status Never   • Passive exposure: Never   Smokeless Tobacco Never     Social History     Substance and Sexual Activity   Drug Use Never       Family History:  Family History   Problem Relation Age of Onset   • Hypertension Mother    • Diabetes Mother    • Other Mother    • Hypertension Father    • Heart disease Father        Physical Exam:     Vitals:   Blood Pressure: 158/82 (04/15/23 0130)  Pulse: (!) 111 (04/15/23 0130)  Temperature: 99 °F (37 2 °C) (04/14/23 1950)  Temp Source: Axillary (04/14/23 1950)  Respirations: 18 (04/15/23 0130)  SpO2: 95 % (04/15/23 0130)    Physical Exam  Constitutional:       General: She is not in acute distress  Appearance: Normal appearance  She is not ill-appearing  HENT:      Head: Normocephalic  Comments: Ecchymosis to left eye / maxillary     Right Ear: External ear normal       Left Ear: External ear normal       Nose: Nose normal       Mouth/Throat:      Pharynx: Oropharynx is clear  Eyes:      General: No scleral icterus  Extraocular Movements: Extraocular movements intact  Conjunctiva/sclera: Conjunctivae normal    Cardiovascular:      Rate and Rhythm: Normal rate  Rhythm irregular  Pulses: Normal pulses  Heart sounds: Normal heart sounds     Pulmonary:      Effort: Pulmonary effort is normal       Breath sounds: Normal breath sounds  Abdominal:      General: Bowel sounds are normal  There is no distension  Palpations: Abdomen is soft  Tenderness: There is no abdominal tenderness  There is no right CVA tenderness, left CVA tenderness, guarding or rebound  Musculoskeletal:         General: Tenderness present  Cervical back: Normal range of motion  No rigidity or tenderness  Left knee: Swelling present  Decreased range of motion  Tenderness present  Skin:     General: Skin is warm  Capillary Refill: Capillary refill takes less than 2 seconds  Neurological:      General: No focal deficit present  Mental Status: She is alert  Mental status is at baseline        Comments: Alert to self and place   Disoriented to time   Psychiatric:         Mood and Affect: Mood normal          Behavior: Behavior normal           Additional Data:     Lab Results:  Results from last 7 days   Lab Units 04/14/23  2238   WBC Thousand/uL 6 81   HEMOGLOBIN g/dL 13 1   HEMATOCRIT % 40 5   PLATELETS Thousands/uL 193   NEUTROS PCT % 75   LYMPHS PCT % 12*   MONOS PCT % 10   EOS PCT % 2     Results from last 7 days   Lab Units 04/14/23  2238   SODIUM mmol/L 132*   POTASSIUM mmol/L 4 1   CHLORIDE mmol/L 94*   CO2 mmol/L 28   BUN mg/dL 34*   CREATININE mg/dL 1 24   ANION GAP mmol/L 10   CALCIUM mg/dL 9 4   GLUCOSE RANDOM mg/dL 363*     Results from last 7 days   Lab Units 04/10/23  1036   INR  1 00     Results from last 7 days   Lab Units 04/15/23  0148   POC GLUCOSE mg/dl 309*               Lines/Drains:  Invasive Devices     Peripheral Intravenous Line  Duration           Peripheral IV 02/14/23 Left Antecubital 59 days                    Imaging: Reviewed radiology reports from this admission including: xray(s)  XR chest 1 view portable    (Results Pending)   XR knee 4+ views left injury    (Results Pending)       EKG and Other Studies Reviewed on Admission:   · EKG: Atrial fibrillation  HR 79     ** Please Note: This note has been constructed using a voice recognition system   **

## 2023-04-15 NOTE — ED PROVIDER NOTES
History  Chief Complaint   Patient presents with   • Weakness - Generalized     Patient presents for generalized weakness that started around dinner  Patient was here last night for a trauma alert due to fall  80 yr female lives  With  at local independent living- has been having problems with knees and has had 3 falls over last week -- last night was in er as trauma alert  Could not get out of bed and fell with left facial injury on eliquis--- neg workup-  Was carmen to ambulate in er prior to er d/c- today pt unable to get out of bed and walk--  states unable to care for her at present- no fall today -- no fever/uri/cough no abd pain no v/d- no gu comps- no rash       History provided by:  Spouse  History limited by:  Dementia   used: No        Prior to Admission Medications   Prescriptions Last Dose Informant Patient Reported? Taking?    FREESTYLE LITE test strip   No No   Sig: USE 1 EACH DAILY USE AS INSTRUCTED   Glucosamine HCl (GLUCOSAMINE PO)   Yes No   Si po daily   apixaban (ELIQUIS) 5 mg   No No   Sig: Take 1 tablet (5 mg total) by mouth 2 (two) times a day   ezetimibe-simvastatin (VYTORIN) 10-20 mg per tablet   No No   Sig: Take 1 tablet by mouth daily at bedtime   lidocaine (Lidoderm) 5 %   No No   Sig: Apply 1 patch topically over 12 hours daily Remove & Discard patch within 12 hours or as directed by MD   metFORMIN (GLUCOPHAGE) 850 mg tablet   No No   Sig: Take 1 tablet (850 mg total) by mouth 2 (two) times a day   metoprolol succinate (TOPROL-XL) 50 mg 24 hr tablet   No No   Sig: Take 1 tablet (50 mg total) by mouth daily   naproxen (NAPROSYN) 375 mg tablet   No No   Sig: TAKE 1 TABLET BY MOUTH  TWICE DAILY WITH MEALS   Patient taking differently: Take 375 mg by mouth once as needed   oxyCODONE (Roxicodone) 5 immediate release tablet   No No   Sig: Take 1 tablet (5 mg total) by mouth every 6 (six) hours as needed for moderate pain Max Daily Amount: 20 mg potassium chloride (K-DUR,KLOR-CON) 20 mEq tablet   No No   Sig: Take 1 tablet (20 mEq total) by mouth daily   valsartan-hydrochlorothiazide (DIOVAN-HCT) 160-12 5 MG per tablet   No No   Sig: Take 1 tablet by mouth daily      Facility-Administered Medications: None       Past Medical History:   Diagnosis Date   • Allergy to sulfa drugs    • Arthritis 2000   • Closed fracture of third lumbar vertebra (HCC)    • Closed wedge compression fracture of T12 vertebra (HCC)    • Diabetes mellitus ([facility] [address] ) 2000   • H/O multiple allergies     Novocaine,Darvocet, Sulfa, Accupril   • HL (hearing loss)    • Hyperlipidemia    • Hypertension    • Memory loss 2020   • Nocturia    • Palpitations    • Paroxysmal atrial fibrillation (HCC)    • Thrombocytopenic disorder (HCC)    • Type 2 diabetes mellitus without complication ([facility] )    • Wears glasses        Past Surgical History:   Procedure Laterality Date   • HERNIA REPAIR     • HYSTERECTOMY         Family History   Problem Relation Age of Onset   • Hypertension Mother    • Diabetes Mother    • Other Mother    • Hypertension Father    • Heart disease Father      I have reviewed and agree with the history as documented  E-Cigarette/Vaping   • E-Cigarette Use Never User      E-Cigarette/Vaping Substances   • Nicotine No    • THC No    • CBD No    • Flavoring No    • Other No    • Unknown No      Social History     Tobacco Use   • Smoking status: Never     Passive exposure: Never   • Smokeless tobacco: Never   Vaping Use   • Vaping Use: Never used   Substance Use Topics   • Alcohol use: Not Currently     Alcohol/week: 1 0 standard drink     Types: 1 Glasses of wine per week     Comment: on holidays add an extra glass of wine   • Drug use: Never       Review of Systems   Constitutional: Positive for activity change  Negative for appetite change, chills, diaphoresis, fatigue, fever and unexpected weight change  HENT: Negative  Eyes: Negative  Respiratory: Negative  Cardiovascular: Negative  Gastrointestinal: Negative  Endocrine: Negative  Genitourinary: Negative  Musculoskeletal: Positive for gait problem  Negative for arthralgias, back pain, joint swelling, myalgias, neck pain and neck stiffness  ? left knee pain    Skin: Negative  Allergic/Immunologic: Negative  Neurological: Negative for dizziness, tremors, seizures, syncope, facial asymmetry, speech difficulty, weakness, light-headedness, numbness and headaches  Hematological: Negative  Psychiatric/Behavioral: Negative  Physical Exam  Physical Exam  Vitals and nursing note reviewed  Constitutional:       General: She is not in acute distress  Appearance: Normal appearance  She is not ill-appearing, toxic-appearing or diaphoretic  Comments: avss--  hrtnsive-- pulse ox 95 % on ra- interpretation is normal- no intervention    HENT:      Head: Normocephalic and atraumatic  Comments: Left maxillary ecchymosis- old     Right Ear: Tympanic membrane, ear canal and external ear normal  There is no impacted cerumen  Left Ear: Tympanic membrane, ear canal and external ear normal  There is no impacted cerumen  Nose: No congestion or rhinorrhea  Mouth/Throat:      Mouth: Mucous membranes are moist       Pharynx: No oropharyngeal exudate or posterior oropharyngeal erythema  Eyes:      General: No scleral icterus  Right eye: No discharge  Left eye: No discharge  Extraocular Movements: Extraocular movements intact  Conjunctiva/sclera: Conjunctivae normal       Pupils: Pupils are equal, round, and reactive to light  Comments: Mm pink   Neck:      Vascular: No carotid bruit  Comments: No pmt c/t/l/s spine   Cardiovascular:      Rate and Rhythm: Normal rate  Rhythm irregular  Pulses: Normal pulses  Heart sounds: Normal heart sounds  No murmur heard  No friction rub  No gallop     Pulmonary:      Effort: Pulmonary effort is normal  No respiratory distress  Breath sounds: Normal breath sounds  No stridor  No wheezing, rhonchi or rales  Chest:      Chest wall: No tenderness  Abdominal:      General: Bowel sounds are normal  There is no distension  Palpations: Abdomen is soft  There is no mass  Tenderness: There is no abdominal tenderness  There is no right CVA tenderness, left CVA tenderness, guarding or rebound  Hernia: No hernia is present  Comments: Soft nt/nd- no hsm- no cva tenderness- no ascites- no peritoneal sifns- no pulsatile abd mass/bruit/ tendenress   Musculoskeletal:         General: Tenderness present  No swelling, deformity or signs of injury  Cervical back: Normal range of motion and neck supple  No rigidity or tenderness  Right lower leg: Edema present  Left lower leg: Edema present  Comments: Left knee- prox aNT TIBIAL TENDERNESS DECREASED ROM AT LEFT KNEE-  NORMAL FOR HER AS PER    Lymphadenopathy:      Cervical: No cervical adenopathy  Skin:     General: Skin is warm  Capillary Refill: Capillary refill takes less than 2 seconds  Coloration: Skin is not jaundiced or pale  Findings: No bruising, erythema, lesion or rash  Neurological:      General: No focal deficit present  Mental Status: She is alert  Mental status is at baseline  Cranial Nerves: No cranial nerve deficit  Sensory: No sensory deficit  Motor: No weakness        Comments: NON FOCAL NEURO EXAM- A AND O X 2 -BASELINE   Psychiatric:         Mood and Affect: Mood normal          Behavior: Behavior normal          Vital Signs  ED Triage Vitals   Temperature Pulse Respirations Blood Pressure SpO2   04/14/23 1950 04/14/23 1950 04/14/23 1950 04/14/23 1950 04/14/23 2029   99 °F (37 2 °C) 77 18 (!) 182/79 96 %      Temp Source Heart Rate Source Patient Position - Orthostatic VS BP Location FiO2 (%)   04/14/23 1950 04/14/23 1950 04/14/23 1950 04/14/23 1950 --   Axillary Monitor Lying Right arm       Pain Score       --                  Vitals:    04/14/23 1950 04/14/23 2300 04/14/23 2340   BP: (!) 182/79 157/70 152/76   Pulse: 77 90 86   Patient Position - Orthostatic VS: Lying  Lying         Visual Acuity  Visual Acuity    Flowsheet Row Most Recent Value   L Pupil Size (mm) 3   R Pupil Size (mm) 3   L Pupil Shape Round   R Pupil Shape Round          ED Medications  Medications - No data to display    Diagnostic Studies  Results Reviewed     Procedure Component Value Units Date/Time    UA w Reflex to Microscopic w Reflex to Culture [[id number]]  (Abnormal) Collected: 04/14/23 2341    Lab Status: Final result Specimen: Urine, Clean Catch Updated: 04/15/23 0025     Color, UA Light Yellow     Clarity, UA Clear     Specific Gravity, UA 1 019     pH, UA 6 5     Leukocytes, UA Elevated glucose may cause decreased leukocyte values  See urine microscopic for [facility] result/     Nitrite, UA Negative     Protein, UA Trace mg/dl      Glucose, UA >=1000 (1%) mg/dl      Ketones, UA Negative mg/dl      Urobilinogen, UA <2 0 mg/dl      Bilirubin, UA Negative     Occult Blood, UA Negative    Urine Microscopic [[id number]] Collected: 04/14/23 2341    Lab Status:  In process Specimen: Urine, Clean Catch Updated: 04/15/23 2638    Basic metabolic panel [[id number]]  (Abnormal) Collected: 04/14/23 2238    Lab Status: Final result Specimen: Blood from Arm, Left Updated: 04/14/23 2305     Sodium 132 mmol/L      Potassium 4 1 mmol/L      Chloride 94 mmol/L      CO2 28 mmol/L      ANION GAP 10 mmol/L      BUN 34 mg/dL      Creatinine 1 24 mg/dL      Glucose 363 mg/dL      Calcium 9 4 mg/dL      eGFR 39 ml/min/1 73sq m     Narrative:      [place] guidelines for Chronic Kidney Disease (CKD):   •  Stage 1 with normal or high GFR (GFR > 90 mL/min/1 73 square meters)  •  Stage 2 Mild CKD (GFR = 60-89 mL/min/1 73 square meters)  •  Stage 3A Moderate CKD (GFR = 45-59 mL/min/1 73 square meters)  •  Stage 3B Moderate CKD (GFR = 30-44 mL/min/1 73 square meters)  •  Stage 4 Severe CKD (GFR = 15-29 mL/min/1 73 square meters)  •  Stage 5 End Stage CKD (GFR <15 mL/min/1 73 square meters)  Note: GFR calculation is accurate only with a steady state creatinine    CBC and differential [[id number]]  (Abnormal) Collected: 04/14/23 2238    Lab Status: Final result Specimen: Blood from Arm, Left Updated: 04/14/23 2255     WBC 6 81 Thousand/uL      RBC 4 44 Million/uL      Hemoglobin 13 1 g/dL      Hematocrit 40 5 %      MCV 91 fL      MCH 29 5 pg      MCHC 32 3 g/dL      RDW 14 6 %      MPV 11 3 fL      Platelets 393 Thousands/uL      nRBC 0 /100 WBCs      Neutrophils Relative 75 %      Immat GRANS % 0 %      Lymphocytes Relative 12 %      Monocytes Relative 10 %      Eosinophils Relative 2 %      Basophils Relative 1 %      Neutrophils Absolute 5 10 Thousands/µL      Immature Grans Absolute 0 02 Thousand/uL      Lymphocytes Absolute 0 82 Thousands/µL      Monocytes Absolute 0 70 Thousand/µL      Eosinophils Absolute 0 12 Thousand/µL      Basophils Absolute 0 05 Thousands/µL                  XR chest 1 view portable    (Results Pending)   XR knee 4+ views left injury    (Results Pending)              Procedures  Procedures         ED Course  ED Course as of 04/15/23 0113   Fri Apr 14, 2023 2230 - er md note- pt is marked for merge- unable to pull up imaging from yesterday - unable to pull up labs/ imaging- previous = hb a1 c reviewed   2249 - er md note- pt did take all of her meds except nightime eliquis as per    2252 Cxr portable- compared to previous 2/13/23- no sign changes- cardiomegaly- no free/sq air- no  infiltrate- ptx- pulm edema- pleural effusions-    2345 ER MD NOTE- SLIM TIGER TEXT FOR ADMIT   Sat Apr 15, 2023   0002 ER MD NOTE- PT- RE-EVALUATED- SOUND ASLEEP-  HAS LEFT    0023 - er md note- slim asking  for er md to call sniff for possible  placement tonight-  [phone]- royce rosenberg called number - left message to call back -    0058 Left knee xray - compared to previous - 9/22- severe djd- osteophytes-  no fx seen    0100 -er md note- carmen to pull up lab/ urine from 4/13/23- cxr/ fast exam / ct of  head/c spine results all reviewed by er md                                             MDM    Disposition  Final diagnoses:   Ambulatory dysfunction   Hyponatremia   Hyperglycemia due to type 2 diabetes mellitus ([facility] [address] )   D[facility] ([facility] )     Time reflects when diagnosis was documented in both MDM as applicable and the Disposition within this note     Time User Action Codes Description Comment    4/15/2023 12:03 AM Yumi Sluder Add [R26 2] Ambulatory dysfunction     4/15/2023 12:03 AM Charna Mio D Add [E87 1] Hyponatremia     4/15/2023 12:03 AM Charna Mio D Add [E11 65] Hyperglycemia due to type 2 diabetes mellitus ([facility] [address] )     4/15/2023  1:05 AM Yumi Sluder Add [F03 90] Dementia [facility])       ED Disposition     None      Follow-up Information    None         Patient's Medications   Discharge Prescriptions    No medications on file       No discharge procedures on file      PDMP Review     None          ED Provider  Electronically Signed by           Anand Valente MD  04/15/23 5997

## 2023-04-15 NOTE — ED NOTES
Patient placed in [place] restraint belt due to at least 5 instances of attempting to stand from bed  Due to recent trauma experience and follow up visit, this RN found it reasonable to lightly restrict patient movement  Order acquired for belt        Liliana Waite RN  04/15/23 1943

## 2023-04-15 NOTE — ASSESSMENT & PLAN NOTE
· Elevated on arrival, improving without intervention   · PTA  · Toprol XL 50 mg qd  · Valsartan HCTZ 160-12 5 mg qd  · Substitute ARB    Hold HCTZ until we can evaluate oral intake

## 2023-04-15 NOTE — ASSESSMENT & PLAN NOTE
· Patient from independent living with    no longer able to care for her  ED attempted contacting Urgent SNF line but did not receive a call back  · Pain control  · Fall precaution  · PT OT  · 4/13 - discharged from ED / trauma service following fall while on Eliquis  No traumatic injuries noted  Was ambulatory prior to discharge

## 2023-04-15 NOTE — ASSESSMENT & PLAN NOTE
· Currently alert to self and place  Disoriented to time    Was at independent living with   · Now likely needing STR

## 2023-04-16 LAB
ATRIAL RATE: 288 BPM
BASOPHILS # BLD AUTO: 0.03 THOUSANDS/ÂΜL (ref 0–0.1)
BASOPHILS NFR BLD AUTO: 0 % (ref 0–1)
EOSINOPHIL # BLD AUTO: 0 THOUSAND/ÂΜL (ref 0–0.61)
EOSINOPHIL NFR BLD AUTO: 0 % (ref 0–6)
ERYTHROCYTE [DISTWIDTH] IN BLOOD BY AUTOMATED COUNT: 14.7 % (ref 11.6–15.1)
GLUCOSE SERPL-MCNC: 243 MG/DL (ref 65–140)
GLUCOSE SERPL-MCNC: 300 MG/DL (ref 65–140)
GLUCOSE SERPL-MCNC: 302 MG/DL (ref 65–140)
GLUCOSE SERPL-MCNC: 360 MG/DL (ref 65–140)
HCT VFR BLD AUTO: 40.7 % (ref 34.8–46.1)
HGB BLD-MCNC: 12.8 G/DL (ref 11.5–15.4)
IMM GRANULOCYTES # BLD AUTO: 0.07 THOUSAND/UL (ref 0–0.2)
IMM GRANULOCYTES NFR BLD AUTO: 1 % (ref 0–2)
LYMPHOCYTES # BLD AUTO: 0.4 THOUSANDS/ÂΜL (ref 0.6–4.47)
LYMPHOCYTES NFR BLD AUTO: 4 % (ref 14–44)
MCH RBC QN AUTO: 28.8 PG (ref 26.8–34.3)
MCHC RBC AUTO-ENTMCNC: 31.4 G/DL (ref 31.4–37.4)
MCV RBC AUTO: 92 FL (ref 82–98)
MONOCYTES # BLD AUTO: 1.18 THOUSAND/ÂΜL (ref 0.17–1.22)
MONOCYTES NFR BLD AUTO: 13 % (ref 4–12)
NEUTROPHILS # BLD AUTO: 7.76 THOUSANDS/ÂΜL (ref 1.85–7.62)
NEUTS SEG NFR BLD AUTO: 82 % (ref 43–75)
NRBC BLD AUTO-RTO: 0 /100 WBCS
PLATELET # BLD AUTO: 121 THOUSANDS/UL (ref 149–390)
PMV BLD AUTO: 11.2 FL (ref 8.9–12.7)
QRS AXIS: -68 DEGREES
QRSD INTERVAL: 72 MS
QT INTERVAL: 382 MS
QTC INTERVAL: 438 MS
RBC # BLD AUTO: 4.44 MILLION/UL (ref 3.81–5.12)
T WAVE AXIS: 8 DEGREES
VENTRICULAR RATE: 79 BPM
WBC # BLD AUTO: 9.44 THOUSAND/UL (ref 4.31–10.16)

## 2023-04-16 RX ORDER — INSULIN LISPRO 100 [IU]/ML
3 INJECTION, SOLUTION INTRAVENOUS; SUBCUTANEOUS
Status: DISCONTINUED | OUTPATIENT
Start: 2023-04-16 | End: 2023-04-17

## 2023-04-16 RX ORDER — DOCUSATE SODIUM 100 MG/1
100 CAPSULE, LIQUID FILLED ORAL 2 TIMES DAILY
Status: DISCONTINUED | OUTPATIENT
Start: 2023-04-16 | End: 2023-04-19 | Stop reason: HOSPADM

## 2023-04-16 RX ORDER — INSULIN GLARGINE 100 [IU]/ML
10 INJECTION, SOLUTION SUBCUTANEOUS
Status: DISCONTINUED | OUTPATIENT
Start: 2023-04-16 | End: 2023-04-17

## 2023-04-16 RX ADMIN — INSULIN LISPRO 3 UNITS: 100 INJECTION, SOLUTION INTRAVENOUS; SUBCUTANEOUS at 21:34

## 2023-04-16 RX ADMIN — ACETAMINOPHEN 975 MG: 325 TABLET ORAL at 05:32

## 2023-04-16 RX ADMIN — ACETAMINOPHEN 975 MG: 325 TABLET ORAL at 13:51

## 2023-04-16 RX ADMIN — MELATONIN 3 MG: at 21:26

## 2023-04-16 RX ADMIN — INSULIN LISPRO 4 UNITS: 100 INJECTION, SOLUTION INTRAVENOUS; SUBCUTANEOUS at 08:24

## 2023-04-16 RX ADMIN — INSULIN LISPRO 3 UNITS: 100 INJECTION, SOLUTION INTRAVENOUS; SUBCUTANEOUS at 11:44

## 2023-04-16 RX ADMIN — INSULIN LISPRO 3 UNITS: 100 INJECTION, SOLUTION INTRAVENOUS; SUBCUTANEOUS at 17:30

## 2023-04-16 RX ADMIN — INSULIN LISPRO 6 UNITS: 100 INJECTION, SOLUTION INTRAVENOUS; SUBCUTANEOUS at 17:30

## 2023-04-16 RX ADMIN — APIXABAN 5 MG: 5 TABLET, FILM COATED ORAL at 17:29

## 2023-04-16 RX ADMIN — METOPROLOL SUCCINATE 50 MG: 50 TABLET, EXTENDED RELEASE ORAL at 08:23

## 2023-04-16 RX ADMIN — LOSARTAN POTASSIUM 100 MG: 50 TABLET, FILM COATED ORAL at 08:23

## 2023-04-16 RX ADMIN — LIDOCAINE 5% 1 PATCH: 700 PATCH TOPICAL at 08:24

## 2023-04-16 RX ADMIN — DOCUSATE SODIUM 100 MG: 100 CAPSULE, LIQUID FILLED ORAL at 17:29

## 2023-04-16 RX ADMIN — ACETAMINOPHEN 975 MG: 325 TABLET ORAL at 21:26

## 2023-04-16 RX ADMIN — INSULIN GLARGINE 10 UNITS: 100 INJECTION, SOLUTION SUBCUTANEOUS at 21:35

## 2023-04-16 RX ADMIN — APIXABAN 5 MG: 5 TABLET, FILM COATED ORAL at 08:23

## 2023-04-16 RX ADMIN — PRAVASTATIN SODIUM 40 MG: 40 TABLET ORAL at 17:29

## 2023-04-16 RX ADMIN — EZETIMIBE 10 MG: 10 TABLET ORAL at 17:29

## 2023-04-16 NOTE — ASSESSMENT & PLAN NOTE
· Currently alert to self and place  Disoriented to time  Was at independent living with   · Now likely needing STR  · Waiting for PT and OT    Fortunately she is off restraint

## 2023-04-16 NOTE — PLAN OF CARE
Problem: SAFETY,RESTRAINT: NV/NON-SELF DESTRUCTIVE BEHAVIOR  Goal: Remains free of harm/injury (restraint for non violent/non self-detsructive behavior)  Description: INTERVENTIONS:  - Instruct patient/family regarding restraint use   - Assess and monitor physiologic and psychological status   - Provide interventions and comfort measures to meet assessed patient needs   - Identify and implement measures to help patient regain control  - Assess readiness for release of restraint   Outcome: Progressing  Goal: Returns to optimal restraint-free functioning  Description: INTERVENTIONS:  - Assess the patient's behavior and symptoms that indicate continued need for restraint  - Identify and implement measures to help patient regain control  - Assess readiness for release of restraint   Outcome: Progressing     Problem: Potential for Falls  Goal: Patient will remain free of falls  Description: INTERVENTIONS:  - Educate patient/family on patient safety including physical limitations  - Instruct patient to call for assistance with activity   - Consult OT/PT to assist with strengthening/mobility   - Keep Call bell within reach  - Keep bed low and locked with side rails adjusted as appropriate  - Keep care items and personal belongings within reach  - Initiate and maintain comfort rounds  - Make Fall Risk Sign visible to staff  - Offer Toileting every Hours, in advance of need  - Initiate/Maintainalarm  - Obtain necessary fall risk management equipment  - Apply yellow socks and bracelet for high fall risk patients  - Consider moving patient to room near nurses station  Outcome: Progressing     Problem: Prexisting or High Potential for Compromised Skin Integrity  Goal: Skin integrity is maintained or improved  Description: INTERVENTIONS:  - Identify patients at risk for skin breakdown  - Assess and monitor skin integrity  - Assess and monitor nutrition and hydration status  - Monitor labs   - Assess for incontinence   - Turn and reposition patient  - Assist with mobility/ambulation  - Relieve pressure over bony prominences  - Avoid friction and shearing  - Provide appropriate hygiene as needed including keeping skin clean and dry  - Evaluate need for skin moisturizer/barrier cream  - Collaborate with interdisciplinary team   - Patient/family teaching  - Consider wound care consult   Outcome: Progressing     Problem: MOBILITY - ADULT  Goal: Maintain or return to baseline ADL function  Description: INTERVENTIONS:  -  Assess patient's ability to carry out ADLs; assess patient's baseline for ADL function and identify physical deficits which impact ability to perform ADLs (bathing, care of mouth/teeth, toileting, grooming, dressing, etc )  - Assess/evaluate cause of self-care deficits   - Assess range of motion  - Assess patient's mobility; develop plan if impaired  - Assess patient's need for assistive devices and provide as appropriate  - Encourage maximum independence but intervene and supervise when necessary  - Involve family in performance of ADLs  - Assess for home care needs following discharge   - Consider OT consult to assist with ADL evaluation and planning for discharge  - Provide patient education as appropriate  Outcome: Progressing  Goal: Maintains/Returns to pre admission functional level  Description: INTERVENTIONS:  - Perform BMAT or MOVE assessment daily    - Set and communicate daily mobility goal to care team and patient/family/caregiver  - Collaborate with rehabilitation services on mobility goals if consulted  - Perform Range of Motion  times a day  - Reposition patient every  hours    - Dangle patient  times a day  - Stand patient  times a day  - Ambulate patient  times a day  - Out of bed to chair  times a day   - Out of bed for meals  times a day  - Out of bed for toileting  - Record patient progress and toleration of activity level   Outcome: Progressing

## 2023-04-16 NOTE — ASSESSMENT & PLAN NOTE
Lab Results   Component Value Date    HGBA1C 9 0 (H) 04/14/2023     · A1c came back at 9 up from 8 1 prior  She takes metformin at home only  · Consider increase metformin dose or add another agent upon discharge  · BG elevated    We will start low-dose Lantus and NovoLog Premeal while inpatient  · Accucheck, SSI

## 2023-04-16 NOTE — PROGRESS NOTES
[facility]  Progress Note  Name: Allan Martinez  MRN: [id number]  Unit/Bed#: S -01 I Date of Admission: 2023   Date of Service: 2023 I Hospital Day: 1    Assessment/Plan   * Dementia without behavioral disturbance [facility])  Assessment & Plan  · Currently alert to self and place  Disoriented to time  Was at independent living with   · Now likely needing STR  · Waiting for PT and OT  Fortunately she is off restraint    Uncontrolled type 2 diabetes mellitus with hyperglycemia  Assessment & Plan  Lab Results   Component Value Date    HGBA1C 9 0 (H) 2023     · A1c came back at 9 up from 8 1 prior  She takes metformin at home only  · Consider increase metformin dose or add another agent upon discharge  · BG elevated  We will start low-dose Lantus and NovoLog Premeal while inpatient  · Accucheck, SSI      History of atrial fibrillation  Assessment & Plan  · Maintained on eliquis, toprol XL    Compression fracture of L3 vertebra (HCC)  Assessment & Plan  · Compression fx following fall 3/26  · Following with orthopedic surgery as outpatient          VTE Pharmacologic Prophylaxis:   Pharmacologic: Apixaban (Eliquis)  Education and Discussions with Family / Patient: Spoke with patient's son  At bedside    Current Length of Stay: 1 day(s)    Current Patient Status: Inpatient   Certification Statement: The patient will continue to require additional inpatient hospital stay due to above/waiting for  PT OT    Discharge Plan: Waiting for PT/OT    Code Status: Level 1 - Full Code      Subjective:   Patient seen and examined  She appears calm and pleasant  Her waist belt restraint has been off since coming up from ED  family at bedside    Objective:     Vitals:   Temp (24hrs), Av 1 °F (37 3 °C), Min:98 6 °F (37 °C), Max:99 5 °F (37 5 °C)    Temp:  [98 6 °F (37 °C)-99 5 °F (37 5 °C)] 98 6 °F (37 °C)  BP: (122-160)/(79-91) 122/79  Body mass index is 32 54 kg/m²  Input and Output Summary (last 24 hours): Intake/Output Summary (Last 24 hours) at 4/16/2023 1457  Last data filed at 4/15/2023 2001  Gross per 24 hour   Intake --   Output 700 ml   Net -700 ml       Physical Exam:     Physical Exam  Constitutional:       General: She is not in acute distress  Appearance: She is not ill-appearing, toxic-appearing or diaphoretic  Eyes:      General:         Right eye: No discharge  Left eye: No discharge  Cardiovascular:      Rate and Rhythm: Normal rate  Rhythm irregular  Pulmonary:      Effort: Pulmonary effort is normal  No respiratory distress  Skin:     General: Skin is warm  Neurological:      Mental Status: Mental status is at baseline           Additional Data:     Labs:    Results from last 7 days   Lab Units 04/16/23  0538   WBC Thousand/uL 9 44   HEMOGLOBIN g/dL 12 8   HEMATOCRIT % 40 7   PLATELETS Thousands/uL 121*   NEUTROS PCT % 82*   LYMPHS PCT % 4*   MONOS PCT % 13*   EOS PCT % 0     Results from last 7 days   Lab Units 04/15/23  0958   POTASSIUM mmol/L 3 7   CHLORIDE mmol/L 97   CO2 mmol/L 27   BUN mg/dL 29*   CREATININE mg/dL 1 05   CALCIUM mg/dL 9 2     Results from last 7 days   Lab Units 04/10/23  1036   INR  1 00         Recent Cultures (last 7 days):           Last 24 Hours Medication List:   Current Facility-Administered Medications   Medication Dose Route Frequency Provider Last Rate   • acetaminophen  975 mg Oral Once JOÃO Mann     • acetaminophen  975 mg Oral [facility] JOÃO Mann     • apixaban  5 mg Oral BID JOÃO Mann     • ezetimibe  10 mg Oral Daily With JOÃO Murillo      And   • pravastatin  40 mg Oral Daily With JOÃO Murillo     • insulin glargine  10 Units Subcutaneous HS Fouzia Aparicio MD     • insulin lispro  1-5 Units Subcutaneous HS JOÃO Mann     • insulin lispro  1-6 Units Subcutaneous TID [facility] JOÃO Mann     • insulin lispro  3 Units Subcutaneous TID With Meals Katt Ulloa MD     • lidocaine  1 patch Topical Daily JOÃO Prieto     • losartan  100 mg Oral Daily JOÃO Prieto     • melatonin  3 mg Oral HS Katt Ulloa MD     • metoprolol  2 5 mg Intravenous Q8H PRN JOÃO Prieto     • metoprolol succinate  50 mg Oral Daily JOÃO Prieto     • oxyCODONE  5 mg Oral Q6H PRN JOÃO Prieto     • oxyCODONE  2 5 mg Oral Q6H PRN JOÃO Prieto     • polyethylene glycol  17 g Oral Daily PRN JOÃO Prieto          Today, Patient Was Seen By: Katt Ulloa MD    ** Please Note: Dictation voice to text software may have been used in the creation of this document   **

## 2023-04-17 ENCOUNTER — APPOINTMENT (INPATIENT)
Dept: CT IMAGING | Facility: HOSPITAL | Age: 88
End: 2023-04-17

## 2023-04-17 PROBLEM — E87.6 HYPOKALEMIA: Status: ACTIVE | Noted: 2023-04-17

## 2023-04-17 PROBLEM — N39.0 UTI (URINARY TRACT INFECTION): Status: RESOLVED | Noted: 2023-02-13 | Resolved: 2023-04-17

## 2023-04-17 PROBLEM — E11.65 TYPE 2 DIABETES MELLITUS WITH HYPERGLYCEMIA (HCC): Status: ACTIVE | Noted: 2023-04-17

## 2023-04-17 PROBLEM — R65.10 SIRS (SYSTEMIC INFLAMMATORY RESPONSE SYNDROME) (HCC): Status: ACTIVE | Noted: 2023-04-17

## 2023-04-17 LAB
ANION GAP SERPL CALCULATED.3IONS-SCNC: 7 MMOL/L (ref 4–13)
BACTERIA UR QL AUTO: ABNORMAL /HPF
BASOPHILS # BLD AUTO: 0.02 THOUSANDS/ÂΜL (ref 0–0.1)
BASOPHILS NFR BLD AUTO: 0 % (ref 0–1)
BILIRUB UR QL STRIP: NEGATIVE
BUN SERPL-MCNC: 39 MG/DL (ref 5–25)
CALCIUM SERPL-MCNC: 8.8 MG/DL (ref 8.4–10.2)
CHLORIDE SERPL-SCNC: 98 MMOL/L (ref 96–108)
CLARITY UR: CLEAR
CO2 SERPL-SCNC: 28 MMOL/L (ref 21–32)
COLOR UR: YELLOW
CREAT SERPL-MCNC: 0.97 MG/DL (ref 0.6–1.3)
EOSINOPHIL # BLD AUTO: 0.05 THOUSAND/ÂΜL (ref 0–0.61)
EOSINOPHIL NFR BLD AUTO: 1 % (ref 0–6)
ERYTHROCYTE [DISTWIDTH] IN BLOOD BY AUTOMATED COUNT: 14.6 % (ref 11.6–15.1)
FLUAV RNA RESP QL NAA+PROBE: NEGATIVE
FLUBV RNA RESP QL NAA+PROBE: NEGATIVE
GFR SERPL CREATININE-BSD FRML MDRD: 52 ML/MIN/1.73SQ M
GLUCOSE SERPL-MCNC: 263 MG/DL (ref 65–140)
GLUCOSE SERPL-MCNC: 267 MG/DL (ref 65–140)
GLUCOSE SERPL-MCNC: 313 MG/DL (ref 65–140)
GLUCOSE SERPL-MCNC: 325 MG/DL (ref 65–140)
GLUCOSE SERPL-MCNC: 347 MG/DL (ref 65–140)
GLUCOSE UR STRIP-MCNC: ABNORMAL MG/DL
HCT VFR BLD AUTO: 38.6 % (ref 34.8–46.1)
HGB BLD-MCNC: 12.5 G/DL (ref 11.5–15.4)
HGB UR QL STRIP.AUTO: NEGATIVE
IMM GRANULOCYTES # BLD AUTO: 0.03 THOUSAND/UL (ref 0–0.2)
IMM GRANULOCYTES NFR BLD AUTO: 0 % (ref 0–2)
KETONES UR STRIP-MCNC: NEGATIVE MG/DL
LEUKOCYTE ESTERASE UR QL STRIP: NEGATIVE
LYMPHOCYTES # BLD AUTO: 0.84 THOUSANDS/ÂΜL (ref 0.6–4.47)
LYMPHOCYTES NFR BLD AUTO: 12 % (ref 14–44)
MCH RBC QN AUTO: 28.9 PG (ref 26.8–34.3)
MCHC RBC AUTO-ENTMCNC: 32.4 G/DL (ref 31.4–37.4)
MCV RBC AUTO: 89 FL (ref 82–98)
MONOCYTES # BLD AUTO: 0.71 THOUSAND/ÂΜL (ref 0.17–1.22)
MONOCYTES NFR BLD AUTO: 10 % (ref 4–12)
NEUTROPHILS # BLD AUTO: 5.43 THOUSANDS/ÂΜL (ref 1.85–7.62)
NEUTS SEG NFR BLD AUTO: 77 % (ref 43–75)
NITRITE UR QL STRIP: NEGATIVE
NON-SQ EPI CELLS URNS QL MICRO: ABNORMAL /HPF
NRBC BLD AUTO-RTO: 0 /100 WBCS
PH UR STRIP.AUTO: 5.5 [PH]
PLATELET # BLD AUTO: 137 THOUSANDS/UL (ref 149–390)
PMV BLD AUTO: 11 FL (ref 8.9–12.7)
POTASSIUM SERPL-SCNC: 3.2 MMOL/L (ref 3.5–5.3)
PROT UR STRIP-MCNC: ABNORMAL MG/DL
RBC # BLD AUTO: 4.32 MILLION/UL (ref 3.81–5.12)
RBC #/AREA URNS AUTO: ABNORMAL /HPF
RSV RNA RESP QL NAA+PROBE: NEGATIVE
SARS-COV-2 RNA RESP QL NAA+PROBE: NEGATIVE
SODIUM SERPL-SCNC: 133 MMOL/L (ref 135–147)
SP GR UR STRIP.AUTO: 1.02 (ref 1–1.03)
UROBILINOGEN UR STRIP-ACNC: <2 MG/DL
WBC # BLD AUTO: 7.08 THOUSAND/UL (ref 4.31–10.16)
WBC #/AREA URNS AUTO: ABNORMAL /HPF

## 2023-04-17 RX ORDER — INSULIN LISPRO 100 [IU]/ML
5 INJECTION, SOLUTION INTRAVENOUS; SUBCUTANEOUS
Status: DISCONTINUED | OUTPATIENT
Start: 2023-04-17 | End: 2023-04-18

## 2023-04-17 RX ORDER — INSULIN GLARGINE 100 [IU]/ML
13 INJECTION, SOLUTION SUBCUTANEOUS
Status: DISCONTINUED | OUTPATIENT
Start: 2023-04-17 | End: 2023-04-18

## 2023-04-17 RX ORDER — POTASSIUM CHLORIDE 20 MEQ/1
40 TABLET, EXTENDED RELEASE ORAL ONCE
Status: COMPLETED | OUTPATIENT
Start: 2023-04-17 | End: 2023-04-17

## 2023-04-17 RX ADMIN — INSULIN LISPRO 5 UNITS: 100 INJECTION, SOLUTION INTRAVENOUS; SUBCUTANEOUS at 17:34

## 2023-04-17 RX ADMIN — LOSARTAN POTASSIUM 100 MG: 50 TABLET, FILM COATED ORAL at 08:53

## 2023-04-17 RX ADMIN — MELATONIN 3 MG: at 21:50

## 2023-04-17 RX ADMIN — ACETAMINOPHEN 975 MG: 325 TABLET ORAL at 04:52

## 2023-04-17 RX ADMIN — INSULIN LISPRO 5 UNITS: 100 INJECTION, SOLUTION INTRAVENOUS; SUBCUTANEOUS at 12:03

## 2023-04-17 RX ADMIN — INSULIN LISPRO 3 UNITS: 100 INJECTION, SOLUTION INTRAVENOUS; SUBCUTANEOUS at 21:51

## 2023-04-17 RX ADMIN — ACETAMINOPHEN 975 MG: 325 TABLET ORAL at 21:50

## 2023-04-17 RX ADMIN — APIXABAN 5 MG: 5 TABLET, FILM COATED ORAL at 17:33

## 2023-04-17 RX ADMIN — EZETIMIBE 10 MG: 10 TABLET ORAL at 17:33

## 2023-04-17 RX ADMIN — APIXABAN 5 MG: 5 TABLET, FILM COATED ORAL at 08:53

## 2023-04-17 RX ADMIN — INSULIN LISPRO 5 UNITS: 100 INJECTION, SOLUTION INTRAVENOUS; SUBCUTANEOUS at 10:58

## 2023-04-17 RX ADMIN — LIDOCAINE 5% 1 PATCH: 700 PATCH TOPICAL at 08:54

## 2023-04-17 RX ADMIN — INSULIN GLARGINE 13 UNITS: 100 INJECTION, SOLUTION SUBCUTANEOUS at 21:50

## 2023-04-17 RX ADMIN — INSULIN LISPRO 5 UNITS: 100 INJECTION, SOLUTION INTRAVENOUS; SUBCUTANEOUS at 17:33

## 2023-04-17 RX ADMIN — INSULIN LISPRO 5 UNITS: 100 INJECTION, SOLUTION INTRAVENOUS; SUBCUTANEOUS at 08:55

## 2023-04-17 RX ADMIN — DOCUSATE SODIUM 100 MG: 100 CAPSULE, LIQUID FILLED ORAL at 08:53

## 2023-04-17 RX ADMIN — INSULIN LISPRO 3 UNITS: 100 INJECTION, SOLUTION INTRAVENOUS; SUBCUTANEOUS at 08:54

## 2023-04-17 RX ADMIN — DOCUSATE SODIUM 100 MG: 100 CAPSULE, LIQUID FILLED ORAL at 17:33

## 2023-04-17 RX ADMIN — METOPROLOL SUCCINATE 50 MG: 50 TABLET, EXTENDED RELEASE ORAL at 08:53

## 2023-04-17 RX ADMIN — POTASSIUM CHLORIDE 40 MEQ: 1500 TABLET, EXTENDED RELEASE ORAL at 08:53

## 2023-04-17 RX ADMIN — ACETAMINOPHEN 975 MG: 325 TABLET ORAL at 15:15

## 2023-04-17 RX ADMIN — PRAVASTATIN SODIUM 40 MG: 40 TABLET ORAL at 17:33

## 2023-04-17 NOTE — PLAN OF CARE
Problem: SAFETY,RESTRAINT: NV/NON-SELF DESTRUCTIVE BEHAVIOR  Goal: Remains free of harm/injury (restraint for non violent/non self-detsructive behavior)  Description: INTERVENTIONS:  - Instruct patient/family regarding restraint use   - Assess and monitor physiologic and psychological status   - Provide interventions and comfort measures to meet assessed patient needs   - Identify and implement measures to help patient regain control  - Assess readiness for release of restraint   Outcome: Progressing  Goal: Returns to optimal restraint-free functioning  Description: INTERVENTIONS:  - Assess the patient's behavior and symptoms that indicate continued need for restraint  - Identify and implement measures to help patient regain control  - Assess readiness for release of restraint   Outcome: Progressing     Problem: Potential for Falls  Goal: Patient will remain free of falls  Description: INTERVENTIONS:  - Educate patient/family on patient safety including physical limitations  - Instruct patient to call for assistance with activity   - Consult OT/PT to assist with strengthening/mobility   - Keep Call bell within reach  - Keep bed low and locked with side rails adjusted as appropriate  - Keep care items and personal belongings within reach  - Initiate and maintain comfort rounds  - Make Fall Risk Sign visible to staff  - Offer Toileting every 2 Hours, in advance of need  - Initiate/Maintain alarm  - Obtain necessary fall risk management equipment:   - Apply yellow socks and bracelet for high fall risk patients  - Consider moving patient to room near nurses station  Outcome: Progressing     Problem: Prexisting or High Potential for Compromised Skin Integrity  Goal: Skin integrity is maintained or improved  Description: INTERVENTIONS:  - Identify patients at risk for skin breakdown  - Assess and monitor skin integrity  - Assess and monitor nutrition and hydration status  - Monitor labs   - Assess for incontinence   - Turn and reposition patient  - Assist with mobility/ambulation  - Relieve pressure over bony prominences  - Avoid friction and shearing  - Provide appropriate hygiene as needed including keeping skin clean and dry  - Evaluate need for skin moisturizer/barrier cream  - Collaborate with interdisciplinary team   - Patient/family teaching  - Consider wound care consult   Outcome: Progressing     Problem: MOBILITY - ADULT  Goal: Maintain or return to baseline ADL function  Description: INTERVENTIONS:  -  Assess patient's ability to carry out ADLs; assess patient's baseline for ADL function and identify physical deficits which impact ability to perform ADLs (bathing, care of mouth/teeth, toileting, grooming, dressing, etc )  - Assess/evaluate cause of self-care deficits   - Assess range of motion  - Assess patient's mobility; develop plan if impaired  - Assess patient's need for assistive devices and provide as appropriate  - Encourage maximum independence but intervene and supervise when necessary  - Involve family in performance of ADLs  - Assess for home care needs following discharge   - Consider OT consult to assist with ADL evaluation and planning for discharge  - Provide patient education as appropriate  Outcome: Progressing  Goal: Maintains/Returns to pre admission functional level  Description: INTERVENTIONS:  - Perform BMAT or MOVE assessment daily    - Set and communicate daily mobility goal to care team and patient/family/caregiver  - Collaborate with rehabilitation services on mobility goals if consulted  - Perform Range of Motion 2 times a day  - Reposition patient every 2 hours    - Dangle patient 2 times a day  - Stand patient 2 times a day  - Ambulate patient 2 times a day  - Out of bed to chair 2 times a day   - Out of bed for meals 2 times a day  - Out of bed for toileting  - Record patient progress and toleration of activity level   Outcome: Progressing

## 2023-04-17 NOTE — CASE MANAGEMENT
Case Management Discharge Planning Note    Patient name Roxana Hung S /S -87 MRN [id number]  : 1935 Date 2023       Current Admission Date: 2023  Current Admission Diagnosis:Generalized weakness   Patient Active Problem List    Diagnosis Date Noted   • SIRS (systemic inflammatory response syndrome) ([facility] ) 2023   • Hypokalemia 2023   • Type 2 diabetes mellitus with hyperglycemia ([address] ) 2023   • Generalized weakness 04/15/2023   • Depression, recurrent ([address] ) 2023   • Compression fracture of L3 vertebra ([facility] ) 2023   • History of atrial fibrillation 5770   • Metabolic encephalopathy    • Hypertensive urgency 2023   • UTI (urinary tract infection) 2023   • Hyponatremia 2023   • Primary osteoarthritis of left knee 2022   • Osteoarthritis of multiple joints 2022   • Bilateral hearing loss 2022   • 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[address] ) 03/15/2022   • Dementia without behavioral disturbance ([address] )    • Stage 3a chronic kidney disease ([facility] ) 2021   • Essential hypertension 2020   • Pure hypercholesterolemia 2020   • Microalbuminuria 2020      LOS (days): 2  Geometric Mean LOS (GMLOS) (days):   Days to GMLOS:     OBJECTIVE:  Risk of Unplanned Readmission Score: 12 87         Current admission status: Inpatient   Preferred Pharmacy:   [address]  Phone: [phone] Fax: [phone]    Primary Care Provider: Diana Copeland MD    Primary Insurance: MEDICARE  Secondary Insurance: [facility] HEALTH OPTIONS PROGRAM    DISCHARGE DETAILS:  CM updated patient's spouse Prashant Pod on the phone that [facility] is able to accept at discharge for inpatient rehab

## 2023-04-17 NOTE — PROGRESS NOTES
[facility]  Progress Note  Name: Unknown Pale  MRN: [id number]  Unit/Bed#: S -01 I Date of Admission: 4/14/2023   Date of Service: 4/17/2023 I Hospital Day: 2    Assessment/Plan   * Generalized weakness  Assessment & Plan  · Patient from independent living with    no longer able to care for her  ED attempted contacting Urgent SNF line was contacted but did not receive a call back  · Pain control  · Fall precaution  · PT OT--recommending short-term rehab at [facility]  · 4/13 - discharged from ED / trauma service following fall while on Eliquis  No traumatic injuries noted  Was ambulatory prior to discharge  Type 2 diabetes mellitus with hyperglycemia [facility])  Assessment & Plan  Lab Results   Component Value Date    HGBA1C 9 0 (H) 04/14/2023     Recent Labs     04/16/23  1553 04/16/23  2132 04/17/23  0730 04/17/23  1053   POCGLU 360* 300* 267* 347*     Blood Sugar Average: Last 72 hrs:  (P) 66 [id number]   · A1c reflects poor control and blood sugars here have remained quite elevated  · Basal bolus regimen plus sliding scale ordered    SIRS (systemic inflammatory response syndrome) ([facility])  Assessment & Plan  · Patient noted to have tachycardia, fever with no focal symptoms or source noted  No leukocytosis  · COVID test negative  · Urinalysis negative  · X-ray of LEFT leg showed severe degenerative changes with moderate joint effusion  · Significant right knee collection/swelling  Will obtain CT to further define and may need orthopedic involvement for tap    Dementia without behavioral disturbance ([facility] [address] )  Assessment & Plan  · At baseline, patient at [facility]    Hypokalemia  Assessment & Plan  · Potassium 3 2, will supplement and recheck in a m      History of atrial fibrillation  Assessment & Plan  · Maintained on eliquis, toprol XL    Compression fracture of L3 vertebra (HCC)  Assessment & Plan  · Compression fx following fall 3/26  · Following with orthopedic surgery as outpatient    Essential hypertension  Assessment & Plan  · Elevated on arrival, improving  · Toprol XL 50 mg qd  · Valsartan HCTZ 160-12 5 mg qd  · Substitute ARB  Hold HCTZ          VTE Pharmacologic Prophylaxis: VTE Score: 4     Patient Centered Rounds: {Pt Centered Care Rounds:17291}  Discussions with Specialists or Other Care Team Provider: ***    Education and Discussions with Family / Patient: Updated  () at bedside  Total Time Spent on Date of Encounter in care of patient: 30 min This time was spent on one or more of the following: performing physical exam; counseling and coordination of care; obtaining or reviewing history; documenting in the medical record; reviewing/ordering tests, medications or procedures; communicating with other healthcare professionals and discussing with patient's family/caregivers  Current Length of Stay: 2 day(s)  Current Patient Status: Inpatient   Certification Statement: The patient will continue to require additional inpatient hospital stay due to Planned for discharge to rehab when accepted  Discharge Plan: Anticipate discharge tomorrow to rehab facility  Code Status: Level 1 - Full Code    Subjective:   ***    Objective:     Vitals:   Temp (24hrs), Av 4 °F (37 4 °C), Min:98 2 °F (36 8 °C), Max:100 8 °F (38 2 °C)    Temp:  [98 2 °F (36 8 °C)-100 8 °F (38 2 °C)] 98 8 °F (37 1 °C)  HR:  [76-91] 91  Resp:  [17-18] 18  BP: (114-137)/(64-94) 137/94  SpO2:  [94 %-98 %] 98 %  Body mass index is 32 54 kg/m²  Input and Output Summary (last 24 hours):      Intake/Output Summary (Last 24 hours) at 2023 1518  Last data filed at 2023 1014  Gross per 24 hour   Intake --   Output 500 ml   Net -500 ml       Physical Exam:   Physical Exam ***    Additional Data:     Labs:  Results from last 7 days   Lab Units 23  0447   WBC Thousand/uL 7 08   HEMOGLOBIN g/dL 12 5   HEMATOCRIT % 38 6   PLATELETS Thousands/uL 137* NEUTROS PCT % 77*   LYMPHS PCT % 12*   MONOS PCT % 10   EOS PCT % 1     Results from last 7 days   Lab Units 04/17/23  0447   SODIUM mmol/L 133*   POTASSIUM mmol/L 3 2*   CHLORIDE mmol/L 98   CO2 mmol/L 28   BUN mg/dL 39*   CREATININE mg/dL 0 97   ANION GAP mmol/L 7   CALCIUM mg/dL 8 8   GLUCOSE RANDOM mg/dL 263*         Results from last 7 days   Lab Units 04/17/23  1053 04/17/23  0730 04/16/23  2132 04/16/23  1553 04/16/23  1127 04/16/23  0749 04/15/23  2046 04/15/23  1717 04/15/23  1144 04/15/23  0702 04/15/23  0148   POC GLUCOSE mg/dl 347* 267* 300* 360* 243* 302* 398* 294* 398* 242* 309*     Results from last 7 days   Lab Units 04/14/23  2238   HEMOGLOBIN A1C % 9 0*           Lines/Drains:  Invasive Devices     Peripheral Intravenous Line  Duration           Peripheral IV 02/14/23 Left Antecubital 62 days                      Imaging: {Radiology Review:94999}    Recent Cultures (last 7 days):         Last 24 Hours Medication List:   Current Facility-Administered Medications   Medication Dose Route Frequency Provider Last Rate   • acetaminophen  975 mg Oral Once JOÃO Sheridan     • acetaminophen  975 mg Oral [facility] JOÃO Sheridan     • apixaban  5 mg Oral BID JOÃO Sheridan     • docusate sodium  100 mg Oral BID Shaquille Del Castillo MD     • ezetimibe  10 mg Oral Daily With JOÃO Packer      And   • pravastatin  40 mg Oral Daily With JOÃO Packer     • insulin glargine  13 Units Subcutaneous HS Ava Escobedo PA-C     • insulin lispro  1-5 Units Subcutaneous HS JOÃO Sheridan     • insulin lispro  1-6 Units Subcutaneous TID [facility] JOÃO Sheridan     • insulin lispro  5 Units Subcutaneous TID With Meals Ava Escobedo PA-C     • lidocaine  1 patch Topical Daily JOÃO Sheridan     • losartan  100 mg Oral Daily JOÃO Sheridan     • melatonin  3 mg Oral HS Shaquille Del Castillo MD     • metoprolol  2 5 mg Intravenous Q8H PRN JOÃO Sheridan     • metoprolol succinate  50 mg Oral Daily Luanne Johns JOÃO Sweeney     • oxyCODONE  5 mg Oral Q6H PRN JOÃO Leigh     • oxyCODONE  2 5 mg Oral Q6H PRN JOÃO Leigh     • polyethylene glycol  17 g Oral Daily PRN JOÃO Leigh          Today, Patient Was Seen By: Tahir Alonzo PA-C    **Please Note: This note may have been constructed using a voice recognition system  **

## 2023-04-17 NOTE — PHYSICAL THERAPY NOTE
PHYSICAL THERAPY EVALUATION NOTE    Patient Name: Get AUGUST'R Date: 4/17/2023  AGE:   80 y o  Mrn:   [id number]  ADMIT DX:  Hyponatremia [E87 1]  Weakness [R53 1]  Dementia ([facility] ) [F03 90]  Ambulatory dysfunction [R26 2]  Hyperglycemia due to type 2 diabetes mellitus ([facility] ) [E11 65]    Past Medical History:   Diagnosis Date    Allergy to sulfa drugs     Arthritis 2000    Closed fracture of third lumbar vertebra (HCC)     Closed wedge compression fracture of T12 vertebra (HCC)     Diabetes mellitus ([facility] ) 2000    H/O multiple allergies     Novocaine,Darvocet, Sulfa, Accupril    HL (hearing loss)     Hyperlipidemia     Hypertension     Memory loss 2020    Nocturia     Palpitations     Paroxysmal atrial fibrillation (HCC)     Thrombocytopenic disorder ([facility] )     Type 2 diabetes mellitus without complication ([facility] )     Wears glasses      Length Of Stay: 2  PHYSICAL THERAPY EVALUATION :   04/17/23 0810   PT Last Visit   PT Visit Date 04/17/23   Pain Assessment   Pain Assessment Tool 0-10   Pain Score No Pain   Restrictions/Precautions   Other Precautions Cognitive; Chair Alarm; Bed Alarm;Multiple lines; Fall Risk  ([place])   Home Living   Type of Home Other (Comment)  ([facility] independent living)   Additional Comments w/ spouse  ambulates w/ cane  owns walker and shower chair receives assist w/ ADLs as needed  + fall history (pt was unable to quantify in last 6 months)  General   Additional Pertinent History 4/17/23 at 7:30 glucose was 267   Family/Caregiver Present No   Cognition   Overall Cognitive Status Impaired   Arousal/Participation Cooperative   Orientation Level Oriented to person;Disoriented to place; Disoriented to time; Other (Comment)  (pt was identified w/ full name, birth date)   Following Commands Follows one step commands with increased time or repetition   Comments room air resting puse ox 98% and 108 BPM, active 96% and 114 BPM    Subjective   Subjective pt seen supine in bed  agreed to PT eval  denied pain or dizziness  input was needed for task focus  pt had limited carryover of reorientation information  RUE Assessment   RUE Assessment WFL   LUE Assessment   LUE Assessment WFL   RLE Assessment   RLE Assessment WFL  (3+/5)   LLE Assessment   LLE Assessment X  (hip 3/5, knee flexion 2+/5 extension 3-/5, ankle 3/5)   Vision-Basic Assessment   Current Vision Wears glasses all the time   Light Touch   RLE Light Touch Grossly intact   LLE Light Touch Grossly intact   Bed Mobility   Supine to Sit 3  Moderate assistance   Additional items Assist x 1;HOB elevated; Bedrails; Increased time required;Verbal cues;LE management  (for bedrail use)   Transfers   Sit to Stand 2  Maximal assistance   Additional items Assist x 1; Increased time required;Verbal cues  (for LE positioning)   Stand to Sit 3  Moderate assistance  (poorly controlled descent to bedside chair)   Additional items Assist x 1;Verbal cues  (for body positioning, safety)   Ambulation/Elevation   Gait pattern Narrow BHUMI; Decreased L stance; Foward flexed; Short stride   Gait Assistance 2  Maximal assist   Additional items Assist x 1;Verbal cues; Tactile cues  (for walker positioning)   Assistive Device Rolling walker   Distance 3 feet  (for walker positioning)   Balance   Static Sitting Fair +   Static Standing Poor  (w/ roller walker)   Ambulatory Poor -  (w/ roller walker)   Activity Tolerance   Activity Tolerance Patient limited by fatigue   Nurse Made Aware spoke to [address]   Assessment   Problem List Decreased strength;Decreased range of motion;Decreased endurance; Impaired balance;Decreased mobility; Decreased safety awareness;Decreased cognition   Assessment Pt presents with generalized weakness, chronic pain  Dx: dementia, uncontrolled DM, generalized weakness/chronic ambulatory dysfunction, L3 compression fx, and essential HTN   order placed for PT eval and tx, w/ activity order of out of bed to chair and fall precautions  pt presents w/ comorbidities of a-fib, HTN, CKD, DM, arthritis, L3 fx, hyperlipidemia, and hyperlipidemia and personal factors of advanced age, mobilizing w/ assistive device, decreased cognition and positive fall history  pt presents w/ weakness, decreased ROM, decreased endurance, impaired cognition, impaired balance, gait deviations, decreased safety awareness and fall risk  these impairments are evident in findings from physical examination (weakness and decreased ROM), mobility assessment (need for mod to max assist w/ all phases of mobility when usually mobilizing independently, tolerance to only 3 feet of ambulation and need for cueing for mobility technique), and Barthel Index: 40/100  pt needed input for task focus and mobility technique  pt is at risk for falls due to physical and safety awareness deficits  pt's clinical presentation is unstable/unpredictable (evident in poor blood sugar control, need for assist w/ all phases of mobility when usually mobilizing independently, tolerance to only 3 feet of ambulation and need for input for task focus and mobility technique)  pt needs inpatient PT tx to improve mobility deficits and progress mobility training as appropriate  discharge recommendation is for inpatient rehab to reduce fall risk and maximize level of functional independence  Goals   Patient Goals make meals for my family   STG Expiration Date 04/27/23   Short Term Goal #1 pt will:  Increase bilateral LE strength 1/2 grade to facilitate independent mobility, Perform bed mobility w/ supervision to increase level of independence, Perform all transfers w/ minx1 to improve independence, Ambulate 100 ft  with roller walker w/ minx1 w/o LOB to improve functional independence and facilitate eventual return to leisure activities like spending time w/ family, Increase all balance 1 grade to decrease risk for falls, Complete exercise program independently to increase strength and endurance, Tolerate 3 hr OOB to faciliate upright tolerance, Improve Barthel Index score to 60 or greater to facilitate independence and Complete Timed Up and Go or Comfortable Gait Speed to further assess mobility and monitor progress   PT Treatment Day 1   Plan   Treatment/Interventions Functional transfer training;LE strengthening/ROM; Therapeutic exercise; Endurance training;Cognitive reorientation;Patient/family training;Equipment eval/education; Bed mobility;Gait training   PT Frequency 3-5x/wk   Recommendation   PT Discharge Recommendation Post acute rehabilitation services   Additional Comments recommend roller walker use w/ mobility   AM-PAC Basic Mobility Inpatient   Turning in Flat Bed Without Bedrails 3   Lying on Back to Sitting on Edge of Flat Bed Without Bedrails 2   Moving Bed to Chair 2   Standing Up From Chair Using Arms 2   Walk in Room 2   Climb 3-5 Stairs With Railing 1   Basic Mobility Inpatient Raw Score 12   Basic Mobility Standardized Score 32 23   Highest Level Of Mobility   -[facility] Goal 4: Move to chair/commode   -HL Achieved 5: Stand (1 or more minutes)   Barthel Index   Feeding 10   Bathing 0   Grooming Score 0   Dressing Score 5   Bladder Score 5   Bowels Score 10   Toilet Use Score 5   Transfers (Bed/Chair) Score 5   Mobility (Level Surface) Score 0   Stairs Score 0   Barthel Index Score 40   Additional Treatment Session   Start Time 0810   End Time 0820   Treatment Assessment Pt agreed to participate in additional ambulation to address physical and mobility deficits noted during eval  Sit < - > stand transfer w/ modx1  Ambulated 5 feet x2 w/ roller walker w/ modx1 and chair follow  Additional ambulation was not possible due to fatigue  Therapist provided education to pt including walker management and transfer technique  Frequent input was needed for limited carryover to be noted  Pt shows improvement w/ increased activity tolerance   Further inpatient PT intervention is necessary to decrease fall risk and maximize functional independence  Equipment Use roller walker   Additional Treatment Day 1   End of Consult   Patient Position at End of Consult Bedside chair;Bed/Chair alarm activated; All needs within reach     The patient's AM-PAC Basic Mobility Inpatient Short Form Raw Score is 12  A Raw score of less than or equal to 16 suggests the patient may benefit from discharge to post-acute rehabilitation services  Please also refer to the recommendation of the Physical Therapist for safe discharge planning  Skilled PT recommended while in hospital and upon DC to progress pt toward treatment goals       Negrito Juárez, PT

## 2023-04-17 NOTE — PROGRESS NOTES
[facility]  Progress Note  Name: Adis Ramos  MRN: [id number]  Unit/Bed#: S -01 I Date of Admission: 4/14/2023   Date of Service: 4/17/2023 I Hospital Day: 2    Assessment/Plan   * Generalized weakness  Assessment & Plan  · Patient from independent living with    no longer able to care for her  ED attempted contacting Urgent SNF line was contacted but did not receive a call back  · Pain control  · Fall precaution  · PT OT--recommending short-term rehab at [facility]  · 4/13 - discharged from ED / trauma service following fall while on Eliquis  No traumatic injuries noted  Was ambulatory prior to discharge  Type 2 diabetes mellitus with hyperglycemia [facility])  Assessment & Plan  Lab Results   Component Value Date    HGBA1C 9 0 (H) 04/14/2023     Recent Labs     04/16/23  1553 04/16/23  2132 04/17/23  0730 04/17/23  1053   POCGLU 360* 300* 267* 347*     Blood Sugar Average: Last 72 hrs:  (P) 485 [id number]   · A1c reflects poor control and blood sugars here have remained quite elevated  · Basal bolus regimen plus sliding scale ordered    SIRS (systemic inflammatory response syndrome) ([facility])  Assessment & Plan  · Patient noted to have tachycardia, fever with no focal symptoms or source noted  No leukocytosis  · COVID test negative  · Urinalysis negative  · X-ray of LEFT leg showed severe degenerative changes with moderate joint effusion  · Significant right knee collection/swelling  Will obtain CT to further define and may need orthopedic involvement for tap    Dementia without behavioral disturbance ([facility] [address] )  Assessment & Plan  · At baseline, patient [facility]    Hypokalemia  Assessment & Plan  · Potassium 3 2, will supplement and recheck in a m      History of atrial fibrillation  Assessment & Plan  · Maintained on eliquis, toprol XL    Compression fracture of L3 vertebra (HCC)  Assessment & Plan  · Compression fx following fall 3/26  · Following with orthopedic surgery as outpatient    Essential hypertension  Assessment & Plan  · Elevated on arrival, improving  · Toprol XL 50 mg qd  · Valsartan HCTZ 160-12 5 mg qd  · Substitute ARB  Hold HCTZ          VTE Pharmacologic Prophylaxis: VTE Score: 4 eliquis    Patient Centered Rounds: Bedside nursing rounds performed  Discussions with Specialists or Other Care Team Provider: Radiology, case management    Education and Discussions with Family / Patient: Updated  () at bedside  Total Time Spent on Date of Encounter in care of patient: 30 min This time was spent on one or more of the following: performing physical exam; counseling and coordination of care; obtaining or reviewing history; documenting in the medical record; reviewing/ordering tests, medications or procedures; communicating with other healthcare professionals and discussing with patient's family/caregivers  Current Length of Stay: 2 day(s)  Current Patient Status: Inpatient   Certification Statement: The patient will continue to require additional inpatient hospital stay due to Work-up for SIRS criteria  Pending rehab  Discharge Plan: Anticipate discharge in 24-48 hrs to rehab facility  Code Status: Level 1 - Full Code    Subjective:   Patient with underlying dementia offers limited history  Denies any complaints of pain  Denies any sources of fever including abdominal pain, diarrhea, dysuria, cough, sore throat, nasal congestion    Objective:     Vitals:   Temp (24hrs), Av 4 °F (37 4 °C), Min:98 2 °F (36 8 °C), Max:100 8 °F (38 2 °C)    Temp:  [98 2 °F (36 8 °C)-100 8 °F (38 2 °C)] 98 8 °F (37 1 °C)  HR:  [76-91] 91  Resp:  [17-18] 18  BP: (114-137)/(64-94) 137/94  SpO2:  [94 %-98 %] 98 %  Body mass index is 32 54 kg/m²  Input and Output Summary (last 24 hours):      Intake/Output Summary (Last 24 hours) at 2023 1530  Last data filed at 2023 1014  Gross per 24 hour   Intake --   Output 500 ml   Net -500 ml       Physical Exam:   Physical Exam  Vitals reviewed  Constitutional:       General: She is not in acute distress  Appearance: Normal appearance  She is not ill-appearing, toxic-appearing or diaphoretic  HENT:      Nose: No congestion or rhinorrhea  Eyes:      General: No scleral icterus  Right eye: No discharge  Left eye: No discharge  Conjunctiva/sclera: Conjunctivae normal    Cardiovascular:      Rate and Rhythm: Normal rate and regular rhythm  Heart sounds: No murmur heard  Pulmonary:      Effort: No respiratory distress  Breath sounds: No stridor  No wheezing, rhonchi or rales  Abdominal:      General: There is no distension  Palpations: Abdomen is soft  Tenderness: There is no abdominal tenderness  There is no guarding  Musculoskeletal:      Comments: Large collection noted medial and extending posterior to patient's knee on the right   Skin:     General: Skin is warm and dry  Coloration: Skin is not jaundiced or pale  Findings: No bruising, erythema, lesion or rash  Neurological:      Mental Status: She is alert        Comments: Awake alert interactive calm cooperative   Psychiatric:         Mood and Affect: Mood normal           Additional Data:     Labs:  Results from last 7 days   Lab Units 04/17/23  0447   WBC Thousand/uL 7 08   HEMOGLOBIN g/dL 12 5   HEMATOCRIT % 38 6   PLATELETS Thousands/uL 137*   NEUTROS PCT % 77*   LYMPHS PCT % 12*   MONOS PCT % 10   EOS PCT % 1     Results from last 7 days   Lab Units 04/17/23  0447   SODIUM mmol/L 133*   POTASSIUM mmol/L 3 2*   CHLORIDE mmol/L 98   CO2 mmol/L 28   BUN mg/dL 39*   CREATININE mg/dL 0 97   ANION GAP mmol/L 7   CALCIUM mg/dL 8 8   GLUCOSE RANDOM mg/dL 263*         Results from last 7 days   Lab Units 04/17/23  1053 04/17/23  0730 04/16/23  2132 04/16/23  1553 04/16/23  1127 04/16/23  0749 04/15/23  2046 04/15/23  1717 04/15/23  1144 04/15/23  0702 04/15/23  0148   POC GLUCOSE mg/dl 347* 267* 300* 360* 243* 302* 398* 294* 398* 242* 309*     Results from last 7 days   Lab Units 04/14/23  2238   HEMOGLOBIN A1C % 9 0*           Lines/Drains:  Invasive Devices     Peripheral Intravenous Line  Duration           Peripheral IV 02/14/23 Left Antecubital 62 days              Imaging: X-ray left leg    Recent Cultures (last 7 days):         Last 24 Hours Medication List:   Current Facility-Administered Medications   Medication Dose Route Frequency Provider Last Rate   • acetaminophen  975 mg Oral Once JOÃO Gorman     • acetaminophen  975 mg Oral [facility] JOÃO Gorman     • apixaban  5 mg Oral BID JOÃO Gorman     • docusate sodium  100 mg Oral BID Ady Anne MD     • ezetimibe  10 mg Oral Daily With JOÃO Cobos      And   • pravastatin  40 mg Oral Daily With JOÃO Cobos     • insulin glargine  13 Units Subcutaneous HS Ava Escobedo PA-C     • insulin lispro  1-5 Units Subcutaneous HS JOÃO Gorman     • insulin lispro  1-6 Units Subcutaneous TID [facility] JOÃO Gorman     • insulin lispro  5 Units Subcutaneous TID With Meals Ava Escobedo PA-C     • lidocaine  1 patch Topical Daily JOÃO Gorman     • losartan  100 mg Oral Daily JOÃO Gorman     • melatonin  3 mg Oral HS Ady Anne MD     • metoprolol  2 5 mg Intravenous Q8H PRN JOÃO Gorman     • metoprolol succinate  50 mg Oral Daily JOÃO Gorman     • oxyCODONE  5 mg Oral Q6H PRN JOÃO Gorman     • oxyCODONE  2 5 mg Oral Q6H PRN JOÃO Gorman     • polyethylene glycol  17 g Oral Daily PRN JOÃO Gorman          Today, Patient Was Seen By: Judy Wooten PA-C    **Please Note: This note may have been constructed using a voice recognition system  **

## 2023-04-17 NOTE — CASE MANAGEMENT
Case Management Assessment & Discharge Planning Note    Patient name Marce Arellano  Location S /S -07 MRN [id number]  : 1935 Date 2023       Current Admission Date: 2023  Current Admission Diagnosis:Dementia without behavioral disturbance [facility])   Patient Active Problem List    Diagnosis Date Noted   • Generalized weakness 04/15/2023   • Depression, recurrent ([facility] [address] ) 2023   • Compression fracture of L3 vertebra ([facility] [address] ) 2023   • History of atrial fibrillation    • Metabolic encephalopathy    • Hypertensive urgency 2023   • UTI (urinary tract infection) 2023   • Hyponatremia 2023   • Primary osteoarthritis of left knee 2022   • Osteoarthritis of multiple joints 2022   • Bilateral hearing loss 2022   • 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[facility] [address] ) 03/15/2022   • Dementia without behavioral disturbance ([facility] [address] )    • Stage 3a chronic kidney disease ([facility] [address] ) 2021   • Uncontrolled type 2 diabetes mellitus with hyperglycemia 2020   • Essential hypertension 2020   • Pure hypercholesterolemia 2020   • Microalbuminuria 2020      LOS (days): 2  Geometric Mean LOS (GMLOS) (days):   Days to GMLOS:     OBJECTIVE:    Risk of Unplanned Readmission Score: 12 98         Current admission status: Inpatient       Preferred Pharmacy:   [place] [address]  Phone: [phone] Fax: [phone]    Primary Care Provider: Jovita Dominguez MD    Primary Insurance: MEDICARE  Secondary Insurance: [address] [place] [address] Avenue    ASSESSMENT:  [address], [address] - Spouse   Primary Phone: [phone] (Mobile)  Home Phone: [phone]               Advance Directives  Does patient have a [address] [place] [address] Avenue?: Yes  Does patient have Advance Directives?: Yes  Advance Directives: Power of  for health care  Primary Contact: Patient's spouse Ursula Price    Readmission Root Cause  30 Day Readmission: No    Patient Information  Admitted from[de-identified] Home  Mental Status: Alert  During Assessment patient was accompanied by: Spouse  Assessment information provided by[de-identified] Patient, Spouse  Primary Caregiver: Spouse  Caregiver's Name[de-identified] Felicia Garrett Relationship to Patient[de-identified] Family Member  Support Systems: Self, Spouse/significant other, Family members  [place] of Residence: [address] do you live in?: [facility] entry access options   Select all that apply : No steps to enter home  Type of Current Residence: Facility ([address])  Upon entering residence, is there a bedroom on the main floor (no further steps)?: Yes  Upon entering residence, is there a bathroom on the main floor (no further steps)?: Yes  In the last 12 months, was there a time when you were not able to pay the mortgage or rent on time?: No  In the last 12 months, was there a time when you did not have a steady place to sleep or slept in a shelter (including now)?: No  Homeless/housing insecurity resource given?: No  Living Arrangements: Lives w/ Spouse/significant other    Activities of Daily Living Prior to Admission  Functional Status: Independent  Completes ADLs independently?: Yes  Ambulates independently?: Yes  Does patient use assisted devices?: Yes  Assisted Devices (DME) used: Shyla Moore  Does patient currently own DME?: Yes  What DME does the patient currently own?: [employer] Hannaley  Does patient have a history of Outpatient Therapy (PT/OT)?: No  Does the patient have a history of Short-Term Rehab?: No  Does patient have a history of HHC?: Yes  Does patient currently have [facility]?: Yes    Current Home Health Care  Type of Current Home Care Services: Home OT, Home PT  [address][de-identified] [facility] Provider[de-identified] PCP    Patient Information Continued  Income Source: Pension/senior living  Does patient have prescription coverage?: Yes  Food insecurity resource given?: No  Does patient receive dialysis treatments?: No  Does patient have a history of substance abuse?: No  Does patient have a history of Mental Health Diagnosis?: No    PHQ 2/9 Screening   Reviewed PHQ 2/9 Depression Screening Score?: No    Means of Transportation  Means of Transport to Appts[de-identified] Family transport  In the past 12 months, has lack of transportation kept you from medical appointments or from getting medications?: No  In the past 12 months, has lack of transportation kept you from meetings, work, or from getting things needed for daily living?: No  Was application for public transport provided?: No    DISCHARGE DETAILS:    Discharge planning discussed with[de-identified] patient and spouse  Freedom of Choice: Yes     CM contacted family/caregiver?: Yes  Were Treatment Team discharge recommendations reviewed with patient/caregiver?: Yes  Did patient/caregiver verbalize understanding of patient care needs?: N/A- going to facility  Were patient/caregiver advised of the risks associated with not following Treatment Team discharge recommendations?: Yes    Contacts  Patient Contacts: Nahomy Morataya  Relationship to Patient[de-identified] Family  Contact Method: In Person  Reason/Outcome: Continuity of Care, Emergency Contact, Discharge [address]         Is the patient interested in [facility] at discharge?: No    DME Referral Provided  Referral made for DME?: No    Other Referral/Resources/Interventions Provided:  Interventions: Short Term Rehab  Referral Comments: Referral sent to [facility], [facility] for STR-pending    Treatment Team Recommendation: Short Term Rehab  Discharge Destination Plan[de-identified] Short Term Rehab     CM spoke with patient and spouse at the bedside  CM introduced self and role  Patient lives with her spouse at [address]  Per spouse, patient has recently been getting weaker and needs more PT/OT services   CM reviewed with spouse and patient per PT, the recommendation at discharge is inpatient rehab  CM discussed the difference between inpatient rehab setting and home services  CM answered all questions regarding insurance for inpatient rehab  Patient and spouse prefer [facility] for inpatient rehab at discharge  Spouse and patient updated CM will send referral to facility and f/u with response  Patient is Covid vaccinated x 2 and boosted x 2  CM sent referral via [facility] to [facility]  Waiting for response  CM reviewed discharge planning process including the following: identifying caregivers at home, preference for d/c planning needs,   availability of [facility] to [facility] program, availability of treatment team to discuss questions or concerns patient and/or family may have regarding diagnosis, plan of care, old or new medications and discharge planning   CM will continue to follow for care coordination and update assessment as appropriate

## 2023-04-17 NOTE — ASSESSMENT & PLAN NOTE
· Patient noted to have tachycardia, fever with no focal symptoms or source noted  No leukocytosis  · COVID test negative  · Urinalysis negative  · X-ray of LEFT leg showed severe degenerative changes with moderate joint effusion  · Significant right knee collection/swelling    Will obtain CT RIGHT leg to further define and may need orthopedic involvement for tap

## 2023-04-17 NOTE — ASSESSMENT & PLAN NOTE
· Patient from independent living with    no longer able to care for her  ED attempted contacting Urgent SNF line was contacted but did not receive a call back  · Pain control  · Fall precaution  · PT OT--recommending short-term rehab at [facility]  · 4/13 - discharged from ED / trauma service following fall while on Eliquis  No traumatic injuries noted  Was ambulatory prior to discharge

## 2023-04-17 NOTE — CASE MANAGEMENT
Case Management Discharge Planning Note    Patient name Roxana Hung S /S -56 MRN [id number]  : 1935 Date 2023       Current Admission Date: 2023  Current Admission Diagnosis:Generalized weakness   Patient Active Problem List    Diagnosis Date Noted   • SIRS (systemic inflammatory response syndrome) ([facility] [address] ) 2023   • Hypokalemia 2023   • Generalized weakness 04/15/2023   • Depression, recurrent ([facility] [address] ) 2023   • Compression fracture of L3 vertebra ([facility] [address] ) 2023   • History of atrial fibrillation    • Metabolic encephalopathy    • Hypertensive urgency 2023   • UTI (urinary tract infection) 2023   • Hyponatremia 2023   • Primary osteoarthritis of left knee 2022   • Osteoarthritis of multiple joints 2022   • Bilateral hearing loss 2022   • 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[facility] [address] ) 03/15/2022   • Dementia without behavioral disturbance ([facility] [address] )    • Stage 3a chronic kidney disease ([facility] [address] ) 2021   • Uncontrolled type 2 diabetes mellitus with hyperglycemia 2020   • Essential hypertension 2020   • Pure hypercholesterolemia 2020   • Microalbuminuria 2020      LOS (days): 2  Geometric Mean LOS (GMLOS) (days):   Days to GMLOS:     OBJECTIVE:  Risk of Unplanned Readmission Score: 12 87         Current admission status: Inpatient   Preferred Pharmacy:   [address] [place] [address]  Phone: [phone] Fax: [phone]    Primary Care Provider: Diana Copeland MD    Primary Insurance: MEDICARE  Secondary Insurance: [facility] HEALTH OPTIONS PROGRAM    DISCHARGE DETAILS:  [facility] is able to accept at discharge for inpatient rehab

## 2023-04-17 NOTE — PLAN OF CARE
Problem: PHYSICAL THERAPY ADULT  Goal: Performs mobility at highest level of function for planned discharge setting  See evaluation for individualized goals  Description: Treatment/Interventions: Functional transfer training, LE strengthening/ROM, Therapeutic exercise, Endurance training, Cognitive reorientation, Patient/family training, Equipment eval/education, Bed mobility, Gait training          See flowsheet documentation for full assessment, interventions and recommendations  Note:    Problem List: Decreased strength, Decreased range of motion, Decreased endurance, Impaired balance, Decreased mobility, Decreased safety awareness, Decreased cognition  Assessment: Pt presents with generalized weakness, chronic pain  Dx: dementia, uncontrolled DM, generalized weakness/chronic ambulatory dysfunction, L3 compression fx, and essential HTN  order placed for PT eval and tx, w/ activity order of out of bed to chair and fall precautions  pt presents w/ comorbidities of a-fib, HTN, CKD, DM, arthritis, L3 fx, hyperlipidemia, and hyperlipidemia and personal factors of advanced age, mobilizing w/ assistive device, decreased cognition and positive fall history  pt presents w/ weakness, decreased ROM, decreased endurance, impaired cognition, impaired balance, gait deviations, decreased safety awareness and fall risk  these impairments are evident in findings from physical examination (weakness and decreased ROM), mobility assessment (need for mod to max assist w/ all phases of mobility when usually mobilizing independently, tolerance to only 3 feet of ambulation and need for cueing for mobility technique), and Barthel Index: 40/100  pt needed input for task focus and mobility technique  pt is at risk for falls due to physical and safety awareness deficits   pt's clinical presentation is unstable/unpredictable (evident in poor blood sugar control, need for assist w/ all phases of mobility when usually mobilizing independently, tolerance to only 3 feet of ambulation and need for input for task focus and mobility technique)  pt needs inpatient PT tx to improve mobility deficits and progress mobility training as appropriate  discharge recommendation is for inpatient rehab to reduce fall risk and maximize level of functional independence  PT Discharge Recommendation: Post acute rehabilitation services    See flowsheet documentation for full assessment

## 2023-04-17 NOTE — ASSESSMENT & PLAN NOTE
Lab Results   Component Value Date    HGBA1C 9 0 (H) 04/14/2023     Recent Labs     04/16/23  1553 04/16/23  2132 04/17/23  0730 04/17/23  1053   POCGLU 360* 300* 267* 347*     Blood Sugar Average: Last 72 hrs:  (P) 333 [id number]   · A1c reflects poor control and blood sugars here have remained quite elevated  · Basal bolus regimen plus sliding scale ordered

## 2023-04-18 ENCOUNTER — APPOINTMENT (INPATIENT)
Dept: MRI IMAGING | Facility: HOSPITAL | Age: 88
End: 2023-04-18

## 2023-04-18 PROBLEM — I48.0 PAF (PAROXYSMAL ATRIAL FIBRILLATION) (HCC): Status: ACTIVE | Noted: 2023-03-03

## 2023-04-18 PROBLEM — R65.10 SIRS (SYSTEMIC INFLAMMATORY RESPONSE SYNDROME) (HCC): Status: RESOLVED | Noted: 2023-04-17 | Resolved: 2023-04-18

## 2023-04-18 PROBLEM — M25.569 KNEE PAIN: Status: ACTIVE | Noted: 2023-04-18

## 2023-04-18 LAB
ANION GAP SERPL CALCULATED.3IONS-SCNC: 9 MMOL/L (ref 4–13)
APPEARANCE FLD: ABNORMAL
BASOPHILS # BLD AUTO: 0.03 THOUSANDS/ÂΜL (ref 0–0.1)
BASOPHILS NFR BLD AUTO: 1 % (ref 0–1)
BUN SERPL-MCNC: 39 MG/DL (ref 5–25)
CALCIUM SERPL-MCNC: 9.1 MG/DL (ref 8.4–10.2)
CHLORIDE SERPL-SCNC: 96 MMOL/L (ref 96–108)
CO2 SERPL-SCNC: 26 MMOL/L (ref 21–32)
COLOR FLD: ABNORMAL
CREAT SERPL-MCNC: 1.06 MG/DL (ref 0.6–1.3)
CRYSTALS SNV QL MICRO: NORMAL
EOSINOPHIL # BLD AUTO: 0.1 THOUSAND/ÂΜL (ref 0–0.61)
EOSINOPHIL NFR BLD AUTO: 2 % (ref 0–6)
ERYTHROCYTE [DISTWIDTH] IN BLOOD BY AUTOMATED COUNT: 14.6 % (ref 11.6–15.1)
GFR SERPL CREATININE-BSD FRML MDRD: 47 ML/MIN/1.73SQ M
GLUCOSE SERPL-MCNC: 249 MG/DL (ref 65–140)
GLUCOSE SERPL-MCNC: 251 MG/DL (ref 65–140)
GLUCOSE SERPL-MCNC: 332 MG/DL (ref 65–140)
GLUCOSE SERPL-MCNC: 335 MG/DL (ref 65–140)
GLUCOSE SERPL-MCNC: 339 MG/DL (ref 65–140)
GRAM STN SPEC: NORMAL
HCT VFR BLD AUTO: 36.2 % (ref 34.8–46.1)
HGB BLD-MCNC: 11.7 G/DL (ref 11.5–15.4)
HISTIOCYTES NFR SNV MANUAL: 4 %
IMM GRANULOCYTES # BLD AUTO: 0.02 THOUSAND/UL (ref 0–0.2)
IMM GRANULOCYTES NFR BLD AUTO: 0 % (ref 0–2)
LYMPHOCYTES # BLD AUTO: 0.88 THOUSANDS/ÂΜL (ref 0.6–4.47)
LYMPHOCYTES NFR BLD AUTO: 14 % (ref 14–44)
MCH RBC QN AUTO: 28.9 PG (ref 26.8–34.3)
MCHC RBC AUTO-ENTMCNC: 32.3 G/DL (ref 31.4–37.4)
MCV RBC AUTO: 89 FL (ref 82–98)
MONOCYTES # BLD AUTO: 0.55 THOUSAND/ÂΜL (ref 0.17–1.22)
MONOCYTES NFR BLD AUTO: 9 % (ref 4–12)
MONOCYTES NFR SNV MANUAL: 5 %
NEUTROPHILS # BLD AUTO: 4.59 THOUSANDS/ÂΜL (ref 1.85–7.62)
NEUTROPHILS NFR SNV MANUAL: 91 %
NEUTS SEG NFR BLD AUTO: 74 % (ref 43–75)
NRBC BLD AUTO-RTO: 0 /100 WBCS
PLATELET # BLD AUTO: 151 THOUSANDS/UL (ref 149–390)
PMV BLD AUTO: 11 FL (ref 8.9–12.7)
POTASSIUM SERPL-SCNC: 4.1 MMOL/L (ref 3.5–5.3)
RBC # BLD AUTO: 4.05 MILLION/UL (ref 3.81–5.12)
RBC # SNV MANUAL: ABNORMAL /UL (ref 0–10)
SITE: ABNORMAL
SODIUM SERPL-SCNC: 131 MMOL/L (ref 135–147)
TOTAL CELLS COUNTED SPEC: 100
WBC # BLD AUTO: 6.17 THOUSAND/UL (ref 4.31–10.16)
WBC # FLD MANUAL: ABNORMAL /UL (ref 0–200)

## 2023-04-18 PROCEDURE — 0S9D3ZX DRAINAGE OF LEFT KNEE JOINT, PERCUTANEOUS APPROACH, DIAGNOSTIC: ICD-10-PCS | Performed by: ORTHOPAEDIC SURGERY

## 2023-04-18 RX ORDER — INSULIN LISPRO 100 [IU]/ML
7 INJECTION, SOLUTION INTRAVENOUS; SUBCUTANEOUS
Status: DISCONTINUED | OUTPATIENT
Start: 2023-04-18 | End: 2023-04-19 | Stop reason: HOSPADM

## 2023-04-18 RX ORDER — LIDOCAINE HYDROCHLORIDE 10 MG/ML
10 INJECTION, SOLUTION EPIDURAL; INFILTRATION; INTRACAUDAL; PERINEURAL ONCE
Status: COMPLETED | OUTPATIENT
Start: 2023-04-18 | End: 2023-04-18

## 2023-04-18 RX ORDER — LIDOCAINE 50 MG/G
2 PATCH TOPICAL DAILY
Status: DISCONTINUED | OUTPATIENT
Start: 2023-04-19 | End: 2023-04-19 | Stop reason: HOSPADM

## 2023-04-18 RX ORDER — INSULIN GLARGINE 100 [IU]/ML
16 INJECTION, SOLUTION SUBCUTANEOUS
Status: DISCONTINUED | OUTPATIENT
Start: 2023-04-18 | End: 2023-04-19 | Stop reason: HOSPADM

## 2023-04-18 RX ADMIN — APIXABAN 5 MG: 5 TABLET, FILM COATED ORAL at 17:42

## 2023-04-18 RX ADMIN — INSULIN LISPRO 7 UNITS: 100 INJECTION, SOLUTION INTRAVENOUS; SUBCUTANEOUS at 17:42

## 2023-04-18 RX ADMIN — INSULIN LISPRO 2 UNITS: 100 INJECTION, SOLUTION INTRAVENOUS; SUBCUTANEOUS at 21:10

## 2023-04-18 RX ADMIN — INSULIN LISPRO 5 UNITS: 100 INJECTION, SOLUTION INTRAVENOUS; SUBCUTANEOUS at 12:25

## 2023-04-18 RX ADMIN — DOCUSATE SODIUM 100 MG: 100 CAPSULE, LIQUID FILLED ORAL at 17:42

## 2023-04-18 RX ADMIN — MELATONIN 3 MG: at 21:10

## 2023-04-18 RX ADMIN — INSULIN LISPRO 3 UNITS: 100 INJECTION, SOLUTION INTRAVENOUS; SUBCUTANEOUS at 08:35

## 2023-04-18 RX ADMIN — INSULIN LISPRO 7 UNITS: 100 INJECTION, SOLUTION INTRAVENOUS; SUBCUTANEOUS at 12:25

## 2023-04-18 RX ADMIN — INSULIN LISPRO 5 UNITS: 100 INJECTION, SOLUTION INTRAVENOUS; SUBCUTANEOUS at 17:42

## 2023-04-18 RX ADMIN — APIXABAN 5 MG: 5 TABLET, FILM COATED ORAL at 08:34

## 2023-04-18 RX ADMIN — METOPROLOL SUCCINATE 50 MG: 50 TABLET, EXTENDED RELEASE ORAL at 08:34

## 2023-04-18 RX ADMIN — ACETAMINOPHEN 975 MG: 325 TABLET ORAL at 05:22

## 2023-04-18 RX ADMIN — INSULIN GLARGINE 16 UNITS: 100 INJECTION, SOLUTION SUBCUTANEOUS at 21:10

## 2023-04-18 RX ADMIN — ACETAMINOPHEN 975 MG: 325 TABLET ORAL at 21:10

## 2023-04-18 RX ADMIN — INSULIN LISPRO 5 UNITS: 100 INJECTION, SOLUTION INTRAVENOUS; SUBCUTANEOUS at 08:35

## 2023-04-18 RX ADMIN — LIDOCAINE HYDROCHLORIDE 10 ML: 10 INJECTION, SOLUTION EPIDURAL; INFILTRATION; INTRACAUDAL at 12:25

## 2023-04-18 RX ADMIN — PRAVASTATIN SODIUM 40 MG: 40 TABLET ORAL at 17:42

## 2023-04-18 RX ADMIN — EZETIMIBE 10 MG: 10 TABLET ORAL at 17:42

## 2023-04-18 RX ADMIN — LOSARTAN POTASSIUM 100 MG: 50 TABLET, FILM COATED ORAL at 08:34

## 2023-04-18 NOTE — ASSESSMENT & PLAN NOTE
Lab Results   Component Value Date    HGBA1C 9 0 (H) 04/14/2023     Recent Labs     04/17/23  1053 04/17/23  1620 04/17/23 2034 04/18/23  0720   POCGLU 347* 325* 313* 251*     Blood Sugar Average: Last 72 hrs:  (P) 310 [id number]   · A1c reflects poor control and blood sugars here have remained quite elevated  · Continue to titrate basal bolus regimen plus sliding scale

## 2023-04-18 NOTE — ASSESSMENT & PLAN NOTE
· Patient noted to have tachycardia, fever with no focal symptoms or source noted--now resolved without intervention    Suspect this could have been due to the fall  · No leukocytosis  · COVID test negative  · Urinalysis negative  · Chest x-ray negative

## 2023-04-18 NOTE — OCCUPATIONAL THERAPY NOTE
Occupational Therapy Evaluation     Patient Name: Princess Garvin  Today's Date: 4/18/2023  Problem List  Principal Problem:    Generalized weakness  Active Problems:    Essential hypertension    Dementia without behavioral disturbance (HCC)    PAF (paroxysmal atrial fibrillation) (HCC)    Compression fracture of L3 vertebra (HCC)    Hypokalemia    Type 2 diabetes mellitus with hyperglycemia (HCC)    Knee pain    Past Medical History  Past Medical History:   Diagnosis Date    Allergy to sulfa drugs     Arthritis 2000    Closed fracture of third lumbar vertebra (HCC)     Closed wedge compression fracture of T12 vertebra (HCC)     Diabetes mellitus ([address] ) 2000    H/O multiple allergies     Novocaine,Darvocet, Sulfa, Accupril    HL (hearing loss)     Hyperlipidemia     Hypertension     Memory loss 2020    Nocturia     Palpitations     Paroxysmal atrial fibrillation (HCC)     Thrombocytopenic disorder ([address] )     Type 2 diabetes mellitus without complication ([address] )     Wears glasses      Past Surgical History  Past Surgical History:   Procedure Laterality Date    HERNIA REPAIR      HYSTERECTOMY               04/18/23 0846   OT Last Visit   OT Visit Date 04/18/23   Note Type   Note type Evaluation   Pain Assessment   Pain Assessment Tool 0-10   Pain Score 8   Pain Location/Orientation Orientation: Left; Location: Knee  (0/10 at rest, 8/10 during WBing)   Pain Onset/Description Frequency: Intermittent; Descriptor: Sore   Effect of Pain on Daily Activities limits functional mobility, WBing tolerance   Hospital Pain Intervention(s) Repositioned; Ambulation/increased activity  (Ava HUDSON made aware)   Restrictions/Precautions   Weight Bearing Precautions Per Order No   Other Precautions Chair Alarm; Bed Alarm;Cognitive;Multiple lines; Fall Risk;Pain   Home Living   Type of Home Other (Comment)  ([facility] independent living)   Home Layout One level;Performs ADLs on one level; Able to live on main level with bedroom/bathroom; Access   [employer] Grab bars in shower; Shower chair; Toilet raiser  (toilet raiser available if needed )   [address] Walker;Cane  (rollator used, [facility] within the apartment vs furniture surfing)   Additional Comments sleeps in regular bed   Prior Function   Level of [place] Independent with ADLs; Independent with functional mobility; Needs assistance with IADLS   Lives With Spouse   Receives Help From Family   IADLs Family/Friend/Other provides transportation; Family/Friend/Other provides meals; Family/Friend/Other provides medication management  (facility provides 3 meals/day   manages meds + transportation)   Falls in the last 6 months 5 to 10  (6 falls per spouse  [de-identified] OOB vs tripping )   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  Spouse manages laundry  Pt typically ambulates down to dining serna for all meals with use of rollator and 1 rest break, spouse reports ~150 ft each way  Lifestyle   Autonomy At baseline, pt is (I) with ADLs, needs A with IADLs  Lives in [address] [place] [address] Road   Reciprocal Relationships Supportive spouse at bedside   Service to Others retired   Intrinsic Gratification enjoys going out in the community with spouse   General   Additional Pertinent History Pt admitted for generalized weakness, inability to get OOB  Multiple recent falls, c o B/L knee pain  imaging showing B/L moderate osteoarthritis and fluid effusion     Family/Caregiver Present Yes  (spouse)   Subjective   Subjective pt expressing that she feels very weak today   ADL   Where Assessed Chair   Eating Assistance 6  Modified independent   Eating Deficit   (eating with set up upon arrival)   Grooming Assistance 5  Supervision/Setup   UB Bathing Assistance 4  Minimal Assistance   LB Bathing Assistance 3  Moderate Assistance   [address] Assistance 3  Moderate Assistance   Toileting Assistance  3  Moderate Assistance   Toileting Deficit Verbal cueing; Increased time to complete;Supervison/safety;Setup  (incontinent of urine upon arrival  total A for pericare/posterior care in stance)   Functional Assistance 3  Moderate Assistance   Additional Comments Unable to formally assess bathing, dressing at time of eval  The above levels of assistance are anticipated based on functional performance deficits with use of clinical judgement  Bed Mobility   Supine to Sit 4  Minimal assistance   Additional items Assist x 1;HOB elevated; Increased time required;Verbal cues;LE management  (significant increase time and effort)   Sit to Supine   (pt seated OOB in recliner at end of session)   Additional Comments Sat EOB with supervision, fair+ static sitting balance  no lightheaded/dizziness   Transfers   Sit to Stand 3  Moderate assistance   Additional items Assist x 1; Increased time required;Verbal cues  (hand / foot placements)   Stand to Sit 3  Moderate assistance   Additional items Assist x 1; Increased time required;Verbal cues;Armrests  (cues for hand placements)   Additional Comments Rw used during transfers, cues for proper hand placements, increased time  stood EOB ~1 min with Min A + BUE on RW and cueing for postural corrections for completion of pericare by 2nd staff member   Functional Mobility   Functional Mobility 3  Moderate assistance  (variable min - mod A)   Additional Comments short distance ambulation from EOB  Significantly increased time  required, unable to mobilize further, recliner brought behind pt d/t fatigue   C/o 8/10 L knee pain   Additional items Rolling walker   Balance   Static Sitting Fair +   Dynamic Sitting Fair   Static Standing Fair -   Dynamic Standing Poor +  (c Rw)   Activity Tolerance   Activity Tolerance Patient limited by fatigue;Patient limited by pain   Medical Staff Made Aware Ava PATEL AP, CM   Nurse Made Aware RN pre/post session   RUE Assessment   RUE Assessment WFL  (Full AROM 4/5 MMT throughout)   LUE Assessment   LUE Assessment WFL  (Full AROM 4/5 MMT throughout)   Hand Function   Gross Motor Coordination Functional   Fine Motor Coordination Functional   Sensation   Light Touch No apparent deficits   Vision-Basic Assessment   Current Vision Wears glasses all the time  (bifocals)   Cognition   Overall Cognitive Status Impaired  (pt appears to be at baseline level  Dx dementia at baseline)   Arousal/Participation Alert; Cooperative   Attention Attends with cues to redirect   Orientation Level Oriented to person;Oriented to situation;Disoriented to place  (able to report month with cueing, unable to recall day of week/year  Unable to identify hospital setting)   Memory Decreased recall of precautions;Decreased recall of recent events;Decreased short term memory   Following Commands Follows one step commands with increased time or repetition   Comments Pt agreeable to OT session  Pleasant  Intermittent difficulty recalling information, increased processing time  Assessment   Limitation Decreased UE strength;Decreased Safe judgement during ADL;Decreased endurance;Decreased self-care trans;Decreased high-level ADLs; Decreased ADL status; Decreased cognition   Prognosis Fair   Assessment Patient is a 80 y o  female seen for OT evaluation at [facility] following admission on 4/14/2023  s/p Generalized weakness  Please see above for comprehensive list of comorbidities and significant PMHx impacting functional performance  Patient presents with active orders for OT eval and treat and OOB to chair  At baseline, pt is mod (I) with SPC vs rollator, (I) with ADLS  Upon initial evaluation, patient requires Min A for UB ADLs, Mod A   for LB ADLs, and Mod A   for transfers and functional mobility short distance  with RW   Based on functional eval, pt appears to be at/ near baseline status;  presents with impaired  safety awareness, impaired  problem solving skills, and impaired   memory  Occupational performance is affected by the following deficits: endurance ,  decreased muscular strength , decreased standing tolerance for self care tasks , decreased dynamic balance impacting functional reach, decreased trunk control , decreased activity tolerance  and (+) pain   Patient would benefit from OT services within the acute care setting to maximize level of functional independence in the following areas fall prevention , UB ADLs, LB ADLs, self-care transfers, bed mobility  and functional mobility  Personal factors impacting D/C include: (+) Hx of falls , decreased caregiver status , decreased ADL independence , High fall risk  and decreased recall of precautions   From OT standpoint, recommendation at time of D/C would be post-acute rehabilitation   Goals   Patient Goals pt and spouse reporting goal to be able to ambulate to / from dining serna with rollator again for meals and to be able to go into the community for leisure activities together   Plan   Treatment Interventions ADL retraining;Functional transfer training;UE strengthening/ROM; Cognitive reorientation;Patient/family training;Equipment evaluation/education; Compensatory technique education; Endurance training   Goal Expiration Date 04/28/23   OT Treatment Day 0   OT Frequency 3-5x/wk   Recommendation   OT Discharge Recommendation Post acute rehabilitation services  (pt and spouse aware of rec and agreeable)   Additional Comments  The patient's raw score on the AM-PAC Daily Activity Inpatient Short Form is 16  A raw score of less than 19 suggests the patient may benefit from discharge to post-acute rehabilitation services  Please refer to the recommendation of the Occupational Therapist for safe discharge planning     AM-PAC Daily Activity Inpatient   Lower Body Dressing 2   Bathing 2   Toileting 2   Upper Body Dressing 3   Grooming 3   Eating 4   Daily Activity Raw Score 16 Daily Activity Standardized Score (Calc for Raw Score >=11) 35 96   AM-PAC Applied Cognition Inpatient   Following a Speech/Presentation 2   Understanding Ordinary Conversation 3   Taking Medications 2   Remembering Where Things Are Placed or Put Away 2   Remembering List of 4-5 Errands 2   Taking Care of Complicated Tasks 2   Applied Cognition Raw Score 13   Applied Cognition Standardized Score 30 46   End of Consult   Education Provided Yes;Family or social support of family present for education by provider  (Spouse)   Patient Position at End of Consult All needs within reach; Bedside chair  (PCA present)   Nurse Communication Nurse aware of consult   Pt will complete UB ADLs Supervision  for increased ADL independence within 10 days  Pt will complete LB ADLs Min A  with use of LHAE as needed for increased ADL independence within 10 days  Pt will complete toileting Min A  with use of DME for increased ADL independence within 10 days  Pt will demonstrate proper body mechanics to complete self-care transfers and functional mobility with Supervision and use of LRAD for increased safety and functional independence within 10 days  Pt will demonstrate standing tolerance of 5 min with Supervision and use of AD PRN for increased activity tolerance during ADL/IADL tasks within 10 days  Pt will demonstrate proper body mechanics and fall prevention strategies during 100% of tx sessions for increased safety awareness during ADL/IADLs    Pt will demonstrate OOB sitting tolerance of 2-4 hr/day for increased activity tolerance and engagement in self care tasks within 10 days     Biju Renner

## 2023-04-18 NOTE — CONSULTS
Orthopedics   Charlette Parham 80 y o  female MRN: [id number]  Unit/Bed#: AN CT SCAN      Chief Complaint:   bilateral knee pain    HPI:   80 y  o female  ambulator complaining of bilateral knee pain  History is limited due to patient's dementia, per internal medicine there was a fall earlier this week  Currently the patient is complaining of pain worse in the left than the right knee  She is unable to recall any fall or any treatment she has receive in the past from any orthopedic doctor  Per the chart she has seen Dr Xi Major in the past and received injections in the left knee   She is on eliquis    Review Of Systems:   · Skin: erythema left superior knee, small abrasion right knee  · Neuro: See HPI  · Musculoskeletal: See HPI  · 14 point review of systems unobtainable due to pts condition    Past Medical History:   Past Medical History:   Diagnosis Date   • Allergy to sulfa drugs    • Arthritis 2000   • Closed fracture of third lumbar vertebra ([address] )    • Closed wedge compression fracture of T12 vertebra ([address] )    • Diabetes mellitus ([address] ) 2000   • H/O multiple allergies     Novocaine,Darvocet, Sulfa, Accupril   • HL (hearing loss)    • Hyperlipidemia    • Hypertension    • Memory loss 2020   • Nocturia    • Palpitations    • Paroxysmal atrial fibrillation (HCC)    • Thrombocytopenic disorder (HCC)    • Type 2 diabetes mellitus without complication (HCC)    • Wears glasses        Past Surgical History:   Past Surgical History:   Procedure Laterality Date   • HERNIA REPAIR     • HYSTERECTOMY         Family History:  Family history reviewed and non-contributory  Family History   Problem Relation Age of Onset   • Hypertension Mother    • Diabetes Mother    • Other Mother    • Hypertension Father    • Heart disease Father        Social History:  Social History     Socioeconomic History   • Marital status: /Civil Union     Spouse name: None   • Number of children: None   • Years of education: None   • Highest education level: None   Occupational History   • None   Tobacco Use   • Smoking status: Never     Passive exposure: Never   • Smokeless tobacco: Never   Vaping Use   • Vaping Use: Never used   Substance and Sexual Activity   • Alcohol use: Not Currently     Alcohol/week: 1 0 standard drink     Types: 1 Glasses of wine per week     Comment: on holidays add an extra glass of wine   • Drug use: Never   • Sexual activity: Not Currently     Partners: Male     Birth control/protection: None   Other Topics Concern   • None   Social History Narrative    Tobacco smoking status:   Never smoker      Smoking - how much:   None      Never used smokeless tobacco  Former smokeless tobacco user  Current snuff user  Currently chews tobacco  Currently uses moist powdered tobacco  ----  Not indicated  Not tolerated  Patient refused       Never used electronic cigarettes  Former user of electronic cigarettes  Current user of electronic cigarettes       Occupation:   retired teacher      Marital status:         Exercise level:   Occasional      Diet:   Regular       Alcohol intake:   Occasional      Caffeine intake:   Occasional      Seat belts used routinely:   Yes      Sunscreen used routinely:   Yes       Smoke alarm in home:   Yes      Advance directive:   No      Live alone or with others:   with others Last modified by [id number]   07-, 03:29      Social Determinants of Health     Financial Resource Strain: Not on file   Food Insecurity: No Food Insecurity   • Worried About Running Out of Food in the Last Year: Never true   • Ran Out of Food in the Last Year: Never true   Transportation Needs: No Transportation Needs   • Lack of Transportation (Medical): No   • Lack of Transportation (Non-Medical): No   Physical Activity: Not on file   Stress: Not on file   Social Connections: Not on file   Intimate Partner Violence: Not on file   Housing Stability: Low Risk    • Unable to Pay for Housing in the Last Year: No   • Number of Places Lived in the Last Year: 1   • Unstable Housing in the Last Year: No       Allergies:    Allergies   Allergen Reactions   • Accupril [Quinapril Hcl]    • Novocain [Procaine]    • Parabens    • Sulfa Antibiotics            Labs:  0   Lab Value Date/Time    HCT 36 2 04/18/2023 0522    HCT 38 6 04/17/2023 0447    HCT 40 7 04/16/2023 0538    HGB 11 7 04/18/2023 0522    HGB 12 5 04/17/2023 0447    HGB 12 8 04/16/2023 0538    INR 1 00 04/10/2023 1036    WBC 6 17 04/18/2023 0522    WBC 7 08 04/17/2023 0447    WBC 9 44 04/16/2023 0538       Meds:    Current Facility-Administered Medications:   •  acetaminophen (TYLENOL) tablet 975 mg, 975 mg, Oral, Once, JOÃO Dominguez  •  acetaminophen (TYLENOL) tablet 975 mg, 975 mg, Oral, Q8H [facility], JOÃO Dominguez, 975 mg at 04/18/23 0522  •  apixaban (ELIQUIS) tablet 5 mg, 5 mg, Oral, BID, JOÃO Dominguez, 5 mg at 04/18/23 6420  •  docusate sodium (COLACE) capsule 100 mg, 100 mg, Oral, BID, Alek Chen MD, 100 mg at 04/17/23 6403  •  ezetimibe (ZETIA) tablet 10 mg, 10 mg, Oral, Daily With Dinner, 10 mg at 04/17/23 2495 "**AND** pravastatin (PRAVACHOL) tablet 40 mg, 40 mg, Oral, Daily With Schuyler Crum, JOÃO, 40 mg at 04/17/23 1733  •  insulin glargine (LANTUS) subcutaneous injection 16 Units 0 16 mL, 16 Units, Subcutaneous, HS, Ava Escobedo PA-C  •  insulin lispro (HumaLOG) 100 units/mL subcutaneous injection 1-5 Units, 1-5 Units, Subcutaneous, HS, JOÃO Gonzalez, 3 Units at 04/17/23 2151  •  insulin lispro (HumaLOG) 100 units/mL subcutaneous injection 1-6 Units, 1-6 Units, Subcutaneous, TID AC, 3 Units at 04/18/23 0835 **AND** Fingerstick Glucose (POCT), , , TID AC, JOÃO Gonzalez  •  insulin lispro (HumaLOG) 100 units/mL subcutaneous injection 7 Units, 7 Units, Subcutaneous, TID With Meals, Ava Escobedo PA-C  •  [START ON 4/19/2023] lidocaine (LIDODERM) 5 % patch 2 patch, 2 patch, Topical, Daily, Ava Escobedo PA-C  •  lidocaine (PF) (XYLOCAINE-MPF) 1 % injection 10 mL, 10 mL, Infiltration, Once, Marah RicorALAN  •  losartan (COZAAR) tablet 100 mg, 100 mg, Oral, Daily, Jarvis Mcdowell, CRNP, 100 mg at 04/18/23 3444  •  melatonin tablet 3 mg, 3 mg, Oral, HS, Dionte Diaz MD, 3 mg at 04/17/23 2150  •  metoprolol (LOPRESSOR) injection 2 5 mg, 2 5 mg, Intravenous, Q8H PRN, Jarvis Plum, CRNP  •  metoprolol succinate (TOPROL-XL) 24 hr tablet 50 mg, 50 mg, Oral, Daily, Jarvis Plum, CRNP, 50 mg at 04/18/23 3076  •  oxyCODONE (ROXICODONE) IR tablet 5 mg, 5 mg, Oral, Q6H PRN, Jarvis Plum, CRNP  •  oxyCODONE (ROXICODONE) split tablet 2 5 mg, 2 5 mg, Oral, Q6H PRN, Jarvis Plum, CRNP  •  polyethylene glycol (MIRALAX) packet 17 g, 17 g, Oral, Daily PRN, Jarvis Plum, CRNP    Blood Culture:   Lab Results   Component Value Date    BLOODCX No Growth After 5 Days  02/13/2023       Wound Culture:   No results found for: WOUNDCULT    Ins and Outs:  I/O last 24 hours:   In: 240 [P O :240]  Out: 200 [Urine:200]          Physical Exam:   /77   Pulse 95   Temp 98 6 °F (37 °C)   Resp 18   Ht 5' 2\" (1 575 m)   Wt 80 7 kg (177 lb " 14 6 oz)   SpO2 94%   BMI 32 54 kg/m²   Gen: No acute distress, resting comfortably in bed  HEENT: Eyes clear, moist mucus membranes, hearing intact  Respiratory: No audible wheezing or stridor  Cardiovascular: Well Perfused peripherally, 2+ distal pulse  Abdomen: nondistended, no peritoneal signs  Musculoskeletal: bilateral lower extremity  · Skin intact over bilateral knees except small abrasion over right anterior knee  · Tender to palpation over Medial, lateral and superior knee on the right and medial joint line on left  · Small palpable likely defect in the mid to lateral quad tendon in area of mild warmth and erythema but exam limited by edema and swelling  · No effusion on right knee, moderate to large effusion left knee  · AROM to 90  degrees flexion and 0 degrees extension   · Passive ROM to 120 degrees flexion and 0 degrees extension without pain  · Can perform straight leg raise on right but not on left, on left she can extend the knee in a chair from flexed position however  · Stable to varus/valgus stress, negative lachmans, posterior draw, Sensation intact L3-S1  · 5/5 strength to hip flexion/extension, knee flexion/extension, ankle dorsi/plantar flexion, EHL/FHL  · 2+ DP  · Musculature is soft and compressible, no pain with passive stretch  · Leg lengths grossly symmetric    Radiology:   I personally reviewed the films  X-rays AP/Lateral and oblique views left knee shows joint effusion, moderate to severe OA, no fracture  CT right knee shows moderate to severe OA no fracture no joint effusion    _*_*_*_*_*_*_*_*_*_*_*_*_*_*_*_*_*_*_*_*_*_*_*_*_*_*_*_*_*_*_*_*_*_*_*_*_*_*_*_*_*    Procedure- Orthopedics   Aster Mcnally 80 y o  female MRN: [id number]  Unit/Bed#: AN CT SCAN    Procedure: left knee aspiration    After sterile preparation of the skin overlying the knee local anesthetic was provided with 8cc of 1% lidocaine    An 18 gauge needle was then  inserted via a superior lateral portal   Approx 70cc of dark bloody fluid was aspirated and sent for gram stain, culture, synovial WBC/RBC, and crystals  Sterile dressing was then applied  Pt tolerated the procedure well and was neurovascularly intact both pre and post procedure  Judith Knox PA-C      Assessment:  80 y o  female with bilateral knee osteoarthritis, left knee hemarthrosis inability to perform SLR     Plan:   · Weight bearing as tolerated  left lower extremity  · NWB RLE  · PT  · Pain control  · Due to hemarthrosis, inability to perform SLR and no fracture or other explaining factor seen on XR, MRI of L knee will be obtained to rule out partial quad tear vs occult fracture or other internal derangement  · Follow up on synovial fluid labs  · NPO p MN in case any surgical intervention indicated based off MRI  · No suspicion for septic arthritis at this time given exam without pain through near full ROM, WBC and vitals normal, and finding of hemarthrosis  · Body mass index is 32 54 kg/m²  mildly obese  Recommend behavior modifications, nutrition and physical activity    · Dispo: Ortho will follow    Judith Knox PA-C

## 2023-04-18 NOTE — ASSESSMENT & PLAN NOTE
· Compression fx following fall 3/26  · Following with surgery as an outpatient  · Currently reporting no back pain therefore doubt adding LSO brace would be of any benefit

## 2023-04-18 NOTE — ASSESSMENT & PLAN NOTE
· At baseline, patient at care home    Has remained calm and cooperative here but requires higher level of care at this time

## 2023-04-18 NOTE — ASSESSMENT & PLAN NOTE
· Patient from independent living with      was no longer able to care for her in that environment following her recent discharge from this facility after a fall  · Pain control measures and fall precaution  · PT OT--recommending short-term rehab at [facility]

## 2023-04-18 NOTE — PROGRESS NOTES
[facility]  Progress Note  Name: Johanne Corcoran  MRN: [id number]  Unit/Bed#: S -01 I Date of Admission: 4/14/2023   Date of Service: 4/18/2023 I Hospital Day: 3    Assessment/Plan   * Generalized weakness  Assessment & Plan  · Patient from independent living with    was no longer able to care for her in that environment following her recent discharge from this facility after a fall  · Pain control measures and fall precaution  · PT OT--recommending short-term rehab at [facility]      Knee pain  Assessment & Plan  · Patient is a limited historian due to dementia  She has complained of pain at various times in both of her knees  Imaging confirms moderate to severe osteoarthritis in both knees  · Noted on x-ray to have moderate left sided knee effusion, this is visible inferior to the knee on exam   Is no erythema or tenderness  · Noted on exam to have medial right sided knee collection prompting me to order a CT of the right lower extremity which only showed a physiologic amount of fluid  · Orthopedics has been consulted to determine if tapping left knee would be of benefit  · Continue around-the-clock Tylenol and menthol for pain when needed    Type 2 diabetes mellitus with hyperglycemia [facility])  Assessment & Plan  Lab Results   Component Value Date    HGBA1C 9 0 (H) 04/14/2023     Recent Labs     04/17/23  1053 04/17/23  1620 04/17/23 2034 04/18/23  0720   POCGLU 347* 325* 313* 251*     Blood Sugar Average: Last 72 hrs:  (P) 310 [id number]   · A1c reflects poor control and blood sugars here have remained quite elevated  · Continue to titrate basal bolus regimen plus sliding scale    Dementia without behavioral disturbance (HCC)  Assessment & Plan  · At baseline, patient at [facility]    Has remained calm and cooperative here but requires higher level of care at this time    SIRS (systemic inflammatory response syndrome) (HCC)-resolved as of 4/18/2023  Assessment & Plan  · Patient noted to have tachycardia, fever with no focal symptoms or source noted--now resolved without intervention  Suspect this could have been due to the fall  · No leukocytosis  · COVID test negative  · Urinalysis negative  · Chest x-ray negative      Hypokalemia  Assessment & Plan  · Potassium 3 2, supplementation ordered  Repeat BMP still pending    PAF (paroxysmal atrial fibrillation) ([facility])  Assessment & Plan  · Maintained on eliquis, toprol XL    Compression fracture of L3 vertebra ([facility])  Assessment & Plan  · Compression fx following fall 3/26  · Following with surgery as an outpatient  · Currently reporting no back pain therefore doubt adding LSO brace would be of any benefit         VTE Pharmacologic Prophylaxis: VTE Score: 4 eliquis    Patient Centered Rounds: I performed bedside rounds with nursing staff today  Discussions with Specialists or Other Care Team Provider: Orthopedics, case management, OT    Education and Discussions with Family / Patient: Updated  () at bedside  Total Time Spent on Date of Encounter in care of patient: 30 minutes This time was spent on one or more of the following: performing physical exam; counseling and coordination of care; obtaining or reviewing history; documenting in the medical record; reviewing/ordering tests, medications or procedures; communicating with other healthcare professionals and discussing with patient's family/caregivers  Current Length of Stay: 3 day(s)  Current Patient Status: Inpatient   Certification Statement: The patient will continue to require additional inpatient hospital stay due to Awaiting rehab  Also addressing knee effusion  Discharge Plan: Anticipate discharge later today or tomorrow to rehab facility  Code Status: Level 1 - Full Code    Subjective:    at bedside says that she has complaint of pain in either knee but more recently in the right knee with walking    However, a short time later after patient worked with OT I received a text that patient had complained of left knee pain with walking  She has not had any fever, chills or other new events or concerns overnight  Objective:     Vitals:   Temp (24hrs), Av 8 °F (36 6 °C), Min:97 °F (36 1 °C), Max:98 6 °F (37 °C)    Temp:  [97 °F (36 1 °C)-98 6 °F (37 °C)] 98 6 °F (37 °C)  HR:  [] 95  Resp:  [18] 18  BP: (128-137)/(72-94) 128/77  SpO2:  [94 %-97 %] 94 %  Body mass index is 32 54 kg/m²  Input and Output Summary (last 24 hours): Intake/Output Summary (Last 24 hours) at 2023 1003  Last data filed at 2023 0900  Gross per 24 hour   Intake 240 ml   Output 200 ml   Net 40 ml       Physical Exam:   Physical Exam  Vitals reviewed  Constitutional:       General: She is not in acute distress  Appearance: Normal appearance  She is not ill-appearing, toxic-appearing or diaphoretic  Eyes:      General: No scleral icterus  Right eye: No discharge  Left eye: No discharge  Conjunctiva/sclera: Conjunctivae normal    Cardiovascular:      Rate and Rhythm: Normal rate  Rhythm irregular  Heart sounds: No murmur heard  Pulmonary:      Effort: No respiratory distress  Breath sounds: No stridor  No wheezing, rhonchi or rales  Abdominal:      General: There is no distension  Tenderness: There is no abdominal tenderness  There is no guarding  Musculoskeletal:         General: Swelling and deformity present  No tenderness  Right lower leg: No edema  Left lower leg: No edema  Comments: Left inferior knee effusion, not tender to palpation  Right medial collection not painful   Skin:     General: Skin is warm and dry  Coloration: Skin is not jaundiced or pale  Findings: No bruising, erythema, lesion or rash  Neurological:      Mental Status: She is alert  Comments: Awake alert interactive, limited historian but calm and cooperative    Patient seen sitting up at edge of bed working with OT   Psychiatric:         Mood and Affect: Mood normal           Additional Data:     Labs:  Results from last 7 days   Lab Units 04/18/23  0522   WBC Thousand/uL 6 17   HEMOGLOBIN g/dL 11 7   HEMATOCRIT % 36 2   PLATELETS Thousands/uL 151   NEUTROS PCT % 74   LYMPHS PCT % 14   MONOS PCT % 9   EOS PCT % 2     Results from last 7 days   Lab Units 04/18/23  0917   SODIUM mmol/L 131*   POTASSIUM mmol/L 4 1   CHLORIDE mmol/L 96   CO2 mmol/L 26   BUN mg/dL 39*   CREATININE mg/dL 1 06   ANION GAP mmol/L 9   CALCIUM mg/dL 9 1   GLUCOSE RANDOM mg/dL 335*         Results from last 7 days   Lab Units 04/18/23  0720 04/17/23  2034 04/17/23  1620 04/17/23  1053 04/17/23  0730 04/16/23  2132 04/16/23  1553 04/16/23  1127 04/16/23  0749 04/15/23  2046 04/15/23  1717 04/15/23  1144   POC GLUCOSE mg/dl 251* 313* 325* 347* 267* 300* 360* 243* 302* 398* 294* 398*     Results from last 7 days   Lab Units 04/14/23  2238   HEMOGLOBIN A1C % 9 0*           Lines/Drains:  Invasive Devices     Peripheral Intravenous Line  Duration           Peripheral IV 04/18/23 Distal;Dorsal (posterior); Right Forearm <1 day              Imaging: CT RLE    Recent Cultures (last 7 days):         Last 24 Hours Medication List:   Current Facility-Administered Medications   Medication Dose Route Frequency Provider Last Rate   • acetaminophen  975 mg Oral Once JOÃO Leroy     • acetaminophen  975 mg Oral [facility] JOÃO Leroy     • apixaban  5 mg Oral BID JOÃO Leroy     • docusate sodium  100 mg Oral BID Vanna Collins MD     • ezetimibe  10 mg Oral Daily With JOÃO Sierra      And   • pravastatin  40 mg Oral Daily With JOÃO Sierra     • insulin glargine  16 Units Subcutaneous HS Ava Escobedo PA-C     • insulin lispro  1-5 Units Subcutaneous HS JOÃO Leroy     • insulin lispro  1-6 Units Subcutaneous TID [facility] JOÃO Leroy     • insulin lispro  7 Units Subcutaneous TID With Meals Wendy Landrum ALAN     • [START ON 4/19/2023] lidocaine  2 patch Topical Daily Ava Escobedo PA-C     • lidocaine (PF)  10 mL Infiltration Once Katey Zuniga PA-C     • losartan  100 mg Oral Daily JOÃO Cat     • melatonin  3 mg Oral HS Arnaldo Major MD     • metoprolol  2 5 mg Intravenous Q8H PRN JOSE A CatNP     • metoprolol succinate  50 mg Oral Daily JOÃO Cat     • oxyCODONE  5 mg Oral Q6H PRN JOSE A CatNP     • oxyCODONE  2 5 mg Oral Q6H PRN JOSE A CatNP     • polyethylene glycol  17 g Oral Daily PRN JOÃO Cat          Today, Patient Was Seen By: Tona Stanford PA-C    **Please Note: This note may have been constructed using a voice recognition system  **

## 2023-04-18 NOTE — PLAN OF CARE
Problem: OCCUPATIONAL THERAPY ADULT  Goal: Performs self-care activities at highest level of function for planned discharge setting  See evaluation for individualized goals  Description: Treatment Interventions: ADL retraining, Functional transfer training, UE strengthening/ROM, Cognitive reorientation, Patient/family training, Equipment evaluation/education, Compensatory technique education, Endurance training          See flowsheet documentation for full assessment, interventions and recommendations  Note: Limitation: Decreased UE strength, Decreased Safe judgement during ADL, Decreased endurance, Decreased self-care trans, Decreased high-level ADLs, Decreased ADL status, Decreased cognition  Prognosis: Fair  Assessment: Patient is a 80 y o  female seen for OT evaluation at [facility] following admission on 4/14/2023  s/p Generalized weakness  Please see above for comprehensive list of comorbidities and significant PMHx impacting functional performance  Patient presents with active orders for OT eval and treat and OOB to chair  At baseline, pt is mod (I) with SPC vs rollator, (I) with ADLS  Upon initial evaluation, patient requires Min A for UB ADLs, Mod A   for LB ADLs, and Mod A   for transfers and functional mobility short distance  with RW  Based on functional eval, pt appears to be at/ near baseline status;  presents with impaired  safety awareness, impaired  problem solving skills, and impaired   memory  Occupational performance is affected by the following deficits: endurance ,  decreased muscular strength , decreased standing tolerance for self care tasks , decreased dynamic balance impacting functional reach, decreased trunk control , decreased activity tolerance  and (+) pain    Patient would benefit from OT services within the acute care setting to maximize level of functional independence in the following areas fall prevention , UB ADLs, LB ADLs, self-care transfers, bed mobility and functional mobility  Personal factors impacting D/C include: (+) Hx of falls , decreased caregiver status , decreased ADL independence , High fall risk  and decreased recall of precautions   From OT standpoint, recommendation at time of D/C would be post-acute rehabilitation        OT Discharge Recommendation: Post acute rehabilitation services (pt and spouse aware of rec and agreeable)     Andrea Gutierres

## 2023-04-18 NOTE — PLAN OF CARE
Problem: SAFETY,RESTRAINT: NV/NON-SELF DESTRUCTIVE BEHAVIOR  Goal: Remains free of harm/injury (restraint for non violent/non self-detsructive behavior)  Description: INTERVENTIONS:  - Instruct patient/family regarding restraint use   - Assess and monitor physiologic and psychological status   - Provide interventions and comfort measures to meet assessed patient needs   - Identify and implement measures to help patient regain control  - Assess readiness for release of restraint   Outcome: Progressing  Goal: Returns to optimal restraint-free functioning  Description: INTERVENTIONS:  - Assess the patient's behavior and symptoms that indicate continued need for restraint  - Identify and implement measures to help patient regain control  - Assess readiness for release of restraint   Outcome: Progressing     Problem: Potential for Falls  Goal: Patient will remain free of falls  Description: INTERVENTIONS:  - Educate patient/family on patient safety including physical limitations  - Instruct patient to call for assistance with activity   - Consult OT/PT to assist with strengthening/mobility   - Keep Call bell within reach  - Keep bed low and locked with side rails adjusted as appropriate  - Keep care items and personal belongings within reach  - Initiate and maintain comfort rounds  - Make Fall Risk Sign visible to staff  - Offer Toileting every 2 Hours, in advance of need  - Initiate/Maintain bed/chair alarm  - Obtain necessary fall risk management equipment  - Apply yellow socks and bracelet for high fall risk patients  - Consider moving patient to room near nurses station  Outcome: Progressing     Problem: Prexisting or High Potential for Compromised Skin Integrity  Goal: Skin integrity is maintained or improved  Description: INTERVENTIONS:  - Identify patients at risk for skin breakdown  - Assess and monitor skin integrity  - Assess and monitor nutrition and hydration status  - Monitor labs   - Assess for incontinence   - Turn and reposition patient  - Assist with mobility/ambulation  - Relieve pressure over bony prominences  - Avoid friction and shearing  - Provide appropriate hygiene as needed including keeping skin clean and dry  - Evaluate need for skin moisturizer/barrier cream  - Collaborate with interdisciplinary team   - Patient/family teaching  - Consider wound care consult   Outcome: Progressing     Problem: MOBILITY - ADULT  Goal: Maintain or return to baseline ADL function  Description: INTERVENTIONS:  -  Assess patient's ability to carry out ADLs; assess patient's baseline for ADL function and identify physical deficits which impact ability to perform ADLs (bathing, care of mouth/teeth, toileting, grooming, dressing, etc )  - Assess/evaluate cause of self-care deficits   - Assess range of motion  - Assess patient's mobility; develop plan if impaired  - Assess patient's need for assistive devices and provide as appropriate  - Encourage maximum independence but intervene and supervise when necessary  - Involve family in performance of ADLs  - Assess for home care needs following discharge   - Consider OT consult to assist with ADL evaluation and planning for discharge  - Provide patient education as appropriate  Outcome: Progressing  Goal: Maintains/Returns to pre admission functional level  Description: INTERVENTIONS:  - Perform BMAT or MOVE assessment daily    - Set and communicate daily mobility goal to care team and patient/family/caregiver     - Collaborate with rehabilitation services on mobility goals if consulted  - Perform Range of Motion   - Reposition patient  - Dangle patient  - Stand patient   - Ambulate patient   - Out of bed to chair   - Out of bed for meals  - Out of bed for toileting  - Record patient progress and toleration of activity level   Outcome: Progressing

## 2023-04-18 NOTE — ASSESSMENT & PLAN NOTE
· Patient is a limited historian due to dementia  She has complained of pain at various times in both of her knees    Imaging confirms moderate to severe osteoarthritis in both knees  · Noted on x-ray to have moderate left sided knee effusion, this is visible inferior to the knee on exam   Is no erythema or tenderness  · Noted on exam to have medial right sided knee collection prompting me to order a CT of the right lower extremity which only showed a physiologic amount of fluid  · Orthopedics has been consulted to determine if tapping left knee would be of benefit  · Continue around-the-clock Tylenol and menthol for pain when needed

## 2023-04-19 VITALS
TEMPERATURE: 98.2 F | OXYGEN SATURATION: 96 % | RESPIRATION RATE: 18 BRPM | HEIGHT: 62 IN | WEIGHT: 177.91 LBS | HEART RATE: 95 BPM | DIASTOLIC BLOOD PRESSURE: 77 MMHG | BODY MASS INDEX: 32.74 KG/M2 | SYSTOLIC BLOOD PRESSURE: 148 MMHG

## 2023-04-19 PROBLEM — E87.6 HYPOKALEMIA: Status: RESOLVED | Noted: 2023-04-17 | Resolved: 2023-04-19

## 2023-04-19 LAB
GLUCOSE SERPL-MCNC: 240 MG/DL (ref 65–140)
GLUCOSE SERPL-MCNC: 289 MG/DL (ref 65–140)

## 2023-04-19 RX ORDER — OXYCODONE HYDROCHLORIDE 5 MG/1
2.5 TABLET ORAL EVERY 6 HOURS PRN
Qty: 10 TABLET | Refills: 0 | Status: SHIPPED | OUTPATIENT
Start: 2023-04-19 | End: 2023-04-29

## 2023-04-19 RX ORDER — INSULIN LISPRO 100 [IU]/ML
7 INJECTION, SOLUTION INTRAVENOUS; SUBCUTANEOUS
Qty: 10 ML | Refills: 0 | Status: SHIPPED
Start: 2023-04-19 | End: 2023-05-09 | Stop reason: SDUPTHER

## 2023-04-19 RX ORDER — INSULIN GLARGINE 100 [IU]/ML
16 INJECTION, SOLUTION SUBCUTANEOUS
Qty: 10 ML | Refills: 0 | Status: SHIPPED
Start: 2023-04-19 | End: 2023-05-09 | Stop reason: SDUPTHER

## 2023-04-19 RX ADMIN — METOPROLOL SUCCINATE 50 MG: 50 TABLET, EXTENDED RELEASE ORAL at 09:28

## 2023-04-19 RX ADMIN — APIXABAN 5 MG: 5 TABLET, FILM COATED ORAL at 09:27

## 2023-04-19 RX ADMIN — ACETAMINOPHEN 975 MG: 325 TABLET ORAL at 05:59

## 2023-04-19 RX ADMIN — LIDOCAINE 2 PATCH: 50 PATCH CUTANEOUS at 09:28

## 2023-04-19 RX ADMIN — INSULIN LISPRO 4 UNITS: 100 INJECTION, SOLUTION INTRAVENOUS; SUBCUTANEOUS at 09:30

## 2023-04-19 RX ADMIN — INSULIN LISPRO 7 UNITS: 100 INJECTION, SOLUTION INTRAVENOUS; SUBCUTANEOUS at 09:30

## 2023-04-19 RX ADMIN — LOSARTAN POTASSIUM 100 MG: 50 TABLET, FILM COATED ORAL at 09:28

## 2023-04-19 RX ADMIN — DOCUSATE SODIUM 100 MG: 100 CAPSULE, LIQUID FILLED ORAL at 09:27

## 2023-04-19 NOTE — DISCHARGE INSTR - AVS FIRST PAGE
Discharge Instructions - Orthopedics  Get Nasir 80 y o  female MRN: [id number]  Unit/Bed#: S -01    Weight Bearing Status:                                           Weight bearing as tolerated to the bilateral lower extremities  Pain:  Continue analgesics as directed    Dressing Instructions:   Please keep clean, dry and intact until follow up     Appt Instructions: If you do not have your appointment, please call the clinic at [phone] to schedule with a total joint surgeon (Dr Soco Richards, Dr Gregoria Huang)    Contact the office sooner if you experience any increased numbness/tingling in the extremities

## 2023-04-19 NOTE — ASSESSMENT & PLAN NOTE
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

## 2023-04-19 NOTE — ASSESSMENT & PLAN NOTE
· Patient from independent living with      was no longer able to care for her in that environment following her recent discharge from this facility after a fall  · Stable for discharge to [facility]   · Continue Geriatric pain control protocol

## 2023-04-19 NOTE — CASE MANAGEMENT
Case Management Discharge Planning Note    Patient name Manuelito SNELL /S -91 MRN [id number]  : 1935 Date 2023       Current Admission Date: 2023  Current Admission Diagnosis:Generalized weakness   Patient Active Problem List    Diagnosis Date Noted   • Knee pain 2023   • Hypokalemia 2023   • Type 2 diabetes mellitus with hyperglycemia ([address] ) 2023   • Generalized weakness 04/15/2023   • Depression, recurrent ([address] ) 2023   • Compression fracture of L3 vertebra ([address] ) 2023   • PAF (paroxysmal atrial fibrillation) ([address] )    • Metabolic encephalopathy    • Hypertensive urgency 2023   • Hyponatremia 2023   • Primary osteoarthritis of left knee 2022   • Osteoarthritis of multiple joints 2022   • Bilateral hearing loss 2022   • 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[facility] ) 03/15/2022   • Dementia without behavioral disturbance ([address] )    • Stage 3a chronic kidney disease ([address] ) 2021   • Essential hypertension 2020   • Pure hypercholesterolemia 2020   • Microalbuminuria 2020      LOS (days): 4  Geometric Mean LOS (GMLOS) (days): 4 30  Days to GMLOS:-0 1     OBJECTIVE:  Risk of Unplanned Readmission Score: 13 24         Current admission status: Inpatient   Preferred Pharmacy:   [place] [id number]  Phone: [phone] Fax: [phone]    Primary Care Provider: Opal Heredia MD    Primary Insurance: MEDICARE  Secondary Insurance: [facility] HE[employer] OPTIONS PROGRAM    DISCHARGE DETAILS:    Discharge planning discussed with[de-identified] Patient's spouse  Freedom of Choice: Yes  Comments - Freedom of Choice: Choice is to D/C to [facility] for STR  CM contacted family/caregiver?: Yes  Were Treatment Team discharge recommendations reviewed with patient/caregiver?: Yes  Did patient/caregiver verbalize understanding of patient care needs?: N/A- going to facility  Were patient/caregiver advised of the risks associated with not following Treatment Team discharge recommendations?: Yes    Contacts  Patient Contacts: Lynne Mckeon  Relationship to Patient[de-identified] Family  Contact Method: Phone  Phone Number: [phone]  Reason/Outcome: Emergency Contact, Discharge [address]         Is the patient interested in [facility] at discharge?: No    DME Referral Provided  Referral made for DME?: No    Other Referral/Resources/Interventions Provided:  Interventions: Short Term Rehab  Referral Comments: Angelique Jackson confirmed able to accept today  Negative COVID results from 4/17/2023 uploaded to [employer] [facility]  Would you like to participate in our [address] service program?  : No - Declined    Treatment Team Recommendation: Short Term Rehab  Discharge Destination Plan[de-identified] Short Term Rehab ([facility]  Transport at Discharge : [facility]  Dispatcher Contacted: Yes  Number/Name of Dispatcher: ROUNDTRIP  Transported by [employer] and Unit #): [facility]   ETA of Transport (Date): 04/19/23  ETA of Transport (Time): 1200     IMM Given (Date):: 04/19/23  IMM Given to[de-identified] Family  IMM reviewed with patient's spouse, patient's spouse agrees with discharge determination  Accepting Facility Name, [facility] [address], [place]  Receiving Facility/Agency Phone Number: [phone]  [phone]  Facility/Agency Fax Number: [phone]    Facility Insurance Auth Number: SULEMA Stuart, WESLY, ACVICTORIA, [facility]  04/19/23 10:31 AM

## 2023-04-19 NOTE — PLAN OF CARE
Problem: Potential for Falls  Goal: Patient will remain free of falls  Description: INTERVENTIONS:  - Educate patient/family on patient safety including physical limitations  - Instruct patient to call for assistance with activity   - Consult OT/PT to assist with strengthening/mobility   - Keep Call bell within reach  - Keep bed low and locked with side rails adjusted as appropriate  - Keep care items and personal belongings within reach  - Initiate and maintain comfort rounds  - Make Fall Risk Sign visible to staff  - Offer Toileting every 2 Hours, in advance of need  - Initiate/Maintain bed alarm  - Obtain necessary fall risk management equipment  - Apply yellow socks and bracelet for high fall risk patients  - Consider moving patient to room near nurses station  Outcome: Adequate for Discharge     Problem: Prexisting or High Potential for Compromised Skin Integrity  Goal: Skin integrity is maintained or improved  Description: INTERVENTIONS:  - Identify patients at risk for skin breakdown  - Assess and monitor skin integrity  - Assess and monitor nutrition and hydration status  - Monitor labs   - Assess for incontinence   - Turn and reposition patient  - Assist with mobility/ambulation  - Relieve pressure over bony prominences  - Avoid friction and shearing  - Provide appropriate hygiene as needed including keeping skin clean and dry  - Evaluate need for skin moisturizer/barrier cream  - Collaborate with interdisciplinary team   - Patient/family teaching  - Consider wound care consult   Outcome: Adequate for Discharge     Problem: MOBILITY - ADULT  Goal: Maintain or return to baseline ADL function  Description: INTERVENTIONS:  -  Assess patient's ability to carry out ADLs; assess patient's baseline for ADL function and identify physical deficits which impact ability to perform ADLs (bathing, care of mouth/teeth, toileting, grooming, dressing, etc )  - Assess/evaluate cause of self-care deficits   - Assess range of motion  - Assess patient's mobility; develop plan if impaired  - Assess patient's need for assistive devices and provide as appropriate  - Encourage maximum independence but intervene and supervise when necessary  - Involve family in performance of ADLs  - Assess for home care needs following discharge   - Consider OT consult to assist with ADL evaluation and planning for discharge  - Provide patient education as appropriate  Outcome: Adequate for Discharge  Goal: Maintains/Returns to pre admission functional level  Description: INTERVENTIONS:  - Perform BMAT or MOVE assessment daily    - Set and communicate daily mobility goal to care team and patient/family/caregiver  - Collaborate with rehabilitation services on mobility goals if consulted  - Reposition patient every 2 hours    - Record patient progress and toleration of activity level   Outcome: Adequate for Discharge

## 2023-04-19 NOTE — ASSESSMENT & PLAN NOTE
Lab Results   Component Value Date    HGBA1C 9 0 (H) 04/14/2023     Recent Labs     04/18/23  1556 04/18/23  2109 04/19/23  0731 04/19/23  1125   POCGLU 339* 249* 240* 289*     Blood Sugar Average: Last 72 hrs:  (P) 296 [id number]   · A1c reflects poor control and blood sugars here have remained quite elevated  · Restart Metformin   · Continue Lantus 16 U QHS at discharge (new)  · Continue Humalog 7 U TID with meals (new)

## 2023-04-19 NOTE — DISCHARGE SUMMARY
[facility]  Discharge- Sorin Audubon 1935, 80 y o  female MRN: [id number]  Unit/Bed#: S -01 Encounter: [id number]  Primary Care Provider: Ara Jean MD   Date and time admitted to hospital: 4/14/2023  7:44 PM    * Generalized weakness  Assessment & Plan  · Patient from independent living with    was no longer able to care for her in that environment following her recent discharge from this facility after a fall  · Stable for discharge to [facility]   · Continue Geriatric pain control protocol      Knee pain  Assessment & Plan  · Patient is a limited historian due to dementia  She has complained of pain at various times in both of her knees  · Imaging confirms moderate to severe osteoarthritis in both knees  · Noted on x-ray to have moderate left sided knee effusion, this is visible inferior to the knee on exam   Is no erythema or tenderness  · Noted on exam to have medial right sided knee collection prompting me to order a CT of the right lower extremity which only showed a physiologic amount of fluid  · 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, meniscal injuries  · Orthopedics consulted   · STAT gram stain and crystals negative   · Follow up with joint replacement surgery outpatient   · WBAT  · Continue pain control     Dementia without behavioral disturbance ([facility] [address] )  Assessment & Plan  · At baseline, patient at [facility]  Has remained calm and cooperative here but requires higher level of care at this time  · Discharge to [facility]    Hypokalemia-resolved as of 4/19/2023  Assessment & Plan  · Potassium 3 2, supplementation given, now resolved     PAF (paroxysmal atrial fibrillation) ([facility])  Assessment & Plan  · Rate controlled   · Continue Eliquis, Toprol XL    Essential hypertension  Assessment & Plan  · Elevated on arrival, improved  · Toprol XL 50 mg qd  · Valsartan HCTZ 160-12 5 mg qd      · Home regimen restarted at discharge     Compression fracture of L3 vertebra (HCC)  Assessment & Plan  · Compression fx following fall 3/26  · Following with surgery as an outpatient  · Currently reporting no back pain therefore doubt adding LSO brace would be of any benefit    Type 2 diabetes mellitus with hyperglycemia [facility])  Assessment & Plan  Lab Results   Component Value Date    HGBA1C 9 0 (H) 04/14/2023     Recent Labs     04/18/23  1556 04/18/23  2109 04/19/23  0731 04/19/23  1125   POCGLU 339* 249* 240* 289*     Blood Sugar Average: Last 72 hrs:  (P) 296 [id number]   · A1c reflects poor control and blood sugars here have remained quite elevated  · Restart Metformin   · Continue Lantus 16 U QHS at discharge (new)  · Continue Humalog 7 U TID with meals (new)      Medical Problems     Resolved Problems  Date Reviewed: 4/19/2023          Resolved    SIRS (systemic inflammatory response syndrome) ([facility] [address] ) 4/18/2023     Resolved by  Milad Thao PA-C    Hypokalemia 4/19/2023     Resolved by  Ousmane Joy PA-C        Discharging Physician / Practitioner: Ousmane Joy PA-C  PCP: Joanna Jean MD  Admission Date:   Admission Orders (From admission, onward)     Ordered        04/15/23 0123  INPATIENT ADMISSION  Once                      Discharge Date: 04/19/23    Consultations During Hospital Stay:  · Orthopedics    Procedures Performed:   · CXR  · XR Left Knee  · CT Right LE   · MRI Left Knee     Significant Findings / Test Results:   · CXR: No acute pulmonary disease  · XR Left Knee: Severe degenerative changes with moderate joint effusion   · CT Right LE: In the right knee there is moderate tricompartmental osteoarthritis, without acute osseous injury  There is only physiologic amount of joint fluid in the right knee  · MRI Left Knee: No evidence of acute fracture  Large joint effusion with synovitis  Large popliteal cyst  ACL is absent, likely chronic tear  PCL with mild degenerative change  Diffuse complex tearing of the medial meniscus  "Intrasubstance degeneration of the lateral meniscus  Incidental Findings:   · None       Test Results Pending at Discharge (will require follow up): · None     Outpatient Tests Requested:  · None    Complications:  None    Reason for Admission: Generalized Weakness    Hospital Course:   Princess Garvin is a 80 y o  female patient who originally presented to the hospital on 4/14/2023 due to generalized weakness  Patient lived in independent living with her  who was having difficulty caring for her  She was admitted and evaluated by PT and OT with rehab being recommended  She was worked up for left knee pain/effusion and ortho was consulted and the left knee was tapped  There was no signs of crystal or bacteria and her MRI showed chronic changes and arthritis  She was advised to [facility] and follow up with joint replacement surgeon  She was discharged to [facility] in stable condition  As far as medical changes her diabetes regimen was augmented with basal and bolus insulin which will be further titrated  Please see above list of diagnoses and related plan for additional information  Condition at Discharge: stable    Discharge Day Visit / Exam:   Subjective:  Patient has no complaints today   happy about discharge  Both understand recs from ortho  Vitals: Blood Pressure: 148/77 (04/19/23 0700)  Pulse: 95 (04/19/23 0700)  Temperature: 98 2 °F (36 8 °C) (04/19/23 0700)  Temp Source: Oral (04/19/23 0700)  Respirations: 18 (04/18/23 2100)  Height: 5' 2\" (157 5 cm) (04/15/23 0727)  Weight - Scale: 80 7 kg (177 lb 14 6 oz) (04/15/23 0727)  SpO2: 96 % (04/19/23 0700)  Exam:   Physical Exam  Constitutional:       General: She is not in acute distress  Appearance: Normal appearance  She is normal weight  She is not ill-appearing or diaphoretic  HENT:      Head: Normocephalic and atraumatic  Mouth/Throat:      Mouth: Mucous membranes are moist    Eyes:      General: No scleral icterus       " Pupils: Pupils are equal, round, and reactive to light  Cardiovascular:      Rate and Rhythm: Normal rate and regular rhythm  Pulses: Normal pulses  Heart sounds: Normal heart sounds, S1 normal and S2 normal  No murmur heard  No systolic murmur is present  No diastolic murmur is present  No gallop  No S3 or S4 sounds  Pulmonary:      Effort: Pulmonary effort is normal  No accessory muscle usage or respiratory distress  Breath sounds: Normal breath sounds  No stridor  No decreased breath sounds, wheezing, rhonchi or rales  Chest:      Chest wall: No tenderness  Abdominal:      General: Bowel sounds are normal  There is no distension  Palpations: Abdomen is soft  Tenderness: There is no abdominal tenderness  There is no guarding  Musculoskeletal:      Right lower leg: No edema  Left lower leg: No edema  Skin:     General: Skin is warm and dry  Coloration: Skin is not jaundiced  Neurological:      General: No focal deficit present  Mental Status: She is alert  Mental status is at baseline  Motor: No tremor or seizure activity  Psychiatric:         Behavior: Behavior is cooperative  Discussion with Family: Updated  () at bedside  Discharge instructions/Information to patient and family:   See after visit summary for information provided to patient and family  Provisions for Follow-Up Care:  See after visit summary for information related to follow-up care and any pertinent home health orders  Disposition:   [facility] at -    Planned Readmission: None     Discharge Statement:  I spent 45 minutes discharging the patient  This time was spent on the day of discharge  I had direct contact with the patient on the day of discharge   Greater than 50% of the total time was spent examining patient, answering all patient questions, arranging and discussing plan of care with patient as well as directly providing post-discharge instructions  Additional time then spent on discharge activities  Discharge Medications:  See after visit summary for reconciled discharge medications provided to patient and/or family        **Please Note: This note may have been constructed using a voice recognition system**

## 2023-04-19 NOTE — ASSESSMENT & PLAN NOTE
· Elevated on arrival, improved  · Toprol XL 50 mg qd  · Valsartan HCTZ 160-12 5 mg qd      · Home regimen restarted at discharge

## 2023-04-19 NOTE — PROGRESS NOTES
Progress Note - Orthopedics   Firman Reveal 80 y o  female MRN: [id number]  Unit/Bed#: S -01      Subjective:    80 y  o female patient seen and evaluated at bedside  Her spouse is present  She states that she at present has no major complaints  Notes that she has not been out of bed, and feels that this is part of the reason she does not at present has significant pain  Notes longstanding bilateral knee pain, left worse than right  No acute overnight events noted       Labs:  0   Lab Value Date/Time    HCT 36 2 04/18/2023 0522    HCT 38 6 04/17/2023 0447    HCT 40 7 04/16/2023 0538    HGB 11 7 04/18/2023 0522    HGB 12 5 04/17/2023 0447    HGB 12 8 04/16/2023 0538    INR 1 00 04/10/2023 1036    WBC 6 17 04/18/2023 0522    WBC 7 08 04/17/2023 0447    WBC 9 44 04/16/2023 0538       Meds:    Current Facility-Administered Medications:   •  acetaminophen (TYLENOL) tablet 975 mg, 975 mg, Oral, Once, JOSE A SharmaNP  •  acetaminophen (TYLENOL) tablet 975 mg, 975 mg, Oral, Q8H [address], Almer Melboy CRNP, 975 mg at 04/19/23 0559  •  apixaban (ELIQUIS) tablet 5 mg, 5 mg, Oral, BID, Almer Melena, CRNP, 5 mg at 04/19/23 2861  •  docusate sodium (COLACE) capsule 100 mg, 100 mg, Oral, BID, Darian Rahman MD, 100 mg at 04/19/23 3164  •  ezetimibe (ZETIA) tablet 10 mg, 10 mg, Oral, Daily With Dinner, 10 mg at 04/18/23 1742 **AND** pravastatin (PRAVACHOL) tablet 40 mg, 40 mg, Oral, Daily With JOÃO Dominguez, 40 mg at 04/18/23 1742  •  insulin glargine (LANTUS) subcutaneous injection 16 Units 0 16 mL, 16 Units, Subcutaneous, HS, Ava Escobedo PA-C, 16 Units at 04/18/23 2110  •  insulin lispro (HumaLOG) 100 units/mL subcutaneous injection 1-5 Units, 1-5 Units, Subcutaneous, HS, JOÃO Sharma, 2 Units at 04/18/23 2110  •  insulin lispro (HumaLOG) 100 units/mL subcutaneous injection 1-6 Units, 1-6 Units, Subcutaneous, TID AC, 4 Units at 04/19/23 0930 **AND** Fingerstick Glucose (POCT), , , TID AC, JOÃO Sharma  • insulin lispro (HumaLOG) 100 units/mL subcutaneous injection 7 Units, 7 Units, Subcutaneous, TID With Meals, Ava Escobedo PA-C, 7 Units at 04/19/23 0930  •  lidocaine (LIDODERM) 5 % patch 2 patch, 2 patch, Topical, Daily, Ava Escobedo PA-C, 2 patch at 04/19/23 8057  •  losartan (COZAAR) tablet 100 mg, 100 mg, Oral, Daily, Jatin Royalty, CRNP, 100 mg at 04/19/23 0098  •  melatonin tablet 3 mg, 3 mg, Oral, HS, Leobardo Hewitt MD, 3 mg at 04/18/23 2110  •  metoprolol (LOPRESSOR) injection 2 5 mg, 2 5 mg, Intravenous, Q8H PRN, Jatin Royalty, CRNP  •  metoprolol succinate (TOPROL-XL) 24 hr tablet 50 mg, 50 mg, Oral, Daily, Jatin Royalty, CRNP, 50 mg at 04/19/23 8323  •  oxyCODONE (ROXICODONE) IR tablet 5 mg, 5 mg, Oral, Q6H PRN, Jatin Royalty, CRNP  •  oxyCODONE (ROXICODONE) split tablet 2 5 mg, 2 5 mg, Oral, Q6H PRN, Jatin Royalty, CRNP  •  polyethylene glycol (MIRALAX) packet 17 g, 17 g, Oral, Daily PRN, Jatin Royalty, CRNP    Blood Culture:   Lab Results   Component Value Date    BLOODCX No Growth After 5 Days  02/13/2023       Wound Culture:   No results found for: WOUNDCULT    Ins and Outs:  I/O last 24 hours: In: 420 [P O :420]  Out: 900 [Urine:900]          Physical:  Vitals:    04/19/23 0700   BP: 148/77   Pulse: 95   Resp:    Temp: 98 2 °F (36 8 °C)   SpO2: 96%     Musculoskeletal: bilateral Lower Extremity  · Skin intact bilaterally  · Tenderness appreciated over medial, lateral and superior knee on the right  · Tenderness appreciated over the medial aspect of the left knee  · +effusion on the left  · No effusion on the right  · AROM 0-90 bilaterally  · PROM 0-120 bilaterally  · Stable varus/valgus testing, negative lachmans, posterior drawer, anterior drawer testing bilaterally  · Sensation intact L3-S1 bilaterally  · +EHL/FHL  · 2+ DP  · Digits are warm and well perfused    Imaging studies  I have personally reviewed imaging studies, and discussed these with Dr Moser Medicine     Xrays left knee: significant osteoarthritic changes, no acute fracture  MRI left knee: IMPRESSION:   No evidence of acute fracture    Large joint effusion with synovitis  Large popliteal cyst    ACL is absent, likely chronic tear  PCL with mild degenerative change    Diffuse complex tearing of the medial meniscus  Intrasubstance degeneration of the lateral meniscus    Moderate to severe tricompartmental osteoarthritis  Assessment:    80 y  o female with bilateral knee osteoarthritis, left knee hemarthrosis  Plan:  · WBAT to the bilateral lower extremities  · No immediate orthopedic intervention indicated at this time  · PT/OT  · Pain control per primary team    · DVT ppx per primary team  · Medical management per primary team    · Dispo: Ortho signing off   · Discussed with primary team today  · Recommend outpatient follow up with orthopedic total joint surgeon for further intervention for knee pain/intervention  · Case reviewed and discussed with Dr Ronald Hall PA-C

## 2023-04-19 NOTE — ASSESSMENT & PLAN NOTE
· At baseline, patient at [facility]   Has remained calm and cooperative here but requires higher level of care at this time  · Discharge to [facility]

## 2023-04-21 LAB
BACTERIA SPEC BFLD CULT: NO GROWTH
GRAM STN SPEC: NORMAL
GRAM STN SPEC: NORMAL

## 2023-04-28 ENCOUNTER — PATIENT OUTREACH (OUTPATIENT)
Dept: CASE MANAGEMENT | Facility: OTHER | Age: 88
End: 2023-04-28

## 2023-04-28 NOTE — PROGRESS NOTES
Chart review completed  Email sent to facility requesting update on patient  This Admin Coordinator will continue to monitor via chart review throughout episode

## 2023-05-01 ENCOUNTER — PATIENT OUTREACH (OUTPATIENT)
Dept: CASE MANAGEMENT | Facility: OTHER | Age: 88
End: 2023-05-01

## 2023-05-01 NOTE — PROGRESS NOTES
Update received the patient has a LCD of 5/4/23 and will discharge 5/5/23 back to [facility] with her  pending evaluation  Vitals stable - /64, RR 18, T 98 3, , HR 80, SpO2 94% on RA, weight 171 0lbs  Labs attached to this encounter

## 2023-05-05 ENCOUNTER — PATIENT OUTREACH (OUTPATIENT)
Dept: CASE MANAGEMENT | Facility: OTHER | Age: 88
End: 2023-05-05

## 2023-05-05 NOTE — PROGRESS NOTES
Email sent to [facility] to confirm the patients Discharge today 5/5/23 to [facility]  This Admin Coordinator will continue to monitor via chart review

## 2023-05-08 ENCOUNTER — PATIENT OUTREACH (OUTPATIENT)
Dept: CASE MANAGEMENT | Facility: OTHER | Age: 88
End: 2023-05-08

## 2023-05-08 ENCOUNTER — TELEPHONE (OUTPATIENT)
Dept: FAMILY MEDICINE CLINIC | Facility: CLINIC | Age: 88
End: 2023-05-08

## 2023-05-08 DIAGNOSIS — E11.65 HYPERGLYCEMIA DUE TO TYPE 2 DIABETES MELLITUS (HCC): ICD-10-CM

## 2023-05-08 NOTE — PROGRESS NOTES
Update obtained from realtime the patient discharged 5/5/23 from [facility] to [address] [place] [address]  I have removed myself from the care team, updated the Care Coordination note, and closed the episode

## 2023-05-09 ENCOUNTER — PATIENT MESSAGE (OUTPATIENT)
Dept: FAMILY MEDICINE CLINIC | Facility: CLINIC | Age: 88
End: 2023-05-09

## 2023-05-09 ENCOUNTER — TRANSITIONAL CARE MANAGEMENT (OUTPATIENT)
Dept: FAMILY MEDICINE CLINIC | Facility: CLINIC | Age: 88
End: 2023-05-09

## 2023-05-09 DIAGNOSIS — Z79.4 TYPE 2 DIABETES MELLITUS WITH HYPERGLYCEMIA, WITH LONG-TERM CURRENT USE OF INSULIN (HCC): Primary | ICD-10-CM

## 2023-05-09 DIAGNOSIS — E11.65 HYPERGLYCEMIA DUE TO TYPE 2 DIABETES MELLITUS (HCC): ICD-10-CM

## 2023-05-09 DIAGNOSIS — E11.65 TYPE 2 DIABETES MELLITUS WITH HYPERGLYCEMIA, WITH LONG-TERM CURRENT USE OF INSULIN (HCC): Primary | ICD-10-CM

## 2023-05-09 RX ORDER — INSULIN LISPRO 100 [IU]/ML
7 INJECTION, SOLUTION INTRAVENOUS; SUBCUTANEOUS
Qty: 30 ML | Refills: 3 | Status: SHIPPED | OUTPATIENT
Start: 2023-05-09

## 2023-05-09 RX ORDER — INSULIN LISPRO 100 [IU]/ML
7 INJECTION, SOLUTION INTRAVENOUS; SUBCUTANEOUS
Qty: 10 ML | Refills: 0 | Status: CANCELLED | OUTPATIENT
Start: 2023-05-09

## 2023-05-09 RX ORDER — INSULIN GLARGINE 100 [IU]/ML
16 INJECTION, SOLUTION SUBCUTANEOUS
Qty: 10 ML | Refills: 0 | Status: CANCELLED | OUTPATIENT
Start: 2023-05-09

## 2023-05-09 RX ORDER — INSULIN LISPRO 100 [IU]/ML
7 INJECTION, SOLUTION INTRAVENOUS; SUBCUTANEOUS
Qty: 20 ML | Refills: 3 | Status: SHIPPED | OUTPATIENT
Start: 2023-05-09 | End: 2023-05-09

## 2023-05-09 RX ORDER — INSULIN GLARGINE 100 [IU]/ML
16 INJECTION, SOLUTION SUBCUTANEOUS
Qty: 15 ML | Refills: 3 | Status: SHIPPED | OUTPATIENT
Start: 2023-05-09 | End: 2023-05-09

## 2023-05-09 RX ORDER — INSULIN GLARGINE 100 [IU]/ML
16 INJECTION, SOLUTION SUBCUTANEOUS DAILY
Qty: 15 ML | Refills: 3 | Status: SHIPPED | OUTPATIENT
Start: 2023-05-09 | End: 2023-08-07

## 2023-05-09 NOTE — TELEPHONE ENCOUNTER
Daughter called in today because her mom need her insulin refilled  States that he dad called about it already and hasn't heard anything  Patient does live with her spouse who is covid positive  They would both be able to do a virtual apt if needed  Daughter is currently in the area (not seeing patient due to covid) and states that her mom is doing really well on the insulin

## 2023-05-11 ENCOUNTER — PATIENT MESSAGE (OUTPATIENT)
Dept: FAMILY MEDICINE CLINIC | Facility: CLINIC | Age: 88
End: 2023-05-11

## 2023-05-11 DIAGNOSIS — E11.65 TYPE 2 DIABETES MELLITUS WITH HYPERGLYCEMIA, WITH LONG-TERM CURRENT USE OF INSULIN (HCC): Primary | ICD-10-CM

## 2023-05-11 DIAGNOSIS — Z79.4 TYPE 2 DIABETES MELLITUS WITH HYPERGLYCEMIA, WITH LONG-TERM CURRENT USE OF INSULIN (HCC): Primary | ICD-10-CM

## 2023-05-11 RX ORDER — PEN NEEDLE, DIABETIC, SAFETY 30 GX3/16"
NEEDLE, DISPOSABLE MISCELLANEOUS
Qty: 100 EACH | Refills: 5 | Status: SHIPPED | OUTPATIENT
Start: 2023-05-11

## 2023-05-22 ENCOUNTER — TELEPHONE (OUTPATIENT)
Dept: FAMILY MEDICINE CLINIC | Facility: CLINIC | Age: 88
End: 2023-05-22

## 2023-05-22 ENCOUNTER — OFFICE VISIT (OUTPATIENT)
Dept: OBGYN CLINIC | Facility: CLINIC | Age: 88
End: 2023-05-22

## 2023-05-22 VITALS — BODY MASS INDEX: 33.42 KG/M2 | WEIGHT: 177 LBS | HEIGHT: 61 IN

## 2023-05-22 DIAGNOSIS — M25.462 EFFUSION OF LEFT KNEE JOINT: ICD-10-CM

## 2023-05-22 DIAGNOSIS — M17.12 PRIMARY OSTEOARTHRITIS OF LEFT KNEE: ICD-10-CM

## 2023-05-22 NOTE — PROGRESS NOTES
Assessment:  1  Primary osteoarthritis of left knee  Ambulatory referral to Orthopedic Surgery      2  Effusion of left knee joint  Ambulatory referral to Orthopedic Surgery        Patient Active Problem List   Diagnosis   • Essential hypertension   • Pure hypercholesterolemia   • Microalbuminuria   • Stage 3a chronic kidney disease (HCC)   • 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[facility])   • Dementia without behavioral disturbance ([facility])   • Osteoarthritis of multiple joints   • Bilateral hearing loss   • Primary osteoarthritis of left knee   • Hypertensive urgency   • Hyponatremia   • Metabolic encephalopathy   • PAF (paroxysmal atrial fibrillation) ([facility])   • Depression, recurrent ([facility])   • Compression fracture of L3 vertebra ([facility])   • Generalized weakness   • Type 2 diabetes mellitus with hyperglycemia ([facility])   • Knee pain   • Fall       Plan:    80 y o  female with severe left knee osteoarthritis    • Discussed that there are multiple concerns for her moving forward with TKA including her A1c at 9 0 as of last month, and her dementia and ability do well with rehab  • In the meantime we discussed possible pain management referral, advised that a geniculate nerve ablation would have to pay out of pocket for  • If she is able to bring her A1C to 7 0 or below and maintains her CKD at 3a, then she would be potentially a candidate for TKA but advised that anesthesia could have negative cognitive effects on her post op due to her dementia  • They will discuss A1c optimization with their PCP and follow up in 2-3 months after next A1C if it is 7 or below can potentially sign her up for surgery  • She declined CSI today      The patient was seen and examined by Dr Mara aLndis and myself  The assessment and plan were formulated by Dr Mara Landis and I assisted in carrying it out  Subjective:   Patient ID: Yessi Florez is a 80 y o  female   HPI    Patient comes in today with regards to left knee pain osteoarthritis    The patient does have a history of dementia and history from her directly is limited due to this  She presents today along with her  she ambulates with a walker  She was seen in consultation last month by myself at the [facility]  Had an aspiration yielded hemarthrosis  MRI was performed to rule out occult fracture  Found to have severe arthritis no fracture or acute traumatic injury  Her  reports that the patient has tried cortisone Visco and bracing with Dr Elieser Spaulding  She is still having significant knee pain  They would like to discuss her candidacy for total knee arthroplasty      The following portions of the patient's history were reviewed and updated as appropriate: allergies, current medications, past family history, past social history, past surgical history and problem list     Social History     Socioeconomic History   • Marital status: /Civil Union     Spouse name: Not on file   • Number of children: Not on file   • Years of education: Not on file   • Highest education level: Not on file   Occupational History   • Not on file   Tobacco Use   • Smoking status: Never     Passive exposure: Never   • Smokeless tobacco: Never   Vaping Use   • Vaping Use: Never used   Substance and Sexual Activity   • Alcohol use: Not Currently     Alcohol/week: 1 0 standard drink     Types: 1 Glasses of wine per week     Comment: on holidays add an extra glass of wine   • Drug use: Never   • Sexual activity: Not Currently     Partners: Male     Birth control/protection: None   Other Topics Concern   • Not on file   Social History Narrative    ** Merged History Encounter **         Tobacco smoking status:   Never smoker    Smoking - how much:   None    Never used smokeless tobacco  Former smokeless tobacco user  Current snuff user  Currently chews tobacco  Currently uses moist powdered tobacco  ----  Not indicated  Not tolerated  P    atient refused     Never used electronic cigarettes  Former user of electronic cigarettes  Current user of electronic cigarettes     Occupation:   retired teacher    Marital status:       Exercise level:   Occasional    Diet:   Regular     Alcohol     intake:   Occasional    Caffeine intake:   Occasional    Seat belts used routinely:   Yes    Sunscreen used routinely:   Yes     Smoke alarm in home:   Yes    Advance directive:   No    Live alone or with others:   with others                                                                                                                                                                                                                                                                                                                                                                                                                                                          Last modified by [id number]   07-, 03:29      Social Determinants of Health     Financial Resource Strain: Not on file   Food Insecurity: No Food Insecurity   • Worried About Running Out of Food in the Last Year: Never true   • Ran Out of Food in the Last Year: Never true   Transportation Needs: No Transportation Needs   • Lack of Transportation (Medical): No   • Lack of Transportation (Non-Medical):  No   Physical Activity: Not on file   Stress: Not on file   Social Connections: Not on file   Intimate Partner Violence: Not on file   Housing Stability: Low Risk    • Unable to Pay for Housing in the Last Year: No   • Number of Places Lived in the Last Year: 1   • Unstable Housing in the Last Year: No     Past Medical History:   Diagnosis Date   • Allergy to sulfa drugs    • Arthritis 2000   • Closed fracture of third lumbar vertebra ([address] )    • Closed T12 spinal fracture ([facility] )    • Closed wedge compression fracture of T12 vertebra (HCC)    • Dementia ([facility] [address] )    • Diabetes mellitus ([facility] [address] ) 2000   • DM (diabetes mellitus), type 2 ([address] )    • H/O multiple allergies Novocaine,Darvocet, Sulfa, Accupril   • HL (hearing loss)    • Hyperlipidemia    • Hypertension    • Lumbar compression fracture (HCC)    • Memory loss 2020   • Nocturia    • Palpitations    • Paroxysmal atrial fibrillation (HCC)    • Thrombocytopenic disorder (HCC)    • Type 2 diabetes mellitus without complication ([facility] [address] )    • Wears glasses      Past Surgical History:   Procedure Laterality Date   • HERNIA REPAIR     • HYSTERECTOMY       Allergies   Allergen Reactions   • Accupril [Quinapril Hcl]    • Novocain [Procaine]    • Parabens    • Sulfa Antibiotics      Current Outpatient Medications on File Prior to Visit   Medication Sig Dispense Refill   • apixaban (ELIQUIS) 5 mg Take 1 tablet (5 mg total) by mouth 2 (two) times a day 180 tablet 3   • ezetimibe-simvastatin (VYTORIN) 10-20 mg per tablet Take 1 tablet by mouth daily at bedtime 90 tablet 3   • FREESTYLE LITE test strip USE 1 EACH DAILY USE AS INSTRUCTED 100 strip 5   • Glucosamine HCl (GLUCOSAMINE PO) 1 po daily     • Insulin Glargine Solostar (Lantus SoloStar) 100 UNIT/ML SOPN Inject 0 16 mL (16 Units total) under the skin in the morning 15 mL 3   • insulin lispro (HumaLOG KwikPen) 100 units/mL injection pen Inject 7 Units under the skin 3 (three) times a day with meals 30 mL 3   • Insulin Pen Needle (BD AutoShield Duo) 30G X 5 MM MISC USE DAILY AS DIRECTED WITH INSULIN  each 5   • metFORMIN (GLUCOPHAGE) 850 mg tablet Take 1 tablet (850 mg total) by mouth 2 (two) times a day 180 tablet 3   • metoprolol succinate (TOPROL-XL) 50 mg 24 hr tablet Take 1 tablet (50 mg total) by mouth daily 90 tablet 3   • potassium chloride (K-DUR,KLOR-CON) 20 mEq tablet Take 1 tablet (20 mEq total) by mouth daily 90 tablet 3   • valsartan-hydrochlorothiazide (DIOVAN-HCT) 160-12 5 MG per tablet Take 1 tablet by mouth daily 90 tablet 3     No current facility-administered medications on file prior to visit         Review of Systems   Unable to perform ROS: Dementia "        Objective: There were no vitals filed for this visit  Physical Exam  Constitutional:       Appearance: She is well-developed  HENT:      Head: Normocephalic and atraumatic  Eyes:      General: No scleral icterus  Conjunctiva/sclera: Conjunctivae normal    Cardiovascular:      Comments: No discernible arrhthymias  Pulmonary:      Effort: Pulmonary effort is normal  No respiratory distress  Breath sounds: No stridor  Abdominal:      General: There is no distension  Palpations: Abdomen is soft  Musculoskeletal:      Cervical back: Neck supple  Left knee: No effusion  Skin:     General: Skin is warm and dry  Findings: No erythema  Left Knee Exam     Tenderness   The patient is experiencing tenderness in the medial joint line  Range of Motion   The patient has normal left knee ROM  Tests   Varus: negative Valgus: negative    Other   Erythema: absent  Scars: present  Sensation: normal  Pulse: present  Swelling: mild  Effusion: no effusion present            I have personally reviewed pertinent films in PACS  Procedures  No Procedures performed today    Scribe Attestation    I,:  Reinaldo Waddell PA-C am acting as a scribe while in the presence of the attending physician :       I,:  Dulce Hurst, DO personally performed the services described in this documentation    as scribed in my presence :             Portions of the record may have been created with voice recognition software  Occasional wrong word or \"sound a like\" substitutions may have occurred due to the inherent limitations of voice recognition software  Read the chart carefully and recognize, using context, where substitutions have occurred    "

## 2023-05-23 ENCOUNTER — OFFICE VISIT (OUTPATIENT)
Dept: FAMILY MEDICINE CLINIC | Facility: CLINIC | Age: 88
End: 2023-05-23

## 2023-05-23 VITALS
OXYGEN SATURATION: 97 % | BODY MASS INDEX: 33.87 KG/M2 | RESPIRATION RATE: 16 BRPM | TEMPERATURE: 99 F | HEART RATE: 75 BPM | SYSTOLIC BLOOD PRESSURE: 140 MMHG | HEIGHT: 61 IN | WEIGHT: 179.38 LBS | DIASTOLIC BLOOD PRESSURE: 80 MMHG

## 2023-05-23 DIAGNOSIS — E11.65 TYPE 2 DIABETES MELLITUS WITH HYPERGLYCEMIA, UNSPECIFIED WHETHER LONG TERM INSULIN USE (HCC): ICD-10-CM
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
## 2023-05-23 DIAGNOSIS — F03.90 DEMENTIA WITHOUT BEHAVIORAL DISTURBANCE (HCC): ICD-10-CM

## 2023-05-23 DIAGNOSIS — I48.0 PAF (PAROXYSMAL ATRIAL FIBRILLATION) (HCC): ICD-10-CM

## 2023-05-23 DIAGNOSIS — I10 ESSENTIAL HYPERTENSION: ICD-10-CM

## 2023-05-23 DIAGNOSIS — Z09 NEED FOR FOLLOW-UP CARE AFTER DISCHARGE: Primary | ICD-10-CM

## 2023-05-23 DIAGNOSIS — E78.00 PURE HYPERCHOLESTEROLEMIA: ICD-10-CM

## 2023-05-23 DIAGNOSIS — S32.030A COMPRESSION FRACTURE OF L3 VERTEBRA, INITIAL ENCOUNTER (HCC): ICD-10-CM

## 2023-05-23 DIAGNOSIS — N18.31 STAGE 3A CHRONIC KIDNEY DISEASE (HCC): ICD-10-CM

## 2023-05-23 DIAGNOSIS — M17.12 PRIMARY OSTEOARTHRITIS OF LEFT KNEE: ICD-10-CM

## 2023-05-23 NOTE — ASSESSMENT & PLAN NOTE
BP running a bit high today, although not outrageously so-will continue valsartan/hctz and metoprolol XL for now and Bolivar Radha is going to monitor and record her bp at home for awhile-if it's running really high will have to adjust meds

## 2023-05-23 NOTE — ASSESSMENT & PLAN NOTE
Angy Alarcon is in rate controlled a fib-self discontinued Eliquis because it was giving her side effects-I did explain to them that with her   CHADS score there is a risk of stroke with the a fib and not being on blood thinners-of course there's also a risk of a brain bleed with being on ac-for now, Angy Alarcon will take 2 baby ASA daily and she will see Cardiologist next week so they can render an opinion

## 2023-05-23 NOTE — PROGRESS NOTES
Assessment/Plan:         Problem List Items Addressed This Visit        Endocrine    Type 2 diabetes mellitus with hyperglycemia ([facility] [address] )       Lab Results   Component Value Date    HGBA1C 9 0 (H) 04/14/2023   A1c has definitely bumped up-while in the hospital/rehab they doubled the metformin and put her on lantus and pre meal insulin  Per the sugar log this seems to be working for her, although it is very labor intensive for her   He would like to stick with this regimen for awhile, and then consider changing to a GLP 1 plus metformin or even a DPP4 and metformin and watching diet            Cardiovascular and Mediastinum    Essential hypertension     BP running a bit high today, although not outrageously so-will continue valsartan/hctz and metoprolol XL for now and Wesley Landencharisse is going to monitor and record her bp at home for awhile-if it's running really high will have to adjust meds         PAF (paroxysmal atrial fibrillation) ([facility] [address] )     Phyllis Cassidy is in rate controlled a fib-self discontinued Eliquis because it was giving her side effects-I did explain to them that with her   CHADS score there is a risk of stroke with the a fib and not being on blood thinners-of course there's also a risk of a brain bleed with being on ac-for now, Phyllis Cassidy will take 2 baby ASA daily and she will see Cardiologist next week so they can render an opinion            Nervous and Auditory    Dementia without behavioral disturbance ([facility] [address] )       Musculoskeletal and Integument    Primary osteoarthritis of left knee    Compression fracture of L3 vertebra (HCC)       Genitourinary    Stage 3a chronic kidney disease ([facility] [address] )       Other    Pure hypercholesterolemia    Fall   Other Visit Diagnoses     Need for follow-up care after discharge    -  Primary    Follow-up from [facility]             Subjective:      Patient ID: Eddie Frye is a 80 y o  female      Phyllis Cassidy here with her  Ray-she is freshly out of rehab s/p hospitalization for weakness, fall, A1c up to 9, and ambulatory dysfunction secondary to left knee severe OA and hemmorhagic effusion-she's back at [facility] now and is getting around with a walker or with a wheelchair for longer trips  While in the rehab they put her on a regimen of lantus insulin and premeal insulin tid  Her  showed me her sugar logs and with the exception of a few sugars most are under decent control in the AM and post prandial   Of note, Barbara Terry self d/tram the Eliquis she was on for her PAF/high CHADS VASC score, because she felt as though it was making her worse mentally and she had the bleeding in the knee etc-we did have a discussion about PAF and stroke risk, and also discussed risks of falling while on Eliquis  The following portions of the patient's history were reviewed and updated as appropriate:   Past Medical History:  She has a past medical history of Allergy to sulfa drugs, Arthritis (2000), Closed fracture of third lumbar vertebra (HCC), Closed T12 spinal fracture ([address] ), Closed wedge compression fracture of T12 vertebra ([address] ), Dementia ([address] ), Diabetes mellitus ([address] ) (2000), DM (diabetes mellitus), type 2 ([address] ), H/O multiple allergies, HL (hearing loss), Hyperlipidemia, Hypertension, Lumbar compression fracture ([address] ), Memory loss (2020), Nocturia, Palpitations, Paroxysmal atrial fibrillation ([address] ), Thrombocytopenic disorder ([address] ), Type 2 diabetes mellitus without complication ([address] ), and Wears glasses  ,  _______________________________________________________________________  Medical Problems:  does not have any pertinent problems on file ,  _______________________________________________________________________  Past Surgical History:   has a past surgical history that includes Hysterectomy and Hernia repair  ,  _______________________________________________________________________  Family History:  family history includes Diabetes in her mother; Heart disease in her father; Hypertension in her father and mother; Other in her mother ,  _______________________________________________________________________  Social History:   reports that she has never smoked  She has never been exposed to tobacco smoke  She has never used smokeless tobacco  She reports that she does not currently use alcohol after a past usage of about 1 0 standard drink per week  She reports that she does not use drugs  ,  _______________________________________________________________________  Allergies:  is allergic to accupril [quinapril hcl], novocain [procaine], parabens, and sulfa antibiotics     _______________________________________________________________________  Current Outpatient Medications   Medication Sig Dispense Refill   • ezetimibe-simvastatin (VYTORIN) 10-20 mg per tablet Take 1 tablet by mouth daily at bedtime 90 tablet 3   • Insulin Glargine Solostar (Lantus SoloStar) 100 UNIT/ML SOPN Inject 0 16 mL (16 Units total) under the skin in the morning (Patient taking differently: Inject 14 Units under the skin daily at bedtime) 15 mL 3   • insulin lispro (HumaLOG KwikPen) 100 units/mL injection pen Inject 7 Units under the skin 3 (three) times a day with meals (Patient taking differently: Inject 8 Units under the skin 3 (three) times a day with meals) 30 mL 3   • metFORMIN (GLUCOPHAGE) 850 mg tablet Take 1 tablet (850 mg total) by mouth 2 (two) times a day 180 tablet 3   • metoprolol succinate (TOPROL-XL) 50 mg 24 hr tablet Take 1 tablet (50 mg total) by mouth daily 90 tablet 3   • potassium chloride (K-DUR,KLOR-CON) 20 mEq tablet Take 1 tablet (20 mEq total) by mouth daily 90 tablet 3   • valsartan-hydrochlorothiazide (DIOVAN-HCT) 160-12 5 MG per tablet Take 1 tablet by mouth daily 90 tablet 3   • apixaban (ELIQUIS) 5 mg Take 1 tablet (5 mg total) by mouth 2 (two) times a day (Patient not taking: Reported on 5/23/2023) 180 tablet 3   • FREESTYLE LITE test strip USE 1 EACH DAILY USE AS INSTRUCTED 100 "strip 5   • Insulin Pen Needle (BD AutoShield Duo) 30G X 5 MM MISC USE DAILY AS DIRECTED WITH INSULIN  each 5     No current facility-administered medications for this visit      _______________________________________________________________________  Review of Systems   Constitutional: Negative  HENT: Negative  Respiratory: Negative  Cardiovascular: Negative  Gastrointestinal: Negative  Musculoskeletal: Positive for arthralgias, back pain and gait problem  Neurological: Negative for dizziness, tremors, facial asymmetry and speech difficulty  Psychiatric/Behavioral: Positive for confusion  Objective:  Vitals:    05/23/23 1513 05/23/23 1602   BP: 140/80 140/80   BP Location: Left arm    Patient Position: Sitting    Cuff Size: Large    Pulse: 75    Resp: 16    Temp: 99 °F (37 2 °C)    TempSrc: Tympanic    SpO2: 97%    Weight: 81 4 kg (179 lb 6 oz)    Height: 5' 1\" (1 549 m)      Body mass index is 33 89 kg/m²  Physical Exam  Constitutional:       Appearance: She is obese  HENT:      Head: Normocephalic and atraumatic  Right Ear: External ear normal       Left Ear: External ear normal       Nose: Nose normal       Mouth/Throat:      Mouth: Mucous membranes are moist    Eyes:      Extraocular Movements: Extraocular movements intact  Pupils: Pupils are equal, round, and reactive to light  Cardiovascular:      Rate and Rhythm: Rhythm irregular  Heart sounds: No murmur heard  Pulmonary:      Effort: Pulmonary effort is normal       Breath sounds: Normal breath sounds  Abdominal:      Palpations: Abdomen is soft  Musculoskeletal:         General: Normal range of motion  Cervical back: Normal range of motion and neck supple  Right lower leg: No edema  Left lower leg: No edema  Skin:     General: Skin is warm  Capillary Refill: Capillary refill takes less than 2 seconds  Neurological:      General: No focal deficit present        Mental " Status: She is alert  Mental status is at baseline  Psychiatric:         Mood and Affect: Mood normal          Behavior: Behavior normal          Thought Content:  Thought content normal

## 2023-05-23 NOTE — ASSESSMENT & PLAN NOTE
Lab Results   Component Value Date    HGBA1C 9 0 (H) 04/14/2023   A1c has definitely bumped up-while in the hospital/rehab they doubled the metformin and put her on lantus and pre meal insulin  Per the sugar log this seems to be working for her, although it is very labor intensive for her     He would like to stick with this regimen for awhile, and then consider changing to a GLP 1 plus metformin or even a DPP4 and metformin and watching diet

## 2023-05-31 ENCOUNTER — HOSPITAL ENCOUNTER (OUTPATIENT)
Dept: RADIOLOGY | Age: 88
Discharge: HOME | End: 2023-05-31

## 2023-05-31 DIAGNOSIS — S32.039A CLOSED FRACTURE OF THIRD LUMBAR VERTEBRA, UNSPECIFIED FRACTURE MORPHOLOGY, INITIAL ENCOUNTER (HCC): ICD-10-CM

## 2023-05-31 NOTE — ASSESSMENT & PLAN NOTE
· Complaining of ongoing left knee pain   · xrays pending, but no acute fx noted   · Pain control with patient

## 2023-06-08 ENCOUNTER — HOSPITAL ENCOUNTER (INPATIENT)
Facility: HOSPITAL | Age: 88
LOS: 1 days | Discharge: HOME | End: 2023-06-10
Attending: EMERGENCY MEDICINE | Admitting: INTERNAL MEDICINE
Payer: MEDICARE

## 2023-06-08 ENCOUNTER — APPOINTMENT (EMERGENCY)
Dept: RADIOLOGY | Facility: HOSPITAL | Age: 88
End: 2023-06-08
Payer: MEDICARE

## 2023-06-08 DIAGNOSIS — I10 ESSENTIAL HYPERTENSION: ICD-10-CM

## 2023-06-08 DIAGNOSIS — E11.65 TYPE 2 DIABETES MELLITUS WITH HYPERGLYCEMIA, WITH LONG-TERM CURRENT USE OF INSULIN (HCC): ICD-10-CM

## 2023-06-08 DIAGNOSIS — M25.562 LEFT KNEE PAIN: Primary | ICD-10-CM

## 2023-06-08 DIAGNOSIS — I48.91 ATRIAL FIBRILLATION WITH RAPID VENTRICULAR RESPONSE (HCC): ICD-10-CM

## 2023-06-08 DIAGNOSIS — M00.9 SEPTIC ARTHRITIS (HCC): ICD-10-CM

## 2023-06-08 DIAGNOSIS — Z79.4 TYPE 2 DIABETES MELLITUS WITH HYPERGLYCEMIA, WITH LONG-TERM CURRENT USE OF INSULIN (HCC): ICD-10-CM

## 2023-06-08 LAB
ALBUMIN SERPL BCP-MCNC: 4.2 G/DL (ref 3.5–5)
ALP SERPL-CCNC: 88 U/L (ref 34–104)
ALT SERPL W P-5'-P-CCNC: 7 U/L (ref 7–52)
ANION GAP SERPL CALCULATED.3IONS-SCNC: 9 MMOL/L (ref 4–13)
AST SERPL W P-5'-P-CCNC: 12 U/L (ref 13–39)
BASOPHILS # BLD AUTO: 0.02 THOUSANDS/ÂΜL (ref 0–0.1)
BASOPHILS NFR BLD AUTO: 0 % (ref 0–1)
BILIRUB SERPL-MCNC: 0.53 MG/DL (ref 0.2–1)
BUN SERPL-MCNC: 28 MG/DL (ref 5–25)
CALCIUM SERPL-MCNC: 9.8 MG/DL (ref 8.4–10.2)
CARDIAC TROPONIN I PNL SERPL HS: 15 NG/L
CHLORIDE SERPL-SCNC: 98 MMOL/L (ref 96–108)
CO2 SERPL-SCNC: 28 MMOL/L (ref 21–32)
CREAT SERPL-MCNC: 1.08 MG/DL (ref 0.6–1.3)
EOSINOPHIL # BLD AUTO: 0.06 THOUSAND/ÂΜL (ref 0–0.61)
EOSINOPHIL NFR BLD AUTO: 1 % (ref 0–6)
ERYTHROCYTE [DISTWIDTH] IN BLOOD BY AUTOMATED COUNT: 15.5 % (ref 11.6–15.1)
GFR SERPL CREATININE-BSD FRML MDRD: 46 ML/MIN/1.73SQ M
GLUCOSE SERPL-MCNC: 202 MG/DL (ref 65–140)
HCT VFR BLD AUTO: 39.9 % (ref 34.8–46.1)
HGB BLD-MCNC: 12.5 G/DL (ref 11.5–15.4)
IMM GRANULOCYTES # BLD AUTO: 0.03 THOUSAND/UL (ref 0–0.2)
IMM GRANULOCYTES NFR BLD AUTO: 0 % (ref 0–2)
LYMPHOCYTES # BLD AUTO: 0.55 THOUSANDS/ÂΜL (ref 0.6–4.47)
LYMPHOCYTES NFR BLD AUTO: 7 % (ref 14–44)
MCH RBC QN AUTO: 28.2 PG (ref 26.8–34.3)
MCHC RBC AUTO-ENTMCNC: 31.3 G/DL (ref 31.4–37.4)
MCV RBC AUTO: 90 FL (ref 82–98)
MONOCYTES # BLD AUTO: 0.72 THOUSAND/ÂΜL (ref 0.17–1.22)
MONOCYTES NFR BLD AUTO: 9 % (ref 4–12)
NEUTROPHILS # BLD AUTO: 6.92 THOUSANDS/ÂΜL (ref 1.85–7.62)
NEUTS SEG NFR BLD AUTO: 83 % (ref 43–75)
NRBC BLD AUTO-RTO: 0 /100 WBCS
PLATELET # BLD AUTO: 169 THOUSANDS/UL (ref 149–390)
PMV BLD AUTO: 11 FL (ref 8.9–12.7)
POTASSIUM SERPL-SCNC: 4 MMOL/L (ref 3.5–5.3)
PROT SERPL-MCNC: 7.6 G/DL (ref 6.4–8.4)
RBC # BLD AUTO: 4.43 MILLION/UL (ref 3.81–5.12)
SODIUM SERPL-SCNC: 135 MMOL/L (ref 135–147)
WBC # BLD AUTO: 8.3 THOUSAND/UL (ref 4.31–10.16)

## 2023-06-08 PROCEDURE — 87040 BLOOD CULTURE FOR BACTERIA: CPT

## 2023-06-08 PROCEDURE — 96376 TX/PRO/DX INJ SAME DRUG ADON: CPT

## 2023-06-08 PROCEDURE — 85379 FIBRIN DEGRADATION QUANT: CPT

## 2023-06-08 PROCEDURE — 83605 ASSAY OF LACTIC ACID: CPT

## 2023-06-08 PROCEDURE — 96365 THER/PROPH/DIAG IV INF INIT: CPT

## 2023-06-08 PROCEDURE — 71045 X-RAY EXAM CHEST 1 VIEW: CPT

## 2023-06-08 PROCEDURE — 85025 COMPLETE CBC W/AUTO DIFF WBC: CPT | Performed by: EMERGENCY MEDICINE

## 2023-06-08 PROCEDURE — 73564 X-RAY EXAM KNEE 4 OR MORE: CPT

## 2023-06-08 PROCEDURE — 84484 ASSAY OF TROPONIN QUANT: CPT | Performed by: EMERGENCY MEDICINE

## 2023-06-08 PROCEDURE — 84443 ASSAY THYROID STIM HORMONE: CPT

## 2023-06-08 PROCEDURE — 99285 EMERGENCY DEPT VISIT HI MDM: CPT

## 2023-06-08 PROCEDURE — 0241U HB NFCT DS VIR RESP RNA 4 TRGT: CPT

## 2023-06-08 PROCEDURE — 81001 URINALYSIS AUTO W/SCOPE: CPT

## 2023-06-08 PROCEDURE — 36415 COLL VENOUS BLD VENIPUNCTURE: CPT

## 2023-06-08 PROCEDURE — 93005 ELECTROCARDIOGRAM TRACING: CPT

## 2023-06-08 PROCEDURE — 80053 COMPREHEN METABOLIC PANEL: CPT | Performed by: EMERGENCY MEDICINE

## 2023-06-08 RX ORDER — DILTIAZEM HYDROCHLORIDE 5 MG/ML
10 INJECTION INTRAVENOUS ONCE
Status: COMPLETED | OUTPATIENT
Start: 2023-06-08 | End: 2023-06-08

## 2023-06-08 RX ADMIN — DILTIAZEM HYDROCHLORIDE 10 MG/HR: 5 INJECTION INTRAVENOUS at 23:21

## 2023-06-08 RX ADMIN — DILTIAZEM HYDROCHLORIDE 10 MG: 5 INJECTION INTRAVENOUS at 23:09

## 2023-06-09 ENCOUNTER — TELEPHONE (OUTPATIENT)
Dept: FAMILY MEDICINE CLINIC | Facility: CLINIC | Age: 88
End: 2023-06-09

## 2023-06-09 ENCOUNTER — APPOINTMENT (EMERGENCY)
Dept: CT IMAGING | Facility: HOSPITAL | Age: 88
End: 2023-06-09
Payer: MEDICARE

## 2023-06-09 DIAGNOSIS — E11.9 TYPE 2 DIABETES MELLITUS WITHOUT COMPLICATION, WITHOUT LONG-TERM CURRENT USE OF INSULIN (HCC): ICD-10-CM

## 2023-06-09 PROBLEM — R93.5 ABNORMAL ABDOMINAL CT SCAN: Status: ACTIVE | Noted: 2023-06-09

## 2023-06-09 LAB
2HR DELTA HS TROPONIN: 1 NG/L
APPEARANCE FLD: ABNORMAL
BACTERIA UR QL AUTO: NORMAL /HPF
BILIRUB UR QL STRIP: NEGATIVE
CARDIAC TROPONIN I PNL SERPL HS: 16 NG/L
CLARITY UR: CLEAR
COLOR FLD: ABNORMAL
COLOR UR: ABNORMAL
CRYSTALS SNV QL MICRO: NORMAL
D DIMER PPP FEU-MCNC: 2.09 UG/ML FEU
FLUAV RNA RESP QL NAA+PROBE: NEGATIVE
FLUBV RNA RESP QL NAA+PROBE: NEGATIVE
GLUCOSE SERPL-MCNC: 146 MG/DL (ref 65–140)
GLUCOSE SERPL-MCNC: 160 MG/DL (ref 65–140)
GLUCOSE SERPL-MCNC: 188 MG/DL (ref 65–140)
GLUCOSE SERPL-MCNC: 251 MG/DL (ref 65–140)
GLUCOSE UR STRIP-MCNC: ABNORMAL MG/DL
GRAM STN SPEC: NORMAL
HGB UR QL STRIP.AUTO: NEGATIVE
KETONES UR STRIP-MCNC: NEGATIVE MG/DL
LACTATE SERPL-SCNC: 1.4 MMOL/L (ref 0.5–2)
LEUKOCYTE ESTERASE UR QL STRIP: NEGATIVE
LYMPHOCYTES # SNV MANUAL: 4 %
MONOCYTES NFR SNV MANUAL: 4 %
NEUTROPHILS NFR SNV MANUAL: 92 %
NITRITE UR QL STRIP: NEGATIVE
NON-SQ EPI CELLS URNS QL MICRO: NORMAL /HPF
PH UR STRIP.AUTO: 7 [PH]
PROT UR STRIP-MCNC: ABNORMAL MG/DL
RBC # SNV MANUAL: ABNORMAL /UL (ref 0–10)
RBC #/AREA URNS AUTO: NORMAL /HPF
RSV RNA RESP QL NAA+PROBE: NEGATIVE
SARS-COV-2 RNA RESP QL NAA+PROBE: NEGATIVE
SITE: ABNORMAL
SP GR UR STRIP.AUTO: 1.01 (ref 1–1.03)
TOTAL CELLS COUNTED SPEC: 100
TSH SERPL DL<=0.05 MIU/L-ACNC: 3.79 UIU/ML (ref 0.45–4.5)
UROBILINOGEN UR STRIP-ACNC: <2 MG/DL
WBC # FLD MANUAL: ABNORMAL /UL (ref 0–200)
WBC #/AREA URNS AUTO: NORMAL /HPF

## 2023-06-09 PROCEDURE — 87081 CULTURE SCREEN ONLY: CPT | Performed by: INTERNAL MEDICINE

## 2023-06-09 PROCEDURE — 96375 TX/PRO/DX INJ NEW DRUG ADDON: CPT

## 2023-06-09 PROCEDURE — 96366 THER/PROPH/DIAG IV INF ADDON: CPT

## 2023-06-09 PROCEDURE — 89060 EXAM SYNOVIAL FLUID CRYSTALS: CPT

## 2023-06-09 PROCEDURE — 36415 COLL VENOUS BLD VENIPUNCTURE: CPT

## 2023-06-09 PROCEDURE — 84484 ASSAY OF TROPONIN QUANT: CPT | Performed by: EMERGENCY MEDICINE

## 2023-06-09 PROCEDURE — 71275 CT ANGIOGRAPHY CHEST: CPT

## 2023-06-09 PROCEDURE — 99223 1ST HOSP IP/OBS HIGH 75: CPT | Performed by: INTERNAL MEDICINE

## 2023-06-09 PROCEDURE — 82948 REAGENT STRIP/BLOOD GLUCOSE: CPT

## 2023-06-09 PROCEDURE — 0S9D3ZZ DRAINAGE OF LEFT KNEE JOINT, PERCUTANEOUS APPROACH: ICD-10-PCS | Performed by: EMERGENCY MEDICINE

## 2023-06-09 PROCEDURE — 87205 SMEAR GRAM STAIN: CPT

## 2023-06-09 PROCEDURE — 73700 CT LOWER EXTREMITY W/O DYE: CPT

## 2023-06-09 PROCEDURE — 87040 BLOOD CULTURE FOR BACTERIA: CPT

## 2023-06-09 PROCEDURE — G1004 CDSM NDSC: HCPCS

## 2023-06-09 PROCEDURE — 87070 CULTURE OTHR SPECIMN AEROBIC: CPT

## 2023-06-09 PROCEDURE — 89051 BODY FLUID CELL COUNT: CPT

## 2023-06-09 PROCEDURE — 89050 BODY FLUID CELL COUNT: CPT

## 2023-06-09 PROCEDURE — 97163 PT EVAL HIGH COMPLEX 45 MIN: CPT

## 2023-06-09 RX ORDER — LIDOCAINE HYDROCHLORIDE 10 MG/ML
5 INJECTION, SOLUTION EPIDURAL; INFILTRATION; INTRACAUDAL; PERINEURAL ONCE
Status: COMPLETED | OUTPATIENT
Start: 2023-06-09 | End: 2023-06-09

## 2023-06-09 RX ORDER — PRAVASTATIN SODIUM 40 MG
40 TABLET ORAL
Status: DISCONTINUED | OUTPATIENT
Start: 2023-06-09 | End: 2023-06-10 | Stop reason: HOSPADM

## 2023-06-09 RX ORDER — INSULIN LISPRO 100 [IU]/ML
1-5 INJECTION, SOLUTION INTRAVENOUS; SUBCUTANEOUS
Status: DISCONTINUED | OUTPATIENT
Start: 2023-06-09 | End: 2023-06-10 | Stop reason: HOSPADM

## 2023-06-09 RX ORDER — POTASSIUM CHLORIDE 20 MEQ/1
20 TABLET, EXTENDED RELEASE ORAL DAILY
Status: DISCONTINUED | OUTPATIENT
Start: 2023-06-09 | End: 2023-06-10 | Stop reason: HOSPADM

## 2023-06-09 RX ORDER — METOPROLOL TARTRATE 5 MG/5ML
5 INJECTION INTRAVENOUS EVERY 6 HOURS PRN
Status: DISCONTINUED | OUTPATIENT
Start: 2023-06-09 | End: 2023-06-10 | Stop reason: HOSPADM

## 2023-06-09 RX ORDER — METOPROLOL SUCCINATE 50 MG/1
50 TABLET, EXTENDED RELEASE ORAL DAILY
Status: DISCONTINUED | OUTPATIENT
Start: 2023-06-09 | End: 2023-06-10 | Stop reason: HOSPADM

## 2023-06-09 RX ORDER — INSULIN GLARGINE 100 [IU]/ML
14 INJECTION, SOLUTION SUBCUTANEOUS
Status: DISCONTINUED | OUTPATIENT
Start: 2023-06-09 | End: 2023-06-10 | Stop reason: HOSPADM

## 2023-06-09 RX ORDER — INSULIN LISPRO 100 [IU]/ML
5 INJECTION, SOLUTION INTRAVENOUS; SUBCUTANEOUS
Status: DISCONTINUED | OUTPATIENT
Start: 2023-06-09 | End: 2023-06-10 | Stop reason: HOSPADM

## 2023-06-09 RX ORDER — EZETIMIBE 10 MG/1
10 TABLET ORAL
Status: DISCONTINUED | OUTPATIENT
Start: 2023-06-09 | End: 2023-06-10 | Stop reason: HOSPADM

## 2023-06-09 RX ORDER — ONDANSETRON 2 MG/ML
4 INJECTION INTRAMUSCULAR; INTRAVENOUS EVERY 6 HOURS PRN
Status: DISCONTINUED | OUTPATIENT
Start: 2023-06-09 | End: 2023-06-10 | Stop reason: HOSPADM

## 2023-06-09 RX ORDER — BLOOD-GLUCOSE METER
KIT MISCELLANEOUS
Qty: 100 STRIP | Refills: 5 | Status: SHIPPED | OUTPATIENT
Start: 2023-06-09

## 2023-06-09 RX ORDER — SENNOSIDES 8.6 MG
1 TABLET ORAL DAILY
Status: DISCONTINUED | OUTPATIENT
Start: 2023-06-09 | End: 2023-06-10 | Stop reason: HOSPADM

## 2023-06-09 RX ORDER — ACETAMINOPHEN 325 MG/1
975 TABLET ORAL EVERY 8 HOURS SCHEDULED
Status: DISCONTINUED | OUTPATIENT
Start: 2023-06-09 | End: 2023-06-10 | Stop reason: HOSPADM

## 2023-06-09 RX ORDER — LOSARTAN POTASSIUM 50 MG/1
100 TABLET ORAL DAILY
Status: DISCONTINUED | OUTPATIENT
Start: 2023-06-09 | End: 2023-06-10 | Stop reason: HOSPADM

## 2023-06-09 RX ORDER — HYDROCHLOROTHIAZIDE 12.5 MG/1
12.5 TABLET ORAL DAILY
Status: DISCONTINUED | OUTPATIENT
Start: 2023-06-09 | End: 2023-06-10 | Stop reason: HOSPADM

## 2023-06-09 RX ADMIN — POTASSIUM CHLORIDE 20 MEQ: 1500 TABLET, EXTENDED RELEASE ORAL at 09:04

## 2023-06-09 RX ADMIN — CEFTRIAXONE SODIUM 1000 MG: 10 INJECTION, POWDER, FOR SOLUTION INTRAVENOUS at 05:28

## 2023-06-09 RX ADMIN — LIDOCAINE HYDROCHLORIDE 5 ML: 10 INJECTION, SOLUTION EPIDURAL; INFILTRATION; INTRACAUDAL; PERINEURAL at 04:58

## 2023-06-09 RX ADMIN — INSULIN LISPRO 5 UNITS: 100 INJECTION, SOLUTION INTRAVENOUS; SUBCUTANEOUS at 17:41

## 2023-06-09 RX ADMIN — APIXABAN 2.5 MG: 2.5 TABLET, FILM COATED ORAL at 17:40

## 2023-06-09 RX ADMIN — PRAVASTATIN SODIUM 40 MG: 40 TABLET ORAL at 17:40

## 2023-06-09 RX ADMIN — HYDROCHLOROTHIAZIDE 12.5 MG: 12.5 TABLET ORAL at 09:03

## 2023-06-09 RX ADMIN — INSULIN LISPRO 5 UNITS: 100 INJECTION, SOLUTION INTRAVENOUS; SUBCUTANEOUS at 09:04

## 2023-06-09 RX ADMIN — VANCOMYCIN HYDROCHLORIDE 1250 MG: 5 INJECTION, POWDER, LYOPHILIZED, FOR SOLUTION INTRAVENOUS at 06:19

## 2023-06-09 RX ADMIN — APIXABAN 2.5 MG: 2.5 TABLET, FILM COATED ORAL at 09:04

## 2023-06-09 RX ADMIN — EZETIMIBE 10 MG: 10 TABLET ORAL at 17:41

## 2023-06-09 RX ADMIN — IOHEXOL 61 ML: 350 INJECTION, SOLUTION INTRAVENOUS at 02:00

## 2023-06-09 RX ADMIN — LOSARTAN POTASSIUM 100 MG: 50 TABLET, FILM COATED ORAL at 09:03

## 2023-06-09 RX ADMIN — ACETAMINOPHEN 975 MG: 325 TABLET, FILM COATED ORAL at 06:20

## 2023-06-09 RX ADMIN — SENNOSIDES 8.6 MG: 8.6 TABLET, FILM COATED ORAL at 09:04

## 2023-06-09 RX ADMIN — ACETAMINOPHEN 975 MG: 325 TABLET, FILM COATED ORAL at 14:08

## 2023-06-09 RX ADMIN — INSULIN LISPRO 1 UNITS: 100 INJECTION, SOLUTION INTRAVENOUS; SUBCUTANEOUS at 09:04

## 2023-06-09 RX ADMIN — INSULIN LISPRO 2 UNITS: 100 INJECTION, SOLUTION INTRAVENOUS; SUBCUTANEOUS at 12:52

## 2023-06-09 RX ADMIN — INSULIN GLARGINE 14 UNITS: 100 INJECTION, SOLUTION SUBCUTANEOUS at 21:41

## 2023-06-09 RX ADMIN — ACETAMINOPHEN 975 MG: 325 TABLET, FILM COATED ORAL at 21:41

## 2023-06-09 RX ADMIN — INSULIN LISPRO 5 UNITS: 100 INJECTION, SOLUTION INTRAVENOUS; SUBCUTANEOUS at 12:52

## 2023-06-09 RX ADMIN — METOPROLOL SUCCINATE 50 MG: 50 TABLET, EXTENDED RELEASE ORAL at 09:03

## 2023-06-09 RX ADMIN — INSULIN LISPRO 1 UNITS: 100 INJECTION, SOLUTION INTRAVENOUS; SUBCUTANEOUS at 17:41

## 2023-06-09 NOTE — PLAN OF CARE
Problem: Potential for Falls  Goal: Patient will remain free of falls  Description: INTERVENTIONS:  - Educate patient/family on patient safety including physical limitations  - Instruct patient to call for assistance with activity   - Consult OT/PT to assist with strengthening/mobility   - Keep Call bell within reach  - Keep bed low and locked with side rails adjusted as appropriate  - Keep care items and personal belongings within reach  - Initiate and maintain comfort rounds  - Make Fall Risk Sign visible to staff  - Offer Toileting every 2 Hours, in advance of need  - Initiate/Maintain BED alarm  - Obtain necessary fall risk management equipment: ALARMS  - Apply yellow socks and bracelet for high fall risk patients  - Consider moving patient to room near nurses station  Outcome: Progressing     Problem: PAIN - ADULT  Goal: Verbalizes/displays adequate comfort level or baseline comfort level  Description: Interventions:  - Encourage patient to monitor pain and request assistance  - Assess pain using appropriate pain scale  - Administer analgesics based on type and severity of pain and evaluate response  - Implement non-pharmacological measures as appropriate and evaluate response  - Consider cultural and social influences on pain and pain management  - Notify physician/advanced practitioner if interventions unsuccessful or patient reports new pain  Outcome: Progressing     Problem: INFECTION - ADULT  Goal: Absence or prevention of progression during hospitalization  Description: INTERVENTIONS:  - Assess and monitor for signs and symptoms of infection  - Monitor lab/diagnostic results  - Monitor all insertion sites, i e  indwelling lines, tubes, and drains  - Monitor endotracheal if appropriate and nasal secretions for changes in amount and color  - Gall[place] appropriate cooling/warming therapies per order  - Administer medications as ordered  - Instruct and encourage patient and family to use good hand hygiene technique  - Identify and instruct in appropriate isolation precautions for identified infection/condition  Outcome: Progressing     Problem: DISCHARGE PLANNING  Goal: Discharge to home or other facility with appropriate resources  Description: INTERVENTIONS:  - Identify barriers to discharge w/patient and caregiver  - Arrange for needed discharge resources and transportation as appropriate  - Identify discharge learning needs (meds, wound care, etc )  - Arrange for interpretive services to assist at discharge as needed  - Refer to Case Management Department for coordinating discharge planning if the patient needs post-hospital services based on physician/advanced practitioner order or complex needs related to functional status, cognitive ability, or social support system  Outcome: Progressing     Problem: Knowledge Deficit  Goal: Patient/family/caregiver demonstrates understanding of disease process, treatment plan, medications, and discharge instructions  Description: Complete learning assessment and assess knowledge base    Interventions:  - Provide teaching at level of understanding  - Provide teaching via preferred learning methods  Outcome: Progressing

## 2023-06-09 NOTE — ASSESSMENT & PLAN NOTE
Lab Results   Component Value Date    HGBA1C 9 0 (H) 04/14/2023       Recent Labs     06/09/23  1022 06/09/23  1549 06/09/23  2109 06/10/23  0822   POCGLU 251* 188* 146* 152*       Blood Sugar Average: Last 72 hrs:  · (P) 179 4   · Home regimen: Lantus 14U @ bedtime and mealtime 5U TID w/ meals    Plan  · Continue insulin regimen  · Poorly controlled diabetes, improved while inpatient  · Fingersticks sliding scale coverage

## 2023-06-09 NOTE — ED PROCEDURE NOTE
PROCEDURE  CriticalCare Time    Date/Time: 6/9/2023 5:31 AM    Performed by: Arnoldo Barthel, MD  Authorized by: Arnoldo Barthel, MD    Critical care provider statement:     Critical care time (minutes):  35    Critical care time was exclusive of:  Separately billable procedures and treating other patients    Critical care was necessary to treat or prevent imminent or life-threatening deterioration of the following conditions: rapid a  fib      Critical care was time spent personally by me on the following activities:  Obtaining history from patient or surrogate, development of treatment plan with patient or surrogate, evaluation of patient's response to treatment, examination of patient, ordering and performing treatments and interventions, ordering and review of laboratory studies, ordering and review of radiographic studies, re-evaluation of patient's condition and review of old charts    I assumed direction of critical care for this patient from another provider in my specialty: no Arnoldo Barthel, MD  06/09/23 [id number]

## 2023-06-09 NOTE — ED NOTES
Bedside Knee Aspiration completed with Dr Amy Morales and Laurel HUDSON  All Specimens sent to lab  Pt with no adverse effects        Jessica May RN  06/09/23 6836

## 2023-06-09 NOTE — PHYSICAL THERAPY NOTE
Physical Therapy Evaluation    Patient's Name: Demar Woodard    Admitting Diagnosis  Septic arthritis ([facility] [address] ) [M00 9]  Left knee pain [M25 562]  At[facility] ) [I48 91]    Problem List  Patient Active Problem List   Diagnosis    Essential hypertension    Pure hypercholesterolemia    Microalbuminuria    Stage 3a chronic kidney disease ([facility] [place] [address] )    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[facility] [address] )    Dementia without behavioral disturbance (HCC)    Osteoarthritis of multiple joints    Bilateral hearing loss    Primary osteoarthritis of left knee    Hypertensive urgency    Hyponatremia    Metabolic encephalopathy    PAF (paroxysmal atrial fibrillation) (HCC)    Depression, recurrent (HCC)    Compression fracture of L3 vertebra (HCC)    Generalized weakness    Type 2 diabetes mellitus with hyperglycemia (HCC)    Knee pain    Fall    Abnormal abdominal CT scan       Past Medical History  Past Medical History:   Diagnosis Date    Allergy to sulfa drugs     Arthritis 2000    Closed fracture of third lumbar vertebra (HCC)     Closed T12 spinal fracture (HCC)     Closed wedge compression fracture of T12 vertebra (HCC)     Dementia (HCC)     Diabetes mellitus ([facility] [place] [address] ) 2000    DM (diabetes mellitus), type 2 (HCC)     H/O multiple allergies     Novocaine,Darvocet, Sulfa, Accupril    HL (hearing loss)     Hyperlipidemia     Hypertension     Lumbar compression fracture (HCC)     Memory loss 2020    Nocturia     Palpitations     Paroxysmal atrial fibrillation (HCC)     Thrombocytopenic disorder (HCC)     Type 2 diabetes mellitus without complication (HCC)     Wears glasses        Past Surgical History  Past Surgical History:   Procedure Laterality Date    DXA PROCEDURE (HISTORICAL)  05/31/2023    HERNIA REPAIR      HYSTERECTOMY            06/09/23 0939   PT Last Visit   PT Visit Date 06/09/23   Note Type   Note type Evaluation   Pain Assessment   Pain Assessment Tool 0-10   Pain Score No Pain  (no pain at rest, 6/10 with weight bearing activities)   Pain Location/Orientation Orientation: Left; Location: Knee   Effect of Pain on Daily Activities limits mobility, limits ambulatory distances   Restrictions/Precautions   Weight Bearing Precautions Per Order No   Other Precautions Chair Alarm; Bed Alarm;Cognitive; Fall Risk;Pain;Multiple lines   Home Living   Type of Home   ([facility])   Home Layout One level   [address]; [address]   Prior Function   Lives With Facility staff  ( also a resident of )   Falls in the last 6 months (S)  5 to 10  (significant fall history, 1 per this admission, pt admittied to at least 4 )   General   Additional Pertinent History 23:Currently reporting no back pain therefore doubt adding LSO brace would be of any benefit   Family/Caregiver Present No   Cognition   Overall Cognitive Status Impaired   Orientation Level Oriented to person;Oriented to situation;Disoriented to time;Disoriented to place   Following Commands Follows one step commands with increased time or repetition   Comments pt ID by wristband, name and    Subjective   Subjective pt supine in bed agreeable to PT, states her left knee does hurt when she moves   RLE Assessment   RLE Assessment WFL   Strength RLE   RLE Overall Strength 3+/5   LLE Assessment   LLE Assessment X   Strength LLE   LLE Overall Strength   (swelling limits knee flexion/extension, HF to 3-/5)   Bed Mobility   Supine to Sit 4  Minimal assistance   Additional items Assist x 1; Increased time required;LE management  (pt only requires assistance of L LE for bed mobility)   Sit to Supine Unable to assess   Additional Comments pt up in recliner chair at end of session   Transfers   Sit to Stand 5  Supervision   Additional items Assist x 1; Increased time required;Verbal cues  (vc for hand placement, not pulling up on RW)   Stand to Sit 5  Supervision   Additional items Assist x 1; Increased time required;Verbal cues  (vc for hand placement, body positioning)   Stand pivot 5  Supervision   Additional items Assist x 1; Increased time required;Verbal cues  (vc for hand placement, body positioning)   Ambulation/Elevation   Gait pattern Improper Weight shift;Decreased L stance;Decreased hip extension; Excessively slow; Antalgic   Gait Assistance 5  Supervision   Additional items Assist x 1;Verbal cues; Tactile cues  (cues for gait sequencing)   Assistive Device Rolling walker   Distance 4'x1   Ambulation/Elevation Additional Comments further ambulation not possible 2/2 L knee pain   Balance   Static Sitting Fair +   Dynamic Sitting Fair   Static Standing Fair -   Dynamic Standing Fair -   Ambulatory Fair -  (RW)   Activity Tolerance   Activity Tolerance Patient limited by pain   Nurse Made Aware RN pre/post   Assessment   Prognosis Fair   Problem List Decreased strength;Decreased endurance; Impaired balance;Decreased mobility; Decreased cognition; Impaired judgement;Decreased safety awareness; Obesity;Pain   Assessment Pt is a 80 y o  female seen for PT evaluation s/p admit to [address] [place] [address] on 6/8/2023  Pt was admitted with a primary dx of: knee pain  PT now consulted for assessment of mobility and d/c needs  Pt with Up and OOB as tolerated orders  Pts current comorbidities and personal factors effecting treatment include: BMI, significant history of falls, dementia, CKD, PAF, DM II  Pts current clinical presentation is Unstable/Unpredictable (high complexity) due to Ongoing medical management for primary dx, Decreased activity tolerance compared to baseline, Fall risk, Increased assistance needed from caregiver at current time, Ongoing telemetry monitoring  Prior to admission, pt was ambulating short distances however limited 2/2 Knee pain  Upon evaluation, pt currently is requiring Elliott for bed mobility; Supervision for transfers and Supervision for ambulation 4 ft w/ RW   Pt presents at PT eval functioning below baseline and currently w/ overall mobility deficits 2* to: BLE weakness, impaired balance, gait deviations, pain, decreased activity tolerance compared to baseline, decreased functional mobility tolerance compared to baseline, decreased safety awareness, impaired judgement, fall risk, decreased cognition  Pt currently at a fall risk 2* to impairments listed above  Pt will continue to benefit from skilled acute PT interventions to address stated impairments; to maximize functional mobility; for ongoing pt/ family training; and DME needs  At conclusion of PT session chair alarm engaged, all needs in reach, RN notified of session findings/recommendations and pt returned back in recliner chair with phone and call bell within reach  Pt denies any further questions at this time  Recommend post acute rehabilitation services upon hospital D/C  Goals   Patient Goals none stated by patient   STG Expiration Date 06/19/23   Short Term Goal #1 In 10 days pt will be able to: 1  Demonstrate ability to perform all aspects of bed mobility with supervision to improve functional safety  2  Perform functional transfers with supervision to facilitate safe return to previous living environment  3   Ambulate 150 ft with LRAD and supervision with stable vitals to improve safety with household distances and reduce fall risk  4  Improve LE strength grades by 1 to increase ease of functional mobility with transfers and gait  5  Pt will demonstrate improved balance by one grade in order to decrease risk of falls  PT Treatment Day 0   Plan   Treatment/Interventions Functional transfer training;LE strengthening/ROM; Therapeutic exercise; Endurance training;Cognitive reorientation;Patient/family training;Equipment eval/education;Gait training;Bed mobility;Spoke to nursing   PT Frequency 3-5x/wk   Recommendation   PT Discharge Recommendation Post acute rehab services  Equipment Recommended Katelynn Jose; Wheelchair   Additional Comments wheelchair for community ambulation   AM-PAC Basic Mobility Inpatient   Turning in Flat Bed Without Bedrails 2   Lying on Back to Sitting on Edge of Flat Bed Without Bedrails 2   Moving Bed to Chair 3   Standing Up From Chair Using Arms 3   Walk in Room 2   Climb 3-5 Stairs With Railing 2   Basic Mobility Inpatient Raw Score 14   Basic Mobility Standardized Score 35 55   Highest Level Of Mobility   -[facility] Goal 4: Move to chair/commode   -HL Achieved 4: Move to chair/commode   End of Consult   Patient Position at End of Consult Bedside chair;Bed/Chair alarm activated; All needs within reach   The patient's AM-PAC Basic Mobility Inpatient Short Form Raw Score is 14  A Raw score of less than or equal to 16 suggests the patient may benefit from discharge to post-acute rehabilitation services  Please also refer to the recommendation of the Physical Therapist for safe discharge planning    Lukas Mccann, PT

## 2023-06-09 NOTE — PLAN OF CARE
Problem: PHYSICAL THERAPY ADULT  Goal: Performs mobility at highest level of function for planned discharge setting  See evaluation for individualized goals  Description: Treatment/Interventions: Functional transfer training, LE strengthening/ROM, Therapeutic exercise, Endurance training, Cognitive reorientation, Patient/family training, Equipment eval/education, Gait training, Bed mobility, Spoke to nursing  Equipment Recommended: Dorinda Jensen, Wheelchair       See flowsheet documentation for full assessment, interventions and recommendations  Outcome: Progressing  Note: Prognosis: Fair  Problem List: Decreased strength, Decreased endurance, Impaired balance, Decreased mobility, Decreased cognition, Impaired judgement, Decreased safety awareness, Obesity, Pain  Assessment: Pt is a 80 y o  female seen for PT evaluation s/p admit to [facility] on 6/8/2023  Pt was admitted with a primary dx of: knee pain  PT now consulted for assessment of mobility and d/c needs  Pt with Up and OOB as tolerated orders  Pts current comorbidities and personal factors effecting treatment include: BMI, significant history of falls, dementia, CKD, PAF, DM II  Pts current clinical presentation is Unstable/Unpredictable (high complexity) due to Ongoing medical management for primary dx, Decreased activity tolerance compared to baseline, Fall risk, Increased assistance needed from caregiver at current time, Ongoing telemetry monitoring  Prior to admission, pt was ambulating short distances however limited 2/2 Knee pain  Upon evaluation, pt currently is requiring Elliott for bed mobility; Supervision for transfers and Supervision for ambulation 4 ft w/ RW   Pt presents at PT eval functioning below baseline and currently w/ overall mobility deficits 2* to: BLE weakness, impaired balance, gait deviations, pain, decreased activity tolerance compared to baseline, decreased functional mobility tolerance compared to baseline, decreased safety awareness, impaired judgement, fall risk, decreased cognition  Pt currently at a fall risk 2* to impairments listed above  Pt will continue to benefit from skilled acute PT interventions to address stated impairments; to maximize functional mobility; for ongoing pt/ family training; and DME needs  At conclusion of PT session chair alarm engaged, all needs in reach, RN notified of session findings/recommendations and pt returned back in recliner chair with phone and call bell within reach  Pt denies any further questions at this time  Recommend return to facility with rehab services upon hospital D/C  PT Discharge Recommendation: Post acute rehabilitation services    See flowsheet documentation for full assessment

## 2023-06-09 NOTE — ASSESSMENT & PLAN NOTE
"· Patient status post mechanical fall with pain and swelling left knee  · Status post arthrocentesis in the ER studies pending  · Patient being covered for septic arthritis we will hold further antibiotics pending fluid studies  · Patient's knee pain has been chronic and progressive for years recently evaluated for total knee replacement but she was felt to be a poor surgical candidate due to advanced age and underlying medical morbidities  ·  describes the knee is \"bone-on-bone\"  · We will ask orthopedics for evaluation  · Pain control    · PT evaluation  "

## 2023-06-09 NOTE — ASSESSMENT & PLAN NOTE
"Lab Results   Component Value Date    HGBA1C 9 0 (H) 04/14/2023       No results for input(s): \"POCGLU\" in the last 72 hours      Blood Sugar Average: Last 72 hrs:  · Continue insulin regimen  · Poorly controlled diabetes  · Fingersticks sliding scale coverage  "

## 2023-06-09 NOTE — PLAN OF CARE
Problem: Potential for Falls  Goal: Patient will remain free of falls  Description: INTERVENTIONS:  - Educate patient/family on patient safety including physical limitations  - Instruct patient to call for assistance with activity   - Consult OT/PT to assist with strengthening/mobility   - Keep Call bell within reach  - Keep bed low and locked with side rails adjusted as appropriate  - Keep care items and personal belongings within reach  - Initiate and maintain comfort rounds  - Make Fall Risk Sign visible to staff  - Offer Toileting every 2 Hours, in advance of need  - Initiate/Maintain BED alarm  - Obtain necessary fall risk management equipment: ALARMS  - Apply yellow socks and bracelet for high fall risk patients  - Consider moving patient to room near nurses station  Outcome: Progressing     Problem: PAIN - ADULT  Goal: Verbalizes/displays adequate comfort level or baseline comfort level  Description: Interventions:  - Encourage patient to monitor pain and request assistance  - Assess pain using appropriate pain scale  - Administer analgesics based on type and severity of pain and evaluate response  - Implement non-pharmacological measures as appropriate and evaluate response  - Consider cultural and social influences on pain and pain management  - Notify physician/advanced practitioner if interventions unsuccessful or patient reports new pain  Outcome: Progressing     Problem: INFECTION - ADULT  Goal: Absence or prevention of progression during hospitalization  Description: INTERVENTIONS:  - Assess and monitor for signs and symptoms of infection  - Monitor lab/diagnostic results  - Monitor all insertion sites, i e  indwelling lines, tubes, and drains  - Monitor endotracheal if appropriate and nasal secretions for changes in amount and color  - Adams appropriate cooling/warming therapies per order  - Administer medications as ordered  - Instruct and encourage patient and family to use good hand hygiene technique  - Identify and instruct in appropriate isolation precautions for identified infection/condition  Outcome: Progressing     Problem: Knowledge Deficit  Goal: Patient/family/caregiver demonstrates understanding of disease process, treatment plan, medications, and discharge instructions  Description: Complete learning assessment and assess knowledge base    Interventions:  - Provide teaching at level of understanding  - Provide teaching via preferred learning methods  Outcome: Progressing     Problem: MOBILITY - ADULT  Goal: Maintain or return to baseline ADL function  Description: INTERVENTIONS:  -  Assess patient's ability to carry out ADLs; assess patient's baseline for ADL function and identify physical deficits which impact ability to perform ADLs (bathing, care of mouth/teeth, toileting, grooming, dressing, etc )  - Assess/evaluate cause of self-care deficits   - Assess range of motion  - Assess patient's mobility; develop plan if impaired  - Assess patient's need for assistive devices and provide as appropriate  - Encourage maximum independence but intervene and supervise when necessary  - Involve family in performance of ADLs  - Assess for home care needs following discharge   - Consider OT consult to assist with ADL evaluation and planning for discharge  - Provide patient education as appropriate  Outcome: Progressing     Problem: Prexisting or High Potential for Compromised Skin Integrity  Goal: Skin integrity is maintained or improved  Description: INTERVENTIONS:  - Identify patients at risk for skin breakdown  - Assess and monitor skin integrity  - Assess and monitor nutrition and hydration status  - Monitor labs   - Assess for incontinence   - Turn and reposition patient  - Assist with mobility/ambulation  - Relieve pressure over bony prominences  - Avoid friction and shearing  - Provide appropriate hygiene as needed including keeping skin clean and dry  - Evaluate need for skin moisturizer/barrier cream  - Collaborate with interdisciplinary team   - Patient/family teaching  - Consider wound care consult   Outcome: Progressing

## 2023-06-09 NOTE — TELEPHONE ENCOUNTER
FREESTYLE LITE test strip USE 1 EACH DAILY USE AS INSTRUCTED     [facility] [place] [facility]

## 2023-06-09 NOTE — ED PROVIDER NOTES
History  Chief Complaint   Patient presents with   • Knee Pain     Pt arrived via EMS from [facility] (formerly [facility]), her  was pushing her in a wheelchair when she hit her left knee  No fall, no head strike  Left knee (+) swelling, pain, warm  +2 pedal pulses  Per ems hx of afib   • Atrial Fibrillation     79 yo F history of diabetes, A-fib, osteoarthritis, chronic kidney disease who presents emergency department with left knee pain   was pushing patient in wheelchair when left foot was on the ground causing patient to twist her left knee  Patient's  states that she has had chronic osteoarthritis in her left knee and has seen orthopedics  Patient has been states that the left knee is normally swollen but however is more swollen now  EMS was called to assisted living facility where they are currently living, EMS found the patient was in A-fib with RVR  Patient notes she did have diarrhea 2 days ago but states that has resolved  Patient is not currently on anticoagulation/antiplatelet for atrial fibrillation   notes pt has been having chills for 1-2 days  No know fever, CP, SOB, palpitations, abdominal pain, NV, bowel/bladder changes, headache, or any other sxs  No h/o DVT/PE, h/o coagulation disorder, family h/o DVT/PE, estrogen supplementation, cancer h/o  Prior to Admission Medications   Prescriptions Last Dose Informant Patient Reported? Taking?    FREESTYLE LITE test strip   No No   Sig: USE 1 EACH DAILY USE AS INSTRUCTED   Insulin Glargine Solostar (Lantus SoloStar) 100 UNIT/ML SOPN   No Yes   Sig: Inject 0 16 mL (16 Units total) under the skin in the morning   Patient taking differently: Inject 14 Units under the skin daily at bedtime   Insulin Pen Needle (BD AutoShield Duo) 30G X 5 MM MISC   No No   Sig: USE DAILY AS DIRECTED WITH INSULIN PEN   apixaban (ELIQUIS) 5 mg   No No   Sig: Take 1 tablet (5 mg total) by mouth 2 (two) times a day   Patient not taking: Reported on 5/23/2023   ezetimibe-simvastatin (VYTORIN) 10-20 mg per tablet   No Yes   Sig: Take 1 tablet by mouth daily at bedtime   insulin lispro (HumaLOG KwikPen) 100 units/mL injection pen   No Yes   Sig: Inject 7 Units under the skin 3 (three) times a day with meals   Patient taking differently: Inject 8 Units under the skin 3 (three) times a day with meals   metFORMIN (GLUCOPHAGE) 850 mg tablet   No Yes   Sig: Take 1 tablet (850 mg total) by mouth 2 (two) times a day   metoprolol succinate (TOPROL-XL) 50 mg 24 hr tablet   No Yes   Sig: Take 1 tablet (50 mg total) by mouth daily   potassium chloride (K-DUR,KLOR-CON) 20 mEq tablet   No Yes   Sig: Take 1 tablet (20 mEq total) by mouth daily   valsartan-hydrochlorothiazide (DIOVAN-HCT) 160-12 5 MG per tablet   No Yes   Sig: Take 1 tablet by mouth daily      Facility-Administered Medications: None       Past Medical History:   Diagnosis Date   • Allergy to sulfa drugs    • Arthritis 2000   • Closed fracture of third lumbar vertebra (HCC)    • Closed T12 spinal fracture (HCC)    • Closed wedge compression fracture of T12 vertebra (HCC)    • Dementia (HCC)    • Diabetes mellitus ([facility] [address] ) 2000   • DM (diabetes mellitus), type 2 (HCC)    • H/O multiple allergies     Novocaine,Darvocet, Sulfa, Accupril   • HL (hearing loss)    • Hyperlipidemia    • Hypertension    • Lumbar compression fracture (HCC)    • Memory loss 2020   • Nocturia    • Palpitations    • Paroxysmal atrial fibrillation (HCC)    • Thrombocytopenic disorder (HCC)    • Type 2 diabetes mellitus without complication (HCC)    • Wears glasses        Past Surgical History:   Procedure Laterality Date   • DXA PROCEDURE (HISTORICAL)  05/31/2023   • HERNIA REPAIR     • HYSTERECTOMY         Family History   Problem Relation Age of Onset   • Hypertension Mother    • Diabetes Mother    • Other Mother    • Hypertension Father    • Heart disease Father      I have reviewed and agree with the history as documented  E-Cigarette/Vaping   • E-Cigarette Use Never User      E-Cigarette/Vaping Substances   • Nicotine No    • THC No    • CBD No    • Flavoring No    • Other No    • Unknown No      Social History     Tobacco Use   • Smoking status: Never     Passive exposure: Never   • Smokeless tobacco: Never   Vaping Use   • Vaping Use: Never used   Substance Use Topics   • Alcohol use: Not Currently     Alcohol/week: 1 0 standard drink of alcohol     Types: 1 Glasses of wine per week     Comment: on holidays add an extra glass of wine   • Drug use: Never        Review of Systems   Constitutional: Negative for chills and fever  HENT: Negative for ear pain and sore throat  Eyes: Negative for pain and visual disturbance  Respiratory: Negative for cough and shortness of breath  Cardiovascular: Positive for leg swelling  Negative for chest pain and palpitations  Gastrointestinal: Negative for abdominal pain, constipation, diarrhea, nausea and vomiting  Genitourinary: Negative for dysuria and hematuria  Musculoskeletal: Positive for joint swelling  Negative for arthralgias and back pain  +L knee pain, L knee injury   Skin: Negative for color change and rash  Neurological: Negative for seizures and syncope  All other systems reviewed and are negative        Physical Exam  ED Triage Vitals   Temperature Pulse Respirations Blood Pressure SpO2   06/08/23 2207 06/08/23 2207 06/08/23 2207 06/08/23 2207 06/08/23 2207   99 8 °F (37 7 °C) (!) 130 19 166/90 100 %      Temp Source Heart Rate Source Patient Position - Orthostatic VS BP Location FiO2 (%)   06/08/23 2207 06/08/23 2207 06/08/23 2207 06/08/23 2207 --   Oral Monitor Sitting Right arm       Pain Score       06/08/23 2330       No Pain             Orthostatic Vital Signs  Vitals:    06/09/23 0430 06/09/23 0445 06/09/23 0500 06/09/23 0515   BP: 162/75 141/78 156/77 (!) 159/104   Pulse: 77 78 78 84   Patient Position - Orthostatic VS: Physical Exam  Vitals and nursing note reviewed  Constitutional:       General: She is in acute distress (d/t pain and a-fib RVR)  Appearance: She is well-developed  HENT:      Head: Normocephalic and atraumatic  Right Ear: External ear normal       Left Ear: External ear normal       Nose: Nose normal    Eyes:      Extraocular Movements: Extraocular movements intact  Conjunctiva/sclera: Conjunctivae normal       Pupils: Pupils are equal, round, and reactive to light  Cardiovascular:      Rate and Rhythm: Tachycardia present  Rhythm irregularly irregular  Pulses: Normal pulses  Heart sounds: No murmur heard  Pulmonary:      Effort: Pulmonary effort is normal  No respiratory distress  Breath sounds: Normal breath sounds  Abdominal:      Palpations: Abdomen is soft  Tenderness: There is no abdominal tenderness  Musculoskeletal:         General: No swelling  Cervical back: Neck supple  Left knee: Swelling and effusion present  No erythema or lacerations  Decreased range of motion  Tenderness present  Right lower le+ Edema present  Left lower le+ Edema present  Comments: left knee with significant effusion, pain with palpation of the left knee diffusely, pain with range of motion of left knee  Skin:     General: Skin is warm and dry  Capillary Refill: Capillary refill takes less than 2 seconds  Neurological:      General: No focal deficit present  Mental Status: She is alert     Psychiatric:         Mood and Affect: Mood normal          ED Medications  Medications   vancomycin (VANCOCIN) 1,250 mg in sodium chloride 0 9 % 250 mL IVPB (has no administration in time range)   ceftriaxone (ROCEPHIN) 1 g/50 mL in dextrose IVPB (1,000 mg Intravenous New Bag 23 0499)   diltiazem (CARDIZEM) injection 10 mg (10 mg Intravenous Given 23 8437)   diltiazem (CARDIZEM) 125 mg in sodium chloride 0 9 % 125 mL infusion (0 mg/hr Intravenous Stopped 6/9/23 0446)   iohexol (OMNIPAQUE) 350 MG/ML injection (SINGLE-DOSE) 61 mL (61 mL Intravenous Given 6/9/23 0200)   lidocaine (PF) (XYLOCAINE-MPF) 1 % injection 5 mL (5 mL Infiltration Given 6/9/23 0458)       Diagnostic Studies  Results Reviewed     Procedure Component Value Units Date/Time    STAT Gram Stain [[id number]] Collected: 06/09/23 0501    Lab Status: In process Specimen: Other Updated: 06/09/23 0528    Synovial fluid, RBC count [[id number]] Collected: 06/09/23 0501    Lab Status: In process Specimen: Synovial Fluid from Joint, Left Knee Updated: 06/09/23 0516    Synovial fluid, white cell count w/ diff [[id number]] Collected: 06/09/23 0501    Lab Status: In process Specimen: Synovial Fluid from Joint, Left Knee Updated: 06/09/23 0516    Synovial fluid, crystal [[id number]] Collected: 06/09/23 0501    Lab Status: In process Specimen: Synovial Fluid from Joint, Left Knee Updated: 06/09/23 0515    Synovial fluid, Culture and Gram stain [[id number]] Collected: 06/09/23 0501    Lab Status: In process Specimen: Body Fluid from Joint, Left Knee Updated: 06/09/23 0515    HS Troponin I 2hr [[id number]]  (Normal) Collected: 06/09/23 0040    Lab Status: Final result Specimen: Blood from Arm, Left Updated: 06/09/23 0128     hs TnI 2hr 16 ng/L      Delta 2hr hsTnI 1 ng/L     Blood culture #1 [[id number]] Collected: 06/09/23 0040    Lab Status: In process Specimen: Blood from Arm, Left Updated: 06/09/23 0048    D-Dimer [[id number]]  (Abnormal) Collected: 06/08/23 2223    Lab Status: Final result Specimen: Blood from Arm, Right Updated: 06/09/23 0040     D-Dimer, Quant 2 09 ug/ml FEU     Narrative: In the evaluation for possible pulmonary embolism, in the appropriate (Well's Score of 4 or less) patient, the age adjusted d-dimer cutoff for this patient can be calculated as:    Age x 0 01 (in ug/mL) for Age-adjusted D-dimer exclusion threshold for a patient over 50 years      Lactic acid, plasma (w/reflex if result > 2 0) [[id number]]  (Normal) Collected: 06/08/23 2303    Lab Status: Final result Specimen: Blood from Arm, Right Updated: 06/09/23 0026     LACTIC ACID 1 4 mmol/L     Narrative:      Result may be elevated if tourniquet was used during collection  TSH, 3rd generation with Free T4 reflex [[id number]]  (Normal) Collected: 06/08/23 2223    Lab Status: Final result Specimen: Blood from Arm, Right Updated: 06/09/23 0009     TSH 3RD GENERATON 3 788 uIU/mL     Urine Microscopic [[id number]]  (Normal) Collected: 06/08/23 2334    Lab Status: Final result Specimen: Urine, Straight Cath Updated: 06/09/23 0006     RBC, UA 1-2 /hpf      WBC, UA None Seen /hpf      Epithelial Cells None Seen /hpf      Bacteria, UA None Seen /hpf     UA w Reflex to Microscopic w Reflex to Culture [[id number]]  (Abnormal) Collected: 06/08/23 2334    Lab Status: Final result Specimen: Urine, Straight Cath Updated: 06/09/23 0005     Color, UA Light Yellow     Clarity, UA Clear     Specific Gravity, UA 1 015     pH, UA 7 0     Leukocytes, UA Negative     Nitrite, UA Negative     Protein, UA 30 (1+) mg/dl      Glucose, UA Trace mg/dl      Ketones, UA Negative mg/dl      Urobilinogen, UA <2 0 mg/dl      Bilirubin, UA Negative     Occult Blood, UA Negative    FLU/RSV/COVID - if FLU/RSV clinically relevant [[id number]]  (Normal) Collected: 06/08/23 2303    Lab Status: Final result Specimen: Nares from Nose Updated: 06/09/23 0002     SARS-CoV-2 Negative     INFLUENZA A PCR Negative     INFLUENZA B PCR Negative     RSV PCR Negative    Narrative:      FOR PEDIATRIC PATIENTS - copy/paste COVID Guidelines URL to browser: [email or web address]    SARS-CoV-2 assay is a Nucleic Acid Amplification assay intended for the  qualitative detection of nucleic acid from SARS-CoV-2 in nasopharyngeal  swabs  Results are for the presumptive identification of SARS-CoV-2 RNA      Positive results are indicative of infection with SARS-CoV-2, the virus  causing COVID-19, but do not rule out bacterial infection or co-infection  with other viruses  Laboratories within the [place] and its  territories are required to report all positive results to the appropriate  public health authorities  Negative results do not preclude SARS-CoV-2  infection and should not be used as the sole basis for treatment or other  patient management decisions  Negative results must be combined with  clinical observations, patient history, and epidemiological information  This test has not been FDA cleared or approved  This test has been authorized by FDA under an Emergency Use Authorization  (EUA)  This test is only authorized for the duration of time the  declaration that circumstances exist justifying the authorization of the  emergency use of an in vitro diagnostic tests for detection of SARS-CoV-2  virus and/or diagnosis of COVID-19 infection under section 564(b)(1) of  the Act, 21 U  S C  235QIF-1(Y)(3), unless the authorization is terminated  or revoked sooner  The test has been validated but independent review by FDA  and CLIA is pending  Test performed using citizenmade GeneXpert: This RT-PCR assay targets N2,  a region unique to SARS-CoV-2  A conserved region in the E-gene was chosen  for pan-Sarbecovirus detection which includes SARS-CoV-2  According to CMS-2020-01-R, this platform meets the definition of high-throughput technology  Blood culture #2 [[id number]] Collected: 06/08/23 2303    Lab Status:  In process Specimen: Blood from Arm, Right Updated: 06/08/23 2316    HS Troponin 0hr (reflex protocol) [[id number]]  (Normal) Collected: 06/08/23 2223    Lab Status: Final result Specimen: Blood from Arm, Right Updated: 06/08/23 2253     hs TnI 0hr 15 ng/L     Comprehensive metabolic panel [[id number]]  (Abnormal) Collected: 06/08/23 2223    Lab Status: Final result Specimen: Blood from Arm, Right Updated: 06/08/23 2248     Sodium 135 mmol/L      Potassium 4 0 mmol/L      Chloride 98 mmol/L      CO2 28 mmol/L      ANION GAP 9 mmol/L      BUN 28 mg/dL      Creatinine 1 08 mg/dL      Glucose 202 mg/dL      Calcium 9 8 mg/dL      AST 12 U/L      ALT 7 U/L      Alkaline Phosphatase 88 U/L      Total Protein 7 6 g/dL      Albumin 4 2 g/dL      Total Bilirubin 0 53 mg/dL      eGFR 46 ml/min/1 73sq m     Narrative:      [place] guidelines for Chronic Kidney Disease (CKD):   •  Stage 1 with normal or high GFR (GFR > 90 mL/min/1 73 square meters)  •  Stage 2 Mild CKD (GFR = 60-89 mL/min/1 73 square meters)  •  Stage 3A Moderate CKD (GFR = 45-59 mL/min/1 73 square meters)  •  Stage 3B Moderate CKD (GFR = 30-44 mL/min/1 73 square meters)  •  Stage 4 Severe CKD (GFR = 15-29 mL/min/1 73 square meters)  •  Stage 5 End Stage CKD (GFR <15 mL/min/1 73 square meters)  Note: GFR calculation is accurate only with a steady state creatinine    CBC and differential [[id number]]  (Abnormal) Collected: 06/08/23 2223    Lab Status: Final result Specimen: Blood from Arm, Right Updated: 06/08/23 2240     WBC 8 30 Thousand/uL      RBC 4 43 Million/uL      Hemoglobin 12 5 g/dL      Hematocrit 39 9 %      MCV 90 fL      MCH 28 2 pg      MCHC 31 3 g/dL      RDW 15 5 %      MPV 11 0 fL      Platelets 371 Thousands/uL      nRBC 0 /100 WBCs      Neutrophils Relative 83 %      Immat GRANS % 0 %      Lymphocytes Relative 7 %      Monocytes Relative 9 %      Eosinophils Relative 1 %      Basophils Relative 0 %      Neutrophils Absolute 6 92 Thousands/µL      Immature Grans Absolute 0 03 Thousand/uL      Lymphocytes Absolute 0 55 Thousands/µL      Monocytes Absolute 0 72 Thousand/µL      Eosinophils Absolute 0 06 Thousand/µL      Basophils Absolute 0 02 Thousands/µL                  CTA ED chest PE study   Final Result by Roxana Lloyd MD (06/09 0253)      1  No acute pulm embolism or thoracic aortic aneurysm/dissection   Calcific atherosclerosis of the aorta and coronary arteries  2   Cardiomegaly  3   Small hiatal hernia containing nonspecific hyperdense material    4   Partially visualized hypodense areas in the pancreatic tail could represent pseudocysts  Workstation performed: [id number]         XR chest 1 view portable   ED Interpretation by Kathy Grande DO (06/08 2356)   No acute cardiopulmonary process visualized      XR knee 4+ views LEFT   ED Interpretation by Kathy Grande DO (06/08 9194)   Knee effusion present  Loose body superiorly that  has been present on previous knee Xrs  Narrowing of joint space  CT lower extremity wo contrast left    (Results Pending)         Procedures  ECG 12 Lead Documentation Only    Date/Time: 6/9/2023 4:07 AM    Performed by: Kathy Grande DO  Authorized by: Kathy Grande DO    Patient location:  ED  Previous ECG:     Previous ECG:  Compared to current    Comparison ECG info:  4/14/23: afib, rate 74, leftward axis    Similarity:  Changes noted  Interpretation:     Interpretation: normal    Rate:     ECG rate:  122    ECG rate assessment: tachycardic    Rhythm:     Rhythm: atrial fibrillation    Ectopy:     Ectopy: PVCs      PVCs:  Frequent  QRS:     QRS axis:  Left    QRS intervals:  Normal  Conduction:     Conduction: normal    ST segments:     ST segments:  Normal  T waves:     T waves: normal    Arthrocentesis    Date/Time: 6/9/2023 5:26 AM    Performed by: Kathy Grande DO  Authorized by: Kathy Grande DO  Universal Protocol:  Procedure performed by: Roldan Winchester  Consent: The procedure was performed in an emergent situation    Patient identity confirmed: verbally with patient and arm band      Location:  Bedside and ED  Indications:  Joint swelling, pain and possible septic joint  Body area:  Knee  Joint:  Left knee  Local anesthesia used?: Yes    Anesthesia:  Local infiltration  Local anesthetic:  Lidocaine 1% without epinephrine  Preparation: Patient was prepped and draped in usual sterile fashion    Needle size:  18 G  Ultrasound guidance: No    Approach:  Superior  Aspirate amount (ml):  20  Aspirate:  Blood-tinged  Patient tolerance:  Patient tolerated the procedure well with no immediate complications          ED Course  ED Course as of 06/09/23 0545   Thu Jun 08, 2023 2228 Blood Pressure: 166/90   2228 Temperature: 99 8 °F (37 7 °C)   2229 Pulse(!): 130   2229 Respirations: 19   2229 SpO2: 100 %   2229 79 yo F history of diabetes, A-fib, osteoarthritis, chronic kidney disease who presents emergency department with left knee pain   was pushing patient in wheelchair when left foot was on the ground causing patient to twist her left knee  Patient's  states that she has had chronic osteoarthritis in her left knee and has seen orthopedics  Patient has been states that the left knee is normally swollen but however is more swollen now  EMS was called to assisted living facility where they are currently living, EMS found the patient was in A-fib with RVR  Patient denies fevers, chills, chest pain, shortness of breath, abdominal pain, palpitations, nausea, vomiting, bladder changes  Patient notes she did have diarrhea 2 days ago but states that has resolved  Patient is not currently on anticoagulation/antiplatelet for atrial fibrillation  Physical exam: Tachycardic, irregularly irregular, lungs are clear to auscultation bilaterally, abdomen soft nontender, left knee with significant effusion, pain with palpation of the left knee diffusely, pain with range of motion of left knee  2+ pitting edema to left lower extremity, 1+ pitting edema to right lower extremity  No calf tenderness    Concern for: ACS versus thyroid disorder versus pulm embolism versus infectious process   2229 EKG shows atrial fibrillation with RVR, PVCs, leftward axis    No ST elevations, no ST depressions, normal QTc interval    2257 CBC and differential(!)  No leukocytosis, H&H is stable  Platelets within normal limits  Otherwise unremarkable   2257 Comprehensive metabolic panel(!)  Elevated BUN 28, appears around baseline  Glucose 202, history of diabetes  2257 hs TnI 0hr: 15  Will obtain 2-hour troponin  2356 XR knee 4+ views LEFT  Knee effusion present  Loose body superiorly that  has been present on previous knee Xrs  Narrowing of joint space  2356 XR chest 1 view portable  No acute cardiopulmonary process visualized   Fri Jun 09, 2023 0002 SARS-COV-2: Negative   0002 INFLU A PCR: Negative   0002 INFLU B PCR: Negative   0002 RSV PCR: Negative   0004 Pulse(!): 108  10 mg bolus of cardizem given  15 mg/hr cardizem drip currently   0006 UA w Reflex to Microscopic w Reflex to Culture(!)  Proteinuria, trace glucose  Otherwise normal   0006 RBC, UA: 1-2   0006 WBC, UA: None Seen   0006 Epithelial Cells: None Seen   0006 Bacteria, UA: None Seen   0012 TSH 3RD GENERATON: 3 788   0108 LACTIC ACID: 1 4   0108 D-Dimer, Quant(!): 2 09  Will order CT PE rule out    0108 Spoke to family by D-dimer results  Discussed plan to order CT chest to rule out pulmonary embolism  They expressed understanding was agreeable with plan  0130 hs TnI 2hr: 16  Delta 1  Unlikely ACS   0235 Pulse: 91   0301 CTA ED chest PE study  1  No acute pulm embolism or thoracic aortic aneurysm/dissection  Calcific atherosclerosis of the aorta and coronary arteries  2   Cardiomegaly  3   Small hiatal hernia containing nonspecific hyperdense material   4   Partially visualized hypodense areas in the pancreatic tail could represent pseudocysts  0408 Pending CT LLE    0417 Pulse: 80  Currently 7 5 mg/hr of cardizem   0439 CT lower extremity wo contrast left  Read via [employer]: Moderate to large effusion  No evidence of fracture or dislocation  Advanced tricompartmental degenerative changes  Prominent joint effusion with intra-articular body   Likely tear of the anterior cruciate ligament   7257 Arthrocentesis performed by me, see separate procedure note for more details  Synovial fluid labs ordered  0530 Patient started on vancomycin and Rocephin for concern for septic arthritis  4039 Discussed case with admitting team, are agreeable with inpatient admission  Discussed admission with patient she was agreeable with plan  Offered patient to call , she states to let him sleep and to call him later on this morning  Patient was given the opportunity ask questions the emergency department  All questions and concerns were addressed in the emergency department  HEART Risk Score    Flowsheet Row Most Recent Value   Heart Score Risk Calculator    History 0 Filed at: 06/09/2023 0005   ECG 1 Filed at: 06/09/2023 0005   Age 2 Filed at: 06/09/2023 0005   Risk Factors 2 Filed at: 06/09/2023 0005   Troponin 1 Filed at: 06/09/2023 0005   HEART Score 6 Filed at: 06/09/2023 0005              PERC Rule for PE    Flowsheet Row Most Recent Value   PERC Rule for PE    Age >=50 1 Filed at: 06/09/2023 0006   HR >=100 1 Filed at: 06/09/2023 0006   O2 Sat on room air < 95% 0 Filed at: 06/09/2023 0006   History of PE or DVT 0 Filed at: 06/09/2023 0006   Recent trauma or surgery 0 Filed at: 06/09/2023 0006   Hemoptysis 0 Filed at: 06/09/2023 0006   Exogenous estrogen 0 Filed at: 06/09/2023 0006   Unilateral leg swelling 1 Filed at: 06/09/2023 0006   PERC Rule for PE Results 3 Filed at: 06/09/2023 0006           Initial Sepsis Screening     Row Name 06/09/23 0539                Is the patient's history suggestive of a new or worsening infection? Yes (Proceed)  -KS        Suspected source of infection infected joint  -KS        Indicate SIRS criteria Tachycardia > 90 bpm  -KS        Are two or more of the above signs & symptoms of infection both present and new to the patient?  No  -KS              User Key  (r) = Recorded By, (t) = Taken By, (c) = Cosigned By    [address] Name Provider Type    VERENA Deshpande Ashley Arce, DO Resident                SBIRT 20yo+    Flowsheet Row Most Recent Value   Initial Alcohol Screen: US AUDIT-C     1  How often do you have a drink containing alcohol? 0 Filed at: 06/08/2023 2213   2  How many drinks containing alcohol do you have on a typical day you are drinking? 0 Filed at: 06/08/2023 2213   3a  Male UNDER 65: How often do you have five or more drinks on one occasion? 0 Filed at: 06/08/2023 2213   3b  FEMALE Any Age, or MALE 65+: How often do you have 4 or more drinks on one occassion? 0 Filed at: 06/08/2023 2213   Audit-C Score 0 Filed at: 06/08/2023 2213   SAVI: How many times in the past year have you    Used an illegal drug or used a prescription medication for non-medical reasons? Never Filed at: 06/08/2023 2213          Wells' Criteria for PE    Flowsheet Row Most Recent Value   Wells' Criteria for PE    Clinical signs and symptoms of DVT 3 Filed at: 06/09/2023 0006   PE is primary diagnosis or equally likely 0 Filed at: 06/09/2023 0006   HR >100 1 5 Filed at: 06/09/2023 0006   Immobilization at least 3 days or Surgery in the previous 4 weeks 0 Filed at: 06/09/2023 0006   Previous, objectively diagnosed PE or DVT 0 Filed at: 06/09/2023 0006   Hemoptysis 0 Filed at: 06/09/2023 0006   Malignancy with treatment within 6 months or palliative 0 Filed at: 06/09/2023 0006   Wells' Criteria Total 4 5 Filed at: 06/09/2023 0006            Medical Decision Making  See ED course for more details on MDM    Amount and/or Complexity of Data Reviewed  Labs: ordered  Decision-making details documented in ED Course  Radiology: ordered and independent interpretation performed  Decision-making details documented in ED Course  Risk  Prescription drug management  Decision regarding hospitalization              Disposition  Final diagnoses:   Septic arthritis ([facility] )   Left knee pain   Atrial fibrillation with rapid ventricular response ([facility] )     Time reflects when diagnosis was documented in both MDM as applicable and the Disposition within this note     Time User Action Codes Description Comment    6/9/2023  5:28 AM Lorna Clemson Add [M00 9] Septic arthritis ([address] )     6/9/2023  5:28 AM Lorna Clemson Add [M25 562] Left knee pain     6/9/2023  5:28 AM Lorna Clemson Add [I48 91] Atrial fibrillation with rapid ventricular response ([address] )     6/9/2023  5:28 AM Lorna Clemson Modify [I48 91] Atrial fibrillation with rapid ventricular response ([address] ), resolved     6/9/2023  5:28 AM Lorna Clemson Remove [I48 91] Atrial fibrillation with rapid ventricular response ([address] ), resolved     6/9/2023  5:28 AM Lorna Clemson Add [I48 91] Atrial fibrillation with rapid ventricular response [facility])       ED Disposition     ED Disposition   Admit    Condition   Stable    Date/Time   Fri Jun 9, 2023  5:27 AM    Comment   Case was discussed with AMANDA and the patient's admission status was agreed to be Admission Status: inpatient status to the service of Dr Starr Rdz   Follow-up Information    None         Patient's Medications   Discharge Prescriptions    No medications on file     No discharge procedures on file  PDMP Review       Value Time User    PDMP Reviewed  Yes 6/8/2023 10:36 PM Amy Erickson MD           ED Provider  Attending physically available and evaluated Scotty Barajas  CARLA managed the patient along with the ED Attending      Electronically Signed by         Tiffany Guan,   06/09/23 [address], DO  06/09/23 [address], DO  06/09/23 6171

## 2023-06-09 NOTE — ASSESSMENT & PLAN NOTE
"· Suggestion of \"possible pancreatic pseudocyst\" on CT scan chest  · Consider dedicated pancreatic CT or MRI in the near future  · Incidental finding  · Follow up should be done within 1 month  · Please notify the following clinician to assist with the follow up:              Dr Lexie Lopez (PCP)  "
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
## 2023-06-09 NOTE — ASSESSMENT & PLAN NOTE
· Medications reviewed with   · Tachycardia in the ER likely secondary to pain   Patient received 1 dose of IV Cardizem in the ER  · Eliquis was discontinued at rehab due to some issues with bruising and a questionable hemarthrosis in the past   · Patient  reports plan was to start half dose Eliquis as patient has been without anticoagulation for A-fib for some time    Plan  · Continue Eliquis 2 5 twice daily per discussion with patient's  (will be discharged on this dose)  · Continue oral metoprolol  · Monitor VS

## 2023-06-09 NOTE — ASSESSMENT & PLAN NOTE
· Medications reviewed with   · Eliquis was discontinued at rehab due to some issues with bruising and a questionable hemarthrosis in the past   · Patient  reports plan was to start half dose Eliquis as patient has been without anticoagulation for A-fib for some time  · We will start Eliquis 2 5 twice daily per discussion with patient's   · I believe tachycardia in the ER was secondary to pain    Patient received 1 dose of IV Cardizem in the ER  · Restart oral metoprolol

## 2023-06-09 NOTE — ASSESSMENT & PLAN NOTE
"· Suggestion of \"possible pancreatic pseudocyst\" on CT scan chest  · Consider dedicated pancreatic CT or MRI in the near future  "

## 2023-06-09 NOTE — ASSESSMENT & PLAN NOTE
Patient stable for discharge, is from [facility] independent living  PT jessica recommended rehab placement with [facility]  If possible patient can be DC'd today, if not patient pending placement  · Patient status post trauma/injury with pain and swelling left knee  · Status post arthrocentesis in the ER  · Synovial fluid WBC w/ diff: 12,000  · Synovial fluid, RBC count: 154,000  · Synovial fluid, crystals: none seen  · Synovial fluid GS and Cx: 2+ polys and no bacteria seen  · Fluid studies not showing signs of septic arthritis  Patient and  understand that she is not a candidate for knee replacement      · Steroid and gel injections in the past have not been helpful  · Discussion with orthopedics for possible knee injection, however not possible in the hospital    Plan  · Pain control with Tylenol  · H/o CKD stage 3A, therefore NSAIDs is not appropriate for this patient  · PT recommends postacute rehab services  · Plan to d/c with referral to PT at [facility] where the patient currently lives

## 2023-06-09 NOTE — H&P
"[facility]  H&P  Name: Anastacio Sandoval 80 y o  female I MRN: [id number]  Unit/Bed#: S -01 I Date of Admission: 6/8/2023   Date of Service: 6/9/2023 I Hospital Day: 0      Assessment/Plan   * Knee pain  Assessment & Plan  · Patient status post mechanical fall with pain and swelling left knee  · Status post arthrocentesis in the ER studies pending  · Patient being covered for septic arthritis we will hold further antibiotics pending fluid studies  · Patient's knee pain has been chronic and progressive for years recently evaluated for total knee replacement but she was felt to be a poor surgical candidate due to advanced age and underlying medical morbidities  ·  describes the knee is \"bone-on-bone\"  · We will ask orthopedics for evaluation  · Pain control  · PT evaluation    PAF (paroxysmal atrial fibrillation) ([facility])  Assessment & Plan  · Medications reviewed with   · Eliquis was discontinued at rehab due to some issues with bruising and a questionable hemarthrosis in the past   · Patient  reports plan was to start half dose Eliquis as patient has been without anticoagulation for A-fib for some time  · We will start Eliquis 2 5 twice daily per discussion with patient's   · I believe tachycardia in the ER was secondary to pain  Patient received 1 dose of IV Cardizem in the ER  · Restart oral metoprolol    Type 2 diabetes mellitus with hyperglycemia ([facility] [address] )  Assessment & Plan  Lab Results   Component Value Date    HGBA1C 9 0 (H) 04/14/2023       No results for input(s): \"POCGLU\" in the last 72 hours      Blood Sugar Average: Last 72 hrs:  · Continue insulin regimen  · Poorly controlled diabetes  · Fingersticks sliding scale coverage    Dementia without behavioral disturbance ([facility])  Assessment & Plan  · Patient stable and at baseline    Abnormal abdominal CT scan  Assessment & Plan  · Suggestion of \"possible pancreatic pseudocyst\" on CT scan chest  · Consider " "dedicated pancreatic CT or MRI in the near future        VTE Pharmacologic Prophylaxis: VTE Score: 6 High Risk (Score >/= 5) - Pharmacological DVT Prophylaxis Ordered: apixaban (Eliquis)  Sequential Compression Devices Ordered  Code Status: Level 3 - DNAR and DNI   Discussion with family: Updated  () via phone  Anticipated Length of Stay: Patient will be admitted on an inpatient basis with an anticipated length of stay of greater than 2 midnights secondary to Patient with severe knee pain possible hemarthrosis versus septic arthritis with uncontrolled A-fib requiring hospitalization       Total Time Spent on Date of Encounter in care of patient: 65 minutes This time was spent on one or more of the following: performing physical exam; counseling and coordination of care; obtaining or reviewing history; documenting in the medical record; reviewing/ordering tests, medications or procedures; communicating with other healthcare professionals and discussing with patient's family/caregivers  Chief Complaint: \"I hurt my knee\"    History of Present Illness:  Demar Woodard is a 80 y o  female with a PMH of diabetes, osteoarthritis of multiple joints, paroxysmal A-fib, hypertension and dementia who presents with knee pain  Patient is a poor historian and most of the history comes from patient's   She is a chronic severe knee pain  Was recently evaluated by orthopedics and felt not to be a surgical candidate for her \"bone-on-bone\" osteoarthritis of the knee  She suffered a fall today with swelling of her left knee  Came into the ER there was concern for septic arthritis arthrocentesis was performed Labs are pending at this time although the tap was noted to be traumatic and bloody  Patient also with a history of paroxysmal A-fib was taken off of Eliquis at rehab due to bruising and possible hemarthrosis at that time    I had a long conversation with patient's  given her " AXB1LL0-ABCs score of 5 and other comorbidities we will restart Eliquis at half dosing    Review of Systems:  Review of Systems   Constitutional: Negative for diaphoresis, fatigue and fever  Eyes: Negative for photophobia and visual disturbance  Respiratory: Negative for cough, choking, shortness of breath and stridor  Cardiovascular: Positive for leg swelling  Negative for chest pain and palpitations  Gastrointestinal: Negative for abdominal pain, diarrhea, nausea and vomiting  Endocrine: Negative for polyphagia and polyuria  Genitourinary: Negative for dysuria and urgency  Musculoskeletal: Positive for arthralgias and joint swelling (Swelling in left knee)  Negative for back pain and neck pain  Neurological: Negative for seizures and syncope  Hematological: Bruises/bleeds easily  Psychiatric/Behavioral: Negative for confusion  Past Medical and Surgical History:   Past Medical History:   Diagnosis Date   • Allergy to sulfa drugs    • Arthritis 2000   • Closed fracture of third lumbar vertebra (HCC)    • Closed T12 spinal fracture (HCC)    • Closed wedge compression fracture of T12 vertebra (HCC)    • Dementia (HCC)    • Diabetes mellitus ([facility] ) 2000   • DM (diabetes mellitus), type 2 (HCC)    • H/O multiple allergies     Novocaine,Darvocet, Sulfa, Accupril   • HL (hearing loss)    • Hyperlipidemia    • Hypertension    • Lumbar compression fracture (HCC)    • Memory loss 2020   • Nocturia    • Palpitations    • Paroxysmal atrial fibrillation (HCC)    • Thrombocytopenic disorder (HCC)    • Type 2 diabetes mellitus without complication (HCC)    • Wears glasses        Past Surgical History:   Procedure Laterality Date   • DXA PROCEDURE (HISTORICAL)  05/31/2023   • HERNIA REPAIR     • HYSTERECTOMY         Meds/Allergies:  Prior to Admission medications    Medication Sig Start Date End Date Taking?  Authorizing Provider   ezetimibe-simvastatin (VYTORIN) 10-20 mg per tablet Take 1 tablet by mouth daily at bedtime 6/13/22  Yes Linwood Crystal MD   Insulin Glargine Solostar (Lantus SoloStar) 100 UNIT/ML SOPN Inject 0 16 mL (16 Units total) under the skin in the morning  Patient taking differently: Inject 14 Units under the skin daily at bedtime 5/9/23 8/7/23 Yes Linwood Crystal MD   insulin lispro (HumaLOG KwikPen) 100 units/mL injection pen Inject 7 Units under the skin 3 (three) times a day with meals  Patient taking differently: Inject 8 Units under the skin 3 (three) times a day with meals 5/9/23  Yes Linwood Crystal MD   metFORMIN (GLUCOPHAGE) 850 mg tablet Take 1 tablet (850 mg total) by mouth 2 (two) times a day 6/13/22  Yes Linwood Crystal MD   metoprolol succinate (TOPROL-XL) 50 mg 24 hr tablet Take 1 tablet (50 mg total) by mouth daily 6/13/22  Yes Linwood Crystal MD   potassium chloride (K-DUR,KLOR-CON) 20 mEq tablet Take 1 tablet (20 mEq total) by mouth daily 6/13/22  Yes Linwood Crystal MD   valsartan-hydrochlorothiazide (DIOVAN-HCT) 160-12 5 MG per tablet Take 1 tablet by mouth daily 6/13/22  Yes Linwood Crystal MD   apixaban (ELIQUIS) 5 mg Take 1 tablet (5 mg total) by mouth 2 (two) times a day  Patient not taking: Reported on 5/23/2023 4/3/23 3/28/24  Linwood Crystal MD   FREESTYLE LITE test strip USE 1 EACH DAILY USE AS INSTRUCTED 9/27/22   Linwood Crystal MD   Insulin Pen Needle (BD AutoShield Duo) 30G X 5 MM MISC USE DAILY AS DIRECTED WITH INSULIN PEN 5/11/23   Linwood Crystal MD     I have reviewed home medications with patient family member  Allergies:    Allergies   Allergen Reactions   • Accupril [Quinapril Hcl]    • Novocain [Procaine]    • Parabens    • Sulfa Antibiotics        Social History:  Marital Status: /Civil Union   Occupation:   Patient Pre-hospital Living Situation: Home  Patient Pre-hospital Level of Mobility: walks with person assist  Patient Pre-hospital Diet Restrictions:   Substance Use History:   Social History     Substance and Sexual Activity   Alcohol Use "Not Currently   • Alcohol/week: 1 0 standard drink of alcohol   • Types: 1 Glasses of wine per week    Comment: on holidays add an extra glass of wine     Social History     Tobacco Use   Smoking Status Never   • Passive exposure: Never   Smokeless Tobacco Never     Social History     Substance and Sexual Activity   Drug Use Never       Family History:  Family History   Problem Relation Age of Onset   • Hypertension Mother    • Diabetes Mother    • Other Mother    • Hypertension Father    • Heart disease Father        Physical Exam:     Vitals:   Blood Pressure: 136/82 (06/09/23 0617)  Pulse: 79 (06/09/23 0545)  Temperature: 99 2 °F (37 3 °C) (06/09/23 0617)  Temp Source: Oral (06/08/23 2207)  Respirations: 18 (06/09/23 0245)  Height: 5' 1\" (154 9 cm) (06/09/23 0500)  Weight - Scale: 89 4 kg (197 lb 1 5 oz) (06/09/23 0500)  SpO2: 96 % (06/09/23 0545)    Physical Exam  Constitutional:       General: She is not in acute distress  Appearance: She is obese  She is not toxic-appearing or diaphoretic  HENT:      Head: Normocephalic and atraumatic  Eyes:      General: No scleral icterus  Cardiovascular:      Rate and Rhythm: Normal rate  Rhythm irregular  Pulses: Normal pulses  Heart sounds: No murmur heard  No friction rub  No gallop  Pulmonary:      Effort: Pulmonary effort is normal  No respiratory distress  Breath sounds: Normal breath sounds  No stridor  No wheezing, rhonchi or rales  Abdominal:      General: There is no distension  Palpations: Abdomen is soft  There is no mass  Tenderness: There is no abdominal tenderness  There is no guarding or rebound  Musculoskeletal:         General: Swelling present  Cervical back: Neck supple  No rigidity or tenderness  Right lower leg: No edema  Left lower leg: No edema  Comments: Left knee swollen with suggestion of joint effusion   Skin:     General: Skin is warm and dry     Neurological:      General: No focal " deficit present  Mental Status: She is alert  Mental status is at baseline  Additional Data:     Lab Results:  Results from last 7 days   Lab Units 06/08/23  2223   EOS PCT % 1   HEMATOCRIT % 39 9   HEMOGLOBIN g/dL 12 5   LYMPHS PCT % 7*   MONOS PCT % 9   NEUTROS PCT % 83*   PLATELETS Thousands/uL 169   WBC Thousand/uL 8 30     Results from last 7 days   Lab Units 06/08/23  2223   ANION GAP mmol/L 9   ALBUMIN g/dL 4 2   ALK PHOS U/L 88   ALT U/L 7   AST U/L 12*   BUN mg/dL 28*   CALCIUM mg/dL 9 8   CHLORIDE mmol/L 98   CO2 mmol/L 28   CREATININE mg/dL 1 08   GLUCOSE RANDOM mg/dL 202*   POTASSIUM mmol/L 4 0   SODIUM mmol/L 135   TOTAL BILIRUBIN mg/dL 0 53                 Results from last 7 days   Lab Units 06/08/23  2303   LACTIC ACID mmol/L 1 4       Lines/Drains:  Invasive Devices     Peripheral Intravenous Line  Duration           Peripheral IV 06/08/23 Right Antecubital <1 day    Peripheral IV 06/09/23 Left Antecubital <1 day                    Imaging: Personally reviewed the following imaging: chest CT scan  CTA ED chest PE study   Final Result by Jonathan Ayon MD (06/09 0253)      1  No acute pulm embolism or thoracic aortic aneurysm/dissection  Calcific atherosclerosis of the aorta and coronary arteries  2   Cardiomegaly  3   Small hiatal hernia containing nonspecific hyperdense material    4   Partially visualized hypodense areas in the pancreatic tail could represent pseudocysts  Workstation performed: [id number]         XR chest 1 view portable   ED Interpretation by Marina Ramirez DO (06/08 8597)   No acute cardiopulmonary process visualized      XR knee 4+ views LEFT   ED Interpretation by Marina Ramirez DO (06/08 8781)   Knee effusion present  Loose body superiorly that  has been present on previous knee Xrs  Narrowing of joint space        CT lower extremity wo contrast left    (Results Pending)       EKG and Other Studies Reviewed on Admission:   · EKG: Atrial fibrillation   bpm with frequent PVCs  ** Please Note: This note has been constructed using a voice recognition system   **

## 2023-06-09 NOTE — CASE MANAGEMENT
Case Management Progress Note    Patient name Sagar Byron SNELL /S -01 MRN [id number]  : 1935 Date 2023       LOS (days): 0  Geometric Mean LOS (GMLOS) (days): 4 00  Days to GMLOS:3 5        OBJECTIVE:        Current admission status: Inpatient  Preferred Pharmacy:   [facility] [id number] [place] [address]  Phone: [phone] Fax: [phone]    Primary Care Provider: Carlos Ying MD    Primary Insurance: MEDICARE  Secondary Insurance: [facility] [employer] OPTIONS PROGRAM    PROGRESS NOTE:    CM attempted contact spouse and daughter to do assessment but no answer and not able to leave a message at the numbers found on face sheet  CM contacted [facility] who confirmed patient is a resident of their [address] [facility]  CM noted rehab is recommended for patient therefore referral sent to [facility] to inquire if they can accept if family interested in rehab at the facility   CM will follow up with family in am

## 2023-06-09 NOTE — INCIDENTAL FINDINGS
The following findings require follow up:  Radiographic finding   Finding: Possible pseudocyst of the pancreas   Follow up required: Dedicated MRI or CT scan of the pancreas   Follow up should be done within 1 month(s)    Please notify the following clinician to assist with the follow up:   Dr Tammy Washington

## 2023-06-09 NOTE — SEPSIS NOTE
Sepsis Note   Winslow Favre 80 y o  female MRN: [id number]  Unit/Bed#: ED-19 Encounter: [id number]       Initial Sepsis Screening     Row Name 06/09/23 0539                Is the patient's history suggestive of a new or worsening infection? Yes (Proceed)  -KS        Suspected source of infection infected joint  -KS        Indicate SIRS criteria Tachycardia > 90 bpm  -KS        Are two or more of the above signs & symptoms of infection both present and new to the patient? No  -KS              User Key  (r) = Recorded By, (t) = Taken By, (c) = Cosigned By    [address] Name Provider Type    KS Colton Vieira DO Resident                    Body mass index is 37 24 kg/m²    Wt Readings from Last 1 Encounters:   06/09/23 89 4 kg (197 lb 1 5 oz)     IBW (Ideal Body Weight): 47 8 kg    Ideal body weight: 47 8 kg (105 lb 6 1 oz)  Adjusted ideal body weight: 64 4 kg (142 lb 1 oz)

## 2023-06-10 VITALS
SYSTOLIC BLOOD PRESSURE: 144 MMHG | HEIGHT: 61 IN | HEART RATE: 88 BPM | OXYGEN SATURATION: 97 % | BODY MASS INDEX: 37.21 KG/M2 | RESPIRATION RATE: 19 BRPM | WEIGHT: 197.09 LBS | DIASTOLIC BLOOD PRESSURE: 89 MMHG | TEMPERATURE: 98.3 F

## 2023-06-10 LAB
ANION GAP SERPL CALCULATED.3IONS-SCNC: 7 MMOL/L (ref 4–13)
BUN SERPL-MCNC: 19 MG/DL (ref 5–25)
CALCIUM SERPL-MCNC: 8.9 MG/DL (ref 8.4–10.2)
CHLORIDE SERPL-SCNC: 99 MMOL/L (ref 96–108)
CO2 SERPL-SCNC: 29 MMOL/L (ref 21–32)
CREAT SERPL-MCNC: 0.8 MG/DL (ref 0.6–1.3)
ERYTHROCYTE [DISTWIDTH] IN BLOOD BY AUTOMATED COUNT: 15.5 % (ref 11.6–15.1)
GFR SERPL CREATININE-BSD FRML MDRD: 66 ML/MIN/1.73SQ M
GLUCOSE SERPL-MCNC: 152 MG/DL (ref 65–140)
GLUCOSE SERPL-MCNC: 153 MG/DL (ref 65–140)
GLUCOSE SERPL-MCNC: 178 MG/DL (ref 65–140)
HCT VFR BLD AUTO: 37 % (ref 34.8–46.1)
HGB BLD-MCNC: 11.6 G/DL (ref 11.5–15.4)
MCH RBC QN AUTO: 28.1 PG (ref 26.8–34.3)
MCHC RBC AUTO-ENTMCNC: 31.4 G/DL (ref 31.4–37.4)
MCV RBC AUTO: 90 FL (ref 82–98)
MRSA NOSE QL CULT: NORMAL
PLATELET # BLD AUTO: 137 THOUSANDS/UL (ref 149–390)
PMV BLD AUTO: 11.1 FL (ref 8.9–12.7)
POTASSIUM SERPL-SCNC: 3.6 MMOL/L (ref 3.5–5.3)
RBC # BLD AUTO: 4.13 MILLION/UL (ref 3.81–5.12)
SODIUM SERPL-SCNC: 135 MMOL/L (ref 135–147)
WBC # BLD AUTO: 3.94 THOUSAND/UL (ref 4.31–10.16)

## 2023-06-10 PROCEDURE — 80048 BASIC METABOLIC PNL TOTAL CA: CPT | Performed by: INTERNAL MEDICINE

## 2023-06-10 PROCEDURE — 85027 COMPLETE CBC AUTOMATED: CPT | Performed by: INTERNAL MEDICINE

## 2023-06-10 PROCEDURE — 99239 HOSP IP/OBS DSCHRG MGMT >30: CPT | Performed by: INTERNAL MEDICINE

## 2023-06-10 PROCEDURE — 82948 REAGENT STRIP/BLOOD GLUCOSE: CPT

## 2023-06-10 RX ORDER — ACETAMINOPHEN 325 MG/1
1000 TABLET ORAL EVERY 8 HOURS PRN
Refills: 0
Start: 2023-06-10

## 2023-06-10 RX ORDER — INSULIN LISPRO 100 [IU]/ML
8 INJECTION, SOLUTION INTRAVENOUS; SUBCUTANEOUS
Start: 2023-06-10

## 2023-06-10 RX ORDER — POTASSIUM CHLORIDE 20 MEQ/1
20 TABLET, EXTENDED RELEASE ORAL ONCE
Status: COMPLETED | OUTPATIENT
Start: 2023-06-10 | End: 2023-06-10

## 2023-06-10 RX ORDER — INSULIN GLARGINE 100 [IU]/ML
14 INJECTION, SOLUTION SUBCUTANEOUS
Start: 2023-06-10 | End: 2023-09-08

## 2023-06-10 RX ADMIN — POTASSIUM CHLORIDE 20 MEQ: 1500 TABLET, EXTENDED RELEASE ORAL at 12:02

## 2023-06-10 RX ADMIN — SENNOSIDES 8.6 MG: 8.6 TABLET, FILM COATED ORAL at 12:01

## 2023-06-10 RX ADMIN — Medication 2.5 MG: at 13:07

## 2023-06-10 RX ADMIN — POTASSIUM CHLORIDE 20 MEQ: 1500 TABLET, EXTENDED RELEASE ORAL at 12:01

## 2023-06-10 RX ADMIN — HYDROCHLOROTHIAZIDE 12.5 MG: 12.5 TABLET ORAL at 12:01

## 2023-06-10 RX ADMIN — APIXABAN 2.5 MG: 2.5 TABLET, FILM COATED ORAL at 12:01

## 2023-06-10 RX ADMIN — INSULIN LISPRO 1 UNITS: 100 INJECTION, SOLUTION INTRAVENOUS; SUBCUTANEOUS at 12:02

## 2023-06-10 RX ADMIN — ACETAMINOPHEN 975 MG: 325 TABLET, FILM COATED ORAL at 05:33

## 2023-06-10 RX ADMIN — LOSARTAN POTASSIUM 100 MG: 50 TABLET, FILM COATED ORAL at 12:01

## 2023-06-10 RX ADMIN — Medication 2.5 MG: at 07:41

## 2023-06-10 RX ADMIN — METOPROLOL SUCCINATE 50 MG: 50 TABLET, EXTENDED RELEASE ORAL at 12:01

## 2023-06-10 RX ADMIN — INSULIN LISPRO 5 UNITS: 100 INJECTION, SOLUTION INTRAVENOUS; SUBCUTANEOUS at 12:02

## 2023-06-10 NOTE — ASSESSMENT & PLAN NOTE
Patient stable for discharge, is from [facility] independent living  PT jessica recommended rehab placement with [facility]  · Patient status post trauma/injury with pain and swelling left knee  · Status post arthrocentesis in the ER  · Synovial fluid WBC w/ diff: 12,000  · Synovial fluid, RBC count: 154,000  · Synovial fluid, crystals: none seen  · Synovial fluid GS and Cx: 2+ polys and no bacteria seen  · Fluid studies not showing signs of septic arthritis  Patient and  understand that she is not a candidate for knee replacement      · Steroid and gel injections in the past have not been helpful  · Discussion with orthopedics for possible knee injection, however not possible in the hospital    Plan  · Pain control with Tylenol  · H/o CKD stage 3A, therefore NSAIDs is not appropriate for this patient  · PT recommends postacute rehab services  · Plan to d/c with referral to PT at [facility] where the patient currently lives

## 2023-06-10 NOTE — DISCHARGE INSTRUCTIONS
Swollen Knee Joint   WHAT YOU NEED TO KNOW:   A swollen knee joint may be caused by arthritis or by an injury or trauma, such as a knee sprain  It may also happen if you exercise too much  It may be painful to bend or straighten your knee, or walk  DISCHARGE INSTRUCTIONS:   Return to the emergency department if:   Your knee locks or gives way and you fall  Your feet or toes start to look pale or feel cold  You cannot bear weight on your leg, or you have severe pain even after treatment  Contact your healthcare provider if:   You have a fever  You have redness or warmth over your knee  The swelling does not decrease with treatment  It gets harder or more painful to straighten your leg at the knee  Your knee weakens, or you continue to limp  You have questions or concerns about your condition or care  Medicines:   NSAIDs , such as ibuprofen, help decrease swelling, pain, and fever  This medicine is available with or without a doctor's order  NSAIDs can cause stomach bleeding or kidney problems in certain people  If you take blood thinner medicine, always ask your healthcare provider if NSAIDs are safe for you  Always read the medicine label and follow directions  Take your medicine as directed  Contact your healthcare provider if you think your medicine is not helping or if you have side effects  Tell your provider if you are allergic to any medicine  Keep a list of the medicines, vitamins, and herbs you take  Include the amounts, and when and why you take them  Bring the list or the pill bottles to follow-up visits  Carry your medicine list with you in case of an emergency  What you can do to manage your symptoms:   Rest your knee  Avoid activities that make the swelling or pain worse  You may need to avoid putting weight on your knee while you have pain  Crutches, a cane, or a walker can be used to avoid putting weight on your knee while it heals      Apply ice to your knee to help relieve pain and swelling  Apply ice for 15 to 20 minutes every hour or as directed  Use an ice pack, or put crushed ice in a plastic bag  Cover it with a towel before you apply it to your knee  Ice helps prevent tissue damage and decreases swelling and pain  Compress your knee with a brace or bandage to help reduce swelling  Use a brace or bandage only as directed  Elevate your knee above the level of your heart as often as you can  This will help decrease swelling and pain  Prop your joint on pillows or blankets to keep it elevated comfortably  Apply heat to your knee to relieve pain  Apply heat for 20 to 30 minutes every 2 hours for as many days as directed  Heat helps decrease pain  Go to physical therapy if directed  A physical therapist teaches you exercises to help improve movement and strength, and to decrease pain  Follow up with your doctor as directed:  Write down your questions so you remember to ask them during your visits  © Copyright Rosalia Burnett 2022 Information is for End User's use only and may not be sold, redistributed or otherwise used for commercial purposes  The above information is an  only  It is not intended as medical advice for individual conditions or treatments  Talk to your doctor, nurse or pharmacist before following any medical regimen to see if it is safe and effective for you

## 2023-06-10 NOTE — DISCHARGE SUMMARY
"[facility]  Discharge- Ana Villanueva 1935, 80 y o  female MRN: [id number]  Unit/Bed#: S -01 Encounter: [id number]  Primary Care Provider: Nahomy Mcclure MD   Date and time admitted to hospital: 6/8/2023 10:01 PM    * Knee pain  Assessment & Plan  Patient stable for discharge, is from [facility]  PT eval recommended rehab placement with [facility]  · Patient status post trauma/injury with pain and swelling left knee  · Status post arthrocentesis in the ER  · Synovial fluid WBC w/ diff: 12,000  · Synovial fluid, RBC count: 154,000  · Synovial fluid, crystals: none seen  · Synovial fluid GS and Cx: 2+ polys and no bacteria seen  · Fluid studies not showing signs of septic arthritis  Patient and  understand that she is not a candidate for knee replacement      · Steroid and gel injections in the past have not been helpful  · Discussion with orthopedics for possible knee injection, however not possible in the hospital    Plan  · Pain control with Tylenol  · H/o CKD stage 3A, therefore NSAIDs is not appropriate for this patient  · PT recommends postacute rehab services  · Plan to d/c with referral to PT at [facility] where the patient currently lives    Abnormal abdominal CT scan  Assessment & Plan  · Suggestion of \"possible pancreatic pseudocyst\" on CT scan chest  · Consider dedicated pancreatic CT or MRI in the near future  · Incidental finding  · Follow up should be done within 1 month  · Please notify the following clinician to assist with the follow up:              Dr Tin Vora (PCP)    Type 2 diabetes mellitus with hyperglycemia [facility])  Assessment & Plan  Lab Results   Component Value Date    HGBA1C 9 0 (H) 04/14/2023       Recent Labs     06/09/23  1549 06/09/23  2109 06/10/23  0822 06/10/23  1110   POCGLU 188* 146* 152* 178*       Blood Sugar Average: Last 72 hrs:  · (P) 995 [id number]   · Home regimen: " Lantus 14U @ bedtime and mealtime 5U TID w/ meals    Plan  · Continue insulin regimen, recommend close outpt follow up  · Poorly controlled diabetes, improved while inpatient  · Fingersticks sliding scale coverage    PAF (paroxysmal atrial fibrillation) ([facility] [address] )  Assessment & Plan  · Medications reviewed with   · Tachycardia in the ER likely secondary to pain  Patient received 1 dose of IV Cardizem in the ER  · Eliquis was discontinued at rehab due to some issues with bruising and a questionable hemarthrosis in the past   · Patient  reports plan was to start half dose Eliquis as patient has been without anticoagulation for A-fib for some time    Plan  · Continue Eliquis 2 5 twice daily per discussion with patient's  (will be discharged on this dose)  · Continue oral metoprolol  · Monitor VS    Dementia without behavioral disturbance [facility])  Assessment & Plan  · Patient stable and at baseline    Medical Problems     Resolved Problems  Date Reviewed: 6/10/2023   None       Discharging Resident: Elias Mina MD  Discharging Attending: Edmar Damon MD  PCP: Nicole Crisostomo MD  Admission Date:   Admission Orders (From admission, onward)     Ordered        06/09/23 0529  INPATIENT ADMISSION  Once                      Discharge Date: 06/10/23    Consultations During Hospital Stay:  · No    Procedures Performed:   · Left knee arthrocentesis    Significant Findings / Test Results:   · D-dimer 2 09  · CTA PE study: No acute pulm embolism or thoracic aortic aneurysm/dissection  Calcific atherosclerosis of the aorta and coronary arteries  Cardiomegaly  Small hiatal hernia containing nonspecific hyperdense material  Partially visualized hypodense areas in the pancreatic tail could represent pseudocysts  · XR left knee: No acute osseous abnormality  Degenerative changes as described  · CXR: No acute pulmonary disease  Enlarged heart, possibly magnified by technique   Chronic prominence of the main pulmonary artery  · CT lower extremity without contrast, left: No acute osseous abnormality  Severe tricompartmental degenerative changes  Large joint effusion  Subcutaneous fat stranding and edema about the lower extremity suggestive of cellulitis  · Synovial Fluid analysis  · Synovial fluid WBC w/ diff: 12,000  · Synovial fluid, RBC count: 154,000  · Synovial fluid, crystals: none seen  · Synovial fluid GS and Cx: 2+ polys and no bacteria seen    Incidental Findings:   · CT chest suggests possible pancreatic pseudocyst  · Consider dedicated pancreatic CT/MRI within 1 month  · Notify the patient's PCP, Dr Jarvis Cifuentes, to make her aware of this plan    Test Results Pending at Discharge (will require follow up): · None     Outpatient Tests Requested:  · None    Complications:  None    Reason for Admission: Pain and swelling of left knee    Hospital Course:   Tim Nunez is a 80 y o  female patient with a PMH of diabetes, osteoarthritis of multiple joints, paroxysmal A-fib, hypertension and dementia who originally presented to the hospital on 6/8/2023 due to left knee pain  The patient was accompanied by her , who provided most of the information  They live at [facility] assisted living facility   reported that the patient is able to ambulate for short distances and uses a wheelchair at other times  He endorses that she has been walking less due to increasing pain in her left knee  Knee pain acutely worsened after she hit her knee against a hard surface while being pushed in the wheelchair  Upon presentation to the ED, the patient was found to have tachycardia and an elevated D-dimer 2 09  CTA PE study without evidence of PE  She was previously taking Eliquis 5 mg twice daily for atrial fibrillation but it was recently discontinued for concerns of hemarthrosis of the knee  Given the patient's increased risk, it was recommended that she restart Eliquis at half dosing    It was restarted during this current hospitalization  The patient underwent arthrocentesis of the left knee which did not show evidence of septic arthritis  Case discussed with Orthopedics, with recommendation provided for outpatient follow-up for possible knee injections as this is not usually done in the hospital  Of note, the patient had a recent outpatient visit her orthopedic physician where she was told that she was not a candidate for knee replacement  Pain management was provided as well as PT evaluation, who recommended postacute rehab services  The patient's  reports that this is offered at [facility], where they live  He thinks physical therapy will be very helpful for the patient as she has had good results with it in the past     The patient has been medically optimized for discharge at this time with recommendation to follow-up with PCP within 1 week  At this time, they can also discuss ordering imaging studies for her incidental finding of a possible pancreatic pseudocyst      The patient, initially admitted to the hospital as inpatient, was discharged earlier than expected given the following: clinical improvement  Please see above list of diagnoses and related plan for additional information  Condition at Discharge: fair    Discharge Day Visit / Exam: Please see separate progress note from earlier regarding discharge day exam    Discussion with Family: Updated  () at bedside  Discharge instructions/Information to patient and family:   See after visit summary for information provided to patient and family  Provisions for Follow-Up Care:  See after visit summary for information related to follow-up care and any pertinent home health orders  Disposition:   Other: [facility] (prev lives in independant living)    Planned Readmission: No    Discharge Medications:  See after visit summary for reconciled discharge medications provided to patient and/or family        **Please Note: This note may have been constructed using a voice recognition system**

## 2023-06-10 NOTE — ASSESSMENT & PLAN NOTE
"· Suggestion of \"possible pancreatic pseudocyst\" on CT scan chest  · Consider dedicated pancreatic CT or MRI in the near future  · Incidental finding  · Follow up should be done within 1 month  · Please notify the following clinician to assist with the follow up:              Dr Johannah Siemens (PCP)  "

## 2023-06-10 NOTE — ASSESSMENT & PLAN NOTE
Lab Results   Component Value Date    HGBA1C 9 0 (H) 04/14/2023       Recent Labs     06/09/23  1549 06/09/23  2109 06/10/23  0822 06/10/23  1110   POCGLU 188* 146* 152* 178*       Blood Sugar Average: Last 72 hrs:  · (P) 179 [id number]   · Home regimen: Lantus 14U @ bedtime and mealtime 5U TID w/ meals    Plan  · Continue insulin regimen, recommend close outpt follow up  · Poorly controlled diabetes, improved while inpatient  · Fingersticks sliding scale coverage

## 2023-06-10 NOTE — CASE MANAGEMENT
Case Management Discharge Planning Note    Patient name Kristina Hung S /S -01 MRN [id number]  : 1935 Date 6/10/2023       Current Admission Date: 2023  Current Admission Diagnosis:Knee pain   Patient Active Problem List    Diagnosis Date Noted   • Abnormal abdominal CT scan 2023   • Knee pain 2023   • Type 2 diabetes mellitus with hyperglycemia ([address] ) 2023   • Generalized weakness 04/15/2023   • Fall 2023   • Depression, recurrent ([address] ) 2023   • Compression fracture of L3 vertebra ([facility] [address] ) 2023   • PAF (paroxysmal atrial fibrillation) ([facility] [address] )    • Metabolic encephalopathy    • Hypertensive urgency 2023   • Hyponatremia 2023   • Primary osteoarthritis of left knee 2022   • Osteoarthritis of multiple joints 2022   • Bilateral hearing loss 2022   • 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[facility] [address] ) 03/15/2022   • Dementia without behavioral disturbance ([address] )    • Stage 3a chronic kidney disease ([facility] [address] ) 2021   • Essential hypertension 2020   • Pure hypercholesterolemia 2020   • Microalbuminuria 2020      LOS (days): 1  Geometric Mean LOS (GMLOS) (days): 4 00  Days to GMLOS:2 6     OBJECTIVE:  Risk of Unplanned Readmission Score: 18 49         Current admission status: Inpatient   Preferred Pharmacy:   [address] [place] [address], [address] [place] [id number]  Phone: [phone] Fax: [phone]    Primary Care Provider: Rashid Winter MD    Primary Insurance: MEDICARE  Secondary Insurance: [facility] [employer] PROGRAM    DISCHARGE DETAILS:  CM spoke with patient and spouse at the bedside  Patient and spouse aware that CM left voice message for [facility] with [facility]  Patient and spouse updated CM attempted to reach [facility] office open Monday-Friday  Spouse aware CM will send scripts to [facility] for Home PT/OT  CM provided hard copies to spouse   All questions/concerns answered at this time     CM faxed Face Sheet and Home PT/OT orders to [facility] at [phone]

## 2023-06-10 NOTE — PROGRESS NOTES
"[facility]  Progress Note  Name: Colleen Nava  MRN: [id number]  Unit/Bed#: S -01 I Date of Admission: 6/8/2023   Date of Service: 6/10/2023 I Hospital Day: 1    Assessment/Plan   * Knee pain  Assessment & Plan  Patient stable for discharge, is from [facility] independent living  PT jessica recommended rehab placement with [facility]  If possible patient can be DC'd today, if not patient pending placement  · Patient status post trauma/injury with pain and swelling left knee  · Status post arthrocentesis in the ER  · Synovial fluid WBC w/ diff: 12,000  · Synovial fluid, RBC count: 154,000  · Synovial fluid, crystals: none seen  · Synovial fluid GS and Cx: 2+ polys and no bacteria seen  · Fluid studies not showing signs of septic arthritis  Patient and  understand that she is not a candidate for knee replacement      · Steroid and gel injections in the past have not been helpful  · Discussion with orthopedics for possible knee injection, however not possible in the hospital    Plan  · Pain control with Tylenol  · H/o CKD stage 3A, therefore NSAIDs is not appropriate for this patient  · PT recommends postacute rehab services  · Plan to d/c with referral to PT at [facility] assisted living where the patient currently lives    Abnormal abdominal CT scan  Assessment & Plan  · Suggestion of \"possible pancreatic pseudocyst\" on CT scan chest  · Consider dedicated pancreatic CT or MRI in the near future  · Incidental finding  · Follow up should be done within 1 month  · Please notify the following clinician to assist with the follow up:              Dr Nahun Gonzalez (PCP)    Type 2 diabetes mellitus with hyperglycemia [facility])  Assessment & Plan  Lab Results   Component Value Date    HGBA1C 9 0 (H) 04/14/2023       Recent Labs     06/09/23  1022 06/09/23  1549 06/09/23  2109 06/10/23  0822   POCGLU 251* 188* 146* 152*       Blood Sugar Average: Last 72 hrs:  · (P) 179 4 " "  · Home regimen: Lantus 14U @ bedtime and mealtime 5U TID w/ meals    Plan  · Continue insulin regimen  · Poorly controlled diabetes, improved while inpatient  · Fingersticks sliding scale coverage    PAF (paroxysmal atrial fibrillation) ([facility])  Assessment & Plan  · Medications reviewed with   · Tachycardia in the ER likely secondary to pain  Patient received 1 dose of IV Cardizem in the ER  · Eliquis was discontinued at rehab due to some issues with bruising and a questionable hemarthrosis in the past   · Patient  reports plan was to start half dose Eliquis as patient has been without anticoagulation for A-fib for some time    Plan  · Continue Eliquis 2 5 twice daily per discussion with patient's  (will be discharged on this dose)  · Continue oral metoprolol  · Monitor VS    Dementia without behavioral disturbance ([facility])  Assessment & Plan  · Patient stable and at baseline         VTE Pharmacologic Prophylaxis: VTE Score: 6 High Risk (Score >/= 5) - Pharmacological DVT Prophylaxis Ordered: apixaban (Eliquis)  Sequential Compression Devices Ordered  Patient Centered Rounds: I performed bedside rounds with nursing staff today  Discussions with Specialists or Other Care Team Provider: CM    Education and Discussions with Family / Patient: Updated  () at bedside  Current Length of Stay: 1 day(s)  Current Patient Status: Inpatient   Discharge Plan: Today versus tomorrow    Code Status: Level 3 - DNAR and DNI    Subjective:   \"I am feeling good\"  Patient reporting significant improvement though knee remains swollen   at bedside reports she seems to be almost back to her baseline ambulatory status  PT did recommend rehab placement for patient however both patient has been wish to avoid if possible, they stated she has been receiving home PT a few times a week and wondered if that could be continued       Objective:     Vitals:   Temp (24hrs), Av °F (36 7 °C), " Min:97 6 °F (36 4 °C), Max:98 3 °F (36 8 °C)    Temp:  [97 6 °F (36 4 °C)-98 3 °F (36 8 °C)] 98 3 °F (36 8 °C)  HR:  [88] 88  Resp:  [16-19] 19  BP: (130-160)/(66-89) 144/89  SpO2:  [97 %] 97 %  Body mass index is 37 24 kg/m²  Input and Output Summary (last 24 hours): Intake/Output Summary (Last 24 hours) at 6/10/2023 1155  Last data filed at 6/9/2023 1410  Gross per 24 hour   Intake --   Output 650 ml   Net -650 ml       Physical Exam:   Physical Exam  Vitals reviewed  Constitutional:       General: She is not in acute distress  Appearance: She is obese  She is not toxic-appearing or diaphoretic  Comments: Pleasant, no acute distress   is at bedside   HENT:      Head: Normocephalic and atraumatic  Right Ear: External ear normal       Left Ear: External ear normal       Mouth/Throat:      Mouth: Mucous membranes are moist    Eyes:      General: No scleral icterus  Conjunctiva/sclera: Conjunctivae normal    Cardiovascular:      Rate and Rhythm: Normal rate  Rhythm irregular  Heart sounds: No murmur heard  No friction rub  No gallop  Pulmonary:      Effort: Pulmonary effort is normal  No respiratory distress  Breath sounds: Normal breath sounds  No stridor  No wheezing, rhonchi or rales  Abdominal:      General: There is no distension  Palpations: Abdomen is soft  There is no mass  Tenderness: There is no abdominal tenderness  There is no guarding or rebound  Musculoskeletal:         General: Swelling present  Cervical back: Neck supple  Right lower leg: No edema  Left lower leg: No edema  Comments: Left knee remains swollen, slightly decreased from yesterday's exam   Skin:     General: Skin is warm and dry  Coloration: Skin is not jaundiced  Neurological:      General: No focal deficit present  Mental Status: She is alert  Mental status is at baseline     Psychiatric:         Mood and Affect: Mood normal          Behavior: Behavior normal           Additional Data:     Labs:  Results from last 7 days   Lab Units 06/10/23  0601 06/08/23  2223   EOS PCT %  --  1   HEMATOCRIT % 37 0 39 9   HEMOGLOBIN g/dL 11 6 12 5   LYMPHS PCT %  --  7*   MONOS PCT %  --  9   NEUTROS PCT %  --  83*   PLATELETS Thousands/uL 137* 169   WBC Thousand/uL 3 94* 8 30     Results from last 7 days   Lab Units 06/10/23  0601 06/08/23  2223   ANION GAP mmol/L 7 9   ALBUMIN g/dL  --  4 2   ALK PHOS U/L  --  88   ALT U/L  --  7   AST U/L  --  12*   BUN mg/dL 19 28*   CALCIUM mg/dL 8 9 9 8   CHLORIDE mmol/L 99 98   CO2 mmol/L 29 28   CREATININE mg/dL 0 80 1 08   GLUCOSE RANDOM mg/dL 153* 202*   POTASSIUM mmol/L 3 6 4 0   SODIUM mmol/L 135 135   TOTAL BILIRUBIN mg/dL  --  0 53         Results from last 7 days   Lab Units 06/10/23  1110 06/10/23  0822 06/09/23  2109 06/09/23  1549 06/09/23  1022 06/09/23  0715   POC GLUCOSE mg/dl 178* 152* 146* 188* 251* 160*         Results from last 7 days   Lab Units 06/08/23  2303   LACTIC ACID mmol/L 1 4       Lines/Drains:  Invasive Devices     Peripheral Intravenous Line  Duration           Peripheral IV 06/08/23 Right Antecubital 1 day    Peripheral IV 06/09/23 Left Antecubital 1 day              Imaging: Reviewed radiology reports from this admission including: CT lower extremity    Recent Cultures (last 7 days):   Results from last 7 days   Lab Units 06/09/23  0501 06/09/23  0040 06/08/23  2303   BLOOD CULTURE   --  No Growth at 24 hrs  No Growth at 24 hrs     GRAM STAIN RESULT  2+ Polys  No bacteria seen  2+ Polys  No organisms seen  No Mononuclear Cells  --   --        Last 24 Hours Medication List:   Current Facility-Administered Medications   Medication Dose Route Frequency Provider Last Rate   • acetaminophen  975 mg Oral Q8H [facility] Griselda Dill Psaila, MD     • apixaban  2 5 mg Oral BID John Liang MD     • ezetimibe  10 mg Oral Daily With Shelbi Gregorio MD      And   • pravastatin  40 mg Oral Daily With Marigene Babinski, MD     • losartan  100 mg Oral Daily Romana Bo MD      And   • hydrochlorothiazide  12 5 mg Oral Daily Romana Bo MD     • insulin glargine  14 Units Subcutaneous HS Romana Bo MD     • insulin lispro  1-5 Units Subcutaneous TID Francheska Vasques MD     • insulin lispro  1-5 Units Subcutaneous HS Dorie Vasques MD     • insulin lispro  5 Units Subcutaneous TID With Meals Romana Bo MD     • metoprolol  5 mg Intravenous Q6H PRN Romana Bo MD     • metoprolol succinate  50 mg Oral Daily Romana Bo MD     • ondansetron  4 mg Intravenous Q6H PRN Romana Bo MD     • oxyCODONE  2 5 mg Oral Q4H PRN Romana oB MD     • potassium chloride  20 mEq Oral Daily Romana Bo MD     • potassium chloride  20 mEq Oral Once Gely Warren MD     • senna  1 tablet Oral Daily Romana Bo MD          Today, Patient Was Seen By: Gely Warren MD    **Please Note: This note may have been constructed using a voice recognition system  **

## 2023-06-10 NOTE — CASE MANAGEMENT
Case Management Discharge Planning Note    Patient name Samara Britton  Location S /S -01 MRN [id number]  : 1935 Date 6/10/2023       Current Admission Date: 2023  Current Admission Diagnosis:Knee pain   Patient Active Problem List    Diagnosis Date Noted   • Abnormal abdominal CT scan 2023   • Knee pain 2023   • Type 2 diabetes mellitus with hyperglycemia ([address] ) 2023   • Generalized weakness 04/15/2023   • Fall 2023   • Depression, recurrent ([address] ) 2023   • Compression fracture of L3 vertebra ([address] ) 2023   • PAF (paroxysmal atrial fibrillation) ([address] )    • Metabolic encephalopathy 9483   • Hypertensive urgency 2023   • Hyponatremia 2023   • Primary osteoarthritis of left knee 2022   • Osteoarthritis of multiple joints 2022   • Bilateral hearing loss 2022   • 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[address] ) 03/15/2022   • Dementia without behavioral disturbance ([address] )    • Stage 3a chronic kidney disease ([address] ) 2021   • Essential hypertension 2020   • Pure hypercholesterolemia 2020   • Microalbuminuria 2020      LOS (days): 1  Geometric Mean LOS (GMLOS) (days): 4 00  Days to GMLOS:2 7     OBJECTIVE:  Risk of Unplanned Readmission Score: 18 49         Current admission status: Inpatient   Preferred Pharmacy:   [address] [place] [address], [address] [place] [address]  Phone: [phone] Fax: [phone]    Primary Care Provider: Stevan Weber MD    Primary Insurance: MEDICARE  Secondary Insurance: [facility] HEALTH OPTIONS PROGRAM    DISCHARGE DETAILS:  CM spoke with patient and spouse at the bedside  CM introduced self and role  Patient's spouse stated they live at [address] [place] [address]  Patient was receiving SN services but those were recently stopped  Patient was about to start Home PT/OT services with [facility] prior to hospital admission   Patient and spouse requesting return to home setting with Home PT/OT  Patient's spouse provides help at home  Patient has a RW and Wheelchair at home  Patient's spouse stated she is walking around hospital room better today and feels he is able to manage her care at home  Spouse also stated he is able to transport home at discharge  Patient and spouse aware that per PT, the recommendation at discharge is Inpatient rehab  Patient and spouse declining inpatient rehab and preference is Home therapy services  Patient and spouse aware CM will try and reach [place] with [facility]  CM will f/u with patient and spouse re:DCP  All questions/concerns answered at this time  CM contacted [address] [place] [address] and left Voice message for [place]  Waiting for callback  CM attempted to reach [facility]  Powerback hours are Monday-Friday from [phone]  CM left voice message for callback  CM updated SLIM

## 2023-06-10 NOTE — PLAN OF CARE
Problem: Potential for Falls  Goal: Patient will remain free of falls  Description: INTERVENTIONS:  - Educate patient/family on patient safety including physical limitations  - Instruct patient to call for assistance with activity   - Consult OT/PT to assist with strengthening/mobility   - Keep Call bell within reach  - Keep bed low and locked with side rails adjusted as appropriate  - Keep care items and personal belongings within reach  - Initiate and maintain comfort rounds  - Make Fall Risk Sign visible to staff  - Offer Toileting every 2 Hours, in advance of need  - Initiate/Maintain BED alarm  - Obtain necessary fall risk management equipment: ALARMS  - Apply yellow socks and bracelet for high fall risk patients  - Consider moving patient to room near nurses station  Outcome: Progressing     Problem: PAIN - ADULT  Goal: Verbalizes/displays adequate comfort level or baseline comfort level  Description: Interventions:  - Encourage patient to monitor pain and request assistance  - Assess pain using appropriate pain scale  - Administer analgesics based on type and severity of pain and evaluate response  - Implement non-pharmacological measures as appropriate and evaluate response  - Consider cultural and social influences on pain and pain management  - Notify physician/advanced practitioner if interventions unsuccessful or patient reports new pain  Outcome: Progressing     Problem: INFECTION - ADULT  Goal: Absence or prevention of progression during hospitalization  Description: INTERVENTIONS:  - Assess and monitor for signs and symptoms of infection  - Monitor lab/diagnostic results  - Monitor all insertion sites, i e  indwelling lines, tubes, and drains  - Monitor endotracheal if appropriate and nasal secretions for changes in amount and color  - [place] appropriate cooling/warming therapies per order  - Administer medications as ordered  - Instruct and encourage patient and family to use good hand hygiene technique  - Identify and instruct in appropriate isolation precautions for identified infection/condition  Outcome: Progressing     Problem: DISCHARGE PLANNING  Goal: Discharge to home or other facility with appropriate resources  Description: INTERVENTIONS:  - Identify barriers to discharge w/patient and caregiver  - Arrange for needed discharge resources and transportation as appropriate  - Identify discharge learning needs (meds, wound care, etc )  - Arrange for interpretive services to assist at discharge as needed  - Refer to Case Management Department for coordinating discharge planning if the patient needs post-hospital services based on physician/advanced practitioner order or complex needs related to functional status, cognitive ability, or social support system  Outcome: Progressing     Problem: Knowledge Deficit  Goal: Patient/family/caregiver demonstrates understanding of disease process, treatment plan, medications, and discharge instructions  Description: Complete learning assessment and assess knowledge base    Interventions:  - Provide teaching at level of understanding  - Provide teaching via preferred learning methods  Outcome: Progressing     Problem: MOBILITY - ADULT  Goal: Maintain or return to baseline ADL function  Description: INTERVENTIONS:  -  Assess patient's ability to carry out ADLs; assess patient's baseline for ADL function and identify physical deficits which impact ability to perform ADLs (bathing, care of mouth/teeth, toileting, grooming, dressing, etc )  - Assess/evaluate cause of self-care deficits   - Assess range of motion  - Assess patient's mobility; develop plan if impaired  - Assess patient's need for assistive devices and provide as appropriate  - Encourage maximum independence but intervene and supervise when necessary  - Involve family in performance of ADLs  - Assess for home care needs following discharge   - Consider OT consult to assist with ADL evaluation and planning for discharge  - Provide patient education as appropriate  Outcome: Progressing  Goal: Maintains/Returns to pre admission functional level  Description: INTERVENTIONS:  - Perform BMAT or MOVE assessment daily    - Set and communicate daily mobility goal to care team and patient/family/caregiver     - Collaborate with rehabilitation services on mobility goals if consulted  - Out of bed for toileting  - Record patient progress and toleration of activity level   Outcome: Progressing     Problem: Prexisting or High Potential for Compromised Skin Integrity  Goal: Skin integrity is maintained or improved  Description: INTERVENTIONS:  - Identify patients at risk for skin breakdown  - Assess and monitor skin integrity  - Assess and monitor nutrition and hydration status  - Monitor labs   - Assess for incontinence   - Turn and reposition patient  - Assist with mobility/ambulation  - Relieve pressure over bony prominences  - Avoid friction and shearing  - Provide appropriate hygiene as needed including keeping skin clean and dry  - Evaluate need for skin moisturizer/barrier cream  - Collaborate with interdisciplinary team   - Patient/family teaching  - Consider wound care consult   Outcome: Progressing

## 2023-06-10 NOTE — DISCHARGE INSTR - AVS FIRST PAGE
Dear Quique Brooks,     It was our pleasure to care for you here at [facility]  It is our hope that we were always able to exceed the expected standards for your care during your stay  You were hospitalized due to left knee pain and swelling  You were cared for on the 2nd floor by Orion Paz MD under the service of Carlitos Casarez MD with the [facility] who covers for your primary care physician (PCP), Zeinab Hayes MD, while you were hospitalized  If you have any questions or concerns related to this hospitalization, you may contact us at [phone]  For follow up as well as any medication refills, we recommend that you follow up with your primary care physician  A registered nurse will reach out to you by phone within a few days after your discharge to answer any additional questions that you may have after going home  However, at this time we provide for you here, the most important instructions / recommendations at discharge:     Notable Medication Adjustments -   Take Eliquis 2 5 mg twice daily, metoprolol 50 mg daily for A-fib  Take Tylenol 975 mg every 8 hours as needed for pain  Testing Required after Discharge -   None  Important follow up information -   Follow up with your PCP within 1 week  Follow-up with your orthopedic doctor to discuss knee injections for pain relief  Other Instructions -   Discuss obtaining repeat imaging for incidental finding of 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

## 2023-06-11 LAB
ATRIAL RATE: 96 BPM
QRS AXIS: -79 DEGREES
QRSD INTERVAL: 72 MS
QT INTERVAL: 324 MS
QTC INTERVAL: 461 MS
T WAVE AXIS: 76 DEGREES
VENTRICULAR RATE: 122 BPM

## 2023-06-11 PROCEDURE — 93010 ELECTROCARDIOGRAM REPORT: CPT | Performed by: INTERNAL MEDICINE

## 2023-06-12 LAB
BACTERIA SPEC BFLD CULT: NO GROWTH
GRAM STN SPEC: NORMAL
GRAM STN SPEC: NORMAL

## 2023-06-14 LAB
BACTERIA BLD CULT: NORMAL
BACTERIA BLD CULT: NORMAL

## 2023-06-15 ENCOUNTER — APPOINTMENT (OUTPATIENT)
Dept: VASCULAR ULTRASOUND | Facility: HOSPITAL | Age: 88
DRG: 291 | End: 2023-06-15
Payer: MEDICARE

## 2023-06-15 ENCOUNTER — HOSPITAL ENCOUNTER (INPATIENT)
Facility: HOSPITAL | Age: 88
LOS: 3 days | Discharge: NURSING, REHAB OR LONG-TERM CARE | DRG: 291 | End: 2023-06-18
Attending: EMERGENCY MEDICINE | Admitting: INTERNAL MEDICINE
Payer: MEDICARE

## 2023-06-15 ENCOUNTER — APPOINTMENT (EMERGENCY)
Dept: RADIOLOGY | Facility: HOSPITAL | Age: 88
DRG: 291 | End: 2023-06-15
Payer: MEDICARE

## 2023-06-15 DIAGNOSIS — I50.33 ACUTE ON CHRONIC DIASTOLIC CONGESTIVE HEART FAILURE (HCC): ICD-10-CM

## 2023-06-15 DIAGNOSIS — R26.2 AMBULATORY DYSFUNCTION: ICD-10-CM

## 2023-06-15 DIAGNOSIS — Z76.0 MEDICATION REFILL: ICD-10-CM

## 2023-06-15 DIAGNOSIS — R60.9 PERIPHERAL EDEMA: ICD-10-CM

## 2023-06-15 DIAGNOSIS — G89.29 CHRONIC PAIN OF BOTH KNEES: ICD-10-CM

## 2023-06-15 DIAGNOSIS — R53.1 WEAKNESS: Primary | ICD-10-CM

## 2023-06-15 DIAGNOSIS — M25.562 CHRONIC PAIN OF BOTH KNEES: ICD-10-CM

## 2023-06-15 DIAGNOSIS — M17.12 OSTEOARTHRITIS OF LEFT KNEE: ICD-10-CM

## 2023-06-15 DIAGNOSIS — M25.561 CHRONIC PAIN OF BOTH KNEES: ICD-10-CM

## 2023-06-15 LAB
2HR DELTA HS TROPONIN: -2 NG/L
ALBUMIN SERPL BCP-MCNC: 3.7 G/DL (ref 3.5–5)
ALP SERPL-CCNC: 46 U/L (ref 34–104)
ALT SERPL W P-5'-P-CCNC: 7 U/L (ref 7–52)
ANION GAP SERPL CALCULATED.3IONS-SCNC: 10 MMOL/L (ref 4–13)
AST SERPL W P-5'-P-CCNC: 22 U/L (ref 13–39)
ATRIAL RATE: 86 BPM
BASOPHILS # BLD AUTO: 0.03 THOUSANDS/ÂΜL (ref 0–0.1)
BASOPHILS NFR BLD AUTO: 1 % (ref 0–1)
BILIRUB SERPL-MCNC: 0.83 MG/DL (ref 0.2–1)
BILIRUB UR QL STRIP: NEGATIVE
BNP SERPL-MCNC: 290 PG/ML (ref 0–100)
BUN SERPL-MCNC: 25 MG/DL (ref 5–25)
CALCIUM SERPL-MCNC: 9.3 MG/DL (ref 8.4–10.2)
CARDIAC TROPONIN I PNL SERPL HS: 11 NG/L
CARDIAC TROPONIN I PNL SERPL HS: 13 NG/L
CHLORIDE SERPL-SCNC: 93 MMOL/L (ref 96–108)
CLARITY UR: CLEAR
CO2 SERPL-SCNC: 31 MMOL/L (ref 21–32)
COLOR UR: COLORLESS
CREAT SERPL-MCNC: 1.07 MG/DL (ref 0.6–1.3)
EOSINOPHIL # BLD AUTO: 0.03 THOUSAND/ÂΜL (ref 0–0.61)
EOSINOPHIL NFR BLD AUTO: 1 % (ref 0–6)
ERYTHROCYTE [DISTWIDTH] IN BLOOD BY AUTOMATED COUNT: 15.1 % (ref 11.6–15.1)
GFR SERPL CREATININE-BSD FRML MDRD: 46 ML/MIN/1.73SQ M
GLUCOSE SERPL-MCNC: 110 MG/DL (ref 65–140)
GLUCOSE SERPL-MCNC: 116 MG/DL (ref 65–140)
GLUCOSE SERPL-MCNC: 176 MG/DL (ref 65–140)
GLUCOSE SERPL-MCNC: 233 MG/DL (ref 65–140)
GLUCOSE UR STRIP-MCNC: NEGATIVE MG/DL
HCT VFR BLD AUTO: 38.2 % (ref 34.8–46.1)
HGB BLD-MCNC: 11.8 G/DL (ref 11.5–15.4)
HGB UR QL STRIP.AUTO: NEGATIVE
IMM GRANULOCYTES # BLD AUTO: 0.02 THOUSAND/UL (ref 0–0.2)
IMM GRANULOCYTES NFR BLD AUTO: 0 % (ref 0–2)
KETONES UR STRIP-MCNC: NEGATIVE MG/DL
LEUKOCYTE ESTERASE UR QL STRIP: NEGATIVE
LYMPHOCYTES # BLD AUTO: 0.5 THOUSANDS/ÂΜL (ref 0.6–4.47)
LYMPHOCYTES NFR BLD AUTO: 9 % (ref 14–44)
MAGNESIUM SERPL-MCNC: 1.5 MG/DL (ref 1.9–2.7)
MCH RBC QN AUTO: 27.4 PG (ref 26.8–34.3)
MCHC RBC AUTO-ENTMCNC: 30.9 G/DL (ref 31.4–37.4)
MCV RBC AUTO: 89 FL (ref 82–98)
MONOCYTES # BLD AUTO: 0.6 THOUSAND/ÂΜL (ref 0.17–1.22)
MONOCYTES NFR BLD AUTO: 11 % (ref 4–12)
NEUTROPHILS # BLD AUTO: 4.5 THOUSANDS/ÂΜL (ref 1.85–7.62)
NEUTS SEG NFR BLD AUTO: 78 % (ref 43–75)
NITRITE UR QL STRIP: NEGATIVE
NRBC BLD AUTO-RTO: 0 /100 WBCS
PH UR STRIP.AUTO: 7.5 [PH]
PLATELET # BLD AUTO: 210 THOUSANDS/UL (ref 149–390)
PMV BLD AUTO: 10.7 FL (ref 8.9–12.7)
POTASSIUM SERPL-SCNC: 4.2 MMOL/L (ref 3.5–5.3)
PROT SERPL-MCNC: 7 G/DL (ref 6.4–8.4)
PROT UR STRIP-MCNC: NEGATIVE MG/DL
QRS AXIS: -57 DEGREES
QRSD INTERVAL: 76 MS
QT INTERVAL: 358 MS
QTC INTERVAL: 445 MS
RBC # BLD AUTO: 4.3 MILLION/UL (ref 3.81–5.12)
SODIUM SERPL-SCNC: 134 MMOL/L (ref 135–147)
SP GR UR STRIP.AUTO: 1.01 (ref 1–1.03)
T WAVE AXIS: 77 DEGREES
TSH SERPL DL<=0.05 MIU/L-ACNC: 3.96 UIU/ML (ref 0.45–4.5)
UROBILINOGEN UR STRIP-ACNC: <2 MG/DL
VENTRICULAR RATE: 93 BPM
WBC # BLD AUTO: 5.68 THOUSAND/UL (ref 4.31–10.16)

## 2023-06-15 PROCEDURE — 93970 EXTREMITY STUDY: CPT | Performed by: SURGERY

## 2023-06-15 PROCEDURE — 99285 EMERGENCY DEPT VISIT HI MDM: CPT

## 2023-06-15 PROCEDURE — 96375 TX/PRO/DX INJ NEW DRUG ADDON: CPT

## 2023-06-15 PROCEDURE — 93970 EXTREMITY STUDY: CPT

## 2023-06-15 PROCEDURE — 82948 REAGENT STRIP/BLOOD GLUCOSE: CPT

## 2023-06-15 PROCEDURE — 84484 ASSAY OF TROPONIN QUANT: CPT | Performed by: EMERGENCY MEDICINE

## 2023-06-15 PROCEDURE — 93005 ELECTROCARDIOGRAM TRACING: CPT

## 2023-06-15 PROCEDURE — 83880 ASSAY OF NATRIURETIC PEPTIDE: CPT | Performed by: EMERGENCY MEDICINE

## 2023-06-15 PROCEDURE — 99285 EMERGENCY DEPT VISIT HI MDM: CPT | Performed by: EMERGENCY MEDICINE

## 2023-06-15 PROCEDURE — 85025 COMPLETE CBC W/AUTO DIFF WBC: CPT | Performed by: EMERGENCY MEDICINE

## 2023-06-15 PROCEDURE — 80053 COMPREHEN METABOLIC PANEL: CPT | Performed by: EMERGENCY MEDICINE

## 2023-06-15 PROCEDURE — 83735 ASSAY OF MAGNESIUM: CPT | Performed by: EMERGENCY MEDICINE

## 2023-06-15 PROCEDURE — 71045 X-RAY EXAM CHEST 1 VIEW: CPT

## 2023-06-15 PROCEDURE — 96365 THER/PROPH/DIAG IV INF INIT: CPT

## 2023-06-15 PROCEDURE — 36415 COLL VENOUS BLD VENIPUNCTURE: CPT | Performed by: EMERGENCY MEDICINE

## 2023-06-15 PROCEDURE — 81003 URINALYSIS AUTO W/O SCOPE: CPT | Performed by: EMERGENCY MEDICINE

## 2023-06-15 PROCEDURE — 84443 ASSAY THYROID STIM HORMONE: CPT | Performed by: EMERGENCY MEDICINE

## 2023-06-15 PROCEDURE — 99223 1ST HOSP IP/OBS HIGH 75: CPT | Performed by: INTERNAL MEDICINE

## 2023-06-15 PROCEDURE — 93010 ELECTROCARDIOGRAM REPORT: CPT | Performed by: INTERNAL MEDICINE

## 2023-06-15 RX ORDER — FUROSEMIDE 10 MG/ML
40 INJECTION INTRAMUSCULAR; INTRAVENOUS
Status: DISCONTINUED | OUTPATIENT
Start: 2023-06-15 | End: 2023-06-16

## 2023-06-15 RX ORDER — FUROSEMIDE 40 MG/1
40 TABLET ORAL DAILY
COMMUNITY
Start: 2023-06-12 | End: 2023-06-18

## 2023-06-15 RX ORDER — PRAVASTATIN SODIUM 40 MG
40 TABLET ORAL
Status: DISCONTINUED | OUTPATIENT
Start: 2023-06-15 | End: 2023-06-18 | Stop reason: HOSPADM

## 2023-06-15 RX ORDER — INSULIN GLARGINE 100 [IU]/ML
14 INJECTION, SOLUTION SUBCUTANEOUS
Status: DISCONTINUED | OUTPATIENT
Start: 2023-06-15 | End: 2023-06-18 | Stop reason: HOSPADM

## 2023-06-15 RX ORDER — FUROSEMIDE 10 MG/ML
40 INJECTION INTRAMUSCULAR; INTRAVENOUS ONCE
Status: COMPLETED | OUTPATIENT
Start: 2023-06-15 | End: 2023-06-15

## 2023-06-15 RX ORDER — EZETIMIBE 10 MG/1
10 TABLET ORAL
Status: DISCONTINUED | OUTPATIENT
Start: 2023-06-15 | End: 2023-06-18 | Stop reason: HOSPADM

## 2023-06-15 RX ORDER — MAGNESIUM SULFATE HEPTAHYDRATE 40 MG/ML
2 INJECTION, SOLUTION INTRAVENOUS ONCE
Status: COMPLETED | OUTPATIENT
Start: 2023-06-15 | End: 2023-06-15

## 2023-06-15 RX ORDER — ACETAMINOPHEN 325 MG/1
650 TABLET ORAL EVERY 6 HOURS PRN
Status: DISCONTINUED | OUTPATIENT
Start: 2023-06-15 | End: 2023-06-16

## 2023-06-15 RX ORDER — POTASSIUM CHLORIDE 20 MEQ/1
20 TABLET, EXTENDED RELEASE ORAL DAILY
Status: DISCONTINUED | OUTPATIENT
Start: 2023-06-15 | End: 2023-06-16

## 2023-06-15 RX ORDER — INSULIN LISPRO 100 [IU]/ML
2-12 INJECTION, SOLUTION INTRAVENOUS; SUBCUTANEOUS
Status: DISCONTINUED | OUTPATIENT
Start: 2023-06-15 | End: 2023-06-18 | Stop reason: HOSPADM

## 2023-06-15 RX ORDER — INSULIN LISPRO 100 [IU]/ML
5 INJECTION, SOLUTION INTRAVENOUS; SUBCUTANEOUS
Status: DISCONTINUED | OUTPATIENT
Start: 2023-06-15 | End: 2023-06-18 | Stop reason: HOSPADM

## 2023-06-15 RX ORDER — INSULIN LISPRO 100 [IU]/ML
1-6 INJECTION, SOLUTION INTRAVENOUS; SUBCUTANEOUS
Status: DISCONTINUED | OUTPATIENT
Start: 2023-06-15 | End: 2023-06-18 | Stop reason: HOSPADM

## 2023-06-15 RX ORDER — LOSARTAN POTASSIUM 50 MG/1
50 TABLET ORAL DAILY
Status: DISCONTINUED | OUTPATIENT
Start: 2023-06-15 | End: 2023-06-18 | Stop reason: HOSPADM

## 2023-06-15 RX ADMIN — LOSARTAN POTASSIUM 50 MG: 50 TABLET, FILM COATED ORAL at 15:51

## 2023-06-15 RX ADMIN — APIXABAN 5 MG: 5 TABLET, FILM COATED ORAL at 21:35

## 2023-06-15 RX ADMIN — MAGNESIUM SULFATE HEPTAHYDRATE 2 G: 40 INJECTION, SOLUTION INTRAVENOUS at 09:12

## 2023-06-15 RX ADMIN — INSULIN LISPRO 5 UNITS: 100 INJECTION, SOLUTION INTRAVENOUS; SUBCUTANEOUS at 17:48

## 2023-06-15 RX ADMIN — INSULIN GLARGINE 14 UNITS: 100 INJECTION, SOLUTION SUBCUTANEOUS at 21:33

## 2023-06-15 RX ADMIN — FUROSEMIDE 40 MG: 10 INJECTION, SOLUTION INTRAMUSCULAR; INTRAVENOUS at 15:53

## 2023-06-15 RX ADMIN — PRAVASTATIN SODIUM 40 MG: 40 TABLET ORAL at 16:18

## 2023-06-15 RX ADMIN — EZETIMIBE 10 MG: 10 TABLET ORAL at 21:35

## 2023-06-15 RX ADMIN — INSULIN LISPRO 2 UNITS: 100 INJECTION, SOLUTION INTRAVENOUS; SUBCUTANEOUS at 21:37

## 2023-06-15 RX ADMIN — FUROSEMIDE 40 MG: 10 INJECTION, SOLUTION INTRAMUSCULAR; INTRAVENOUS at 08:09

## 2023-06-15 RX ADMIN — POTASSIUM CHLORIDE 20 MEQ: 1500 TABLET, EXTENDED RELEASE ORAL at 15:52

## 2023-06-15 RX ADMIN — METOPROLOL SUCCINATE 75 MG: 50 TABLET, EXTENDED RELEASE ORAL at 21:34

## 2023-06-15 NOTE — ASSESSMENT & PLAN NOTE
Wt Readings from Last 3 Encounters:   06/15/23 82 5 kg (181 lb 14 1 oz)   06/09/23 89 4 kg (197 lb 1 5 oz)   05/23/23 81 4 kg (179 lb 6 oz)     Patient was seen by cardiology at [facility] 2 days ago, noted to have decompensated CHF in the setting of A-fib with increased heart rates and dietary noncompliance with sodium and fluid she was started on Lasix orally 40 mg daily however now presents with worsening leg swelling and difficulty in ambulation  Denies any chest pain, shortness of breath  Will give Lasix 40 mg IV twice daily, monitor renal function, electrolytes  Check venous Doppler of the lower extremities to rule out DVT

## 2023-06-15 NOTE — H&P
"[facility]  H&P  Name: Aracely Celaya 80 y o  female I MRN: [id number]  Unit/Bed#: ED-07 I Date of Admission: 6/15/2023   Date of Service: 6/15/2023 I Hospital Day: 0      Assessment/Plan   * Acute on chronic diastolic congestive heart failure [facility])  Assessment & Plan  Wt Readings from Last 3 Encounters:   06/15/23 82 5 kg (181 lb 14 1 oz)   06/09/23 89 4 kg (197 lb 1 5 oz)   05/23/23 81 4 kg (179 lb 6 oz)     Patient was seen by cardiology at [facility] 2 days ago, noted to have decompensated CHF in the setting of A-fib with increased heart rates and dietary noncompliance with sodium and fluid she was started on Lasix orally 40 mg daily however now presents with worsening leg swelling and difficulty in ambulation  Denies any chest pain, shortness of breath  Will give Lasix 40 mg IV twice daily, monitor renal function, electrolytes  Check venous Doppler of the lower extremities to rule out DVT  Type 2 diabetes mellitus with hyperglycemia ([facility])  Assessment & Plan  Lab Results   Component Value Date    HGBA1C 9 0 (H) 04/14/2023       No results for input(s): \"POCGLU\" in the last 72 hours  Blood Sugar Average: Last 72 hrs:  Continue insulin  Hold metformin    PAF (paroxysmal atrial fibrillation) ([facility])  Assessment & Plan  Persistent atrial fibrillation, chronic, rate controlled, continue Toprol, Eliquis dose now at 5 mg twice daily  At the time of last discharge Eliquis dose was decreased to 2 5 twice daily due to blood in the synovial fluid, patient saw her cardiologist 2 days ago who put her back on 5 mg twice daily    Primary osteoarthritis of left knee  Assessment & Plan  • Patient status post trauma/injury with pain and swelling left knee during last admission  • Status post arthrocentesis in the ER- last admission ON 6/9  ? Synovial fluid WBC w/ diff: 12,000  ? Synovial fluid, RBC count: 154,000  ? Synovial fluid, crystals: none seen  ?  Synovial fluid GS and Cx: 2+ polys " and no bacteria seen  • Fluid studies not showing signs of septic arthritis  • Continue pain medications  • Patient does not report any significant pain today  No erythema noted on physical exam     Dementia without behavioral disturbance [facility])  Assessment & Plan  Currently alert and oriented, discussed with patient's   He is looking in the line of more long-term care for the patient  Patient is usually with it and she does have intermittent spells where she gets confused  I have not noted any confusion today  Stage 3a chronic kidney disease [facility])  Assessment & Plan  Lab Results   Component Value Date    CREATININE 1 07 06/15/2023    CREATININE 0 80 06/10/2023    CREATININE 1 08 06/08/2023    EGFR 46 06/15/2023    EGFR 66 06/10/2023    EGFR 46 06/08/2023   Monitor renal function  Essential hypertension  Assessment & Plan  Continue home medications valsartan, hold hydrochlorothiazide  Continue metoprolol  VTE Prophylaxis: Apixaban (Eliquis)  / sequential compression device   Code Status: DNR/DNI  POLST: There is no POLST form on file for this patient (pre-hospital)  Discussion with family: Patient's  over the phone    Anticipated Length of Stay:  Patient will be admitted on inpatient basis with an anticipated length of stay of more than 2 midnights  Justification for Hospital Stay: Acute on chronic CHF    Total Time for Visit, including Counseling / Coordination of Care: 70 minutes  Greater than 50% of this total time spent on direct patient counseling and coordination of care  Chief Complaint:   Unable to walk, lower extremity swelling    History of Present Illness:    Leigha Rodriguez is a 80 y o  female who presents with past medical history of diastolic heart failure, hypertension, chronic kidney disease presented to the emergency department from assisted living facility as she was not able to ambulate    Patient was recently admitted on the first week of June for left knee pain, effusion, septic arthritis ruled out, subsequently discharged to assisted living facility  Now presents with ambulatory dysfunction, bilateral lower extremity swelling, denies any chest pain, shortness of breath, fever, chills patient was seen by her cardiologist 2 days ago and was started on oral Lasix for acute on chronic CHF  Discussed with patient's , he is looking forward for short-term rehabilitation and if needed transition to long-term care  Patient denies any cough, fever, chills, abdominal pain, nausea, vomiting, urinary or bowel complaints    Review of Systems:    More than 10 system review of systems was performed, pertinent positive and negative findings mentioned as per history of present illness      Past Medical and Surgical History:     Past Medical History:   Diagnosis Date   • Allergy to sulfa drugs    • Arthritis 2000   • Closed fracture of third lumbar vertebra ([address] )    • Closed T12 spinal fracture ([address] )    • Closed wedge compression fracture of T12 vertebra (HCC)    • Dementia ([address] )    • Diabetes mellitus ([address] ) 2000   • DM (diabetes mellitus), type 2 ([address] )    • H/O multiple allergies     Novocaine,Darvocet, Sulfa, Accupril   • HL (hearing loss)    • Hyperlipidemia    • Hypertension    • Lumbar compression fracture (HCC)    • Memory loss 2020   • Nocturia    • Palpitations    • Paroxysmal atrial fibrillation (HCC)    • Thrombocytopenic disorder (HCC)    • Type 2 diabetes mellitus without complication ([address] )    • Wears glasses        Past Surgical History:   Procedure Laterality Date   • DXA PROCEDURE (HISTORICAL)  05/31/2023   • HERNIA REPAIR     • HYSTERECTOMY         Meds/Allergies:    Prior to Admission medications    Medication Sig Start Date End Date Taking?  Authorizing Provider   acetaminophen (TYLENOL) 325 mg tablet Take 3 tablets (975 mg total) by mouth every 8 (eight) hours as needed for mild pain or moderate pain 6/10/23  Yes Fatoumata Delcid MD   apixaban Debbie Rob) 2 5 mg Take 1 tablet (2 5 mg total) by mouth 2 (two) times a day 6/10/23  Yes Kiel Herrera MD   ezetimibe-simvastatin (VYTORIN) 10-20 mg per tablet Take 1 tablet by mouth daily at bedtime 6/13/22  Yes Guillermo Russell MD   furosemide (LASIX) 40 mg tablet Take 40 mg by mouth daily 6/12/23 6/11/24 Yes Peace Stahl MD   Insulin Glargine Solostar (Lantus SoloStar) 100 UNIT/ML SOPN Inject 0 14 mL (14 Units total) under the skin daily at bedtime 6/10/23 9/8/23 Yes Kiel Herrera MD   insulin lispro (HumaLOG KwikPen) 100 units/mL injection pen Inject 8 Units under the skin 3 (three) times a day with meals 6/10/23  Yes Kiel Herrera MD   metFORMIN (GLUCOPHAGE) 850 mg tablet Take 1 tablet (850 mg total) by mouth 2 (two) times a day 6/13/22  Yes Guillermo Russell MD   metoprolol succinate (TOPROL-XL) 50 mg 24 hr tablet Take 1 tablet (50 mg total) by mouth daily  Patient taking differently: Take 75 mg by mouth 2 (two) times a day 6/13/22  Yes Guillermo Russell MD   potassium chloride (K-DUR,KLOR-CON) 20 mEq tablet Take 1 tablet (20 mEq total) by mouth daily 6/13/22  Yes Guillermo Russell MD   valsartan-hydrochlorothiazide (DIOVAN-HCT) 160-12 5 MG per tablet Take 1 tablet by mouth daily 6/13/22  Yes Guillermo Russell MD   glucose blood (FREESTYLE LITE) test strip Use to check blood sugar once a day 6/9/23   Guillermo Russell MD   Insulin Pen Needle (BD AutoShield Duo) 30G X 5 MM MISC USE DAILY AS DIRECTED WITH INSULIN PEN 5/11/23   Guillermo Russell MD     For medications I have reviewed old records and discussed with patient's     Allergies:    Allergies   Allergen Reactions   • Accupril [Quinapril Hcl]    • Novocain [Procaine]    • Parabens    • Sulfa Antibiotics        Social History:     Marital Status: /Civil Union     Social History     Substance and Sexual Activity   Alcohol Use Not Currently   • Alcohol/week: 1 0 standard drink of alcohol   • Types: 1 Glasses of wine per week    Comment: on holidays add an extra glass of wine     Social History     Tobacco Use   Smoking Status Never   • Passive exposure: Never   Smokeless Tobacco Never     Social History     Substance and Sexual Activity   Drug Use Never       Family History:    non-contributory    Physical Exam:     Vitals:   Blood Pressure: 154/90 (06/15/23 1100)  Pulse: 91 (06/15/23 1100)  Temperature: 98 1 °F (36 7 °C) (06/15/23 0730)  Respirations: 18 (06/15/23 1100)  Weight - Scale: 82 5 kg (181 lb 14 1 oz) (06/15/23 0724)  SpO2: 99 % (06/15/23 1100)    Physical Exam     General appearance:  Patient not in acute distress  Eyes:  Pupils equal reacting to light  ENT:  Moist oral mucous membranes  CVS:  S1-S2 heard, irregular rate and rhythm, bilateral lower extremity edema left greater than right  Chest:  Bilateral air entry present, clear to auscultation  Abdomen:  Soft, nontender, bowel sounds present  CNS:  No focal neurological deficits  Genitourinary: deferred  Skin:  No acute rash   psychiatric:  No psychosis  Musculoskeletal: Left knee deformity, reduced range of motion, no tenderness, erythema    Additional Data:     Lab Results: I have personally reviewed pertinent reports        Results from last 7 days   Lab Units 06/15/23  0759   EOS PCT % 1   HEMATOCRIT % 38 2   HEMOGLOBIN g/dL 11 8   LYMPHS PCT % 9*   MONOS PCT % 11   NEUTROS PCT % 78*   PLATELETS Thousands/uL 210   WBC Thousand/uL 5 68     Results from last 7 days   Lab Units 06/15/23  0759   ANION GAP mmol/L 10   ALBUMIN g/dL 3 7   ALK PHOS U/L 46   ALT U/L 7   AST U/L 22   BUN mg/dL 25   CALCIUM mg/dL 9 3   CHLORIDE mmol/L 93*   CO2 mmol/L 31   CREATININE mg/dL 1 07   GLUCOSE RANDOM mg/dL 116   POTASSIUM mmol/L 4 2   SODIUM mmol/L 134*   TOTAL BILIRUBIN mg/dL 0 83         Results from last 7 days   Lab Units 06/10/23  1110 06/10/23  0822 06/09/23  2109 06/09/23  1549 06/09/23  1022 06/09/23  0715   POC GLUCOSE mg/dl 178* 152* 146* 188* 251* 160*         Results from last 7 days   Lab Units 06/08/23  8608 LACTIC ACID mmol/L 1 4       Imaging: Reviewed personally by me shows mild pulmonary vascular congestion    XR chest 1 view portable    (Results Pending)   VAS lower limb venous duplex study, complete bilateral    (Results Pending)       EKG, Pathology, and Other Studies Reviewed on Admission:   · EKG: Reviewed personally by me shows atrial fibrillation with PVCs    Allscripts / Epic Records Reviewed: Yes     ** Please Note: This note has been constructed using a voice recognition system   **

## 2023-06-15 NOTE — ASSESSMENT & PLAN NOTE
Currently alert and oriented, discussed with patient's   He is looking in the line of more long-term care for the patient  Patient is usually with it and she does have intermittent spells where she gets confused  I have not noted any confusion today

## 2023-06-15 NOTE — ASSESSMENT & PLAN NOTE
• Patient status post trauma/injury with pain and swelling left knee during last admission  • Status post arthrocentesis in the ER- last admission ON 6/9  ? Synovial fluid WBC w/ diff: 12,000  ? Synovial fluid, RBC count: 154,000  ? Synovial fluid, crystals: none seen  ? Synovial fluid GS and Cx: 2+ polys and no bacteria seen  • Fluid studies not showing signs of septic arthritis  • Continue pain medications  • Patient does not report any significant pain today    No erythema noted on physical exam

## 2023-06-15 NOTE — ASSESSMENT & PLAN NOTE
Lab Results   Component Value Date    CREATININE 1 07 06/15/2023    CREATININE 0 80 06/10/2023    CREATININE 1 08 06/08/2023    EGFR 46 06/15/2023    EGFR 66 06/10/2023    EGFR 46 06/08/2023   Monitor renal function

## 2023-06-15 NOTE — ED PROVIDER NOTES
History  Chief Complaint   Patient presents with   • Weakness - Generalized     Weakness over the last 24 hours per EMS  Per EMS, pt's  states that she has been unable to amblate  Pt was able to ambulate to EMS stretcher at [facility]  Pt has dementia at baseline  77-year-old female comes in for generalized weakness  Patient's  reports that she has had increased weakness over the past 24 hours she usually is in a wheelchair but is able to self transfer and now can no longer self transfer gluteals  Worsening swelling of her legs  Prior to Admission Medications   Prescriptions Last Dose Informant Patient Reported? Taking?    Insulin Glargine Solostar (Lantus SoloStar) 100 UNIT/ML SOPN 6/14/2023  No Yes   Sig: Inject 0 14 mL (14 Units total) under the skin daily at bedtime   Insulin Pen Needle (BD AutoShield Duo) 30G X 5 MM MISC   No No   Sig: USE DAILY AS DIRECTED WITH INSULIN PEN   acetaminophen (TYLENOL) 325 mg tablet Past Week  No Yes   Sig: Take 3 tablets (975 mg total) by mouth every 8 (eight) hours as needed for mild pain or moderate pain   apixaban (ELIQUIS) 2 5 mg 6/14/2023  No Yes   Sig: Take 1 tablet (2 5 mg total) by mouth 2 (two) times a day   ezetimibe-simvastatin (VYTORIN) 10-20 mg per tablet 6/14/2023  No Yes   Sig: Take 1 tablet by mouth daily at bedtime   furosemide (LASIX) 40 mg tablet 6/14/2023  Yes Yes   Sig: Take 40 mg by mouth daily   glucose blood (FREESTYLE LITE) test strip   No No   Sig: Use to check blood sugar once a day   insulin lispro (HumaLOG KwikPen) 100 units/mL injection pen 6/14/2023  No Yes   Sig: Inject 8 Units under the skin 3 (three) times a day with meals   metFORMIN (GLUCOPHAGE) 850 mg tablet 6/14/2023  No Yes   Sig: Take 1 tablet (850 mg total) by mouth 2 (two) times a day   metoprolol succinate (TOPROL-XL) 50 mg 24 hr tablet 6/14/2023  No Yes   Sig: Take 1 tablet (50 mg total) by mouth daily   Patient taking differently: Take 75 mg by mouth 2 (two) times a day   potassium chloride (K-DUR,KLOR-CON) 20 mEq tablet 6/14/2023  No Yes   Sig: Take 1 tablet (20 mEq total) by mouth daily   valsartan-hydrochlorothiazide (DIOVAN-HCT) 160-12 5 MG per tablet 6/14/2023  No Yes   Sig: Take 1 tablet by mouth daily      Facility-Administered Medications: None       Past Medical History:   Diagnosis Date   • Allergy to sulfa drugs    • Arthritis 2000   • Closed fracture of third lumbar vertebra (HCC)    • Closed T12 spinal fracture (HCC)    • Closed wedge compression fracture of T12 vertebra (HCC)    • Dementia (HCC)    • Diabetes mellitus ([facility] [address] ) 2000   • DM (diabetes mellitus), type 2 (HCC)    • H/O multiple allergies     Novocaine,Darvocet, Sulfa, Accupril   • HL (hearing loss)    • Hyperlipidemia    • Hypertension    • Lumbar compression fracture (HCC)    • Memory loss 2020   • Nocturia    • Palpitations    • Paroxysmal atrial fibrillation (HCC)    • Thrombocytopenic disorder (HCC)    • Type 2 diabetes mellitus without complication ([facility] [address] )    • Wears glasses        Past Surgical History:   Procedure Laterality Date   • DXA PROCEDURE (HISTORICAL)  05/31/2023   • HERNIA REPAIR     • HYSTERECTOMY         Family History   Problem Relation Age of Onset   • Hypertension Mother    • Diabetes Mother    • Other Mother    • Hypertension Father    • Heart disease Father      I have reviewed and agree with the history as documented      E-Cigarette/Vaping   • E-Cigarette Use Never User      E-Cigarette/Vaping Substances   • Nicotine No    • THC No    • CBD No    • Flavoring No    • Other No    • Unknown No      Social History     Tobacco Use   • Smoking status: Never     Passive exposure: Never   • Smokeless tobacco: Never   Vaping Use   • Vaping Use: Never used   Substance Use Topics   • Alcohol use: Not Currently     Alcohol/week: 1 0 standard drink of alcohol     Types: 1 Glasses of wine per week     Comment: on holidays add an extra glass of wine   • Drug use: Never       Review of Systems    Physical Exam  Physical Exam    Vital Signs  ED Triage Vitals   Temperature Pulse Respirations Blood Pressure SpO2   06/15/23 0730 06/15/23 0724 06/15/23 0724 06/15/23 0724 06/15/23 0724   98 1 °F (36 7 °C) 84 18 144/67 95 %      Temp src Heart Rate Source Patient Position - Orthostatic VS BP Location FiO2 (%)   -- -- -- 06/15/23 0724 --      Right arm       Pain Score       06/15/23 0724       No Pain           Vitals:    06/15/23 0800 06/15/23 0830 06/15/23 0900 06/15/23 1000   BP: 140/66 124/59 146/69 143/71   Pulse: 93 85 85 87         Visual Acuity      ED Medications  Medications   magnesium sulfate 2 g/50 mL IVPB (premix) 2 g (2 g Intravenous New Bag 6/15/23 0912)   furosemide (LASIX) injection 40 mg (40 mg Intravenous Given 6/15/23 0809)       Diagnostic Studies  Results Reviewed     Procedure Component Value Units Date/Time    HS Troponin I 2hr [[id number]] Collected: 06/15/23 1007    Lab Status:  In process Specimen: Blood from Arm, Left Updated: 06/15/23 1013    UA w Reflex to Microscopic w Reflex to Culture [[id number]] Collected: 06/15/23 0922    Lab Status: Final result Specimen: Urine, Clean Catch Updated: 06/15/23 0939     Color, UA Colorless     Clarity, UA Clear     Specific Gravity, UA 1 008     pH, UA 7 5     Leukocytes, UA Negative     Nitrite, UA Negative     Protein, UA Negative mg/dl      Glucose, UA Negative mg/dl      Ketones, UA Negative mg/dl      Urobilinogen, UA <2 0 mg/dl      Bilirubin, UA Negative     Occult Blood, UA Negative    B-Type Natriuretic Peptide(BNP) [[id number]]  (Abnormal) Collected: 06/15/23 0759    Lab Status: Final result Specimen: Blood from Arm, Left Updated: 06/15/23 0915      pg/mL     TSH [[id number]]  (Normal) Collected: 06/15/23 0759    Lab Status: Final result Specimen: Blood from Arm, Left Updated: 06/15/23 0843     TSH 3RD GENERATON 3 964 uIU/mL     HS Troponin 0hr (reflex protocol) [[id number]]  (Normal) Collected: 06/15/23 0759    Lab Status: Final result Specimen: Blood from Arm, Left Updated: 06/15/23 0835     hs TnI 0hr 13 ng/L     HS Troponin I 4hr [[id number]]     Lab Status: No result Specimen: Blood     Comprehensive metabolic panel [[id number]]  (Abnormal) Collected: 06/15/23 0759    Lab Status: Final result Specimen: Blood from Arm, Left Updated: 06/15/23 0831     Sodium 134 mmol/L      Potassium 4 2 mmol/L      Chloride 93 mmol/L      CO2 31 mmol/L      ANION GAP 10 mmol/L      BUN 25 mg/dL      Creatinine 1 07 mg/dL      Glucose 116 mg/dL      Calcium 9 3 mg/dL      AST 22 U/L      ALT 7 U/L      Alkaline Phosphatase 46 U/L      Total Protein 7 0 g/dL      Albumin 3 7 g/dL      Total Bilirubin 0 83 mg/dL      eGFR 46 ml/min/1 73sq m     Narrative:      [place] guidelines for Chronic Kidney Disease (CKD):   •  Stage 1 with normal or high GFR (GFR > 90 mL/min/1 73 square meters)  •  Stage 2 Mild CKD (GFR = 60-89 mL/min/1 73 square meters)  •  Stage 3A Moderate CKD (GFR = 45-59 mL/min/1 73 square meters)  •  Stage 3B Moderate CKD (GFR = 30-44 mL/min/1 73 square meters)  •  Stage 4 Severe CKD (GFR = 15-29 mL/min/1 73 square meters)  •  Stage 5 End Stage CKD (GFR <15 mL/min/1 73 square meters)  Note: GFR calculation is accurate only with a steady state creatinine    Magnesium [[id number]]  (Abnormal) Collected: 06/15/23 0759    Lab Status: Final result Specimen: Blood from Arm, Left Updated: 06/15/23 0831     Magnesium 1 5 mg/dL     CBC and differential [[id number]]  (Abnormal) Collected: 06/15/23 0759    Lab Status: Final result Specimen: Blood from Arm, Left Updated: 06/15/23 0813     WBC 5 68 Thousand/uL      RBC 4 30 Million/uL      Hemoglobin 11 8 g/dL      Hematocrit 38 2 %      MCV 89 fL      MCH 27 4 pg      MCHC 30 9 g/dL      RDW 15 1 %      MPV 10 7 fL      Platelets 109 Thousands/uL      nRBC 0 /100 WBCs      Neutrophils Relative 78 %      Immat GRANS % 0 %      Lymphocytes Relative 9 %      Monocytes Relative 11 %      Eosinophils Relative 1 %      Basophils Relative 1 %      Neutrophils Absolute 4 50 Thousands/µL      Immature Grans Absolute 0 02 Thousand/uL      Lymphocytes Absolute 0 50 Thousands/µL      Monocytes Absolute 0 60 Thousand/µL      Eosinophils Absolute 0 03 Thousand/µL      Basophils Absolute 0 03 Thousands/µL                  XR chest 1 view portable    (Results Pending)              Procedures  ECG 12 Lead Documentation Only    Date/Time: 6/15/2023 7:41 AM    Performed by: Kandi Rainey DO  Authorized by: Katerine Brooks DO    Rate:     ECG rate:  93  Rhythm:     Rhythm: atrial fibrillation               ED Course                               SBIRT 20yo+    Flowsheet Row Most Recent Value   Initial Alcohol Screen: US AUDIT-C     1  How often do you have a drink containing alcohol? 0 Filed at: 06/15/2023 0747   2  How many drinks containing alcohol do you have on a typical day you are drinking? 0 Filed at: 06/15/2023 0747   3b  FEMALE Any Age, or MALE 65+: How often do you have 4 or more drinks on one occassion? 0 Filed at: 06/15/2023 0747   Audit-C Score 0 Filed at: 06/15/2023 9730   SAVI: How many times in the past year have you    Used an illegal drug or used a prescription medication for non-medical reasons? Never Filed at: 06/15/2023 0747                    Medical Decision Making  59-year-old female comes in for evaluation of generalized weakness  Differential diagnosis includes but is not limited to electrolyte abnormality, UTI, ACS, CHF exacerbation, thyroid dysfunction,    Ambulatory dysfunction: acute illness or injury  Peripheral edema: acute illness or injury  Weakness: acute illness or injury  Amount and/or Complexity of Data Reviewed  Independent Historian: spouse     Details:  gives most of the history from yesterday she seemed somewhat weak but now she is unable to get out of bed  External Data Reviewed: notes       Details: Reviewed patient's past medical history and medications as well as recent admission  Labs: ordered  Radiology: ordered  ECG/medicine tests: ordered and independent interpretation performed  Decision-making details documented in ED Course  Risk  Prescription drug management  Decision regarding hospitalization  Risk Details: Patient should be admitted to the hospital for further evaluation and treatment internal medicine and is agreement with this plan        Disposition  Final diagnoses:   Weakness   Ambulatory dysfunction   Peripheral edema     Time reflects when diagnosis was documented in both MDM as applicable and the Disposition within this note     Time User Action Codes Description Comment    6/15/2023 10:12 AM Danyell MASSEY Add [R53 1] Weakness     6/15/2023 10:12 AM Irving Mullen Add [R26 2] Ambulatory dysfunction     6/15/2023 10:12 AM Irving Mullen Add [R60 9] Peripheral edema       ED Disposition     ED Disposition   Admit    Condition   Stable    Date/Time   u Vidal 15, 2023 10:12 AM    Comment   Case was discussed with dr Geovany Butler and the patient's admission status was agreed to be Admission Status: observation status to the service of Dr Nicholas Vale    None         Patient's Medications   Discharge Prescriptions    No medications on file       No discharge procedures on file      PDMP Review       Value Time User    PDMP Reviewed  Yes 6/8/2023 10:36 PM Germaine Grande MD          ED Provider  Electronically Signed by           Srini Levine DO  06/15/23 1011 Hydroxyzine Pregnancy And Lactation Text: This medication is not safe during pregnancy and should not be taken. It is also excreted in breast milk and breast feeding isn't recommended.

## 2023-06-15 NOTE — ASSESSMENT & PLAN NOTE
Persistent atrial fibrillation, chronic, rate controlled, continue Toprol, Eliquis dose now at 5 mg twice daily    At the time of last discharge Eliquis dose was decreased to 2 5 twice daily due to blood in the synovial fluid, patient saw her cardiologist 2 days ago who put her back on 5 mg twice daily

## 2023-06-15 NOTE — ASSESSMENT & PLAN NOTE
"Lab Results   Component Value Date    HGBA1C 9 0 (H) 04/14/2023       No results for input(s): \"POCGLU\" in the last 72 hours  Blood Sugar Average: Last 72 hrs:  Continue insulin    Hold metformin  "

## 2023-06-16 PROBLEM — M71.22 BAKER'S CYST OF KNEE, LEFT: Status: ACTIVE | Noted: 2023-06-16

## 2023-06-16 PROBLEM — I48.19 PERSISTENT ATRIAL FIBRILLATION (HCC): Status: ACTIVE | Noted: 2023-03-03

## 2023-06-16 LAB
ANION GAP SERPL CALCULATED.3IONS-SCNC: 13 MMOL/L (ref 4–13)
BASOPHILS # BLD AUTO: 0.04 THOUSANDS/ÂΜL (ref 0–0.1)
BASOPHILS NFR BLD AUTO: 1 % (ref 0–1)
BUN SERPL-MCNC: 28 MG/DL (ref 5–25)
CALCIUM SERPL-MCNC: 8.3 MG/DL (ref 8.4–10.2)
CHLORIDE SERPL-SCNC: 95 MMOL/L (ref 96–108)
CO2 SERPL-SCNC: 25 MMOL/L (ref 21–32)
CREAT SERPL-MCNC: 0.94 MG/DL (ref 0.6–1.3)
EOSINOPHIL # BLD AUTO: 0.01 THOUSAND/ÂΜL (ref 0–0.61)
EOSINOPHIL NFR BLD AUTO: 0 % (ref 0–6)
ERYTHROCYTE [DISTWIDTH] IN BLOOD BY AUTOMATED COUNT: 15.2 % (ref 11.6–15.1)
GFR SERPL CREATININE-BSD FRML MDRD: 54 ML/MIN/1.73SQ M
GLUCOSE SERPL-MCNC: 137 MG/DL (ref 65–140)
GLUCOSE SERPL-MCNC: 153 MG/DL (ref 65–140)
GLUCOSE SERPL-MCNC: 168 MG/DL (ref 65–140)
GLUCOSE SERPL-MCNC: 177 MG/DL (ref 65–140)
GLUCOSE SERPL-MCNC: 204 MG/DL (ref 65–140)
HCT VFR BLD AUTO: 40.1 % (ref 34.8–46.1)
HGB BLD-MCNC: 11.8 G/DL (ref 11.5–15.4)
IMM GRANULOCYTES # BLD AUTO: 0.02 THOUSAND/UL (ref 0–0.2)
IMM GRANULOCYTES NFR BLD AUTO: 0 % (ref 0–2)
LYMPHOCYTES # BLD AUTO: 0.72 THOUSANDS/ÂΜL (ref 0.6–4.47)
LYMPHOCYTES NFR BLD AUTO: 11 % (ref 14–44)
MAGNESIUM SERPL-MCNC: 1.9 MG/DL (ref 1.9–2.7)
MCH RBC QN AUTO: 27.8 PG (ref 26.8–34.3)
MCHC RBC AUTO-ENTMCNC: 29.4 G/DL (ref 31.4–37.4)
MCV RBC AUTO: 95 FL (ref 82–98)
MONOCYTES # BLD AUTO: 0.83 THOUSAND/ÂΜL (ref 0.17–1.22)
MONOCYTES NFR BLD AUTO: 13 % (ref 4–12)
NEUTROPHILS # BLD AUTO: 4.79 THOUSANDS/ÂΜL (ref 1.85–7.62)
NEUTS SEG NFR BLD AUTO: 75 % (ref 43–75)
NRBC BLD AUTO-RTO: 0 /100 WBCS
PLATELET # BLD AUTO: 179 THOUSANDS/UL (ref 149–390)
PMV BLD AUTO: 10.3 FL (ref 8.9–12.7)
POTASSIUM SERPL-SCNC: 3.2 MMOL/L (ref 3.5–5.3)
RBC # BLD AUTO: 4.24 MILLION/UL (ref 3.81–5.12)
SODIUM SERPL-SCNC: 133 MMOL/L (ref 135–147)
WBC # BLD AUTO: 6.41 THOUSAND/UL (ref 4.31–10.16)

## 2023-06-16 PROCEDURE — 82948 REAGENT STRIP/BLOOD GLUCOSE: CPT

## 2023-06-16 PROCEDURE — 87081 CULTURE SCREEN ONLY: CPT | Performed by: INTERNAL MEDICINE

## 2023-06-16 PROCEDURE — 99232 SBSQ HOSP IP/OBS MODERATE 35: CPT | Performed by: INTERNAL MEDICINE

## 2023-06-16 PROCEDURE — 97163 PT EVAL HIGH COMPLEX 45 MIN: CPT

## 2023-06-16 PROCEDURE — 97167 OT EVAL HIGH COMPLEX 60 MIN: CPT

## 2023-06-16 PROCEDURE — 80048 BASIC METABOLIC PNL TOTAL CA: CPT | Performed by: INTERNAL MEDICINE

## 2023-06-16 PROCEDURE — 83735 ASSAY OF MAGNESIUM: CPT | Performed by: INTERNAL MEDICINE

## 2023-06-16 PROCEDURE — 85025 COMPLETE CBC W/AUTO DIFF WBC: CPT | Performed by: INTERNAL MEDICINE

## 2023-06-16 RX ORDER — POTASSIUM CHLORIDE 20 MEQ/1
40 TABLET, EXTENDED RELEASE ORAL EVERY 4 HOURS
Status: COMPLETED | OUTPATIENT
Start: 2023-06-16 | End: 2023-06-16

## 2023-06-16 RX ORDER — TORSEMIDE 20 MG/1
40 TABLET ORAL
Status: DISCONTINUED | OUTPATIENT
Start: 2023-06-16 | End: 2023-06-17

## 2023-06-16 RX ORDER — MAGNESIUM SULFATE 1 G/100ML
1 INJECTION INTRAVENOUS ONCE
Status: COMPLETED | OUTPATIENT
Start: 2023-06-16 | End: 2023-06-16

## 2023-06-16 RX ORDER — ACETAMINOPHEN 325 MG/1
975 TABLET ORAL EVERY 8 HOURS SCHEDULED
Status: DISCONTINUED | OUTPATIENT
Start: 2023-06-16 | End: 2023-06-18

## 2023-06-16 RX ADMIN — EZETIMIBE 10 MG: 10 TABLET ORAL at 23:26

## 2023-06-16 RX ADMIN — APIXABAN 5 MG: 5 TABLET, FILM COATED ORAL at 07:54

## 2023-06-16 RX ADMIN — LOSARTAN POTASSIUM 50 MG: 50 TABLET, FILM COATED ORAL at 07:54

## 2023-06-16 RX ADMIN — TORSEMIDE 40 MG: 20 TABLET ORAL at 15:16

## 2023-06-16 RX ADMIN — INSULIN LISPRO 1 UNITS: 100 INJECTION, SOLUTION INTRAVENOUS; SUBCUTANEOUS at 07:55

## 2023-06-16 RX ADMIN — INSULIN LISPRO 1 UNITS: 100 INJECTION, SOLUTION INTRAVENOUS; SUBCUTANEOUS at 17:06

## 2023-06-16 RX ADMIN — INSULIN LISPRO 5 UNITS: 100 INJECTION, SOLUTION INTRAVENOUS; SUBCUTANEOUS at 11:29

## 2023-06-16 RX ADMIN — INSULIN LISPRO 2 UNITS: 100 INJECTION, SOLUTION INTRAVENOUS; SUBCUTANEOUS at 11:28

## 2023-06-16 RX ADMIN — ACETAMINOPHEN 975 MG: 325 TABLET, FILM COATED ORAL at 23:26

## 2023-06-16 RX ADMIN — METOPROLOL SUCCINATE 75 MG: 50 TABLET, EXTENDED RELEASE ORAL at 07:54

## 2023-06-16 RX ADMIN — INSULIN LISPRO 5 UNITS: 100 INJECTION, SOLUTION INTRAVENOUS; SUBCUTANEOUS at 07:56

## 2023-06-16 RX ADMIN — INSULIN LISPRO 5 UNITS: 100 INJECTION, SOLUTION INTRAVENOUS; SUBCUTANEOUS at 17:06

## 2023-06-16 RX ADMIN — METOPROLOL SUCCINATE 75 MG: 50 TABLET, EXTENDED RELEASE ORAL at 23:26

## 2023-06-16 RX ADMIN — APIXABAN 5 MG: 5 TABLET, FILM COATED ORAL at 23:26

## 2023-06-16 RX ADMIN — MAGNESIUM SULFATE HEPTAHYDRATE 1 G: 1 INJECTION, SOLUTION INTRAVENOUS at 07:54

## 2023-06-16 RX ADMIN — POTASSIUM CHLORIDE 40 MEQ: 1500 TABLET, EXTENDED RELEASE ORAL at 07:54

## 2023-06-16 RX ADMIN — INSULIN GLARGINE 14 UNITS: 100 INJECTION, SOLUTION SUBCUTANEOUS at 23:26

## 2023-06-16 RX ADMIN — ACETAMINOPHEN 975 MG: 325 TABLET, FILM COATED ORAL at 14:27

## 2023-06-16 RX ADMIN — FUROSEMIDE 40 MG: 10 INJECTION, SOLUTION INTRAMUSCULAR; INTRAVENOUS at 07:54

## 2023-06-16 RX ADMIN — INSULIN LISPRO 2 UNITS: 100 INJECTION, SOLUTION INTRAVENOUS; SUBCUTANEOUS at 23:28

## 2023-06-16 RX ADMIN — PRAVASTATIN SODIUM 40 MG: 40 TABLET ORAL at 17:06

## 2023-06-16 RX ADMIN — POTASSIUM CHLORIDE 40 MEQ: 1500 TABLET, EXTENDED RELEASE ORAL at 11:28

## 2023-06-16 RX ADMIN — DICLOFENAC SODIUM 2 G: 10 GEL TOPICAL at 15:16

## 2023-06-16 NOTE — ASSESSMENT & PLAN NOTE
Currently alert and oriented, discussed with patient's   He is looking in the line of more long-term care for the patient  Patient is usually with it and she does have intermittent spells where she gets confused

## 2023-06-16 NOTE — ASSESSMENT & PLAN NOTE
Lab Results   Component Value Date    EGFR 54 06/16/2023    EGFR 46 06/15/2023    EGFR 66 06/10/2023    CREATININE 0 94 06/16/2023    CREATININE 1 07 06/15/2023    CREATININE 0 80 06/10/2023   Monitor renal function

## 2023-06-16 NOTE — ASSESSMENT & PLAN NOTE
• Patient status post trauma/injury with pain and swelling left knee during last admission  • Status post arthrocentesis in the ER- last admission ON 6/9  ? Synovial fluid WBC w/ diff: 12,000  ? Synovial fluid, RBC count: 154,000  ? Synovial fluid, crystals: none seen  ? Synovial fluid GS and Cx: 2+ polys and no bacteria seen  • Fluid studies not showing signs of septic arthritis  • Continue pain medications    Discussed case w Ortho  Appreciate and recommendations  Follows w Ortho o/p; pt did not want joint injection at that time and was not candidate for total knee d/t HgbA1c, which must be below 7 for surgery  Pt should consider f/u w Ortho o/p    Weightbearing as tolerated  Ice and elevation for pain control as needed  Knee brace for comfort and support  Thigh-high compression stockings  Tylenol scheduled  Voltaren gel

## 2023-06-16 NOTE — ASSESSMENT & PLAN NOTE
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

## 2023-06-16 NOTE — ASSESSMENT & PLAN NOTE
Wt Readings from Last 3 Encounters:   06/15/23 82 5 kg (181 lb 14 1 oz)   06/09/23 89 4 kg (197 lb 1 5 oz)   05/23/23 81 4 kg (179 lb 6 oz)     venous Doppler of the lower extremities negative for DVT  Patient was seen by cardiology at [facility] 2 days ago, noted to have decompensated CHF in the setting of A-fib with increased heart rates and dietary noncompliance with sodium and fluid she was started on Lasix orally 40 mg daily however now presents with worsening leg swelling and difficulty in ambulation    Denies any chest pain, shortness of breath    -Lasix 40 mg IV twice daily--will switch to oral equivalent until IV access can be regained   -monitor renal function, electrolytes  -Compression stockings

## 2023-06-16 NOTE — ASSESSMENT & PLAN NOTE
Lab Results   Component Value Date    HGBA1C 9 0 (H) 04/14/2023       Recent Labs     06/15/23  1614 06/15/23  2127 06/16/23  0738 06/16/23  1117   POCGLU 233* 176* 153* 204*       Blood Sugar Average: Last 72 hrs:  (P) 175 2     Continue insulin      Hold metformin

## 2023-06-16 NOTE — OCCUPATIONAL THERAPY NOTE
Occupational Therapy Evaluation     Patient Name: Luz Skiff  Today's Date: 6/16/2023  Problem List  Principal Problem:    Acute on chronic diastolic congestive heart failure ([facility] [address] )  Active Problems:    Essential hypertension    Stage 3a chronic kidney disease (HCC)    Dementia without behavioral disturbance (HCC)    Primary osteoarthritis of left knee    PAF (paroxysmal atrial fibrillation) (HCC)    Type 2 diabetes mellitus with hyperglycemia ([facility] [address] )    Past Medical History  Past Medical History:   Diagnosis Date   • Allergy to sulfa drugs    • Arthritis 2000   • Closed fracture of third lumbar vertebra (HCC)    • Closed T12 spinal fracture (HCC)    • Closed wedge compression fracture of T12 vertebra (HCC)    • Dementia ([facility] [address] )    • Diabetes mellitus ([facility] [address] ) 2000   • DM (diabetes mellitus), type 2 ([facility])    • H/O multiple allergies     Novocaine,Darvocet, Sulfa, Accupril   • HL (hearing loss)    • Hyperlipidemia    • Hypertension    • Lumbar compression fracture (HCC)    • Memory loss 2020   • Nocturia    • Palpitations    • Paroxysmal atrial fibrillation (HCC)    • Thrombocytopenic disorder (HCC)    • Type 2 diabetes mellitus without complication (HCC)    • Wears glasses      Past Surgical History  Past Surgical History:   Procedure Laterality Date   • DXA PROCEDURE (HISTORICAL)  05/31/2023   • HERNIA REPAIR     • HYSTERECTOMY             06/16/23 0837   OT Last Visit   OT Visit Date 06/16/23   Note Type   Note type Evaluation   Pain Assessment   Pain Assessment Tool 0-10   Pain Score No Pain   Pain Location/Orientation Orientation: Left; Location: Knee   Effect of Pain on Daily Activities limits all aspects of bed mobility   Restrictions/Precautions   Weight Bearing Precautions Per Order No   Other Precautions Cognitive; Chair Alarm; Bed Alarm;Multiple lines; Fall Risk;Pain   Home Living   Type of Home Apartment  ([facility])   Home Layout One level   Bathroom Shower/Tub Walk-in shower   Bathroom Toilet "Standard   Bathroom Equipment Grab bars in shower; Shower chair; Toilet raiser   Bathroom Accessibility Accessible   Home Equipment Walker;Cane;Wheelchair-manual   Additional Comments typically use of RW at baseline, however due to weakness pt has been more reliant on w/c   Prior Function   Level of [place]   (typically (I) with ADLs, due to functional decline  now assisting with all ADLs)   Lives With Spouse   Receives Help From Family   IADLs   (facility provides 3 meals/day in dining serna   manages meds, cleaning,  transportation)   Falls in the last 6 months 5 to 10  (per chart review >6 falls)   Vocational Retired   Comments Prior to April 2023 pt typically ambulates down to dining senra for all meals with use of rollator and 1 rest break, spouse reports ~150 ft each way  However has been mainly w/c bound   Lifestyle   Autonomy PTA pt living with  in apartment at [place] [address], pt recently requiring incresaed assist with all ADLs and IADLs, (+)falls, (-)drives, use of w/c   Reciprocal Relationships supportive    Service to Others retired   Intrinsic Gratification spending time with    General   Family/Caregiver Present No   Subjective   Subjective \"I need that, don't move it\" Pt talking about incontinence pads on table top   ADL   Where Assessed   (Sitting EOB for bathing/dressing tasks)   Eating Assistance 6  Modified independent   Eating Deficit Increased time to complete   Grooming Assistance 5  Supervision/Setup   Grooming Deficit Setup; Increased time to complete;Wash/dry hands; Wash/dry face   UB Bathing Assistance 4  Minimal Assistance   UB Bathing Deficit Increased time to complete;Supervision/safety;Verbal cueing  (A for thoroughness under breasts)   LB Bathing Assistance 3  Moderate Assistance   LB Bathing Deficit Increased time to complete;Supervision/safety;Verbal cueing;Right lower leg including foot; Left lower leg including foot; Buttocks   UB Dressing Assistance 3  " Moderate Assistance   UB Dressing Deficit Increased time to complete;Supervision/safety;Verbal cueing; Thread RUE  (max A with doffing gown, able to don with min A)   LB Dressing Assistance 3  Moderate Assistance   LB Dressing Deficit Thread RLE into underwear; Thread LLE into underwear;Pull up over hips  (Able to doff with A for over hips, able to pull off over feet without assistance, A for threading RLE into clean underwear and A for pulling over hips in standing)   Toileting Assistance  2  Maximal Assistance   Toileting Deficit Perineal hygiene  (incontinent of urine)   Bed Mobility   Supine to Sit 2  Maximal assistance   Additional items Assist x 1; Increased time required;Verbal cues;LE management   Sit to Supine Unable to assess   Additional Comments Sitting EOB 10 min for ADL tasks with overall (S)   Transfers   Sit to Stand   (fluctuating performance: mod A x1 regressing to max A x2 )   Additional items Assist x 1;Assist x 2; Increased time required;Verbal cues   Stand to Sit 3  Moderate assistance   Additional items Assist x 1; Increased time required;Verbal cues   Additional Comments initally pt is mod x 1 for STS however, regresses to max x 2 for STS, verbal cues for hand placement   Functional Mobility   Functional Mobility 2  Maximal assistance   Additional Comments Ax2, limited side stepping to pt's L side, A with maneuvering RW and bringing chair behing pt   Additional items Rolling walker   Balance   Static Sitting Fair -   Dynamic Sitting Poor +   Static Standing   (fluctuating poor to zero)   Dynamic Standing   (fluctuating poor to zero)   Ambulatory Zero   Activity Tolerance   Activity Tolerance Patient limited by fatigue;Patient limited by pain     Medical Staff Made Aware PT Juan Pino, ASPEN [employer] Adjutant   Nurse Made Aware RN pre/post   RUE Assessment   RUE Assessment WFL  (as seen with ADLs)   LUE Assessment   LUE Assessment WFL  (as seen with ADLs)   Hand Function   Gross Motor Coordination Functional   Fine Motor Coordination Functional   Cognition   Overall Cognitive Status Impaired  (Simultaneous filing  User may not have seen previous data )   Arousal/Participation Alert; Cooperative   Attention Attends with cues to redirect   Orientation Level Oriented to person;Oriented to place; Disoriented to time;Oriented to situation   Memory Decreased short term memory;Decreased recall of recent events;Decreased recall of precautions   Following Commands Follows one step commands with increased time or repetition   Comments Pt with dementia diagnosis  flat affect and delayed repsonses to questions at times, requiring VC for safety throughout   Assessment   Limitation Decreased ADL status; Decreased UE strength;Decreased Safe judgement during ADL;Decreased cognition;Decreased endurance;Decreased self-care trans;Decreased high-level ADLs  (impaired pain, balance, fxnl mobility, act obdulia, standing obdulia, strength, fxnl sitting balance, fxnl sitting obdulia, attention to task, safety awareness, insight, pacing, sequencing, orientation, problem solving, display of emotion, response time)   Prognosis Good   Assessment Pt is a 80 y o  female seen for OT evaluation s/p admission [facility] 6[facility]15[facility]2023 due to weakness  Diagnosed with Acute on chronic diastolic congestive heart failure ([facility] [address] )  Personal and env factors supporting pt at time of IE include (I) PLOF, supportive  and accessible home environment  Personal and env factors inhibiting engagement in occupations include advanced age, difficulty completing ADLs and difficulty completing IADLs  Performance deficits that affect the pt’s occupational performance can be seen above  Due to pt's current functional limitations and medical complications pt is functioning below baseline   Pt would benefit from continued skilled OT treatment in order to maximize safety, independence and overall performance with ADLs, functional mobility, functional transfers and cognition in order to achieve highest level of function  Goals   Patient Goals none stated, will cont to assess   LTG Time Frame 10-14   Long Term Goal see goals listed below   Plan   Treatment Interventions ADL retraining;Functional transfer training; Endurance training;Cognitive reorientation;Patient/family training;Equipment evaluation/education; Compensatory technique education; Activityengagement   Goal Expiration Date 06/26/23   OT Treatment Day 0   OT Frequency 3-5x/wk   Recommendation   OT Discharge Recommendation Post acute rehabilitation services   AM-PAC Daily Activity Inpatient   Lower Body Dressing 2   Bathing 2   Toileting 2   Upper Body Dressing 2   Grooming 3   Eating 3   Daily Activity Raw Score 14   Daily Activity Standardized Score (Calc for Raw Score >=11) 33 39   AM-PAC Applied Cognition Inpatient   Following a Speech/Presentation 2   Understanding Ordinary Conversation 3   Taking Medications 1   Remembering Where Things Are Placed or Put Away 1   Remembering List of 4-5 Errands 1   Taking Care of Complicated Tasks 1   Applied Cognition Raw Score 9   Applied Cognition Standardized Score 22 48   End of Consult   Patient Position at End of Consult Bedside chair;Bed/Chair alarm activated; All needs within reach     GOALS:      -Patient will perform grooming tasks sitting at sink with overall Mod I in order to increase overall independence    -Patient will be Min A  with UB dressing using AE and AD as needed in order to increase (I) with ADLs    -Patient will be Min A  with UB bathing using AE and AD as needed in order to increase (I) with ADLs    -Patient will be Min A  with LB dressing with use of AE and AD as needed in order to increase (I) with ADLs    -Patient will be Min A  with LB bathing with use of AE and AD as needed in order to increase (I) with ADLs    -Patient will complete toileting w/ Min A  w/ G hygiene/thoroughness in order to reduce caregiver burden    -Patient will demonstrate Mod A x 1 with bed mobility for ability to manage own comfort and initiate OOB tasks      -Patient will perform functional transfers with Min A x 1 to/from all surfaces using DME as needed in order to increase (I) with functional tasks    -Patient will be Min A x 1 with functional mobility to/from [facility] for increased independence with toileting tasks    -Patient will tolerate therapeutic activities for greater than 30 min, in order to increase tolerance for functional activities      -Patient will increase OOB/sitting tolerance to 2-4 hours per day to increase participation in self-care and leisure tasks with no s/s of exertion  The patient's raw score on the -PAC Daily Activity Inpatient Short Form is 14  A raw score of less than 19 suggests the patient may benefit from discharge to post-acute rehabilitation services  Please refer to the recommendation of the Occupational Therapist for safe discharge planning  This session, pt required and most appropriately benefited from skilled OT/PT co-eval due to extensive physical assistance of SKILLED therapists, significant regression from functional baseline, and decreased activity tolerance  OT and PT goals were addressed separately as seen in documentation       Mirta Sanchez MS, OTR/L

## 2023-06-16 NOTE — CASE MANAGEMENT
Case Management Assessment & Discharge Planning Note    Patient name Martha Barriga  Location S /S -19 MRN [id number]  : 1935 Date 2023       Current Admission Date: 6/15/2023  Current Admission Diagnosis:Acute on chronic diastolic congestive heart failure [facility])   Patient Active Problem List    Diagnosis Date Noted   • Acute on chronic diastolic congestive heart failure ([facility] [address] ) 06/15/2023   • Abnormal abdominal CT scan 2023   • Knee pain 2023   • Type 2 diabetes mellitus with hyperglycemia ([facility] [address] ) 2023   • Generalized weakness 04/15/2023   • Fall 2023   • Depression, recurrent ([facility] [address] ) 2023   • Compression fracture of L3 vertebra ([facility] [address] ) 2023   • PAF (paroxysmal atrial fibrillation) ([facility] [address] )    • Metabolic encephalopathy    • Hypertensive urgency 2023   • Hyponatremia 2023   • Primary osteoarthritis of left knee 2022   • Osteoarthritis of multiple joints 2022   • Bilateral hearing loss 2022   • 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[facility] [address] ) 03/15/2022   • Dementia without behavioral disturbance ([facility] [address] )    • Stage 3a chronic kidney disease ([facility] [address] ) 2021   • Essential hypertension 2020   • Pure hypercholesterolemia 2020   • Microalbuminuria 2020      LOS (days): 1  Geometric Mean LOS (GMLOS) (days): 3 90  Days to GMLOS:3     OBJECTIVE:  PATIENT READMITTED TO HOSPITAL  Risk of Unplanned Readmission Score: 28 13         Current admission status: Inpatient       Preferred Pharmacy:   [address], [address] [facility]  [address] [place] [address]  Phone: [phone] Fax: [phone]    Primary Care Provider: Pradeep Mays MD    Primary Insurance: MEDICARE  Secondary Insurance: [address]:  [address], [address] - Spouse   Primary Phone: [phone] (Mobile)  Home Phone: [phone]               Advance Directives  Does patient have a [address]?: Yes  Does patient have Advance Directives?: Yes  Advance Directives: Living will, Power of  for health care  Primary Contact: Spouse, Babita Ramirez ([phone])         Readmission Root Cause  30 Day Readmission: Yes  Who directed you to return to the hospital?: Family  Did you understand whom to contact if you had questions or problems?: Yes  Did you get your prescriptions before you left the hospital?: Yes  Were you able to get your prescriptions filled when you left the hospital?: Yes  Did you take your medications as prescribed?: Yes  Were you able to get to your follow-up appointments?: Yes  During previous admission, was a post-acute recommendation made?: Yes  What post-acute resources were offered?: Offered, but declined (Recommended for STR, spouse declined )  Patient was readmitted due to: Ambulatory dysfunction  Action Plan: CHF management, IV lasix; Referral to [facility] for STR placement  Patient Information  Admitted from[de-identified] Home  Mental Status: Confused  During Assessment patient was accompanied by: Not accompanied during assessment  Assessment information provided by[de-identified] Spouse  Primary Caregiver: Spouse  Caregiver's Name[de-identified] Anderson Aguirre (spouse)  Caregiver's Relationship to Patient[de-identified] Family Member  Caregiver's Telephone Number[de-identified] [phone]  Support Systems: Self, Spouse/significant other  [place] of Residence: [address] do you live in?: [place], [address] [place] [address] Street entry access options   Select all that apply : No steps to enter home  Type of Current Residence: Other (Comment) ([address] [facility] [address] [facility] [facility])  In the last 12 months, was there a time when you were not able to pay the mortgage or rent on time?: No  In the last 12 months, was there a time when you did not have a steady place to sleep or slept in a shelter (including now)?: No  Homeless/housing insecurity resource given?: N/A  Living Arrangements: Lives w/ Spouse/significant other    Activities of Daily Living Prior to Admission  Functional Status: Assistance (Spouse assists with all ADL's -- Pt generally ambulatory with RW at baseline, but recently has been WC bound  Spouse wheels patient to/from the dining serna   Spouse normally dresses herself but is no longe able to do so )  Completes ADLs independently?: No  Level of ADL dependence: Assistance  Ambulates independently?: Yes (Uses RW at baseline; Recently needing to use WC)  Does patient use assisted devices?: Yes  Assisted Devices (DME) used: Samson Finders, Wheelchair, Bedside Commode, Rollator, Shower Chair  Does patient currently own DME?: Yes  What DME does the patient currently own?: Bedside Commode, Wheelchair, Shower Chair, Rollator, Walker  Does patient have a history of Outpatient Therapy (PT/OT)?: Yes  Does the patient have a history of Short-Term Rehab?: Yes ([facility] < 1 month ago for 14 days, per spouse)  Does patient have a history of HHC?: Yes  Does patient currently have BUX ?: No         Patient Information Continued  Income Source: SSI/SSD  Does patient have prescription coverage?: Yes  Within the past 12 months, you worried that your food would run out before you got the money to buy more : Never true  Within the past 12 months, the food you bought just didn't last and you didn't have money to get more : Never true  Food insecurity resource given?: N/A  Does patient receive dialysis treatments?: No  Does patient have a history of substance abuse?: No  Does patient have a history of Mental Health Diagnosis?: No         Means of Transportation  Means of Transport to [facility][de-identified] Other (Comment) (Facility transports)  In the past 12 months, has lack of transportation kept you from medical appointments or from getting medications?: No  In the past 12 months, has lack of transportation kept you from meetings, work, or from getting things needed for daily living?: No  Was application for public transport provided?: N/A        DISCHARGE DETAILS:    Discharge planning discussed with[de-identified] Spouse via phone (pt currently resting)  Freedom of Choice: Yes  Comments - Freedom of Choice: Spouse requests referral to [facility] for con't rehab services  CM contacted family/caregiver?: Yes  Were Treatment Team discharge recommendations reviewed with patient/caregiver?: Yes  Did patient/caregiver verbalize understanding of patient care needs?: Yes  Were patient/caregiver advised of the risks associated with not following Treatment Team discharge recommendations?: Yes    Contacts  Patient Contacts: Paul Hand  Relationship to Patient[de-identified] Family  Contact Method: Phone  Phone Number: [phone]  Reason/Outcome: Emergency Contact, Discharge [address]         Is the patient interested in [address] at discharge?: No    DME Referral Provided  Referral made for DME?: No    Other Referral/Resources/Interventions Provided:  Interventions: Short Term Rehab  Referral Comments: Referral sent to [facility], per spouse's request          Treatment Team Recommendation: Short Term Rehab  Discharge Destination Plan[de-identified] Short Term Rehab                                         Additional Comments: CM spoke with spouse via phone to introduce role of CM and begin discharge planning -- Pt and spouse reside at [address] [place] [address]  Per spouse, pt was generally independent with ADL's at baseline (requiring WC for longer distances), but has recently declined  Spouse reports pt is now largely WC bound, and he must also assist her with her washing, dressing, bathing, and medication management  Pt has hx of rehab at [facility] -- Spouse is hopeful she can return upon [place] stability  Referral placed via [employer] -- Awaiting response  Spouse states he is open to exploring [facility] placement at [facility] following rehab placement   CM reviewed [address] guidelines for rehab, including 100 qualifying rehab days, 14 of which spouse believes is used  CM will follow for [facility] response re: financial obligations  Spouse appreciative

## 2023-06-16 NOTE — PLAN OF CARE
Problem: Potential for Falls  Goal: Patient will remain free of falls  Description: INTERVENTIONS:  - Educate patient/family on patient safety including physical limitations  - Instruct patient to call for assistance with activity   - Consult OT/PT to assist with strengthening/mobility   - Keep Call bell within reach  - Keep bed low and locked with side rails adjusted as appropriate  - Keep care items and personal belongings within reach  - Initiate and maintain comfort rounds  - Make Fall Risk Sign visible to staff  - Offer Toileting every  Hours, in advance of need  - Initiate/Maintain alarm  - Obtain necessary fall risk management equipment:  - Apply yellow socks and bracelet for high fall risk patients  - Consider moving patient to room near nurses station  Outcome: Progressing     Problem: MOBILITY - ADULT  Goal: Maintain or return to baseline ADL function  Description: INTERVENTIONS:  -  Assess patient's ability to carry out ADLs; assess patient's baseline for ADL function and identify physical deficits which impact ability to perform ADLs (bathing, care of mouth/teeth, toileting, grooming, dressing, etc )  - Assess/evaluate cause of self-care deficits   - Assess range of motion  - Assess patient's mobility; develop plan if impaired  - Assess patient's need for assistive devices and provide as appropriate  - Encourage maximum independence but intervene and supervise when necessary  - Involve family in performance of ADLs  - Assess for home care needs following discharge   - Consider OT consult to assist with ADL evaluation and planning for discharge  - Provide patient education as appropriate  Outcome: Progressing  Goal: Maintains/Returns to pre admission functional level  Description: INTERVENTIONS:  - Perform BMAT or MOVE assessment daily    - Set and communicate daily mobility goal to care team and patient/family/caregiver     - Collaborate with rehabilitation services on mobility goals if consulted  - Perform Range of Motion  times a day  - Reposition patient every  hours    - Dangle patient  times a day  - Stand patient  times a day  - Ambulate patient  times a day  - Out of bed to chair  times a day   - Out of bed for meals  times a day  - Out of bed for toileting  - Record patient progress and toleration of activity level   Outcome: Progressing

## 2023-06-16 NOTE — PLAN OF CARE
Problem: OCCUPATIONAL THERAPY ADULT  Goal: Performs self-care activities at highest level of function for planned discharge setting  See evaluation for individualized goals  Description: Treatment Interventions: ADL retraining, Functional transfer training, Endurance training, Cognitive reorientation, Patient/family training, Equipment evaluation/education, Compensatory technique education, Activityengagement          See flowsheet documentation for full assessment, interventions and recommendations  6/16/2023 1152 by NIK Lynn  Note: Limitation: Decreased ADL status, Decreased UE strength, Decreased Safe judgement during ADL, Decreased cognition, Decreased endurance, Decreased self-care trans, Decreased high-level ADLs (impaired pain, balance, fxnl mobility, act obdulia, standing obdulia, strength, fxnl sitting balance, fxnl sitting obdulia, attention to task, safety awareness, insight, pacing, sequencing, orientation, problem solving, display of emotion, response time)  Prognosis: Good  Assessment: Pt is a 80 y o  female seen for OT evaluation s/p admission [facility] on 6/15/2023 due to weakness  Diagnosed with Acute on chronic diastolic congestive heart failure ([facility] [address] )  Personal and env factors supporting pt at time of IE include (I) PLOF, supportive  and accessible home environment  Personal and env factors inhibiting engagement in occupations include advanced age, difficulty completing ADLs and difficulty completing IADLs  Performance deficits that affect the pt’s occupational performance can be seen above  Due to pt's current functional limitations and medical complications pt is functioning below baseline  Pt would benefit from continued skilled OT treatment in order to maximize safety, independence and overall performance with ADLs, functional mobility, functional transfers and cognition in order to achieve highest level of function       OT Discharge Recommendation: Post acute rehabilitation services

## 2023-06-16 NOTE — PLAN OF CARE
Problem: PHYSICAL THERAPY ADULT  Goal: Performs mobility at highest level of function for planned discharge setting  See evaluation for individualized goals  Description: Treatment/Interventions: Functional transfer training, LE strengthening/ROM, Therapeutic exercise, Cognitive reorientation, Patient/family training, Bed mobility, Gait training, Spoke to nursing, Spoke to case management, OT  Equipment Recommended: Wheelchair, Ho Pastel       See flowsheet documentation for full assessment, interventions and recommendations  Outcome: Progressing  Note: Prognosis: Fair  Problem List: Decreased strength, Decreased endurance, Impaired balance, Decreased mobility, Decreased cognition, Obesity, Pain  Assessment: Pt is a 80 y o  female seen for PT evaluation s/p admit to [facility] on 6/15/2023  Pt was admitted with a primary dx of: acute on chronic diastolic HF  PT now consulted for assessment of mobility and d/c needs  Pt with Up and OOB as tolerated orders  Pts current comorbidities and personal factors effecting treatment include: HTN, CKD, DM II, knee pain, history of falls, dementia, PAF, depression  Pts current clinical presentation is Unstable/Unpredictable (high complexity) due to Ongoing medical management for primary dx, Decreased activity tolerance compared to baseline, Fall risk, Increased assistance needed from caregiver at current time, Cog status  Prior to admission, pt was required assistance with transfers  Upon evaluation, pt currently is requiring MaxA for bed mobility; MaxA x2 for transfers and MaxA x2 for ambulation 3 ft w/ RW   Pt presents at PT eval functioning below baseline and currently w/ overall mobility deficits 2* to: BLE weakness, impaired balance, decreased endurance, gait deviations, pain, decreased activity tolerance compared to baseline, decreased functional mobility tolerance compared to baseline, decreased safety awareness, impaired judgement, fall risk, decreased cognition  Pt currently at a fall risk 2* to impairments listed above  Pt will continue to benefit from skilled acute PT interventions to address stated impairments; to maximize functional mobility; for ongoing pt/ family training; and DME needs  At conclusion of PT session chair alarm engaged, all needs in reach, RN notified of session findings/recommendations and pt returned back in recliner chair with phone and call bell within reach  Pt denies any further questions at this time  Recommend IP rehab upon hospital D/C  PT Discharge Recommendation: Post acute rehabilitation services    See flowsheet documentation for full assessment

## 2023-06-16 NOTE — PROGRESS NOTES
[facility]  Progress Note  Name: Priyanka Mcleod  MRN: [id number]  Unit/Bed#: S -01 I Date of Admission: 6/15/2023   Date of Service: 6/16/2023 I Hospital Day: 1    Assessment/Plan   * Acute on chronic diastolic congestive heart failure [facility])  Assessment & Plan  Wt Readings from Last 3 Encounters:   06/15/23 82 5 kg (181 lb 14 1 oz)   06/09/23 89 4 kg (197 lb 1 5 oz)   05/23/23 81 4 kg (179 lb 6 oz)     venous Doppler of the lower extremities negative for DVT  Patient was seen by cardiology at [facility] 2 days ago, noted to have decompensated CHF in the setting of A-fib with increased heart rates and dietary noncompliance with sodium and fluid she was started on Lasix orally 40 mg daily however now presents with worsening leg swelling and difficulty in ambulation  Denies any chest pain, shortness of breath    -Lasix 40 mg IV twice daily--will switch to oral equivalent until IV access can be regained   -monitor renal function, electrolytes  -Compression stockings      Primary osteoarthritis of left knee  Assessment & Plan  • Patient status post trauma/injury with pain and swelling left knee during last admission  • Status post arthrocentesis in the ER- last admission ON 6/9  ? Synovial fluid WBC w/ diff: 12,000  ? Synovial fluid, RBC count: 154,000  ? Synovial fluid, crystals: none seen  ? Synovial fluid GS and Cx: 2+ polys and no bacteria seen  • Fluid studies not showing signs of septic arthritis  • Continue pain medications    Discussed case w Ortho  Appreciate and recommendations  Follows w Ortho o/p; pt did not want joint injection at that time and was not candidate for total knee d/t HgbA1c, which must be below 7 for surgery  Pt should consider f/u w Ortho o/p    Weightbearing as tolerated  Ice and elevation for pain control as needed  Knee brace for comfort and support  Thigh-high compression stockings  Tylenol scheduled  Voltaren gel    Baker's cyst of knee, left  Assessment & Plan  US Venous Doppler LLE 6/16/23: negative for DVT  Theckingsre is a well defined hypoechoic non-vascularized cystic-type structure noted  in the popliteal fossa  MRI from April 2023 noted large complex Baker's cyst    Given pt and spouse report acute worsening of her chronic L knee pain, wonder if Baker's cyst might not account for acute change  Discussed case w Ortho, who suspect Snell's cyst constitutes incidental finding  Appreciate and recommendations  Weightbearing as tolerated  Ice and elevation for pain control as needed  Knee brace for comfort and support  Thigh-high compression stockings    Type 2 diabetes mellitus with hyperglycemia [facility])  Assessment & Plan  Lab Results   Component Value Date    HGBA1C 9 0 (H) 04/14/2023       Recent Labs     06/15/23  1614 06/15/23  2127 06/16/23  0738 06/16/23  1117   POCGLU 233* 176* 153* 204*       Blood Sugar Average: Last 72 hrs:  (P) 175 2     Continue insulin  Hold metformin    Persistent atrial fibrillation (HCC)  Assessment & Plan  Persistent atrial fibrillation, rate controlled,  continue Toprol, Eliquis dose now at 5 mg twice daily  At the time of last discharge Eliquis dose was decreased to 2 5 twice daily due to blood in the synovial fluid, patient saw her cardiologist 2 days prior to presentation, who put her back on 5 mg twice daily    Dementia without behavioral disturbance [facility])  Assessment & Plan  Currently alert and oriented, discussed with patient's   He is looking in the line of more long-term care for the patient  Patient is usually with it and she does have intermittent spells where she gets confused  Stage 3a chronic kidney disease [facility])  Assessment & Plan  Lab Results   Component Value Date    EGFR 54 06/16/2023    EGFR 46 06/15/2023    EGFR 66 06/10/2023    CREATININE 0 94 06/16/2023    CREATININE 1 07 06/15/2023    CREATININE 0 80 06/10/2023   Monitor renal function      Essential hypertension  Assessment & Plan  Continue home medication valsartan,   Hold home hydrochlorothiazide  Continue metoprolol  VTE Pharmacologic Prophylaxis: VTE Score: 4 Moderate Risk (Score 3-4) - Pharmacological DVT Prophylaxis Ordered: apixaban (Eliquis)  Patient Centered Rounds: I performed bedside rounds with nursing staff today  Discussions with Specialists or Other Care Team Provider: Ortho    Education and Discussions with Family / Patient: Updated  () at bedside  Current Length of Stay: 1 day(s)  Current Patient Status: Inpatient   Discharge Plan: Anticipate discharge in 48-72 hrs to rehab facility  Code Status: Level 3 - DNAR and DNI    Subjective: This morning, Ms Adama Jackson is seen sitting up in bedside chair, awake, alert, engaged with exam, no acute distress  She and her  report chief complaint of left knee pain, stable to recent baseline, with subacute increased pain on chronic pain of osteoarthritis  Also notes swelling in the bilateral lower extremities  Denies fevers, chills, chest pain, shortness of breath, abdominal pain, changes in bowel or bladder habits  Eating and drinking, moving bowels and voiding bladder volitionally  Good urine output since admission  Objective:     Vitals:   Temp (24hrs), Av 8 °F (37 1 °C), Min:98 3 °F (36 8 °C), Max:99 3 °F (37 4 °C)    Temp:  [98 3 °F (36 8 °C)-99 3 °F (37 4 °C)] 98 9 °F (37 2 °C)  HR:  [] 89  Resp:  [16-17] 16  BP: (130-170)/(60-95) 170/95  SpO2:  [95 %-97 %] 95 %  Body mass index is 34 37 kg/m²  Input and Output Summary (last 24 hours): Intake/Output Summary (Last 24 hours) at 2023 1427  Last data filed at 6/15/2023 2300  Gross per 24 hour   Intake 200 ml   Output 1600 ml   Net -1400 ml       Physical Exam:   Physical Exam  Constitutional:       General: She is not in acute distress  Appearance: Normal appearance  She is obese   She is not ill-appearing, toxic-appearing or diaphoretic  HENT:      Head: Normocephalic and atraumatic  Eyes:      General: No scleral icterus  Right eye: No discharge  Left eye: No discharge  Conjunctiva/sclera: Conjunctivae normal    Cardiovascular:      Rate and Rhythm: Normal rate  Rhythm irregular  Pulses: Normal pulses  Heart sounds: Normal heart sounds  No murmur heard  No friction rub  No gallop  Pulmonary:      Effort: Pulmonary effort is normal  No respiratory distress  Breath sounds: No stridor  Rales (Scant inspiratory rales left lung base) present  No wheezing or rhonchi  Chest:      Chest wall: No tenderness  Abdominal:      General: Bowel sounds are normal  There is no distension  Palpations: Abdomen is soft  Tenderness: There is no abdominal tenderness  There is no guarding or rebound  Musculoskeletal:         General: Tenderness present  Right lower leg: Edema (2+ pitting to level of knee) present  Left lower leg: Edema (2+ pitting the level of the knee) present  Comments: Bilateral knee swelling greater on left than right, joint is not hot to touch it is minimally tender with extensive palpation and manipulation  Active range of motion left knee is markedly decreased   Skin:     General: Skin is warm and dry  Neurological:      Mental Status: She is alert     Psychiatric:         Mood and Affect: Mood normal          Behavior: Behavior normal         Additional Data:     Labs:  Results from last 7 days   Lab Units 06/16/23  0514   WBC Thousand/uL 6 41   HEMOGLOBIN g/dL 11 8   HEMATOCRIT % 40 1   PLATELETS Thousands/uL 179   NEUTROS PCT % 75   LYMPHS PCT % 11*   MONOS PCT % 13*   EOS PCT % 0     Results from last 7 days   Lab Units 06/16/23  0514 06/15/23  0759   SODIUM mmol/L 133* 134*   POTASSIUM mmol/L 3 2* 4 2   CHLORIDE mmol/L 95* 93*   CO2 mmol/L 25 31   BUN mg/dL 28* 25   CREATININE mg/dL 0 94 1 07   ANION GAP mmol/L 13 10   CALCIUM mg/dL 8 3* 9 3   ALBUMIN g/dL  --  3 7   TOTAL BILIRUBIN mg/dL  --  0 83   ALK PHOS U/L  --  46   ALT U/L  --  7   AST U/L  --  22   GLUCOSE RANDOM mg/dL 137 116         Results from last 7 days   Lab Units 06/16/23  1117 06/16/23  0738 06/15/23  2127 06/15/23  1614 06/15/23  1218 06/10/23  1110 06/10/23  0822 06/09/23  2109 06/09/23  1549   POC GLUCOSE mg/dl 204* 153* 176* 233* 110 178* 152* 146* 188*               Lines/Drains:  Invasive Devices     Peripheral Intravenous Line  Duration           Peripheral IV 06/15/23 Left Antecubital 1 day                      Imaging: Reviewed radiology reports from this admission including: chest xray and ultrasound(s)    Recent Cultures (last 7 days):         Last 24 Hours Medication List:   Current Facility-Administered Medications   Medication Dose Route Frequency Provider Last Rate   • acetaminophen  975 mg Oral Q8H [address] Dariana Montes MD     • apixaban  5 mg Oral BID Vahe Hall MD     • Diclofenac Sodium  2 g Topical 4x Daily Dariana Montes MD     • ezetimibe  10 mg Oral HS Vahe Hall MD      And   • pravastatin  40 mg Oral Daily With Nacho Florez MD     • insulin glargine  14 Units Subcutaneous HS Vahe Hall MD     • insulin lispro  1-6 Units Subcutaneous TID AC Vahe Hall MD     • insulin lispro  2-12 Units Subcutaneous HS Vahe Hall MD     • insulin lispro  5 Units Subcutaneous TID With Meals Vahe Hall MD     • losartan  50 mg Oral Daily Vahe Hall MD     • metoprolol succinate  75 mg Oral BID Vahe Hall MD     • torsemide  40 mg Oral BID (diuretic) Dariana Montes MD          Today, Patient Was Seen By: Dariana Montes MD    **Please Note: This note may have been constructed using a voice recognition system  **

## 2023-06-16 NOTE — CASE MANAGEMENT
Case Management Discharge Planning Note    Patient name Bee Flores  Location S /S -24 MRN [id number]  : 1935 Date 2023       Current Admission Date: 6/15/2023  Current Admission Diagnosis:Acute on chronic diastolic congestive heart failure [facility])   Patient Active Problem List    Diagnosis Date Noted   • Acute on chronic diastolic congestive heart failure ([address] ) 06/15/2023   • Abnormal abdominal CT scan 2023   • Knee pain 2023   • Type 2 diabetes mellitus with hyperglycemia ([address] ) 2023   • Generalized weakness 04/15/2023   • Fall 2023   • Depression, recurrent ([address] ) 2023   • Compression fracture of L3 vertebra ([facility] [address] ) 2023   • PAF (paroxysmal atrial fibrillation) ([facility] [address] )    • Metabolic encephalopathy    • Hypertensive urgency 2023   • Hyponatremia 2023   • Primary osteoarthritis of left knee 2022   • Osteoarthritis of multiple joints 2022   • Bilateral hearing loss 2022   • 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[facility] [address] ) 03/15/2022   • Dementia without behavioral disturbance ([facility] [address] )    • Stage 3a chronic kidney disease ([facility] [address] ) 2021   • Essential hypertension 2020   • Pure hypercholesterolemia 2020   • Microalbuminuria 2020      LOS (days): 1  Geometric Mean LOS (GMLOS) (days): 3 90  Days to GMLOS:2 8     OBJECTIVE:  Risk of Unplanned Readmission Score: 27 69         Current admission status: Inpatient   Preferred Pharmacy:   [address]  Phone: [phone] Fax: [phone]    Primary Care Provider: Jocy Draper MD    Primary Insurance: MEDICARE  Secondary Insurance: [facility] [employer] OPTIONS PROGRAM    DISCHARGE DETAILS:    Discharge planning discussed with[de-identified] Yessy Mejia @ [facility]                                     Other Referral/Resources/Interventions Provided:  Interventions: Short Term Rehab  Referral Comments: Pt approved for admission to [address] [place] [address] (SNF) at discharge  Pt does not need a 3 midnight stay as she was recently admitted and discharged from the hospital  Pt will likely be ready for discharge on Sunday vs  Monday  Facility made aware  Pt will need a covid test prior to admission           Treatment Team Recommendation: Short Term Rehab  Discharge Destination Plan[de-identified] Short Term Rehab

## 2023-06-16 NOTE — PHYSICAL THERAPY NOTE
Physical Therapy Evaluation    Patient's Name: Krysta Wood    Admitting Diagnosis  Peripheral edema [R60 9]  Weakness [R53 1]  Acute on chronic diastolic congestive heart failure ([address] ) [I50 33]  Ambulatory dysfunction [R26 2]  Chronic pain of both knees [M25 561, M25 562, G89 29]    Problem List  Patient Active Problem List   Diagnosis    Essential hypertension    Pure hypercholesterolemia    Microalbuminuria    Stage 3a chronic kidney disease (HCC)    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HCC)    Dementia without behavioral disturbance (HCC)    Osteoarthritis of multiple joints    Bilateral hearing loss    Primary osteoarthritis of left knee    Hypertensive urgency    Hyponatremia    Metabolic encephalopathy    PAF (paroxysmal atrial fibrillation) (HCC)    Depression, recurrent (HCC)    Compression fracture of L3 vertebra (HCC)    Generalized weakness    Type 2 diabetes mellitus with hyperglycemia (HCC)    Knee pain    Fall    Abnormal abdominal CT scan    Acute on chronic diastolic congestive heart failure ([address] )       Past Medical History  Past Medical History:   Diagnosis Date    Allergy to sulfa drugs     Arthritis 2000    Closed fracture of third lumbar vertebra (HCC)     Closed T12 spinal fracture (HCC)     Closed wedge compression fracture of T12 vertebra (HCC)     Dementia (HCC)     Diabetes mellitus ([address] ) 2000    DM (diabetes mellitus), type 2 (HCC)     H/O multiple allergies     Novocaine,Darvocet, Sulfa, Accupril    HL (hearing loss)     Hyperlipidemia     Hypertension     Lumbar compression fracture (HCC)     Memory loss 2020    Nocturia     Palpitations     Paroxysmal atrial fibrillation (HCC)     Thrombocytopenic disorder (HCC)     Type 2 diabetes mellitus without complication ([address] )     Wears glasses        Past Surgical History  Past Surgical History:   Procedure Laterality Date    DXA PROCEDURE (HISTORICAL)  05/31/2023    HERNIA REPAIR      HYSTERECTOMY          06/16/23 0838   PT Last Visit   PT Visit Date 23   Note Type   Note type Evaluation   Pain Assessment   Pain Assessment Tool 0-10   Pain Score 8   Pain Location/Orientation Location: Knee;Orientation: Left   Effect of Pain on Daily Activities limits all aspects of bed mobility   Restrictions/Precautions   Weight Bearing Precautions Per Order No   Other Precautions Cognitive; Chair Alarm; Bed Alarm;Multiple lines; Fall Risk;Pain   Home Living   Type of Home Apartment  (CM ILF)   Home Layout One level   Home Equipment Walker;Cane;Wheelchair-manual   Additional Comments previously pt was utilizing RW, recently utilizing w/c 2 2 LE pain   Prior Function   Lives With [address] in the last 6 months 5 to 10   Vocational Retired   General   Family/Caregiver Present No   Cognition   Overall Cognitive Status Impaired   Orientation Level Oriented to person;Oriented to place;Oriented to situation;Disoriented to time   Following Commands Follows one step commands with increased time or repetition   Comments pt ID by wristband, name and    RLE Assessment   RLE Assessment WFL   LLE Assessment   LLE Assessment WFL  (significant swelling L vs R, HF 3-/5)   Bed Mobility   Supine to Sit 2  Maximal assistance   Additional items Assist x 1; Increased time required;LE management; Bedrails;HOB elevated  (trunk management)   Sit to Supine Unable to assess   Transfers   Sit to Stand 2  Maximal assistance   Additional items Assist x 2; Increased time required;Verbal cues   Stand to Sit 2  Maximal assistance   Additional items Assist x 2; Increased time required;Verbal cues   Additional Comments initally pt is mod x 1 for STS however, regresses to max x 2 for STS, verbal cues for hand placement   Ambulation/Elevation   Gait pattern Poor UE support; Improper Weight shift;Decreased L stance; Inconsistent jeny; Step to;Excessively slow   Gait Assistance 2  Maximal assist   Additional items Assist x 2;Verbal cues; Tactile cues   Assistive Device Rolling walker   Distance 3'x1  (to bed side chair)   Ambulation/Elevation Additional Comments verbal and tactile cues required for RW management, sequencing of gait, body positioning   Balance   Static Sitting Fair -   Dynamic Sitting Poor +   Static Standing Zero  (ax2)   Dynamic Standing   (zero, Ax2)   Ambulatory Zero  (Ax2)   Activity Tolerance   Activity Tolerance Patient limited by fatigue;Patient limited by pain   Medical Staff Made Aware KOFFI Dockery,   Nurse Made Aware RN pre/post   Assessment   Prognosis Fair   Problem List Decreased strength;Decreased endurance; Impaired balance;Decreased mobility; Decreased cognition;Obesity;Pain   Assessment Pt is a 80 y o  female seen for PT evaluation s/p admit to [address] [place] [address] on 6/15/2023  Pt was admitted with a primary dx of: acute on chronic diastolic HF  PT now consulted for assessment of mobility and d/c needs  Pt with Up and OOB as tolerated orders  Pts current comorbidities and personal factors effecting treatment include: HTN, CKD, DM II, knee pain, history of falls, dementia, PAF, depression  Pts current clinical presentation is Unstable/Unpredictable (high complexity) due to Ongoing medical management for primary dx, Decreased activity tolerance compared to baseline, Fall risk, Increased assistance needed from caregiver at current time, Cog status  Prior to admission, pt was required assistance with transfers  Upon evaluation, pt currently is requiring MaxA for bed mobility; MaxA x2 for transfers and MaxA x2 for ambulation 3 ft w/ RW  Pt presents at PT eval functioning below baseline and currently w/ overall mobility deficits 2* to: BLE weakness, impaired balance, decreased endurance, gait deviations, pain, decreased activity tolerance compared to baseline, decreased functional mobility tolerance compared to baseline, decreased safety awareness, impaired judgement, fall risk, decreased cognition  Pt currently at a fall risk 2* to impairments listed above    Pt will continue to benefit from skilled acute PT interventions to address stated impairments; to maximize functional mobility; for ongoing pt/ family training; and DME needs  At conclusion of PT session chair alarm engaged, all needs in reach, RN notified of session findings/recommendations and pt returned back in recliner chair with phone and call bell within reach  Pt denies any further questions at this time  Recommend IP rehab upon hospital D/C  Goals   Patient Goals none stated   [facility] Expiration Date 06/26/23   Short Term Goal #1 In 10 days pt will be able to: 1  Demonstrate ability to perform all aspects of bed mobility with Howard to improve functional safety  2  Perform functional transfers with [place] to facilitate safe return to previous living environment  3   Ambulate 50 ft with RW and supervision with stable vitals to improve safety with household distances and reduce fall risk  4  Improve LE strength grades by 1 to increase ease of functional mobility with transfers and gait  5  Pt will demonstrate improved balance by one grade in order to decrease risk of falls  PT Treatment Day 0   Plan   Treatment/Interventions Functional transfer training;LE strengthening/ROM; Therapeutic exercise;Cognitive reorientation;Patient/family training;Bed mobility;Gait training;Spoke to nursing;Spoke to case management;OT   PT Frequency 3-5x/wk   Recommendation   PT Discharge Recommendation Post acute rehabilitation services   Equipment Recommended Wheelchair;Walker   Wheelchair Package Recommended Standard   Change or Add item to Wheelchair Package?  No   AM-PAC Basic Mobility Inpatient   Turning in Flat Bed Without Bedrails 1   Lying on Back to Sitting on Edge of Flat Bed Without Bedrails 1   Moving Bed to Chair 1   Standing Up From Chair Using Arms 1   Walk in Room 1   Climb 3-5 Stairs With Railing 1   Basic Mobility Inpatient Raw Score 6   Turning Head Towards Sound 3   Follow Simple Instructions 2   Low Function Basic Mobility Raw Score  11   Low Function Basic Mobility Standardized Score  16 55   Highest Level Of Mobility   -HLM Goal 2: Bed activities/Dependent transfer   JH-HLM Achieved 4: Move to chair/commode   End of Consult   Patient Position at End of Consult Bedside chair;Bed/Chair alarm activated; All needs within reach  (RN trell in room)   The patient's AM-PAC Basic Mobility Inpatient Short Form Raw Score is 6  A Raw score of less than or equal to 16 suggests the patient may benefit from discharge to post-acute rehabilitation services  Please also refer to the recommendation of the Physical Therapist for safe discharge planning      Gauri Antonio, PT

## 2023-06-16 NOTE — ASSESSMENT & PLAN NOTE
Persistent atrial fibrillation, rate controlled,  continue Toprol, Eliquis dose now at 5 mg twice daily    At the time of last discharge Eliquis dose was decreased to 2 5 twice daily due to blood in the synovial fluid, patient saw her cardiologist 2 days prior to presentation, who put her back on 5 mg twice daily

## 2023-06-17 LAB
ANION GAP SERPL CALCULATED.3IONS-SCNC: 8 MMOL/L (ref 4–13)
BUN SERPL-MCNC: 31 MG/DL (ref 5–25)
CALCIUM SERPL-MCNC: 8.6 MG/DL (ref 8.4–10.2)
CHLORIDE SERPL-SCNC: 97 MMOL/L (ref 96–108)
CO2 SERPL-SCNC: 30 MMOL/L (ref 21–32)
CREAT SERPL-MCNC: 1.05 MG/DL (ref 0.6–1.3)
GFR SERPL CREATININE-BSD FRML MDRD: 47 ML/MIN/1.73SQ M
GLUCOSE SERPL-MCNC: 147 MG/DL (ref 65–140)
GLUCOSE SERPL-MCNC: 157 MG/DL (ref 65–140)
GLUCOSE SERPL-MCNC: 164 MG/DL (ref 65–140)
GLUCOSE SERPL-MCNC: 166 MG/DL (ref 65–140)
GLUCOSE SERPL-MCNC: 192 MG/DL (ref 65–140)
MRSA NOSE QL CULT: NORMAL
POTASSIUM SERPL-SCNC: 3.9 MMOL/L (ref 3.5–5.3)
SODIUM SERPL-SCNC: 135 MMOL/L (ref 135–147)

## 2023-06-17 PROCEDURE — 82948 REAGENT STRIP/BLOOD GLUCOSE: CPT

## 2023-06-17 PROCEDURE — 99232 SBSQ HOSP IP/OBS MODERATE 35: CPT | Performed by: INTERNAL MEDICINE

## 2023-06-17 PROCEDURE — 80048 BASIC METABOLIC PNL TOTAL CA: CPT

## 2023-06-17 RX ORDER — FUROSEMIDE 10 MG/ML
40 INJECTION INTRAMUSCULAR; INTRAVENOUS
Status: DISCONTINUED | OUTPATIENT
Start: 2023-06-17 | End: 2023-06-18

## 2023-06-17 RX ADMIN — LOSARTAN POTASSIUM 50 MG: 50 TABLET, FILM COATED ORAL at 09:01

## 2023-06-17 RX ADMIN — DICLOFENAC SODIUM 2 G: 10 GEL TOPICAL at 12:55

## 2023-06-17 RX ADMIN — INSULIN GLARGINE 14 UNITS: 100 INJECTION, SOLUTION SUBCUTANEOUS at 21:45

## 2023-06-17 RX ADMIN — ACETAMINOPHEN 975 MG: 325 TABLET, FILM COATED ORAL at 21:45

## 2023-06-17 RX ADMIN — ACETAMINOPHEN 975 MG: 325 TABLET, FILM COATED ORAL at 15:04

## 2023-06-17 RX ADMIN — ACETAMINOPHEN 975 MG: 325 TABLET, FILM COATED ORAL at 06:40

## 2023-06-17 RX ADMIN — INSULIN LISPRO 1 UNITS: 100 INJECTION, SOLUTION INTRAVENOUS; SUBCUTANEOUS at 12:54

## 2023-06-17 RX ADMIN — APIXABAN 5 MG: 5 TABLET, FILM COATED ORAL at 09:01

## 2023-06-17 RX ADMIN — INSULIN LISPRO 1 UNITS: 100 INJECTION, SOLUTION INTRAVENOUS; SUBCUTANEOUS at 09:02

## 2023-06-17 RX ADMIN — APIXABAN 5 MG: 5 TABLET, FILM COATED ORAL at 20:33

## 2023-06-17 RX ADMIN — DICLOFENAC SODIUM 2 G: 10 GEL TOPICAL at 17:22

## 2023-06-17 RX ADMIN — INSULIN LISPRO 5 UNITS: 100 INJECTION, SOLUTION INTRAVENOUS; SUBCUTANEOUS at 09:02

## 2023-06-17 RX ADMIN — DICLOFENAC SODIUM 2 G: 10 GEL TOPICAL at 21:45

## 2023-06-17 RX ADMIN — INSULIN LISPRO 1 UNITS: 100 INJECTION, SOLUTION INTRAVENOUS; SUBCUTANEOUS at 16:11

## 2023-06-17 RX ADMIN — PRAVASTATIN SODIUM 40 MG: 40 TABLET ORAL at 16:10

## 2023-06-17 RX ADMIN — INSULIN LISPRO 2 UNITS: 100 INJECTION, SOLUTION INTRAVENOUS; SUBCUTANEOUS at 21:45

## 2023-06-17 RX ADMIN — FUROSEMIDE 40 MG: 10 INJECTION, SOLUTION INTRAMUSCULAR; INTRAVENOUS at 15:05

## 2023-06-17 RX ADMIN — DICLOFENAC SODIUM 2 G: 10 GEL TOPICAL at 09:01

## 2023-06-17 RX ADMIN — METOPROLOL SUCCINATE 75 MG: 50 TABLET, EXTENDED RELEASE ORAL at 09:00

## 2023-06-17 RX ADMIN — INSULIN LISPRO 5 UNITS: 100 INJECTION, SOLUTION INTRAVENOUS; SUBCUTANEOUS at 12:54

## 2023-06-17 RX ADMIN — METOPROLOL SUCCINATE 75 MG: 50 TABLET, EXTENDED RELEASE ORAL at 20:33

## 2023-06-17 RX ADMIN — INSULIN LISPRO 5 UNITS: 100 INJECTION, SOLUTION INTRAVENOUS; SUBCUTANEOUS at 16:11

## 2023-06-17 RX ADMIN — TORSEMIDE 40 MG: 20 TABLET ORAL at 09:01

## 2023-06-17 RX ADMIN — EZETIMIBE 10 MG: 10 TABLET ORAL at 21:45

## 2023-06-17 NOTE — ASSESSMENT & PLAN NOTE
US Venous Doppler LLE 6/16/23: negative for DVT  [place] is a well defined hypoechoic non-vascularized cystic-type structure noted  in the popliteal fossa  MRI from April 2023 noted large complex Baker's cyst    Given pt and spouse report acute worsening of her chronic L knee pain, wonder if Baker's cyst might not account for acute change  Discussed case w Ortho, who suspect Snell's cyst constitutes incidental finding     Weightbearing as tolerated  Ice and elevation for pain control as needed  Knee brace for comfort and support  Thigh-high compression stockings

## 2023-06-17 NOTE — ASSESSMENT & PLAN NOTE
Lab Results   Component Value Date    HGBA1C 9 0 (H) 04/14/2023       Recent Labs     06/16/23  0738 06/16/23  1117 06/16/23  1559 06/16/23 2228   POCGLU 153* 204* 177* 168*       Blood Sugar Average: Last 72 hrs:  (P) 174 [id number]     · Continue insulin      · Hold metformin

## 2023-06-17 NOTE — ASSESSMENT & PLAN NOTE
· Persistent atrial fibrillation, rate controlled,  · continue Toprol, Eliquis dose now at 5 mg twice daily    · At the time of last discharge Eliquis dose was decreased to 2 5 twice daily due to blood in the synovial fluid, patient saw her cardiologist 2 days prior to presentation, who put her back on 5 mg twice daily

## 2023-06-17 NOTE — ASSESSMENT & PLAN NOTE
· Patient alert and oriented, discussed with patient's   · He is looking in the line of more long-term care for the patient  · Patient is usually with it and she does have intermittent spells where she gets confused

## 2023-06-17 NOTE — ASSESSMENT & PLAN NOTE
Lab Results   Component Value Date    EGFR 47 06/17/2023    EGFR 54 06/16/2023    EGFR 46 06/15/2023    CREATININE 1 05 06/17/2023    CREATININE 0 94 06/16/2023    CREATININE 1 07 06/15/2023     · Monitor renal function

## 2023-06-17 NOTE — PROGRESS NOTES
[facility]  Progress Note  Name: Cara Madrid  MRN: [id number]  Unit/Bed#: S -01 I Date of Admission: 6/15/2023   Date of Service: 6/17/2023 I Hospital Day: 2    Assessment/Plan   * Acute on chronic diastolic congestive heart failure [facility])  Assessment & Plan  Wt Readings from Last 3 Encounters:   06/17/23 79 5 kg (175 lb 4 3 oz)   06/09/23 89 4 kg (197 lb 1 5 oz)   05/23/23 81 4 kg (179 lb 6 oz)     venous Doppler of the lower extremities negative for DVT  Patient was seen by cardiology at [facility] 2 days ago, noted to have decompensated CHF in the setting of A-fib with increased heart rates and dietary noncompliance with sodium and fluid she was started on Lasix orally 40 mg daily however now presents with worsening leg swelling and difficulty in ambulation  Denies any chest pain, shortness of breath  Plan:   -Resume Lasix 40 mg IV twice daily when IV access can be re-established; currently on oral equivalent   -Monitor renal function, electrolytes  -Compression stockings      Baker's cyst of knee, left  Assessment & Plan  US Venous Doppler LLE 6/16/23: negative for DVT  [place]ckings[place] is a well defined hypoechoic non-vascularized cystic-type structure noted  in the popliteal fossa  MRI from April 2023 noted large complex Baker's cyst    Given pt and spouse report acute worsening of her chronic L knee pain, wonder if Baker's cyst might not account for acute change  Discussed case w Ortho, who suspect Snell's cyst constitutes incidental finding     Weightbearing as tolerated  Ice and elevation for pain control as needed  Knee brace for comfort and support  Thigh-high compression stockings    Type 2 diabetes mellitus with hyperglycemia [facility])  Assessment & Plan  Lab Results   Component Value Date    HGBA1C 9 0 (H) 04/14/2023       Recent Labs     06/16/23  0738 06/16/23  1117 06/16/23  1559 06/16/23  2228   POCGLU 153* 204* 177* 168*       Blood Sugar Average: Last 72 hrs:  (P) 174 [id number]     · Continue insulin  · Hold metformin    Persistent atrial fibrillation (HCC)  Assessment & Plan  · Persistent atrial fibrillation, rate controlled,  · continue Toprol, Eliquis dose now at 5 mg twice daily  · At the time of last discharge Eliquis dose was decreased to 2 5 twice daily due to blood in the synovial fluid, patient saw her cardiologist 2 days prior to presentation, who put her back on 5 mg twice daily    Primary osteoarthritis of left knee  Assessment & Plan  • Patient status post trauma/injury with pain and swelling left knee during last admission  • Status post arthrocentesis in the ER- last admission ON 6/9  ? Synovial fluid WBC w/ diff: 12,000  ? Synovial fluid, RBC count: 154,000  ? Synovial fluid, crystals: none seen  ? Synovial fluid GS and Cx: 2+ polys and no bacteria seen  • Fluid studies not showing signs of septic arthritis  • Continue pain medications    · Discussed case w Ortho  · Follows w Ortho o/p; pt did not want joint injection at that time and was not candidate for total knee d/t HgbA1c, which must be below 7 for surgery  Pt should consider f/u w Ortho o/p  · Weightbearing as tolerated  · Ice and elevation for pain control as needed  · Knee brace for comfort and support  · Thigh-high compression stockings  · Tylenol scheduled  · Voltaren gel    Dementia without behavioral disturbance ([address] )  Assessment & Plan  · Patient alert and oriented, discussed with patient's   · He is looking in the line of more long-term care for the patient  · Patient is usually with it and she does have intermittent spells where she gets confused  Stage 3a chronic kidney disease [facility])  Assessment & Plan  Lab Results   Component Value Date    EGFR 47 06/17/2023    EGFR 54 06/16/2023    EGFR 46 06/15/2023    CREATININE 1 05 06/17/2023    CREATININE 0 94 06/16/2023    CREATININE 1 07 06/15/2023     · Monitor renal function      Essential hypertension  Assessment & Plan  · Continue home medication valsartan  · Hold home hydrochlorothiazide  · Continue metoprolol  VTE Pharmacologic Prophylaxis: VTE Score: 4 Moderate Risk (Score 3-4) - Pharmacological DVT Prophylaxis Ordered: apixaban (Eliquis)  Patient Centered Rounds: I performed bedside rounds with nursing staff today  Discussions with Specialists or Other Care Team Provider: N/A     Education and Discussions with Family / Patient: Attempted to update  () via phone  Left voicemail  Current Length of Stay: 2 day(s)  Current Patient Status: Inpatient   Discharge Plan: Anticipate discharge in 48-72 hrs to rehab facility  Code Status: Level 3 - DNAR and DNI    Subjective:   No acute events overnight  Discussed re-establishment of IV access for further diuresis  Patient without specific concerns this morning  Mentation at apparent baseline  Objective:     Vitals:   Temp (24hrs), Av 9 °F (37 2 °C), Min:98 1 °F (36 7 °C), Max:99 7 °F (37 6 °C)    Temp:  [98 1 °F (36 7 °C)-99 7 °F (37 6 °C)] 98 1 °F (36 7 °C)  HR:  [74-89] 80  Resp:  [16-20] 17  BP: (129-170)/(63-95) 146/88  SpO2:  [92 %-95 %] 92 %  Body mass index is 33 12 kg/m²  Input and Output Summary (last 24 hours): Intake/Output Summary (Last 24 hours) at 2023 0649  Last data filed at 2023 0445  Gross per 24 hour   Intake 120 ml   Output 1650 ml   Net -1530 ml       Physical Exam:   Physical Exam  Constitutional:       General: She is not in acute distress  Appearance: Normal appearance  She is obese  She is not ill-appearing, toxic-appearing or diaphoretic  HENT:      Head: Normocephalic and atraumatic  Eyes:      General: No scleral icterus  Right eye: No discharge  Left eye: No discharge  Conjunctiva/sclera: Conjunctivae normal    Cardiovascular:      Rate and Rhythm: Normal rate  Rhythm irregular  Pulses: Normal pulses        Heart sounds: Normal heart sounds  No murmur heard  No friction rub  No gallop  Pulmonary:      Effort: Pulmonary effort is normal  No respiratory distress  Breath sounds: No stridor  Rales (Scant inspiratory rales left lung base) present  No wheezing or rhonchi  Chest:      Chest wall: No tenderness  Abdominal:      General: Bowel sounds are normal  There is no distension  Palpations: Abdomen is soft  Tenderness: There is no abdominal tenderness  There is no guarding or rebound  Musculoskeletal:         General: No tenderness  Right lower leg: Edema (2+ pitting to level of knee) present  Left lower leg: Edema (2+ pitting the level of the knee) present  Comments: B/L knee swelling R>L    Skin:     General: Skin is warm and dry  Neurological:      Mental Status: She is alert  Psychiatric:         Mood and Affect: Mood normal          Behavior: Behavior normal           Additional Data:     Labs:  Results from last 7 days   Lab Units 06/16/23  0514   WBC Thousand/uL 6 41   HEMOGLOBIN g/dL 11 8   HEMATOCRIT % 40 1   PLATELETS Thousands/uL 179   NEUTROS PCT % 75   LYMPHS PCT % 11*   MONOS PCT % 13*   EOS PCT % 0     Results from last 7 days   Lab Units 06/17/23  0501 06/16/23  0514 06/15/23  0759   SODIUM mmol/L 135   < > 134*   POTASSIUM mmol/L 3 9   < > 4 2   CHLORIDE mmol/L 97   < > 93*   CO2 mmol/L 30   < > 31   BUN mg/dL 31*   < > 25   CREATININE mg/dL 1 05   < > 1 07   ANION GAP mmol/L 8   < > 10   CALCIUM mg/dL 8 6   < > 9 3   ALBUMIN g/dL  --   --  3 7   TOTAL BILIRUBIN mg/dL  --   --  0 83   ALK PHOS U/L  --   --  46   ALT U/L  --   --  7   AST U/L  --   --  22   GLUCOSE RANDOM mg/dL 147*   < > 116    < > = values in this interval not displayed           Results from last 7 days   Lab Units 06/16/23  2228 06/16/23  1559 06/16/23  1117 06/16/23  0738 06/15/23  2127 06/15/23  1614 06/15/23  1218 06/10/23  1110 06/10/23  0822   POC GLUCOSE mg/dl 168* 177* 204* 153* 176* 233* 110 178* 152*               Lines/Drains:  Invasive Devices     None                       Imaging: Reviewed radiology reports from this admission including: chest xray and ultrasound(s)    Recent Cultures (last 7 days):         Last 24 Hours Medication List:   Current Facility-Administered Medications   Medication Dose Route Frequency Provider Last Rate   • acetaminophen  975 mg Oral Q8H [facility] Serge Hicks MD     • apixaban  5 mg Oral BID Kelley Donahue MD     • Diclofenac Sodium  2 g Topical 4x Daily Serge Hicks MD     • ezetimibe  10 mg Oral HS Kelley Donahue MD      And   • pravastatin  40 mg Oral Daily With Clover Carrasco MD     • insulin glargine  14 Units Subcutaneous HS Kelley Donahue MD     • insulin lispro  1-6 Units Subcutaneous TID Natalie Vasquez MD     • insulin lispro  2-12 Units Subcutaneous HS Kelley Donahue MD     • insulin lispro  5 Units Subcutaneous TID With Meals Kellye Donahue MD     • losartan  50 mg Oral Daily Kelley Donahue MD     • metoprolol succinate  75 mg Oral BID Kelley Donahue MD     • torsemide  40 mg Oral BID (diuretic) Serge Hicks MD          Today, Patient Was Seen By: Slim Markham MD    **Please Note: This note may have been constructed using a voice recognition system  **

## 2023-06-17 NOTE — ASSESSMENT & PLAN NOTE
• Patient status post trauma/injury with pain and swelling left knee during last admission  • Status post arthrocentesis in the ER- last admission ON 6/9  ? Synovial fluid WBC w/ diff: 12,000  ? Synovial fluid, RBC count: 154,000  ? Synovial fluid, crystals: none seen  ? Synovial fluid GS and Cx: 2+ polys and no bacteria seen  • Fluid studies not showing signs of septic arthritis  • Continue pain medications    · Discussed case w Ortho  · Follows w Ortho o/p; pt did not want joint injection at that time and was not candidate for total knee d/t HgbA1c, which must be below 7 for surgery  Pt should consider f/u w Ortho o/p    · Weightbearing as tolerated  · Ice and elevation for pain control as needed  · Knee brace for comfort and support  · Thigh-high compression stockings  · Tylenol scheduled  · Voltaren gel

## 2023-06-17 NOTE — ASSESSMENT & PLAN NOTE
Wt Readings from Last 3 Encounters:   06/17/23 79 5 kg (175 lb 4 3 oz)   06/09/23 89 4 kg (197 lb 1 5 oz)   05/23/23 81 4 kg (179 lb 6 oz)     venous Doppler of the lower extremities negative for DVT  Patient was seen by cardiology at [facility] 2 days ago, noted to have decompensated CHF in the setting of A-fib with increased heart rates and dietary noncompliance with sodium and fluid she was started on Lasix orally 40 mg daily however now presents with worsening leg swelling and difficulty in ambulation  Denies any chest pain, shortness of breath       Plan:   -Resume Lasix 40 mg IV twice daily when IV access can be re-established; currently on oral equivalent   -Monitor renal function, electrolytes  -Compression stockings

## 2023-06-18 VITALS
TEMPERATURE: 97.6 F | BODY MASS INDEX: 32.91 KG/M2 | SYSTOLIC BLOOD PRESSURE: 127 MMHG | OXYGEN SATURATION: 95 % | WEIGHT: 174.16 LBS | RESPIRATION RATE: 16 BRPM | HEART RATE: 71 BPM | DIASTOLIC BLOOD PRESSURE: 79 MMHG

## 2023-06-18 LAB
ANION GAP SERPL CALCULATED.3IONS-SCNC: 9 MMOL/L (ref 4–13)
BUN SERPL-MCNC: 35 MG/DL (ref 5–25)
CALCIUM SERPL-MCNC: 9 MG/DL (ref 8.4–10.2)
CHLORIDE SERPL-SCNC: 94 MMOL/L (ref 96–108)
CO2 SERPL-SCNC: 33 MMOL/L (ref 21–32)
CREAT SERPL-MCNC: 1.3 MG/DL (ref 0.6–1.3)
ERYTHROCYTE [DISTWIDTH] IN BLOOD BY AUTOMATED COUNT: 15.3 % (ref 11.6–15.1)
GFR SERPL CREATININE-BSD FRML MDRD: 36 ML/MIN/1.73SQ M
GLUCOSE SERPL-MCNC: 147 MG/DL (ref 65–140)
GLUCOSE SERPL-MCNC: 154 MG/DL (ref 65–140)
GLUCOSE SERPL-MCNC: 237 MG/DL (ref 65–140)
HCT VFR BLD AUTO: 38.8 % (ref 34.8–46.1)
HGB BLD-MCNC: 12.1 G/DL (ref 11.5–15.4)
MCH RBC QN AUTO: 27.6 PG (ref 26.8–34.3)
MCHC RBC AUTO-ENTMCNC: 31.2 G/DL (ref 31.4–37.4)
MCV RBC AUTO: 88 FL (ref 82–98)
PLATELET # BLD AUTO: 232 THOUSANDS/UL (ref 149–390)
PMV BLD AUTO: 10.7 FL (ref 8.9–12.7)
POTASSIUM SERPL-SCNC: 3.8 MMOL/L (ref 3.5–5.3)
RBC # BLD AUTO: 4.38 MILLION/UL (ref 3.81–5.12)
SARS-COV-2 RNA RESP QL NAA+PROBE: NEGATIVE
SODIUM SERPL-SCNC: 136 MMOL/L (ref 135–147)
WBC # BLD AUTO: 6.08 THOUSAND/UL (ref 4.31–10.16)

## 2023-06-18 PROCEDURE — 85027 COMPLETE CBC AUTOMATED: CPT

## 2023-06-18 PROCEDURE — 99239 HOSP IP/OBS DSCHRG MGMT >30: CPT | Performed by: INTERNAL MEDICINE

## 2023-06-18 PROCEDURE — 80048 BASIC METABOLIC PNL TOTAL CA: CPT

## 2023-06-18 PROCEDURE — 82948 REAGENT STRIP/BLOOD GLUCOSE: CPT

## 2023-06-18 PROCEDURE — 87635 SARS-COV-2 COVID-19 AMP PRB: CPT

## 2023-06-18 RX ORDER — FUROSEMIDE 80 MG
80 TABLET ORAL DAILY
Qty: 30 TABLET | Refills: 0 | Status: SHIPPED | OUTPATIENT
Start: 2023-06-20 | End: 2023-07-20

## 2023-06-18 RX ORDER — FUROSEMIDE 80 MG
80 TABLET ORAL DAILY
Status: DISCONTINUED | OUTPATIENT
Start: 2023-06-20 | End: 2023-06-18 | Stop reason: HOSPADM

## 2023-06-18 RX ORDER — METOPROLOL SUCCINATE 50 MG/1
75 TABLET, EXTENDED RELEASE ORAL 2 TIMES DAILY
Qty: 90 TABLET | Refills: 0
Start: 2023-06-18 | End: 2023-07-18

## 2023-06-18 RX ORDER — FUROSEMIDE 80 MG
80 TABLET ORAL DAILY
Status: DISCONTINUED | OUTPATIENT
Start: 2023-06-18 | End: 2023-06-18

## 2023-06-18 RX ORDER — ACETAMINOPHEN 325 MG/1
650 TABLET ORAL EVERY 6 HOURS PRN
Status: DISCONTINUED | OUTPATIENT
Start: 2023-06-18 | End: 2023-06-18 | Stop reason: HOSPADM

## 2023-06-18 RX ADMIN — FUROSEMIDE 40 MG: 10 INJECTION, SOLUTION INTRAMUSCULAR; INTRAVENOUS at 08:50

## 2023-06-18 RX ADMIN — LOSARTAN POTASSIUM 50 MG: 50 TABLET, FILM COATED ORAL at 08:50

## 2023-06-18 RX ADMIN — INSULIN LISPRO 5 UNITS: 100 INJECTION, SOLUTION INTRAVENOUS; SUBCUTANEOUS at 08:51

## 2023-06-18 RX ADMIN — DICLOFENAC SODIUM 2 G: 10 GEL TOPICAL at 12:23

## 2023-06-18 RX ADMIN — INSULIN LISPRO 5 UNITS: 100 INJECTION, SOLUTION INTRAVENOUS; SUBCUTANEOUS at 12:23

## 2023-06-18 RX ADMIN — INSULIN LISPRO 3 UNITS: 100 INJECTION, SOLUTION INTRAVENOUS; SUBCUTANEOUS at 12:23

## 2023-06-18 RX ADMIN — INSULIN LISPRO 1 UNITS: 100 INJECTION, SOLUTION INTRAVENOUS; SUBCUTANEOUS at 08:51

## 2023-06-18 RX ADMIN — APIXABAN 5 MG: 5 TABLET, FILM COATED ORAL at 08:50

## 2023-06-18 RX ADMIN — METOPROLOL SUCCINATE 75 MG: 50 TABLET, EXTENDED RELEASE ORAL at 08:50

## 2023-06-18 RX ADMIN — DICLOFENAC SODIUM 2 G: 10 GEL TOPICAL at 08:53

## 2023-06-18 RX ADMIN — ACETAMINOPHEN 975 MG: 325 TABLET, FILM COATED ORAL at 05:26

## 2023-06-18 NOTE — ASSESSMENT & PLAN NOTE
US Venous Doppler LLE 6/16/23: negative for DVT  There is a well defined hypoechoic non-vascularized cystic-type structure noted in the popliteal fossa  MRI from April 2023 noted large complex Baker's cyst    Given pt and spouse report acute worsening of her chronic L knee pain, wonder if Baker's cyst might not account for acute change      Follow up with Ortho outpatient  Weightbearing as tolerated  Ice and elevation for pain control as needed  Knee brace for comfort and support  Thigh-high compression stockings  Voltaren gel to the knee

## 2023-06-18 NOTE — ASSESSMENT & PLAN NOTE
Lab Results   Component Value Date    EGFR 36 06/18/2023    EGFR 47 06/17/2023    EGFR 54 06/16/2023    CREATININE 1 30 06/18/2023    CREATININE 1 05 06/17/2023    CREATININE 0 94 06/16/2023     · Decrease metformin dosing due to kidney impairment

## 2023-06-18 NOTE — CASE MANAGEMENT
Case Management Discharge Planning Note    Patient name Georgette Henley  Location S /S -34 MRN [id number]  : 1935 Date 2023       Current Admission Date: 6/15/2023  Current Admission Diagnosis:Acute on chronic diastolic congestive heart failure [facility])   Patient Active Problem List    Diagnosis Date Noted   • Baker's cyst of knee, left 2023   • Acute on chronic diastolic congestive heart failure ([address] ) 06/15/2023   • Abnormal abdominal CT scan 2023   • Knee pain 2023   • Type 2 diabetes mellitus with hyperglycemia ([address] ) 2023   • Generalized weakness 04/15/2023   • Fall 2023   • Depression, recurrent ([address] ) 2023   • Compression fracture of L3 vertebra ([facility] ) 2023   • Persistent atrial fibrillation ([facility] )    • Metabolic encephalopathy    • Hypertensive urgency 2023   • Hyponatremia 2023   • Primary osteoarthritis of left knee 2022   • Osteoarthritis of multiple joints 2022   • Bilateral hearing loss 2022   • 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[facility] ) 03/15/2022   • Dementia without behavioral disturbance ([address] )    • Stage 3a chronic kidney disease ([facility] ) 2021   • Essential hypertension 2020   • Pure hypercholesterolemia 2020   • Microalbuminuria 2020      LOS (days): 3  Geometric Mean LOS (GMLOS) (days): 3 90  Days to GMLOS:0 8     OBJECTIVE:  Risk of Unplanned Readmission Score: 24 9         Current admission status: Inpatient   Preferred Pharmacy:   [place] [address]  Phone: [phone] Fax: [phone]    Primary Care Provider: Jorge Piña MD    Primary Insurance: MEDICARE  Secondary Insurance: [facility] HE[employer] OPTIONS PROGRAM    DISCHARGE DETAILS:    Discharge planning discussed with[de-identified] Patient and spouse  Freedom of Choice: Yes  Comments - Freedom of Choice: Choice is for STR @ [facility]  CM contacted family/caregiver?: Yes  Were Treatment Team discharge recommendations reviewed with patient/caregiver?: Yes  Did patient/caregiver verbalize understanding of patient care needs?: N/A- going to facility  Were patient/caregiver advised of the risks associated with not following Treatment Team discharge recommendations?: Yes    Contacts  Patient Contacts: Susanarachelle Duval  Relationship to Patient[de-identified] Family  Contact Method: Phone  Phone Number: [phone]  Reason/Outcome: Emergency Contact, Discharge [address]         Is the patient interested in Sandy Pena at discharge?: No    DME Referral Provided  Referral made for DME?: No    Other Referral/Resources/Interventions Provided:  Interventions: Short Term Rehab  Referral Comments: CM s/w Wendelyn Sensor @ [facility] to notify of negative COVID swab and confirmed transport      Would you like to participate in our [facility] service program?  : No - Declined    Treatment Team Recommendation: Short Term Rehab  Discharge Destination Plan[de-identified] Short Term Rehab ([facility])  Transport at Discharge : [facility]  Dispatcher Contacted: Yes  Number/Name of Dispatcher: ROUNDTRIP  Transported by [employer] and Unit #): [place]  ETA of Transport (Date): 06/18/23  ETA of Transport (Time): [address], [address] : [address], [place]  Receiving Facility/Agency Phone Number: [phone]  Facility/Agency Fax Number: [phone]  [facility] [address] [place] [address] Number: SULEMA Vaughan, WESLY, SARWAT, SARAY  06/18/23 1:35 PM

## 2023-06-18 NOTE — DISCHARGE INSTR - AVS FIRST PAGE
Dear Malaika Donahue,     It was our pleasure to care for you here at [facility]  It is our hope that we were always able to exceed the expected standards for your care during your stay  You were hospitalized due to peripheral edema caused by congestive heart failure, as well as chronic pain due to severe osteoarthritis of the left knee  You were cared for on the [facility] 3rd floor by Queenie Martin MD under the service of Louann Tello MD with the [facility] who covers for your primary care physician (PCP), Jeromy Kruger MD, while you were hospitalized  If you have any questions or concerns related to this hospitalization, you may contact us at [phone]  For follow up as well as any medication refills, we recommend that you follow up with your primary care physician  A registered nurse will reach out to you by phone within a few days after your discharge to answer any additional questions that you may have after going home  However, at this time we provide for you here, the most important instructions / recommendations at discharge:     Notable Medication Adjustments -   Please start applying Voltaren gel to the left knee up to 4 times daily as needed for pain  Please take the diuretic furosemide 80 mg (1 tablet) daily starting June 20th 2023  Please decrease your dose of metformin to 500 mg twice daily due to your decreased kidney function, and discuss with your primary care provider  Testing Required after Discharge -   None  Important follow up information -   Please schedule a follow-up with your primary care provider within one week of discharge from the hospital   Please call your orthopedic surgeon's office regarding whether and when future follow-up visits for severe osteoarthritis and Baker's cyst of the left knee would be appropriate    Please keep your scheduled follow-up visit with Cardiology on 7/5/23  Other Instructions - None  Please review this entire after visit summary as additional general instructions including medication list, appointments, activity, diet, any pertinent wound care, and other additional recommendations from your care team that may be provided for you        Sincerely,     Cintia Colin MD

## 2023-06-18 NOTE — ASSESSMENT & PLAN NOTE
· Persistent atrial fibrillation, rate controlled,  · continue Toprol, Eliquis dose now at 5 mg twice daily

## 2023-06-18 NOTE — ASSESSMENT & PLAN NOTE
Wt Readings from Last 3 Encounters:   06/18/23 79 kg (174 lb 2 6 oz)   06/09/23 89 4 kg (197 lb 1 5 oz)   05/23/23 81 4 kg (179 lb 6 oz)     venous Doppler of the lower extremities negative for DVT  Patient was seen by cardiology at [facility] 2 days ago, noted to have decompensated CHF in the setting of A-fib with increased heart rates and dietary noncompliance with sodium and fluid she was started on Lasix orally 40 mg daily however now presents with worsening leg swelling and difficulty in ambulation  Denies any chest pain, shortness of breath       Plan:   - D/C on Lasix 80mg daily  - Monitor renal function, electrolytes  - Compression stockings  - Toprol 75mg BID

## 2023-06-18 NOTE — ASSESSMENT & PLAN NOTE
Lab Results   Component Value Date    HGBA1C 9 0 (H) 04/14/2023       Recent Labs     06/17/23  1529 06/17/23  2106 06/18/23  0723 06/18/23  1141   POCGLU 164* 192* 154* 237*       Blood Sugar Average: Last 72 hrs:  (P) 196 [id number]     · On discharge, decrease metformin dosing to 500mg BID (was 850mg BID)

## 2023-06-18 NOTE — DISCHARGE SUMMARY
[facility]  Discharge- Peg Jen 1935, 80 y o  female MRN: [id number]  Unit/Bed#: S -01 Encounter: [id number]  Primary Care Provider: Fanny Cruz MD   Date and time admitted to hospital: 6/15/2023  7:22 AM    * Acute on chronic diastolic congestive heart failure [facility])  Assessment & Plan  Wt Readings from Last 3 Encounters:   06/18/23 79 kg (174 lb 2 6 oz)   06/09/23 89 4 kg (197 lb 1 5 oz)   05/23/23 81 4 kg (179 lb 6 oz)     venous Doppler of the lower extremities negative for DVT  Patient was seen by cardiology at [facility] 2 days ago, noted to have decompensated CHF in the setting of A-fib with increased heart rates and dietary noncompliance with sodium and fluid she was started on Lasix orally 40 mg daily however now presents with worsening leg swelling and difficulty in ambulation  Denies any chest pain, shortness of breath  Plan:   - D/C on Lasix 80mg daily  - Monitor renal function, electrolytes  - Compression stockings  - Toprol 75mg BID    Baker's cyst of knee, left  Assessment & Plan  US Venous Doppler LLE 6/16/23: negative for DVT  There is a well defined hypoechoic non-vascularized cystic-type structure noted in the popliteal fossa  MRI from April 2023 noted large complex Baker's cyst    Given pt and spouse report acute worsening of her chronic L knee pain, wonder if Baker's cyst might not account for acute change      Follow up with Ortho outpatient  Weightbearing as tolerated  Ice and elevation for pain control as needed  Knee brace for comfort and support  Thigh-high compression stockings  Voltaren gel to the knee    Type 2 diabetes mellitus with hyperglycemia [facility])  Assessment & Plan  Lab Results   Component Value Date    HGBA1C 9 0 (H) 04/14/2023       Recent Labs     06/17/23  1529 06/17/23  2106 06/18/23  0723 06/18/23  1141   POCGLU 164* 192* 154* 237*       Blood Sugar Average: Last 72 hrs:  (P) 403 [id number]     · On discharge, decrease metformin dosing to 500mg BID (was 850mg BID)    Persistent atrial fibrillation ([facility])  Assessment & Plan  · Persistent atrial fibrillation, rate controlled,  · continue Toprol, Eliquis dose now at 5 mg twice daily  Primary osteoarthritis of left knee  Assessment & Plan  • Patient status post trauma/injury with pain and swelling left knee during last admission  • Status post arthrocentesis in the ER- last admission ON 6/9  ? Synovial fluid WBC w/ diff: 12,000  ? Synovial fluid, RBC count: 154,000  ? Synovial fluid, crystals: none seen  ? Synovial fluid GS and Cx: 2+ polys and no bacteria seen  • Fluid studies not showing signs of septic arthritis  • Continue pain medications    · Discussed case w Ortho  · Follows w Ortho o/p; pt did not want joint injection at that time and was not candidate for total knee d/t HgbA1c, which must be below 7 for surgery  Pt should consider f/u w Ortho o/p  · Weightbearing as tolerated  · Ice and elevation for pain control as needed  · Knee brace for comfort and support  · Thigh-high compression stockings  · Tylenol scheduled  · Voltaren gel    Dementia without behavioral disturbance ([facility] )  Assessment & Plan  · Patient alert and oriented, discussed with patient's   · He is looking in the line of more long-term care for the patient  · Patient is usually with it and she does have intermittent spells where she gets confused  Stage 3a chronic kidney disease [facility])  Assessment & Plan  Lab Results   Component Value Date    EGFR 36 06/18/2023    EGFR 47 06/17/2023    EGFR 54 06/16/2023    CREATININE 1 30 06/18/2023    CREATININE 1 05 06/17/2023    CREATININE 0 94 06/16/2023     · Decrease metformin dosing due to kidney impairment    Essential hypertension  Assessment & Plan  · Continue home medication valsartan-HCTZ on discharge  · Continue metoprolol      Medical Problems     Resolved Problems  Date Reviewed: 6/18/2023   None       Discharging Resident: Daniela Galvan MD  Discharging Attending: Dr Srinivas Knutson  PCP: Jorge Piña MD  Admission Date:   Admission Orders (From admission, onward)     Ordered        06/15/23 1133  Inpatient Admission  Once            06/15/23 1012  Place in Observation  Once                      Discharge Date: 06/18/23    Consultations During Hospital Stay:  · None    Procedures Performed:   · None    Significant Findings / Test Results:   VAS lower limb venous duplex study, complete bilateral   Final Result by Stephanie Trejo MD (06/15 9549)      XR chest 1 view portable   Final Result by Zoie Gonzalez MD (06/15 1641)      No acute cardiopulmonary disease  Workstation performed: [id number]           Incidental Findings:   · Baker's Cyst    Test Results Pending at Discharge (will require follow up):  · none     Outpatient Tests Requested:  · none    Complications:  none    Reason for Admission: difficulty ambulating secondary to lower extremity edema    Hospital Course:   Georgette Henley is a 80 y o  female patient who originally presented to the hospital on 6/15/2023 due to goals ambulating secondary to bilateral lower extremity swelling  He was treated for acute on chronic heart failure exacerbation as well as left knee pain due to osteoarthritis and a Baker's cyst     She was discharged to [facility] on increased dose of Lasix 80 mg daily as well as a decreased dose of metformin  Please see above list of diagnoses and related plan for additional information  Condition at Discharge: stable    Discharge Day Visit / Exam:   Subjective: This morning, Ms Verner Rainier is seen sitting up in bed, awake, alert, engaged with exam, no acute distress  She reports feeling well this morning, has no acute complaints, continues to endorse pain in the left knee stable to baseline  Reports improvement of swelling in lower extremities  Denies any new or worsening symptoms in brief review of systems is otherwise negative  Reports good p o  intake, normal bowel and bladder habits  Feels well for discharge to short-term rehab     Vitals: Blood Pressure: 127/79 (06/18/23 0700)  Pulse: 71 (06/18/23 0700)  Temperature: 97 6 °F (36 4 °C) (06/18/23 0700)  Temp Source: Oral (06/18/23 0700)  Respirations: 16 (06/18/23 0700)  Weight - Scale: 79 kg (174 lb 2 6 oz) (06/18/23 0600)  SpO2: 95 % (06/18/23 0700)  Exam:   Physical Exam  Constitutional:       General: She is not in acute distress  Appearance: Normal appearance  She is obese  She is not ill-appearing, toxic-appearing or diaphoretic  HENT:      Head: Normocephalic and atraumatic  Eyes:      General: No scleral icterus  Right eye: No discharge  Left eye: No discharge  Conjunctiva/sclera: Conjunctivae normal    Cardiovascular:      Rate and Rhythm: Normal rate  Rhythm irregular  Pulses: Normal pulses  Heart sounds: Normal heart sounds  No murmur heard  No friction rub  No gallop  Pulmonary:      Effort: Pulmonary effort is normal  No respiratory distress  Breath sounds: No stridor  Rales (Scant inspiratory rales left lung base) present  No wheezing or rhonchi  Chest:      Chest wall: No tenderness  Abdominal:      General: Bowel sounds are normal  There is no distension  Palpations: Abdomen is soft  Tenderness: There is no abdominal tenderness  There is no guarding or rebound  Musculoskeletal:         General: Tenderness (Left knee minimally tender to palpation and manipulation) present  Right lower leg: Edema (Trace pretibial pitting to level of knee) present  Left lower leg: Edema (Trace pretibial pitting to level of kne) present  Comments: Bilateral knee swelling greater on left than right, joint is not hot to touch it is minimally tender with extensive palpation and manipulation  Active range of motion left knee is markedly decreased   Skin:     General: Skin is warm and dry     Neurological:      Mental Status: She is alert  Psychiatric:         Mood and Affect: Mood normal          Behavior: Behavior normal         Discussion with Family: Updated  () via phone  Discharge instructions/Information to patient and family:   See after visit summary for information provided to patient and family  Provisions for Follow-Up Care:  See after visit summary for information related to follow-up care and any pertinent home health orders  Disposition:   Assisted Living Facility at [facility]    Planned Readmission: no    Discharge Medications:  See after visit summary for reconciled discharge medications provided to patient and/or family        **Please Note: This note may have been constructed using a voice recognition system**

## 2023-06-19 ENCOUNTER — PATIENT OUTREACH (OUTPATIENT)
Dept: CASE MANAGEMENT | Facility: OTHER | Age: 88
End: 2023-06-19

## 2023-06-19 NOTE — PROGRESS NOTES
Outpatient Care Management [facility]/SNF Pathway  Discharged 6/18/23 from [address] [place] [address]  Email sent to facility to inform them the patient is on the [facility] Pathway and I will be following them during their skilled stay  This Admin Coordinator will continue to monitor via chart review

## 2023-06-26 ENCOUNTER — PATIENT OUTREACH (OUTPATIENT)
Dept: CASE MANAGEMENT | Facility: OTHER | Age: 88
End: 2023-06-26

## 2023-06-26 NOTE — PROGRESS NOTES
Update received ambulating 60’ with RW and CG, bed mob mod I, transfers CG  Toilet transfers CG  D/C plan to return to [place] [address] with   VSS Wt 165 4lbs, /75, T 98 7, HR 77, RR 18,  0, SpO2 99% on RA  This Admin Coordinator will continue to monitor via chart review

## 2023-07-03 ENCOUNTER — PATIENT OUTREACH (OUTPATIENT)
Dept: CASE MANAGEMENT | Facility: OTHER | Age: 88
End: 2023-07-03

## 2023-07-03 NOTE — PROGRESS NOTES
Update received the patient has a LCD of 7/3/23 and will discharge 7/4/23 to [facility] NICHELLE Stoddard with her .  assists with med administration and care at [address] [place] [address].   Toilet transfers stand by, LBD CG, ambulating 100’ with RW CG; /59, T 97.9, RR 18, SpO2 94% on RA, Wt 165.5, HR 72

## 2023-07-05 ENCOUNTER — PATIENT OUTREACH (OUTPATIENT)
Dept: CASE MANAGEMENT | Facility: OTHER | Age: 88
End: 2023-07-05

## 2023-07-05 DIAGNOSIS — N18.31 STAGE 3A CHRONIC KIDNEY DISEASE (HCC): Primary | ICD-10-CM

## 2023-07-05 NOTE — PROGRESS NOTES
Outreach TC to patient for initial care management assessment. Patient reports that she is feeling well. Patient denies s/sx of CHF including weight gain, edema, palpitations, chest pain, chronic cough or increased SOB. . Patient reports that she is independent with ADL's. Patient does need assist with IADLs. Patient resides with spouse  who assists as needed. Patient did not complete her FRANK appointment yet. Patient is scheduled for 8/3/23. Patient & CG encouraged to make appointment. I reviewed medications including dose, schedule, purpose & side effects of furosemide, metoprolol, Voltaren gel, metformin, apixaban, insulin glargine, insulin lispro, acetaminophen, Diovan, potassium chloride and Vytorin with patient & CG. Both verbalized understanding. Reviewed future appointments, s/sx to report and how/when to report symptoms to provider vs. 911. Patient and CG verbalize understanding. Educated on daily weights & to report weight gain greater than 3 lbs overnight or 5 lbs in 1 week. Educated on low sodium diet including label reading. Patient/CG verbalize understanding. . Patient educated on role of Cm & contact information for CM. Agrees to additional calls.

## 2023-07-05 NOTE — PROGRESS NOTES
Update obtained from [employer] The patient discharged 7/4/23 to [address] [place] [address]. A email was sent to the facility requesting discharge instructions. When CM assistant has received the Discharge paperwork CM assistant will attach to this encounter. I have removed myself off of the care team, added the CM to the care team who will follow the patient through the episode, sent the care manager an inbasket notifying them of the [facility]/SNF Pathway. Ambulatory referral placed for complex care management. Discharge paperwork attached to this encounter.

## 2023-07-07 ENCOUNTER — RA CDI HCC (OUTPATIENT)
Dept: OTHER | Facility: HOSPITAL | Age: 88
End: 2023-07-07

## 2023-07-07 NOTE — PROGRESS NOTES
[address] [facility] coding opportunities    E11.22 and I13.0      Chart Reviewed number of suggestions sent to Provider: 2     Patients Insurance     Medicare Insurance: [employer]

## 2023-07-10 RX ORDER — METOPROLOL SUCCINATE 25 MG/1
TABLET, EXTENDED RELEASE ORAL
COMMUNITY
Start: 2023-06-13 | End: 2023-07-13

## 2023-07-13 ENCOUNTER — OFFICE VISIT (OUTPATIENT)
Dept: FAMILY MEDICINE CLINIC | Facility: CLINIC | Age: 88
End: 2023-07-13
Payer: MEDICARE

## 2023-07-13 VITALS
HEART RATE: 65 BPM | HEIGHT: 61 IN | BODY MASS INDEX: 33.11 KG/M2 | RESPIRATION RATE: 18 BRPM | SYSTOLIC BLOOD PRESSURE: 116 MMHG | WEIGHT: 175.38 LBS | TEMPERATURE: 97.9 F | DIASTOLIC BLOOD PRESSURE: 80 MMHG | OXYGEN SATURATION: 98 %

## 2023-07-13 DIAGNOSIS — S32.030A COMPRESSION FRACTURE OF L3 VERTEBRA, INITIAL ENCOUNTER (HCC): ICD-10-CM

## 2023-07-13 DIAGNOSIS — E11.9 TYPE 2 DIABETES MELLITUS WITHOUT COMPLICATION, WITH LONG-TERM CURRENT USE OF INSULIN (HCC): ICD-10-CM
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
## 2023-07-13 DIAGNOSIS — I10 ESSENTIAL HYPERTENSION: ICD-10-CM

## 2023-07-13 DIAGNOSIS — I50.33 ACUTE ON CHRONIC DIASTOLIC CONGESTIVE HEART FAILURE (HCC): ICD-10-CM

## 2023-07-13 DIAGNOSIS — Z79.4 TYPE 2 DIABETES MELLITUS WITHOUT COMPLICATION, WITH LONG-TERM CURRENT USE OF INSULIN (HCC): ICD-10-CM

## 2023-07-13 DIAGNOSIS — E11.65 TYPE 2 DIABETES MELLITUS WITH HYPERGLYCEMIA, UNSPECIFIED WHETHER LONG TERM INSULIN USE (HCC): ICD-10-CM

## 2023-07-13 DIAGNOSIS — N18.31 STAGE 3A CHRONIC KIDNEY DISEASE (HCC): ICD-10-CM

## 2023-07-13 DIAGNOSIS — I48.19 PERSISTENT ATRIAL FIBRILLATION (HCC): ICD-10-CM

## 2023-07-13 DIAGNOSIS — Z09 HOSPITAL DISCHARGE FOLLOW-UP: Primary | ICD-10-CM

## 2023-07-13 DIAGNOSIS — F03.90 DEMENTIA WITHOUT BEHAVIORAL DISTURBANCE (HCC): ICD-10-CM

## 2023-07-13 LAB — SL AMB POCT HEMOGLOBIN AIC: 6.3 (ref ?–6.5)

## 2023-07-13 PROCEDURE — 99214 OFFICE O/P EST MOD 30 MIN: CPT | Performed by: INTERNAL MEDICINE

## 2023-07-13 PROCEDURE — 83036 HEMOGLOBIN GLYCOSYLATED A1C: CPT | Performed by: INTERNAL MEDICINE

## 2023-07-13 RX ORDER — METOPROLOL SUCCINATE 25 MG/1
75 TABLET, EXTENDED RELEASE ORAL 2 TIMES DAILY
COMMUNITY
Start: 2023-06-18 | End: 2023-07-13 | Stop reason: SDUPTHER

## 2023-07-13 RX ORDER — EZETIMIBE AND SIMVASTATIN 10; 20 MG/1; MG/1
1 TABLET ORAL
Qty: 90 TABLET | Refills: 1 | Status: SHIPPED | OUTPATIENT
Start: 2023-07-13

## 2023-07-13 RX ORDER — METOPROLOL SUCCINATE 25 MG/1
75 TABLET, EXTENDED RELEASE ORAL 2 TIMES DAILY
Qty: 540 TABLET | Refills: 1 | Status: SHIPPED | OUTPATIENT
Start: 2023-07-13

## 2023-07-13 RX ORDER — VALSARTAN 160 MG/1
160 TABLET ORAL DAILY
Qty: 30 TABLET | Refills: 5 | Status: SHIPPED | OUTPATIENT
Start: 2023-07-13

## 2023-07-13 RX ORDER — FUROSEMIDE 80 MG
80 TABLET ORAL DAILY
Qty: 90 TABLET | Refills: 1 | Status: SHIPPED | OUTPATIENT
Start: 2023-07-13

## 2023-07-13 RX ORDER — VALSARTAN AND HYDROCHLOROTHIAZIDE 160; 12.5 MG/1; MG/1
1 TABLET, FILM COATED ORAL DAILY
Qty: 90 TABLET | Refills: 1 | Status: SHIPPED | OUTPATIENT
Start: 2023-07-13 | End: 2023-07-13

## 2023-07-13 NOTE — ASSESSMENT & PLAN NOTE
Wt Readings from Last 3 Encounters:   07/13/23 79.5 kg (175 lb 6 oz)   06/18/23 79 kg (174 lb 2.6 oz)   06/09/23 89.4 kg (197 lb 1.5 oz)       On lasix and valsartan and toprol, followed by cardiology-last echo showed a 50-55% EF-discussed checking weight daily and any symptoms of sob

## 2023-07-13 NOTE — ASSESSMENT & PLAN NOTE
Lab Results   Component Value Date    EGFR 36 06/18/2023    EGFR 47 06/17/2023    EGFR 54 06/16/2023    CREATININE 1.30 06/18/2023    CREATININE 1.05 06/17/2023    CREATININE 0.94 06/16/2023   Will monitor with another bmp in 2-3 months -did discuss referring her to Nephrology because of baseline Cr being high and that herself staying in CKD stage 3A was one of the requirements for Orthopedics to replace/repair the knee

## 2023-07-13 NOTE — ASSESSMENT & PLAN NOTE
Lab Results   Component Value Date    HGBA1C 6.3 07/13/2023   A1c today is much better at 6.3%-she is on metformin and insulin and really watching her diet-it's possible with the A1c being where it's at Orthopedics would be more amenable to doing a TKR-has a lot of internal derangements of ACL and PCR and medial meniscus

## 2023-07-13 NOTE — PROGRESS NOTES
Name: Ward Bazan      : 1935      MRN: [id number]  Encounter Provider: Scott Barrios MD  Encounter Date: 2023   Encounter department: [address]    Assessment & Plan     1. Hospital discharge follow-up    2. Type 2 diabetes mellitus without complication, with long-term current use of insulin (HCC)  -     POCT hemoglobin A1c           Subjective      Jayden Mix here for follow up-was in the hospital again because her legs were giving out on her and she was falling and to the point that she couldn't get up at all-she has a hx of obesity, DM II, OA, atrial fib, CHF-her A1c% has markedly improved today to 6.3%-her  is really keeping an eye on her sugars and her diet and she's doing good with all of that-she is using a walker now and can go about 100 feet with that, and is iin a wheelchair a good part of the time too now-she has known severe left knee OA and had an MRI showing meniscal and ACL tears and did see Ortho but they were hesitant to do a TKR because at the time her A1c% was 9 and her CKD, age, dementia-she says a lot of times when she tries to get up her knee just gives out     Review of Systems   Constitutional: Negative. HENT: Negative. Respiratory: Negative. Cardiovascular: Positive for leg swelling. Gastrointestinal: Negative. Musculoskeletal: Positive for gait problem. Allergic/Immunologic: Negative. Hematological: Negative. Psychiatric/Behavioral: Negative.         Current Outpatient Medications on File Prior to Visit   Medication Sig   • acetaminophen (TYLENOL) 325 mg tablet Take 3 tablets (975 mg total) by mouth every 8 (eight) hours as needed for mild pain or moderate pain   • apixaban (ELIQUIS) 2.5 mg Take 1 tablet (2.5 mg total) by mouth 2 (two) times a day   • Diclofenac Sodium (VOLTAREN) 1 % Apply 2 g topically 4 (four) times a day as needed (pain)   • ezetimibe-simvastatin (VYTORIN) 10-20 mg per tablet Take 1 tablet by mouth daily at bedtime   • furosemide (LASIX) 80 mg tablet Take 1 tablet (80 mg total) by mouth daily Do not start before June 20, 2023. • Insulin Glargine Solostar (Lantus SoloStar) 100 UNIT/ML SOPN Inject 0.14 mL (14 Units total) under the skin daily at bedtime   • insulin lispro (HumaLOG KwikPen) 100 units/mL injection pen Inject 8 Units under the skin 3 (three) times a day with meals   • metFORMIN (GLUCOPHAGE) 500 mg tablet Take 1 tablet (500 mg total) by mouth 2 (two) times a day   • metoprolol succinate (TOPROL-XL) 25 mg 24 hr tablet Take 75 mg by mouth 2 (two) times a day   • potassium chloride (K-DUR,KLOR-CON) 20 mEq tablet Take 1 tablet (20 mEq total) by mouth daily   • valsartan-hydrochlorothiazide (DIOVAN-HCT) 160-12.5 MG per tablet Take 1 tablet by mouth daily   • glucose blood (FREESTYLE LITE) test strip Use to check blood sugar once a day   • Insulin Pen Needle (BD AutoShield Duo) 30G X 5 MM MISC USE DAILY AS DIRECTED WITH INSULIN PEN   • [DISCONTINUED] metoprolol succinate (TOPROL-XL) 25 mg 24 hr tablet        Objective     /80 (BP Location: Left arm, Patient Position: Sitting, Cuff Size: Adult)   Pulse 65   Temp 97.9 °F (36.6 °C) (Tympanic)   Resp 18   Ht 5' 1" (1.549 m)   Wt 79.5 kg (175 lb 6 oz)   SpO2 98%   BMI 33.14 kg/m²     Physical Exam  Constitutional:       Appearance: She is obese. HENT:      Head: Normocephalic and atraumatic. Nose: Nose normal.      Mouth/Throat:      Mouth: Mucous membranes are moist.   Eyes:      Extraocular Movements: Extraocular movements intact. Cardiovascular:      Rate and Rhythm: Normal rate. Rhythm irregular. Pulses: no weak pulses          Dorsalis pedis pulses are 2+ on the right side. Posterior tibial pulses are 2+ on the right side. Heart sounds: Normal heart sounds. Pulmonary:      Effort: Pulmonary effort is normal.      Breath sounds: Normal breath sounds. No rales.    Musculoskeletal:      Cervical back: Normal range of motion. Right lower leg: Edema present. Left lower leg: Edema present. Feet:      Right foot:      Skin integrity: No ulcer, skin breakdown, erythema, warmth, callus or dry skin. Left foot:      Skin integrity: No ulcer, skin breakdown, erythema, warmth, callus or dry skin. Skin:     General: Skin is warm. Capillary Refill: Capillary refill takes less than 2 seconds. Neurological:      General: No focal deficit present. Mental Status: She is alert and oriented to person, place, and time. Psychiatric:         Mood and Affect: Mood normal.         Thought Content: Thought content normal.         Judgment: Judgment normal.     Patient's shoes and socks removed. Right Foot/Ankle   Right Foot Inspection  Skin Exam: skin normal and skin intact. No dry skin, no warmth, no callus, no erythema, no maceration, no abnormal color, no pre-ulcer, no ulcer and no callus. Toe Exam: ROM and strength within normal limits. Sensory   Proprioception: intact  Monofilament testing: diminished    Vascular  Capillary refills: < 3 seconds  The right DP pulse is 2+. The right PT pulse is 2+. Left Foot/Ankle  Left Foot Inspection  Skin Exam: skin normal and skin intact. No dry skin, no warmth, no erythema, no maceration, normal color, no pre-ulcer, no ulcer and no callus. Toe Exam: ROM and strength within normal limits. Sensory   Proprioception: intact  Monofilament testing: diminished    Vascular  Capillary refills: < 3 seconds        Assign Risk Category  No deformity present  Loss of protective sensation  No weak pulses  Risk: 1    Joey Wallace MD     BMI Counseling: Body mass index is 33.14 kg/m².  The BMI is above normal. Nutrition recommendations include reducing portion sizes, decreasing overall calorie intake, 3-5 servings of fruits/vegetables daily, reducing fast food intake, consuming healthier snacks, decreasing soda and/or juice intake, moderation in carbohydrate intake, increasing intake of lean protein, reducing intake of saturated fat and trans fat and reducing intake of cholesterol. Exercise recommendations include strength training exercises.

## 2023-07-19 DIAGNOSIS — E87.1 HYPONATREMIA: Primary | ICD-10-CM

## 2023-07-19 DIAGNOSIS — Z76.0 MEDICATION REFILL: ICD-10-CM

## 2023-07-19 RX ORDER — POTASSIUM CHLORIDE 20 MEQ/1
TABLET, EXTENDED RELEASE ORAL
Qty: 90 TABLET | Refills: 3 | Status: SHIPPED | OUTPATIENT
Start: 2023-07-19

## 2023-07-20 ENCOUNTER — PATIENT OUTREACH (OUTPATIENT)
Dept: CASE MANAGEMENT | Facility: OTHER | Age: 88
End: 2023-07-20

## 2023-07-20 NOTE — PROGRESS NOTES
Outreach TC to patient for CM assessment. No answer to call. Unable to leave a message on voicemail as "voice mail box has not been set up yet.". First unanswered call to patient.

## 2023-07-27 ENCOUNTER — PATIENT MESSAGE (OUTPATIENT)
Dept: FAMILY MEDICINE CLINIC | Facility: CLINIC | Age: 88
End: 2023-07-27

## 2023-07-27 DIAGNOSIS — E11.9 TYPE 2 DIABETES MELLITUS WITHOUT COMPLICATION, WITHOUT LONG-TERM CURRENT USE OF INSULIN (HCC): ICD-10-CM

## 2023-07-27 RX ORDER — BLOOD-GLUCOSE METER
KIT MISCELLANEOUS
Qty: 300 STRIP | Refills: 5 | Status: SHIPPED | OUTPATIENT
Start: 2023-07-27

## 2023-08-03 ENCOUNTER — OFFICE VISIT (OUTPATIENT)
Dept: FAMILY MEDICINE CLINIC | Facility: CLINIC | Age: 88
End: 2023-08-03
Payer: MEDICARE

## 2023-08-03 VITALS
RESPIRATION RATE: 16 BRPM | SYSTOLIC BLOOD PRESSURE: 124 MMHG | HEIGHT: 61 IN | OXYGEN SATURATION: 98 % | TEMPERATURE: 97.9 F | WEIGHT: 175 LBS | DIASTOLIC BLOOD PRESSURE: 78 MMHG | HEART RATE: 70 BPM | BODY MASS INDEX: 33.04 KG/M2

## 2023-08-03 DIAGNOSIS — Z00.00 MEDICARE ANNUAL WELLNESS VISIT, SUBSEQUENT: Primary | ICD-10-CM

## 2023-08-03 DIAGNOSIS — Z71.89 ACP (ADVANCE CARE PLANNING): ICD-10-CM

## 2023-08-03 DIAGNOSIS — I48.19 PERSISTENT ATRIAL FIBRILLATION (HCC): ICD-10-CM

## 2023-08-03 DIAGNOSIS — M15.9 OSTEOARTHRITIS OF MULTIPLE JOINTS, UNSPECIFIED OSTEOARTHRITIS TYPE: ICD-10-CM

## 2023-08-03 DIAGNOSIS — E11.65 TYPE 2 DIABETES MELLITUS WITH HYPERGLYCEMIA, UNSPECIFIED WHETHER LONG TERM INSULIN USE (HCC): ICD-10-CM

## 2023-08-03 DIAGNOSIS — I10 ESSENTIAL HYPERTENSION: ICD-10-CM

## 2023-08-03 PROBLEM — R41.3 MEMORY LOSS: Status: ACTIVE | Noted: 2020-01-01

## 2023-08-03 PROCEDURE — 99214 OFFICE O/P EST MOD 30 MIN: CPT | Performed by: INTERNAL MEDICINE

## 2023-08-03 PROCEDURE — 99497 ADVNCD CARE PLAN 30 MIN: CPT | Performed by: INTERNAL MEDICINE

## 2023-08-03 PROCEDURE — G0439 PPPS, SUBSEQ VISIT: HCPCS | Performed by: INTERNAL MEDICINE

## 2023-08-03 NOTE — PATIENT INSTRUCTIONS
Medicare Preventive Visit Patient Instructions  Thank you for completing your Welcome to Medicare Visit or Medicare Annual Wellness Visit today. Your next wellness visit will be due in one year (8/3/2024). The screening/preventive services that you may require over the next 5-10 years are detailed below. Some tests may not apply to you based off risk factors and/or age. Screening tests ordered at today's visit but not completed yet may show as past due. Also, please note that scanned in results may not display below. Preventive Screenings:  Service Recommendations Previous Testing/Comments   Colorectal Cancer Screening  * Colonoscopy    * Fecal Occult Blood Test (FOBT)/Fecal Immunochemical Test (FIT)  * Fecal DNA/Cologuard Test  * Flexible Sigmoidoscopy Age: 43-73 years old   Colonoscopy: every 10 years (may be performed more frequently if at higher risk)  OR  FOBT/FIT: every 1 year  OR  Cologuard: every 3 years  OR  Sigmoidoscopy: every 5 years  Screening may be recommended earlier than age 39 if at higher risk for colorectal cancer. Also, an individualized decision between you and your healthcare provider will decide whether screening between the ages of 77-80 would be appropriate. Colonoscopy: Not on file  FOBT/FIT: Not on file  Cologuard: Not on file  Sigmoidoscopy: Not on file    Screening Not Indicated     Breast Cancer Screening Age: 36 years old  Frequency: every 1-2 years  Not required if history of left and right mastectomy Mammogram: 08/05/2011    Screening Not Indicated   Cervical Cancer Screening Between the ages of 21-29, pap smear recommended once every 3 years. Between the ages of 32-69, can perform pap smear with HPV co-testing every 5 years.    Recommendations may differ for women with a history of total hysterectomy, cervical cancer, or abnormal pap smears in past. Pap Smear: Not on file    Screening Not Indicated   Hepatitis C Screening Once for adults born between [address] [place] [address] frequently in patients at high risk for Hepatitis C Hep C Antibody: Not on file        Diabetes Screening 1-2 times per year if you're at risk for diabetes or have pre-diabetes Fasting glucose: 159 mg/dL (12/8/2022)  A1C: 6.3 (7/13/2023)  Screening Not Indicated  History Diabetes   Cholesterol Screening Once every 5 years if you don't have a lipid disorder. May order more often based on risk factors. Lipid panel: 12/08/2022    Screening Not Indicated  History Lipid Disorder     Other Preventive Screenings Covered by Medicare:  1. Abdominal Aortic Aneurysm (AAA) Screening: covered once if your at risk. You're considered to be at risk if you have a family history of AAA. 2. Lung Cancer Screening: covers low dose CT scan once per year if you meet all of the following conditions: (1) Age 48-67; (2) No signs or symptoms of lung cancer; (3) Current smoker or have quit smoking within the last 15 years; (4) You have a tobacco smoking history of at least 20 pack years (packs per day multiplied by number of years you smoked); (5) You get a written order from a healthcare provider. 3. Glaucoma Screening: covered annually if you're considered high risk: (1) You have diabetes OR (2) Family history of glaucoma OR (3)  aged 48 and older OR (3)  American aged 72 and older  3. Osteoporosis Screening: covered every 2 years if you meet one of the following conditions: (1) You're estrogen deficient and at risk for osteoporosis based off medical history and other findings; (2) Have a vertebral abnormality; (3) On glucocorticoid therapy for more than 3 months; (4) Have primary hyperparathyroidism; (5) On osteoporosis medications and need to assess response to drug therapy. · Last bone density test (DXA Scan): 05/31/2023.  5. HIV Screening: covered annually if you're between the age of 15-65. Also covered annually if you are younger than 13 and older than 72 with risk factors for HIV infection.  For pregnant patients, it is covered up to 3 times per pregnancy. Immunizations:  Immunization Recommendations   Influenza Vaccine Annual influenza vaccination during flu season is recommended for all persons aged >= 6 months who do not have contraindications   Pneumococcal Vaccine   * Pneumococcal conjugate vaccine = PCV13 (Prevnar 13), PCV15 (Vaxneuvance), PCV20 (Prevnar 20)  * Pneumococcal polysaccharide vaccine = PPSV23 (Pneumovax) Adults 20-63 years old: 1-3 doses may be recommended based on certain risk factors  Adults 72 years old: 1-2 doses may be recommended based off what pneumonia vaccine you previously received   Hepatitis B Vaccine 3 dose series if at intermediate or high risk (ex: diabetes, end stage renal disease, liver disease)   Tetanus (Td) Vaccine - COST NOT COVERED BY MEDICARE PART B Following completion of primary series, a booster dose should be given every 10 years to maintain immunity against tetanus. Td may also be given as tetanus wound prophylaxis. Tdap Vaccine - COST NOT COVERED BY MEDICARE PART B Recommended at least once for all adults. For pregnant patients, recommended with each pregnancy. Shingles Vaccine (Shingrix) - COST NOT COVERED BY MEDICARE PART B  2 shot series recommended in those aged 48 and above     Health Maintenance Due:  There are no preventive care reminders to display for this patient. Immunizations Due:      Topic Date Due   • COVID-19 Vaccine (6 - Moderna series) 02/17/2023   • Influenza Vaccine (1) 09/01/2023     Advance Directives   What are advance directives? Advance directives are legal documents that state your wishes and plans for medical care. These plans are made ahead of time in case you lose your ability to make decisions for yourself. Advance directives can apply to any medical decision, such as the treatments you want, and if you want to donate organs. What are the types of advance directives?   There are many types of advance directives, and each state has rules about how to use them. You may choose a combination of any of the following:  · Living will: This is a written record of the treatment you want. You can also choose which treatments you do not want, which to limit, and which to stop at a certain time. This includes surgery, medicine, IV fluid, and tube feedings. · Durable power of  for healthcare [facility]): This is a written record that states who you want to make healthcare choices for you when you are unable to make them for yourself. This person, called a proxy, is usually a family member or a friend. You may choose more than 1 proxy. · Do not resuscitate (DNR) order:  A DNR order is used in case your heart stops beating or you stop breathing. It is a request not to have certain forms of treatment, such as CPR. A DNR order may be included in other types of advance directives. · Medical directive: This covers the care that you want if you are in a coma, near death, or unable to make decisions for yourself. You can list the treatments you want for each condition. Treatment may include pain medicine, surgery, blood transfusions, dialysis, IV or tube feedings, and a ventilator (breathing machine). · Values history: This document has questions about your views, beliefs, and how you feel and think about life. This information can help others choose the care that you would choose. Why are advance directives important? An advance directive helps you control your care. Although spoken wishes may be used, it is better to have your wishes written down. Spoken wishes can be misunderstood, or not followed. Treatments may be given even if you do not want them. An advance directive may make it easier for your family to make difficult choices about your care. Fall Prevention    Fall prevention  includes ways to make your home and other areas safer. It also includes ways you can move more carefully to prevent a fall.  Health conditions that cause changes in your blood pressure, vision, or muscle strength and coordination may increase your risk for falls. Medicines may also increase your risk for falls if they make you dizzy, weak, or sleepy. Fall prevention tips:   · Stand or sit up slowly. · Use assistive devices as directed. · Wear shoes that fit well and have soles that . · Wear a personal alarm. · Stay active. · Manage your medical conditions. Home Safety Tips:  · Add items to prevent falls in the bathroom. · Keep paths clear. · Install bright lights in your home. · Keep items you use often on shelves within reach. · Paint or place reflective tape on the edges of your stairs. Urinary Incontinence   Urinary incontinence (UI)  is when you lose control of your bladder. UI develops because your bladder cannot store or empty urine properly. The 3 most common types of UI are stress incontinence, urge incontinence, or both. Medicines:   · May be given to help strengthen your bladder control. Report any side effects of medication to your healthcare provider. Do pelvic muscle exercises often:  Your pelvic muscles help you stop urinating. Squeeze these muscles tight for 5 seconds, then relax for 5 seconds. Gradually work up to squeezing for 10 seconds. Do 3 sets of 15 repetitions a day, or as directed. This will help strengthen your pelvic muscles and improve bladder control. Train your bladder:  Go to the bathroom at set times, such as every 2 hours, even if you do not feel the urge to go. You can also try to hold your urine when you feel the urge to go. For example, hold your urine for 5 minutes when you feel the urge to go. As that becomes easier, hold your urine for 10 minutes. Self-care:   · Keep a UI record. Write down how often you leak urine and how much you leak. Make a note of what you were doing when you leaked urine. · Drink liquids as directed.  You may need to limit the amount of liquid you drink to help control your urine leakage. Do not drink any liquid right before you go to bed. Limit or do not have drinks that contain caffeine or alcohol. · Prevent constipation. Eat a variety of high-fiber foods. Good examples are high-fiber cereals, beans, vegetables, and whole-grain breads. Walking is the best way to trigger your intestines to have a bowel movement. · Exercise regularly and maintain a healthy weight. Weight loss and exercise will decrease pressure on your bladder and help you control your leakage. · Use a catheter as directed  to help empty your bladder. A catheter is a tiny, plastic tube that is put into your bladder to drain your urine. · Go to behavior therapy as directed. Behavior therapy may be used to help you learn to control your urge to urinate. Weight Management   Why it is important to manage your weight:  Being overweight increases your risk of health conditions such as heart disease, high blood pressure, type 2 diabetes, and certain types of cancer. It can also increase your risk for osteoarthritis, sleep apnea, and other respiratory problems. Aim for a slow, steady weight loss. Even a small amount of weight loss can lower your risk of health problems. How to lose weight safely:  A safe and healthy way to lose weight is to eat fewer calories and get regular exercise. You can lose up about 1 pound a week by decreasing the number of calories you eat by 500 calories each day. Healthy meal plan for weight management:  A healthy meal plan includes a variety of foods, contains fewer calories, and helps you stay healthy. A healthy meal plan includes the following:  · Eat whole-grain foods more often. A healthy meal plan should contain fiber. Fiber is the part of grains, fruits, and vegetables that is not broken down by your body. Whole-grain foods are healthy and provide extra fiber in your diet.  Some examples of whole-grain foods are whole-wheat breads and pastas, oatmeal, brown rice, and bulgur. · Eat a variety of vegetables every day. Include dark, leafy greens such as spinach, kale, edward greens, and mustard greens. Eat yellow and orange vegetables such as carrots, sweet potatoes, and winter squash. · Eat a variety of fruits every day. Choose fresh or canned fruit (canned in its own juice or light syrup) instead of juice. Fruit juice has very little or no fiber. · Eat low-fat dairy foods. Drink fat-free (skim) milk or 1% milk. Eat fat-free yogurt and low-fat cottage cheese. Try low-fat cheeses such as mozzarella and other reduced-fat cheeses. · Choose meat and other protein foods that are low in fat. Choose beans or other legumes such as split peas or lentils. Choose fish, skinless poultry (chicken or turkey), or lean cuts of red meat (beef or pork). Before you cook meat or poultry, cut off any visible fat. · Use less fat and oil. Try baking foods instead of frying them. Add less fat, such as margarine, sour cream, regular salad dressing and mayonnaise to foods. Eat fewer high-fat foods. Some examples of high-fat foods include french fries, doughnuts, ice cream, and cakes. · Eat fewer sweets. Limit foods and drinks that are high in sugar. This includes candy, cookies, regular soda, and sweetened drinks. Exercise:  Exercise at least 30 minutes per day on most days of the week. Some examples of exercise include walking, biking, dancing, and swimming. You can also fit in more physical activity by taking the stairs instead of the elevator or parking farther away from stores. Ask your healthcare provider about the best exercise plan for you. © Copyright [employer] 2018 Information is for End User's use only and may not be sold, redistributed or otherwise used for commercial purposes.  All illustrations and images included in CareNotes® are the copyrighted property of [employer] or [address] [place] [id number]

## 2023-08-03 NOTE — PROGRESS NOTES
Assessment and Plan:     Problem List Items Addressed This Visit        Endocrine    Type 2 diabetes mellitus with hyperglycemia ([address])       Lab Results   Component Value Date    HGBA1C 6.3 07/13/2023   A1c much improved, and sugar log looks good-is on insulin, at night and with meals prn, and is also on metformin bid. Watching her diet             Cardiovascular and Mediastinum    Essential hypertension    Persistent atrial fibrillation (HCC)       Musculoskeletal and Integument    Osteoarthritis of multiple joints   Other Visit Diagnoses     Medicare annual wellness visit, subsequent    -  Primary    ACP (advance care planning)          Nona Ventura is doing well, has not fallen since her last visit here, her  is very on top of her diabetic regimen, testing sugars several times per day and watching what she eats-seems that her sugars have stabilized. Is on Eliquis in light of atrial fib and is taking her daily      Preventive health issues were discussed with patient, and age appropriate screening tests were ordered as noted in patient's After Visit Summary. Personalized health advice and appropriate referrals for health education or preventive services given if needed, as noted in patient's After Visit Summary. History of Present Illness:     Patient presents for a Medicare Wellness Visit    Nona Ventura here for [facility], ACP discussion, and to touch base on her chronic issues of DM II, hx of frequent falls, OA, DJD knees,atrial fib-she is here with her  and they are both now living at [facility]. Dayana's sugars have finally stabilized, she is getting insulin in the evening and then tid with meals as needed, her bp is good, and she has not fallen since the last time she was here-her  is managing her insulin and diet     Patient Care Team:  Rodrigo Monge MD as PCP - General (Internal Medicine)  Maritza Nolen RN as RN Care Manager     Review of Systems:     Review of Systems Constitutional: Negative. Respiratory: Negative. Cardiovascular: Negative. Gastrointestinal: Negative. Musculoskeletal: Positive for arthralgias. Neurological: Negative. Hematological: Negative. Psychiatric/Behavioral: Negative.          Problem List:     Patient Active Problem List   Diagnosis   • Essential hypertension   • Pure hypercholesterolemia   • Microalbuminuria   • Stage 3a chronic kidney disease ([address])   • 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[address])   • Dementia without behavioral disturbance ([facility])   • Osteoarthritis of multiple joints   • Bilateral hearing loss   • Primary osteoarthritis of left knee   • Hypertensive urgency   • Hyponatremia   • Metabolic encephalopathy   • Persistent atrial fibrillation (HCC)   • Depression, recurrent ([facility])   • Compression fracture of L3 vertebra ([facility])   • Generalized weakness   • Type 2 diabetes mellitus with hyperglycemia ([facility])   • Knee pain   • Fall   • Abnormal abdominal CT scan   • Acute on chronic diastolic congestive heart failure ([facility])   • Baker's cyst of knee, left   • Memory loss      Past Medical and Surgical History:     Past Medical History:   Diagnosis Date   • Allergy to sulfa drugs    • Arthritis 2000   • Closed fracture of third lumbar vertebra ([facility])    • Closed T12 spinal fracture ([facility])    • Closed wedge compression fracture of T12 vertebra ([facility])    • Dementia ([facility])    • Diabetes mellitus ([address]) 2000   • DM (diabetes mellitus), type 2 ([facility])    • H/O multiple allergies     Novocaine,Darvocet, Sulfa, Accupril   • HL (hearing loss)    • Hyperlipidemia    • Hypertension    • Lumbar compression fracture ([facility])    • Memory loss 2020   • Nocturia    • Palpitations    • Paroxysmal atrial fibrillation ([facility])    • Thrombocytopenic disorder ([facility])    • Type 2 diabetes mellitus without complication ([address])    • Wears glasses      Past Surgical History:   Procedure Laterality Date   • DXA PROCEDURE (HISTORICAL)  05/31/2023   • HERNIA REPAIR     • HYSTERECTOMY Family History:     Family History   Problem Relation Age of Onset   • Hypertension Mother    • Diabetes Mother    • Other Mother    • Hypertension Father    • Heart disease Father       Social History:     Social History     Socioeconomic History   • Marital status: /Civil Union     Spouse name: None   • Number of children: None   • Years of education: None   • Highest education level: None   Occupational History   • None   Tobacco Use   • Smoking status: Never     Passive exposure: Never   • Smokeless tobacco: Never   Vaping Use   • Vaping Use: Never used   Substance and Sexual Activity   • Alcohol use: Not Currently     Alcohol/week: 1.0 standard drink of alcohol     Types: 1 Glasses of wine per week     Comment: on holidays add an extra glass of wine   • Drug use: Never   • Sexual activity: Not Currently     Partners: Male     Birth control/protection: None   Other Topics Concern   • None   Social History Narrative    ** Merged History Encounter **         Tobacco smoking status:   Never smoker    Smoking - how much:   None    Never used smokeless tobacco  Former smokeless tobacco user  Current snuff user  Currently chews tobacco  Currently uses moist powdered tobacco  ----  Not indicated  Not tolerated  P    atient refused     Never used electronic cigarettes  Former user of electronic cigarettes  Current user of electronic cigarettes     Occupation:   retired teacher    Marital status:       Exercise level:   Occasional    Diet:   Regular     Alcohol     intake:   Occasional    Caffeine intake:   Occasional    Seat belts used routinely:   Yes    Sunscreen used routinely:   Yes     Smoke alarm in home:   Yes    Advance directive:   No    Live alone or with others:   with others Last modified by [id number]   07-, 03:29      Social Determinants of Health     Financial Resource Strain: Low Risk  (7/28/2023)    Overall Financial Resource Strain (CARDIA)    • Difficulty of Paying Living Expenses: Not very hard   Food Insecurity: No Food Insecurity (6/16/2023)    Hunger Vital Sign    • Worried About Running Out of Food in the Last Year: Never true    • Ran Out of Food in the Last Year: Never true   Transportation Needs: No Transportation Needs (7/28/2023)    PRAPARE - Transportation    • Lack of Transportation (Medical): No    • Lack of Transportation (Non-Medical):  No   Physical Activity: Not on file   Stress: Not on file   Social Connections: Not on file   Intimate Partner Violence: Not on file   Housing Stability: Low Risk  (6/16/2023)    Housing Stability Vital Sign    • Unable to Pay for Housing in the Last Year: No    • Number of Places Lived in the Last Year: 1    • Unstable Housing in the Last Year: No      Medications and Allergies:     Current Outpatient Medications   Medication Sig Dispense Refill   • apixaban (ELIQUIS) 2.5 mg Take 1 tablet (2.5 mg total) by mouth 2 (two) times a day  0   • ezetimibe-simvastatin (VYTORIN) 10-20 mg per tablet Take 1 tablet by mouth daily at bedtime 90 tablet 1   • furosemide (LASIX) 80 mg tablet Take 1 tablet (80 mg total) by mouth daily 90 tablet 1   • glucose blood (FREESTYLE LITE) test strip Use to check blood sugar three times a day 300 strip 5   • Insulin Glargine Solostar (Lantus SoloStar) 100 UNIT/ML SOPN Inject 0.14 mL (14 Units total) under the skin daily at bedtime     • insulin lispro (HumaLOG KwikPen) 100 units/mL injection pen Inject 8 Units under the skin 3 (three) times a day with meals     • Insulin Pen Needle (BD AutoShield Duo) 30G X 5 MM MISC USE DAILY AS DIRECTED WITH INSULIN  each 5   • metFORMIN (GLUCOPHAGE) 500 mg tablet Take 1 tablet (500 mg total) by mouth 2 (two) times a day 180 tablet 1   • metoprolol succinate (TOPROL-XL) 25 mg 24 hr tablet Take 3 tablets (75 mg total) by mouth 2 (two) times a day 540 tablet 1   • potassium chloride (K-DUR,KLOR-CON) 20 mEq tablet TAKE 1 TABLET BY MOUTH  DAILY 90 tablet 3   • valsartan (DIOVAN) 160 mg tablet Take 1 tablet (160 mg total) by mouth daily 30 tablet 5     No current facility-administered medications for this visit. Allergies   Allergen Reactions   • Accupril [Quinapril Hcl]    • Novocain [Procaine]    • Parabens    • Sulfa Antibiotics       Immunizations:     Immunization History   Administered Date(s) Administered   • COVID-19 MODERNA VACC 0.5 ML IM 01/27/2021, 02/24/2021, 10/29/2021, 04/21/2022   • COVID-19 Moderna Vac BIVALENT 12 Yr+ IM (BOOSTER ONLY) 0.5 ML 10/17/2022   • INFLUENZA 05/04/2010, 12/06/2010, 08/24/2012, 01/06/2014, 12/18/2014, 09/09/2015, 01/11/2017, 09/15/2017, 09/07/2018, 09/13/2019, 10/11/2022   • Influenza, high dose seasonal 0.7 mL 10/08/2020, 12/13/2021   • Meningococcal MCV4P 09/13/2012   • Pneumococcal Conjugate 13-Valent 05/04/2016   • Pneumococcal Polysaccharide PPV23 12/13/2021   • Tdap 04/13/2023   • Typhoid Live, oral 08/05/2014   • influenza, injectable, quadrivalent 09/21/2018      Health Maintenance: There are no preventive care reminders to display for this patient. Topic Date Due   • COVID-19 Vaccine (6 - Moderna series) 02/17/2023   • Influenza Vaccine (1) 09/01/2023      Medicare Screening Tests and Risk Assessments:     Bere Livingston is here for her Subsequent Wellness visit.      Health Risk Assessment:   Patient rates overall health as fair. Patient feels that their physical health rating is slightly worse. Patient is satisfied with their life. Eyesight was rated as slightly worse. Hearing was rated as slightly worse. Patient feels that their emotional and mental health rating is same. Patients states they are sometimes angry. Patient states they are often unusually tired/fatigued. Pain experienced in the last 7 days has been some. Patient's pain rating has been 4/10. Patient states that she has experienced no weight loss or gain in last 6 months. Depression Screening:   PHQ-9 Score: 12      Fall Risk Screening: In the past year, patient has experienced: history of falling in past year    Number of falls: 2 or more  Injured during fall?: Yes    Feels unsteady when standing or walking?: Yes    Worried about falling?: No      Urinary Incontinence Screening:   Patient has leaked urine accidently in the last six months. Home Safety:  Patient has trouble with stairs inside or outside of their home. Patient has working smoke alarms and has working carbon monoxide detector. Home safety hazards include: none. Nutrition:   Current diet is Regular. Some attempt toward a diabetic diet    Medications:   Patient is not currently taking any over-the-counter supplements. Patient is not able to manage medications.  (Tricia Kenney) manages my medications    Activities of Daily Living (ADLs)/Instrumental Activities of Daily Living (IADLs):   Walk and transfer into and out of bed and chair?: Yes  Dress and groom yourself?: Yes    Bathe or shower yourself?: Yes    Feed yourself?  Yes  Do your laundry/housekeeping?: No  Manage your money, pay your bills and track your expenses?: No  Make your own meals?: No    Do your own shopping?: No    ADL comments: I must use a walker for even one or two steps    Previous Hospitalizations:   Any hospitalizations or ED visits within the last 12 months?: Yes    How many hospitalizations have you had in the last year?: 3-4    Advance Care Planning:   Living will: Yes    Durable POA for healthcare: Yes    Advanced directive: No    Advanced directive counseling given: Yes      Cognitive Screening:   Provider or family/friend/caregiver concerned regarding cognition?: No    PREVENTIVE SCREENINGS      Cardiovascular Screening:    General: Screening Not Indicated and History Lipid Disorder      Diabetes Screening:     General: Screening Not Indicated and History Diabetes      Colorectal Cancer Screening:     General: Screening Not Indicated      Breast Cancer Screening:     General: Screening Not Indicated      Cervical Cancer Screening:    General: Screening Not Indicated      Osteoporosis Screening:    General: Screening Current      Abdominal Aortic Aneurysm (AAA) Screening:        General: Screening Not Indicated      Lung Cancer Screening:     General: Screening Not Indicated      Hepatitis C Screening:    General: Screening Not Indicated    Screening, Brief Intervention, and Referral to Treatment (SBIRT)    Screening  Typical number of drinks in a day: 0  Typical number of drinks in a week: 0  Interpretation: Low risk drinking behavior. AUDIT-C Screenin) How often did you have a drink containing alcohol in the past year? monthly or less  2) How many drinks did you have on a typical day when you were drinking in the past year? 0  3) How often did you have 6 or more drinks on one occasion in the past year? never    AUDIT-C Score: 1  Interpretation: Score 0-2 (female): Negative screen for alcohol misuse    Single Item Drug Screening:  How often have you used an illegal drug (including marijuana) or a prescription medication for non-medical reasons in the past year? never    Single Item Drug Screen Score: 0  Interpretation: Negative screen for possible drug use disorder    No results found.      Physical Exam:     /78 (BP Location: Right leg, Patient Position: Sitting, Cuff Size: Large)   Pulse 70   Temp 97.9 °F (36.6 °C) (Tympanic)   Resp 16   Ht 5' 1" (1.549 m)   Wt 79.4 kg (175 lb)   SpO2 98%   BMI 33.07 kg/m²     Physical Exam  Constitutional:       Appearance: She is obese. HENT:      Head: Normocephalic and atraumatic. Nose: Nose normal.      Mouth/Throat:      Mouth: Mucous membranes are moist.   Eyes:      Extraocular Movements: Extraocular movements intact. Cardiovascular:      Rate and Rhythm: Normal rate and regular rhythm. Pulses: no weak pulses          Dorsalis pedis pulses are 2+ on the right side and 2+ on the left side. Posterior tibial pulses are 2+ on the right side and 2+ on the left side. Heart sounds: Normal heart sounds. Pulmonary:      Effort: Pulmonary effort is normal.      Breath sounds: Normal breath sounds. Abdominal:      Palpations: Abdomen is soft. Musculoskeletal:         General: Normal range of motion. Cervical back: Normal range of motion and neck supple. Feet:      Right foot:      Skin integrity: No ulcer, skin breakdown, erythema, warmth, callus or dry skin. Left foot:      Skin integrity: No ulcer, skin breakdown, erythema, warmth, callus or dry skin. Skin:     General: Skin is warm. Capillary Refill: Capillary refill takes less than 2 seconds. Neurological:      General: No focal deficit present. Mental Status: She is alert. Psychiatric:         Mood and Affect: Mood normal.         Thought Content: Thought content normal.      Patient's shoes and socks removed. Right Foot/Ankle   Right Foot Inspection  Skin Exam: skin normal and skin intact. No dry skin, no warmth, no callus, no erythema, no maceration, no abnormal color, no pre-ulcer, no ulcer and no callus. Toe Exam: ROM and strength within normal limits. Sensory   Proprioception: intact  Monofilament testing: intact    Vascular  Capillary refills: < 3 seconds  The right DP pulse is 2+. The right PT pulse is 2+.      Left Foot/Ankle  Left Foot Inspection  Skin Exam: skin normal and skin intact. No dry skin, no warmth, no erythema, no maceration, normal color, no pre-ulcer, no ulcer and no callus. Toe Exam: ROM and strength within normal limits. Sensory   Proprioception: intact  Monofilament testing: intact    Vascular  Capillary refills: < 3 seconds  The left DP pulse is 2+. The left PT pulse is 2+.      Assign Risk Category  No deformity present  No loss of protective sensation  No weak pulses  Risk: 0      Monica Dunn MD

## 2023-08-03 NOTE — ASSESSMENT & PLAN NOTE
Lab Results   Component Value Date    HGBA1C 6.3 07/13/2023   A1c much improved, and sugar log looks good-is on insulin, at night and with meals prn, and is also on metformin bid.   Watching her diet

## 2023-08-10 ENCOUNTER — PATIENT OUTREACH (OUTPATIENT)
Dept: CASE MANAGEMENT | Facility: OTHER | Age: 88
End: 2023-08-10

## 2023-08-10 NOTE — PROGRESS NOTES
Spoke with SO, Linda Singh.  He states Regina Collazo is doing well. She had an appointment yesterday with Dr. Andrei Riley which went well. There are no changes in Dayana's medications. Linda Singh said that they have not been checking Dayana's weight on a regular basis. He said her weight was stable, but  I advised them to check daily and he said they would. Reminded him to report weight gain over 3 pounds overnight or 5 pounds in a week. At this time, they have not identified any needs to be addressed. I gave him Teresa Hard contact information to call if needed.

## 2023-09-08 ENCOUNTER — PATIENT OUTREACH (OUTPATIENT)
Dept: CASE MANAGEMENT | Facility: HOSPITAL | Age: 88
End: 2023-09-08

## 2023-09-08 NOTE — PROGRESS NOTES
Pt due for outreach. Call placed to pt. No answer to call and unable to leave message. Will set outreach date in future to attempt another call for care management follow up. Bushra Johnson RN CM continues on care team for follow up and note routed to same.

## 2023-09-25 LAB
LEFT EYE DIABETIC RETINOPATHY: NORMAL
RIGHT EYE DIABETIC RETINOPATHY: NORMAL

## 2023-10-13 ENCOUNTER — PATIENT OUTREACH (OUTPATIENT)
Dept: CASE MANAGEMENT | Facility: OTHER | Age: 88
End: 2023-10-13

## 2023-10-13 NOTE — PROGRESS NOTES
Outreach TC to patient for RN care . Patient reports that she is feeling well. Patient denies any new or worsening cardiac symptoms. Patient denies any s/sx of CHF exacerbation including edema, weight gain, cough, increased fatigue, exercise intolerance or SOB. Patient verbalizes adherence to her medication schedule and daily weights. Patient reports that she does monitor her blood glucose 3x day. BG range is 100-160. Patient has had only 1 episode of hypoglycemia (79) in the last 2 months. Patient/CG is able to recognize and treat low blood glucose. Patient/CG do verbalize that they do keep a BG diary and shre the information with the PCP at office visits. Patient reports that she has obtained her annual influenza vaccine. Patient reports that she is independent with her ADL's. Patient resides at [facility] in an [address] [place] [address] apartment with her spouse. She does receive some assist from the facility for meals and transportation. We did review s/sx to report, future appointments, home medications and how/when to report symptoms to provider vs 911. Patient verbalizes understanding and agrees to additional calls. Patient was originally referred for [facility] pathway. Patient has not had any further issues regarding kidney function. Episode is closed. I have removed myself the car paln and the care coordination note has been updated.

## 2023-10-21 DIAGNOSIS — E11.9 TYPE 2 DIABETES MELLITUS WITHOUT COMPLICATION, WITH LONG-TERM CURRENT USE OF INSULIN (HCC): ICD-10-CM

## 2023-10-21 DIAGNOSIS — I50.33 ACUTE ON CHRONIC DIASTOLIC CONGESTIVE HEART FAILURE (HCC): ICD-10-CM

## 2023-10-21 DIAGNOSIS — Z79.4 TYPE 2 DIABETES MELLITUS WITHOUT COMPLICATION, WITH LONG-TERM CURRENT USE OF INSULIN (HCC): ICD-10-CM

## 2023-10-21 DIAGNOSIS — I10 ESSENTIAL HYPERTENSION: ICD-10-CM

## 2023-10-23 RX ORDER — VALSARTAN 160 MG/1
160 TABLET ORAL DAILY
Qty: 90 TABLET | Refills: 1 | Status: SHIPPED | OUTPATIENT
Start: 2023-10-23

## 2023-10-23 RX ORDER — FUROSEMIDE 80 MG
80 TABLET ORAL DAILY
Qty: 90 TABLET | Refills: 1 | Status: SHIPPED | OUTPATIENT
Start: 2023-10-23

## 2023-10-23 RX ORDER — METOPROLOL SUCCINATE 25 MG/1
75 TABLET, EXTENDED RELEASE ORAL 2 TIMES DAILY
Qty: 540 TABLET | Refills: 1 | Status: SHIPPED | OUTPATIENT
Start: 2023-10-23

## 2023-11-28 DIAGNOSIS — I10 ESSENTIAL HYPERTENSION: ICD-10-CM

## 2023-11-28 RX ORDER — EZETIMIBE AND SIMVASTATIN 10; 20 MG/1; MG/1
1 TABLET ORAL
Qty: 90 TABLET | Refills: 1 | Status: SHIPPED | OUTPATIENT
Start: 2023-11-28

## 2023-12-06 ENCOUNTER — OFFICE VISIT (OUTPATIENT)
Dept: FAMILY MEDICINE CLINIC | Facility: CLINIC | Age: 88
End: 2023-12-06
Payer: MEDICARE

## 2023-12-06 ENCOUNTER — TELEPHONE (OUTPATIENT)
Age: 88
End: 2023-12-06

## 2023-12-06 VITALS
BODY MASS INDEX: 34.55 KG/M2 | HEART RATE: 68 BPM | TEMPERATURE: 97.3 F | HEIGHT: 61 IN | RESPIRATION RATE: 16 BRPM | WEIGHT: 183 LBS | OXYGEN SATURATION: 98 % | SYSTOLIC BLOOD PRESSURE: 120 MMHG | DIASTOLIC BLOOD PRESSURE: 66 MMHG

## 2023-12-06 DIAGNOSIS — I50.33 ACUTE ON CHRONIC DIASTOLIC CONGESTIVE HEART FAILURE (HCC): ICD-10-CM

## 2023-12-06 DIAGNOSIS — E11.65 TYPE 2 DIABETES MELLITUS WITH HYPERGLYCEMIA, UNSPECIFIED WHETHER LONG TERM INSULIN USE (HCC): Primary | ICD-10-CM

## 2023-12-06 DIAGNOSIS — I48.19 PERSISTENT ATRIAL FIBRILLATION (HCC): ICD-10-CM

## 2023-12-06 DIAGNOSIS — I10 ESSENTIAL HYPERTENSION: ICD-10-CM

## 2023-12-06 DIAGNOSIS — M15.9 OSTEOARTHRITIS OF MULTIPLE JOINTS, UNSPECIFIED OSTEOARTHRITIS TYPE: ICD-10-CM

## 2023-12-06 DIAGNOSIS — N18.31 STAGE 3A CHRONIC KIDNEY DISEASE (HCC): ICD-10-CM

## 2023-12-06 DIAGNOSIS — F03.90 DEMENTIA WITHOUT BEHAVIORAL DISTURBANCE (HCC): ICD-10-CM

## 2023-12-06 PROCEDURE — 99214 OFFICE O/P EST MOD 30 MIN: CPT | Performed by: INTERNAL MEDICINE

## 2023-12-06 RX ORDER — INSULIN GLARGINE 100 [IU]/ML
14 INJECTION, SOLUTION SUBCUTANEOUS
Qty: 10 ML | Refills: 5 | Status: SHIPPED | OUTPATIENT
Start: 2023-12-06

## 2023-12-06 NOTE — TELEPHONE ENCOUNTER
Insurance does Not cover Generic Insulin Glargine Solostar (Lantus). Rx needs be re sent to pharmacy as Brand Name Necessary (KATHERYN) Lantus which is what insurance will cover.  Rx is pending for provider approval.

## 2023-12-06 NOTE — PROGRESS NOTES
Assessment/Plan: is doing quite well-is now at [facility], [place] and has not had any falls over the past few months-did already get her Flu and Covid vaccines-bp good and sugars pretty good-labs ordered to be done before the next time she comes in-we again had the discussion about her knee and the idea of replacement and finding an orthopod to do it- is wanting to hold off on that for now         Problem List Items Addressed This Visit       Essential hypertension    Stage 3a chronic kidney disease ([address])    Dementia without behavioral disturbance ([address])    Osteoarthritis of multiple joints    Persistent atrial fibrillation (HCC)    Type 2 diabetes mellitus with hyperglycemia ([address]) - Primary    Relevant Medications    insulin glargine (Lantus) 100 units/mL subcutaneous injection    Other Relevant Orders    CBC and differential    Comprehensive metabolic panel    Lipid panel    TSH, 3rd generation    Albumin / creatinine urine ratio    HEMOGLOBIN A1C W/ EAG ESTIMATION    Acute on chronic diastolic congestive heart failure ([address])         Subjective:      Patient ID: Federico Cummings is a 80 y.o. female. Angela Double here with her , Jovanni Nathan. She has a hx of atrial fib, obesity, HTN, DM II, HL, mild-mod dementia, osteoarthritis and multiple falls-she's been doing much much better than several months ago-they are now living at [facility] is using a walker most of the time because of her knee giving out on her and she has not fallen in the past few months-Bill checks her sugars three times a day and she is on Lantus in the evening and lispro with meals-sugar log reviewed-Am and afternoon sugars are excellent, and some high dinner sugars, but nothing terrible    Diabetes  Pertinent negatives for diabetes include no fatigue. Hypertension    Atrial Fibrillation  Past medical history includes atrial fibrillation.        The following portions of the patient's history were reviewed and updated as appropriate: Past Medical History:  She has a past medical history of Allergy to sulfa drugs, Arthritis (2000), Closed fracture of third lumbar vertebra (HCC), Closed T12 spinal fracture ([address]), Closed wedge compression fracture of T12 vertebra ([address]), Dementia ([address]), Diabetes mellitus ([address]) (2000), DM (diabetes mellitus), type 2 ([address]), H/O multiple allergies, HL (hearing loss), Hyperlipidemia, Hypertension, Lumbar compression fracture ([address]), Memory loss (2020), Nocturia, Palpitations, Paroxysmal atrial fibrillation ([address]), Thrombocytopenic disorder ([address]), Type 2 diabetes mellitus without complication ([address]), and Wears glasses. ,  _______________________________________________________________________  Medical Problems:  does not have any pertinent problems on file.,  _______________________________________________________________________  Past Surgical History:   has a past surgical history that includes Hysterectomy; Hernia repair; and DXA procedure(historical) (05/31/2023). ,  _______________________________________________________________________  Family History:  family history includes Diabetes in her mother; Heart disease in her father; Hypertension in her father and mother; Other in her mother.,  _______________________________________________________________________  Social History:   reports that she has never smoked. She has never been exposed to tobacco smoke. She has never used smokeless tobacco. She reports that she does not currently use alcohol after a past usage of about 1.0 standard drink of alcohol per week. She reports that she does not use drugs. ,  _______________________________________________________________________  Allergies:  is allergic to accupril [quinapril hcl], novocain [procaine], parabens, and sulfa antibiotics. .  _______________________________________________________________________  Current Outpatient Medications   Medication Sig Dispense Refill    apixaban (ELIQUIS) 2.5 mg Take 1 tablet (2.5 mg total) by mouth 2 (two) times a day  0    ezetimibe-simvastatin (VYTORIN) 10-20 mg per tablet TAKE 1 TABLET BY MOUTH DAILY AT  BEDTIME 90 tablet 1    furosemide (LASIX) 80 mg tablet TAKE 1 TABLET BY MOUTH DAILY 90 tablet 1    glucose blood (FREESTYLE LITE) test strip Use to check blood sugar three times a day 300 strip 5    insulin glargine (Lantus) 100 units/mL subcutaneous injection Inject 14 Units under the skin daily at bedtime 10 mL 5    insulin lispro (HumaLOG KwikPen) 100 units/mL injection pen Inject 8 Units under the skin 3 (three) times a day with meals      Insulin Pen Needle (BD AutoShield Duo) 30G X 5 MM MISC USE DAILY AS DIRECTED WITH INSULIN  each 5    metFORMIN (GLUCOPHAGE) 500 mg tablet TAKE 1 TABLET BY MOUTH TWICE  DAILY 180 tablet 1    metoprolol succinate (TOPROL-XL) 25 mg 24 hr tablet TAKE 3 TABLETS BY MOUTH TWICE  DAILY 540 tablet 1    potassium chloride (K-DUR,KLOR-CON) 20 mEq tablet TAKE 1 TABLET BY MOUTH  DAILY 90 tablet 3    valsartan (DIOVAN) 160 mg tablet TAKE 1 TABLET BY MOUTH DAILY 90 tablet 1    Insulin Glargine Solostar (Lantus SoloStar) 100 UNIT/ML SOPN Inject 0.14 mL (14 Units total) under the skin daily at bedtime       No current facility-administered medications for this visit.     _______________________________________________________________________  Review of Systems   Constitutional:  Negative for fatigue and fever. HENT: Negative. Respiratory: Negative. Cardiovascular: Negative. Genitourinary: Negative. Musculoskeletal:  Positive for arthralgias and gait problem. Hematological: Negative. Objective:  Vitals:    12/06/23 1402   BP: 120/66   BP Location: Right arm   Patient Position: Sitting   Cuff Size: Large   Pulse: 68   Resp: 16   Temp: (!) 97.3 °F (36.3 °C)   TempSrc: Tympanic   SpO2: 98%   Weight: 83 kg (183 lb)   Height: 5' 1" (1.549 m)     Body mass index is 34.58 kg/m².      Physical Exam  Constitutional:       Appearance: She is obese.   HENT:      Head: Normocephalic and atraumatic. Right Ear: External ear normal.      Left Ear: External ear normal.      Nose: Nose normal.      Mouth/Throat:      Mouth: Mucous membranes are moist.   Neck:      Vascular: No carotid bruit. Cardiovascular:      Rate and Rhythm: Normal rate and regular rhythm. Heart sounds: Normal heart sounds. Pulmonary:      Effort: Pulmonary effort is normal.      Breath sounds: Normal breath sounds. Musculoskeletal:         General: Normal range of motion. Cervical back: Normal range of motion and neck supple. Skin:     General: Skin is warm. Neurological:      General: No focal deficit present. Mental Status: She is alert. Mental status is at baseline. Psychiatric:         Mood and Affect: Mood normal.         Thought Content:  Thought content normal.

## 2024-01-01 DIAGNOSIS — I48.91 ATRIAL FIBRILLATION WITH RAPID VENTRICULAR RESPONSE (HCC): ICD-10-CM

## 2024-01-10 ENCOUNTER — TELEPHONE (OUTPATIENT)
Dept: NEUROLOGY | Facility: CLINIC | Age: 89
End: 2024-01-10

## 2024-01-11 ENCOUNTER — CONSULT (OUTPATIENT)
Dept: NEUROLOGY | Facility: CLINIC | Age: 89
End: 2024-01-11
Payer: MEDICARE

## 2024-01-11 VITALS
DIASTOLIC BLOOD PRESSURE: 88 MMHG | BODY MASS INDEX: 35.68 KG/M2 | SYSTOLIC BLOOD PRESSURE: 134 MMHG | WEIGHT: 189 LBS | HEIGHT: 61 IN | HEART RATE: 98 BPM

## 2024-01-11 DIAGNOSIS — R41.3 MEMORY LOSS: ICD-10-CM

## 2024-01-11 DIAGNOSIS — G31.84 MILD COGNITIVE IMPAIRMENT: Primary | ICD-10-CM

## 2024-01-11 PROCEDURE — 99204 OFFICE O/P NEW MOD 45 MIN: CPT | Performed by: PSYCHIATRY & NEUROLOGY

## 2024-01-11 NOTE — ASSESSMENT & PLAN NOTE
87 yo F with DM on insulin, osteoarthritis of multiple joints, HTN, A-fib on reduced Eliquis dose, HLD is here for evaluation and treatment of cognitive problems.    1/11/2024 MOCA: 23/30 (lost 1 point in visuospatial/executive, 1 point in attention, 5 points in delayed recall)    Impression: mild cognitive impairment likely natural aging; cannot exclude neurodegenerative process    Plan:  -Discussed plan with neurology attending, Dr. Galvan. Please refer to the attestation for additional recommendation and plan  -Discussed about getting MRI brain NeuroQuant to rule out neurodegenerative process and medication Aricept to slow the process. Patient and her  Bill would like to hold off for now and would like to do non-pharmacology management for now  -Will get B12/MMA, folate and TSH labs. Will replete as needed  -Referral to Speech therapy: cognitive therapy  -Recommend being active mentally (puzzles, crosswords), socially and physically as tolerated  -Recommend wearing hearing aids    Follow up in about 6 months

## 2024-01-11 NOTE — PROGRESS NOTES
Patient ID: Dayana Reagan is a 88 y.o. female.    Assessment/Plan:    Mild cognitive impairment  89 yo F with DM on insulin, osteoarthritis of multiple joints, HTN, A-fib on reduced Eliquis dose, HLD is here for evaluation and treatment of cognitive problems.    1/11/2024 MOCA: 23/30 (lost 1 point in visuospatial/executive, 1 point in attention, 5 points in delayed recall)    Impression: mild cognitive impairment likely natural aging; cannot exclude neurodegenerative process    Plan:  -Discussed plan with neurology attending, Dr. Galvan. Please refer to the attestation for additional recommendation and plan  -Discussed about getting MRI brain NeuroQuant to rule out neurodegenerative process and medication Aricept to slow the process. Patient and her  Bill would like to hold off for now and would like to do non-pharmacology management for now  -Will get B12/MMA, folate and TSH labs. Will replete as needed  -Referral to Speech therapy: cognitive therapy  -Recommend being active mentally (puzzles, crosswords), socially and physically as tolerated  -Recommend wearing hearing aids    Follow up in about 6 months    Memory loss  Same plans as above       Diagnoses and all orders for this visit:    Mild cognitive impairment  -     Ambulatory Referral to Neurology  -     Vitamin B12/Folate, Serum Panel; Future  -     Methylmalonic acid, serum; Future  -     TSH, 3rd generation with Free T4 reflex; Future  -     Ambulatory Referral to Speech Therapy; Future    Memory loss  -     Ambulatory Referral to Neurology  -     Vitamin B12/Folate, Serum Panel; Future  -     Methylmalonic acid, serum; Future  -     TSH, 3rd generation with Free T4 reflex; Future  -     Ambulatory Referral to Speech Therapy; Future           Subjective:    Dayana Reagan 89 yo F with DM on insulin, osteoarthritis of multiple joints, HTN, A-fib on reduced Eliquis dose, HLD, hearing loss using hearing aids is here for evaluation and treatment of  "cognitive problems. She is accompanied by  Ray. Primary caregiver is patient and . Patient lives with their spouse.     For the past two years, her memory is progressively worsened. She sometimes is confused. She asks the same questions over and over again. She forgets names. She has difficulty finding words. Independent with ADLs. She lives at independent living facility at [facility] where she moved in with her  in April 2023 so no need to cook. She does not forget to turn off water, lights, to close the fridge door. She does not get lost in her residential area, but has a hard time making her way.  takes over writing checks. Patient stopped driving in Jan 2023, but still has 's license. Her car was sold that time. Before that, patient has difficulty finding familiar route. Patient sometimes gets aggravated but not so much. She does not use any electronic devices. Her  keeps track of the appointments and medications. Patient normally uses a rolling walker at home due to left knee pain.    Fam hx: late young brother had memory issues in early 70s  Social hx: no smoking, drinks a glass of wine in 2 months, no recreational drug use    The following portions of the patient's history were reviewed and updated as appropriate: allergies, current medications, past family history, past medical history, past social history, past surgical history, and problem list.      Objective:    Blood pressure 134/88, pulse 98, height 5' 1\" (1.549 m), weight 85.7 kg (189 lb).    Physical Exam  Constitutional:       General: She is awake. She is not in acute distress.  HENT:      Head: Normocephalic and atraumatic.      Nose: Nose normal.      Mouth/Throat:      Mouth: Mucous membranes are moist.      Pharynx: Oropharynx is clear. No oropharyngeal exudate or posterior oropharyngeal erythema.   Eyes:      General: Lids are normal.      Extraocular Movements: Extraocular movements intact.      " Pupils: Pupils are equal, round, and reactive to light.   Cardiovascular:      Rate and Rhythm: Normal rate.      Pulses: Normal pulses.   Pulmonary:      Effort: Pulmonary effort is normal.   Musculoskeletal:         General: Normal range of motion.      Cervical back: Normal range of motion.   Skin:     General: Skin is warm.   Neurological:      Motor: Motor strength is normal.     Deep Tendon Reflexes:      Reflex Scores:       Tricep reflexes are 2+ on the right side and 2+ on the left side.       Bicep reflexes are 2+ on the right side and 2+ on the left side.       Brachioradialis reflexes are 2+ on the right side and 2+ on the left side.       Patellar reflexes are 1+ on the right side and 1+ on the left side.       Achilles reflexes are 1+ on the right side and 1+ on the left side.  Psychiatric:         Mood and Affect: Mood normal.         Speech: Speech normal.         Neurological Exam  Mental Status  Awake. Oriented to person, place and time. Speech is normal. Language is fluent with no aphasia.    Cranial Nerves  CN II: Visual acuity is normal. Visual fields full to confrontation. Right funduscopic exam: not visualized. Left funduscopic exam: not visualized.  CN III, IV, VI: Extraocular movements intact bilaterally. Normal lids and orbits bilaterally. Pupils equal round and reactive to light bilaterally.  CN V: Facial sensation is normal.  CN VII: Full and symmetric facial movement.  CN VIII: Hearing is normal.  CN IX, X: Palate elevates symmetrically  CN XI: Shoulder shrug strength is normal.  CN XII: Tongue midline without atrophy or fasciculations.    Motor  Normal muscle bulk throughout. No fasciculations present. Normal muscle tone. No abnormal involuntary movements. Strength is 5/5 throughout all four extremities.    Sensory  Light touch is normal in upper and lower extremities.     Reflexes                                            Right                      Left  Brachioradialis                     2+                         2+  Biceps                                 2+                         2+  Triceps                                2+                         2+  Patellar                                1+                         1+  Achilles                                1+                         1+    Coordination  Right: Finger-to-nose normal.Left: Finger-to-nose normal.    Gait  Casual gait is normal including stance, stride, and arm swing.  Uses a rolling walker to ambulate.      ROS:    Review of Systems   Constitutional:  Negative for chills and fever.   HENT:  Negative for ear pain and sore throat.    Eyes:  Negative for pain and visual disturbance.   Respiratory:  Negative for cough and shortness of breath.    Cardiovascular:  Negative for chest pain and palpitations.   Gastrointestinal:  Negative for abdominal pain and vomiting.   Genitourinary:  Negative for dysuria and hematuria.   Musculoskeletal:  Positive for arthralgias. Negative for back pain.        Left knee pain   Skin:  Negative for color change and rash.   Neurological:  Negative for seizures, syncope and headaches.   All other systems reviewed and are negative.

## 2024-01-11 NOTE — PATIENT INSTRUCTIONS
Please get blood work done    Will consider MRI brain with NeruoQuant and Aricept at the next appointment    Referral to speech therapy: cognitive therapy    Recommend more socializing and exercises as tolerated and crosswords and puzzles    Follow up in about 6 months

## 2024-01-15 DIAGNOSIS — E11.65 TYPE 2 DIABETES MELLITUS WITH HYPERGLYCEMIA, WITH LONG-TERM CURRENT USE OF INSULIN (HCC): ICD-10-CM

## 2024-01-15 DIAGNOSIS — Z79.4 TYPE 2 DIABETES MELLITUS WITH HYPERGLYCEMIA, WITH LONG-TERM CURRENT USE OF INSULIN (HCC): ICD-10-CM

## 2024-01-15 RX ORDER — PEN NEEDLE, DIABETIC, SAFETY 30 GX3/16"
NEEDLE, DISPOSABLE MISCELLANEOUS
Qty: 100 EACH | Refills: 0 | Status: SHIPPED | OUTPATIENT
Start: 2024-01-15

## 2024-01-17 ENCOUNTER — APPOINTMENT (OUTPATIENT)
Dept: LAB | Facility: CLINIC | Age: 89
End: 2024-01-17
Payer: MEDICARE

## 2024-01-17 DIAGNOSIS — R41.3 MEMORY LOSS: ICD-10-CM

## 2024-01-17 DIAGNOSIS — G31.84 MILD COGNITIVE IMPAIRMENT: ICD-10-CM

## 2024-01-17 LAB
FOLATE SERPL-MCNC: 17.9 NG/ML
TSH SERPL DL<=0.05 MIU/L-ACNC: 4.25 UIU/ML (ref 0.45–4.5)
VIT B12 SERPL-MCNC: 204 PG/ML (ref 180–914)

## 2024-01-17 PROCEDURE — 82746 ASSAY OF FOLIC ACID SERUM: CPT

## 2024-01-17 PROCEDURE — 36415 COLL VENOUS BLD VENIPUNCTURE: CPT

## 2024-01-17 PROCEDURE — 84443 ASSAY THYROID STIM HORMONE: CPT

## 2024-01-17 PROCEDURE — 83918 ORGANIC ACIDS TOTAL QUANT: CPT

## 2024-01-17 PROCEDURE — 82607 VITAMIN B-12: CPT

## 2024-01-22 LAB — METHYLMALONATE SERPL-SCNC: 358 NMOL/L (ref 0–378)

## 2024-02-26 DIAGNOSIS — Z79.4 TYPE 2 DIABETES MELLITUS WITH HYPERGLYCEMIA, WITH LONG-TERM CURRENT USE OF INSULIN (HCC): ICD-10-CM

## 2024-02-26 DIAGNOSIS — E11.65 TYPE 2 DIABETES MELLITUS WITH HYPERGLYCEMIA, WITH LONG-TERM CURRENT USE OF INSULIN (HCC): ICD-10-CM

## 2024-02-26 RX ORDER — PEN NEEDLE, DIABETIC, SAFETY 30 GX3/16"
NEEDLE, DISPOSABLE MISCELLANEOUS
Qty: 100 EACH | Refills: 0 | Status: SHIPPED | OUTPATIENT
Start: 2024-02-26

## 2024-02-26 NOTE — TELEPHONE ENCOUNTER
Fax received from [facility] on  [address] requesting refill:  BD AutoShield Duo Miscellaneous 30G x 5 MM.  100.0  Use daily as directed w/insulin pen  (Last filled 1/16/24)

## 2024-03-14 DIAGNOSIS — I50.33 ACUTE ON CHRONIC DIASTOLIC CONGESTIVE HEART FAILURE (HCC): ICD-10-CM

## 2024-03-14 RX ORDER — FUROSEMIDE 80 MG
80 TABLET ORAL DAILY
Qty: 90 TABLET | Refills: 1 | Status: SHIPPED | OUTPATIENT
Start: 2024-03-14

## 2024-03-14 RX ORDER — VALSARTAN 160 MG/1
160 TABLET ORAL DAILY
Qty: 90 TABLET | Refills: 1 | Status: SHIPPED | OUTPATIENT
Start: 2024-03-14

## 2024-03-20 DIAGNOSIS — Z00.6 ENCOUNTER FOR EXAMINATION FOR NORMAL COMPARISON OR CONTROL IN CLINICAL RESEARCH PROGRAM: ICD-10-CM

## 2024-03-21 ENCOUNTER — APPOINTMENT (OUTPATIENT)
Dept: LAB | Facility: AMBULARY SURGERY CENTER | Age: 89
End: 2024-03-21

## 2024-03-21 DIAGNOSIS — Z00.6 ENCOUNTER FOR EXAMINATION FOR NORMAL COMPARISON OR CONTROL IN CLINICAL RESEARCH PROGRAM: ICD-10-CM

## 2024-03-21 DIAGNOSIS — E11.65 TYPE 2 DIABETES MELLITUS WITH HYPERGLYCEMIA, UNSPECIFIED WHETHER LONG TERM INSULIN USE (HCC): ICD-10-CM

## 2024-03-21 PROCEDURE — 36415 COLL VENOUS BLD VENIPUNCTURE: CPT

## 2024-03-27 DIAGNOSIS — E11.65 TYPE 2 DIABETES MELLITUS WITH HYPERGLYCEMIA, WITH LONG-TERM CURRENT USE OF INSULIN (HCC): ICD-10-CM

## 2024-03-27 DIAGNOSIS — Z79.4 TYPE 2 DIABETES MELLITUS WITH HYPERGLYCEMIA, WITH LONG-TERM CURRENT USE OF INSULIN (HCC): ICD-10-CM

## 2024-03-28 DIAGNOSIS — E11.65 TYPE 2 DIABETES MELLITUS WITH HYPERGLYCEMIA, WITH LONG-TERM CURRENT USE OF INSULIN (HCC): ICD-10-CM

## 2024-03-28 DIAGNOSIS — Z79.4 TYPE 2 DIABETES MELLITUS WITH HYPERGLYCEMIA, WITH LONG-TERM CURRENT USE OF INSULIN (HCC): ICD-10-CM

## 2024-03-28 RX ORDER — PEN NEEDLE, DIABETIC, SAFETY 30 GX3/16"
NEEDLE, DISPOSABLE MISCELLANEOUS
Qty: 100 EACH | Refills: 1 | Status: SHIPPED | OUTPATIENT
Start: 2024-03-28 | End: 2024-03-29 | Stop reason: SDUPTHER

## 2024-03-28 RX ORDER — PEN NEEDLE, DIABETIC, SAFETY 30 GX3/16"
NEEDLE, DISPOSABLE MISCELLANEOUS
Qty: 100 EACH | Refills: 3 | OUTPATIENT
Start: 2024-03-28

## 2024-03-28 NOTE — TELEPHONE ENCOUNTER
----- Message from Dayana Reagan sent at 3/27/2024  6:51 PM EDT -----  Regarding: Refill prescription needed for needed for insulin needles.  Contact: [phone]  Dayana needs refill prescription sent to [facility] on [address] for insulin needles.   She has one supply for one day left.

## 2024-03-29 DIAGNOSIS — Z79.4 TYPE 2 DIABETES MELLITUS WITH HYPERGLYCEMIA, WITH LONG-TERM CURRENT USE OF INSULIN (HCC): ICD-10-CM

## 2024-03-29 DIAGNOSIS — E11.65 TYPE 2 DIABETES MELLITUS WITH HYPERGLYCEMIA, WITH LONG-TERM CURRENT USE OF INSULIN (HCC): ICD-10-CM

## 2024-03-29 RX ORDER — PEN NEEDLE, DIABETIC, SAFETY 30 GX3/16"
NEEDLE, DISPOSABLE MISCELLANEOUS
Qty: 400 EACH | Refills: 5 | Status: SHIPPED | OUTPATIENT
Start: 2024-03-29

## 2024-04-09 LAB
APOB+LDLR+PCSK9 GENE MUT ANL BLD/T: NOT DETECTED
BRCA1+BRCA2 DEL+DUP + FULL MUT ANL BLD/T: NOT DETECTED
MLH1+MSH2+MSH6+PMS2 GN DEL+DUP+FUL M: NOT DETECTED

## 2024-05-15 ENCOUNTER — HOSPITAL ENCOUNTER (INPATIENT)
Facility: HOSPITAL | Age: 89
LOS: 3 days | Discharge: HOME | DRG: 872 | End: 2024-05-18
Attending: EMERGENCY MEDICINE | Admitting: INTERNAL MEDICINE
Payer: MEDICARE

## 2024-05-15 ENCOUNTER — APPOINTMENT (EMERGENCY)
Dept: RADIOLOGY | Facility: HOSPITAL | Age: 89
DRG: 872 | End: 2024-05-15
Payer: MEDICARE

## 2024-05-15 DIAGNOSIS — Z79.4 TYPE 2 DIABETES MELLITUS WITHOUT COMPLICATION, WITH LONG-TERM CURRENT USE OF INSULIN (HCC): ICD-10-CM

## 2024-05-15 DIAGNOSIS — E11.9 TYPE 2 DIABETES MELLITUS WITHOUT COMPLICATION, WITH LONG-TERM CURRENT USE OF INSULIN (HCC): ICD-10-CM

## 2024-05-15 DIAGNOSIS — N39.0 UTI (URINARY TRACT INFECTION): Primary | ICD-10-CM

## 2024-05-15 DIAGNOSIS — I10 ESSENTIAL HYPERTENSION: ICD-10-CM

## 2024-05-15 DIAGNOSIS — I48.91 ATRIAL FIBRILLATION WITH RAPID VENTRICULAR RESPONSE (HCC): ICD-10-CM

## 2024-05-15 DIAGNOSIS — E87.1 HYPONATREMIA: ICD-10-CM

## 2024-05-15 PROBLEM — A41.9 SEPSIS (HCC): Status: ACTIVE | Noted: 2024-05-15

## 2024-05-15 PROBLEM — I50.32 CHRONIC DIASTOLIC CONGESTIVE HEART FAILURE (HCC): Status: ACTIVE | Noted: 2023-06-15

## 2024-05-15 LAB
2HR DELTA HS TROPONIN: 0 NG/L
ALBUMIN SERPL BCP-MCNC: 4.2 G/DL (ref 3.5–5)
ALP SERPL-CCNC: 78 U/L (ref 34–104)
ALT SERPL W P-5'-P-CCNC: 8 U/L (ref 7–52)
ANION GAP SERPL CALCULATED.3IONS-SCNC: 10 MMOL/L (ref 4–13)
ANISOCYTOSIS BLD QL SMEAR: PRESENT
APTT PPP: 28 SECONDS (ref 23–37)
AST SERPL W P-5'-P-CCNC: 14 U/L (ref 13–39)
BACTERIA UR QL AUTO: ABNORMAL /HPF
BASOPHILS # BLD AUTO: 0.02 THOUSANDS/ÂΜL (ref 0–0.1)
BASOPHILS NFR BLD AUTO: 1 % (ref 0–1)
BILIRUB SERPL-MCNC: 0.45 MG/DL (ref 0.2–1)
BILIRUB UR QL STRIP: NEGATIVE
BUN SERPL-MCNC: 29 MG/DL (ref 5–25)
CALCIUM SERPL-MCNC: 9.4 MG/DL (ref 8.4–10.2)
CARDIAC TROPONIN I PNL SERPL HS: 15 NG/L
CARDIAC TROPONIN I PNL SERPL HS: 15 NG/L
CHLORIDE SERPL-SCNC: 95 MMOL/L (ref 96–108)
CLARITY UR: ABNORMAL
CO2 SERPL-SCNC: 30 MMOL/L (ref 21–32)
COLOR UR: ABNORMAL
CREAT SERPL-MCNC: 1.33 MG/DL (ref 0.6–1.3)
EOSINOPHIL # BLD AUTO: 0.01 THOUSAND/ÂΜL (ref 0–0.61)
EOSINOPHIL NFR BLD AUTO: 0 % (ref 0–6)
ERYTHROCYTE [DISTWIDTH] IN BLOOD BY AUTOMATED COUNT: 14.7 % (ref 11.6–15.1)
FLUAV RNA RESP QL NAA+PROBE: NEGATIVE
FLUBV RNA RESP QL NAA+PROBE: NEGATIVE
GFR SERPL CREATININE-BSD FRML MDRD: 35 ML/MIN/1.73SQ M
GLUCOSE SERPL-MCNC: 318 MG/DL (ref 65–140)
GLUCOSE UR STRIP-MCNC: ABNORMAL MG/DL
HCT VFR BLD AUTO: 41.8 % (ref 34.8–46.1)
HGB BLD-MCNC: 13 G/DL (ref 11.5–15.4)
HGB UR QL STRIP.AUTO: ABNORMAL
IMM GRANULOCYTES # BLD AUTO: 0.01 THOUSAND/UL (ref 0–0.2)
IMM GRANULOCYTES NFR BLD AUTO: 0 % (ref 0–2)
INR PPP: 1.18 (ref 0.84–1.19)
KETONES UR STRIP-MCNC: NEGATIVE MG/DL
LACTATE SERPL-SCNC: 1.5 MMOL/L (ref 0.5–2)
LEUKOCYTE ESTERASE UR QL STRIP: ABNORMAL
LG PLATELETS BLD QL SMEAR: PRESENT
LYMPHOCYTES # BLD AUTO: 0.33 THOUSANDS/ÂΜL (ref 0.6–4.47)
LYMPHOCYTES NFR BLD AUTO: 8 % (ref 14–44)
MCH RBC QN AUTO: 28 PG (ref 26.8–34.3)
MCHC RBC AUTO-ENTMCNC: 31.1 G/DL (ref 31.4–37.4)
MCV RBC AUTO: 90 FL (ref 82–98)
MONOCYTES # BLD AUTO: 0.24 THOUSAND/ÂΜL (ref 0.17–1.22)
MONOCYTES NFR BLD AUTO: 6 % (ref 4–12)
NEUTROPHILS # BLD AUTO: 3.51 THOUSANDS/ÂΜL (ref 1.85–7.62)
NEUTS SEG NFR BLD AUTO: 85 % (ref 43–75)
NITRITE UR QL STRIP: POSITIVE
NON-SQ EPI CELLS URNS QL MICRO: ABNORMAL /HPF
NRBC BLD AUTO-RTO: 0 /100 WBCS
PH UR STRIP.AUTO: 6.5 [PH]
PLATELET # BLD AUTO: 123 THOUSANDS/UL (ref 149–390)
PLATELET BLD QL SMEAR: ADEQUATE
PMV BLD AUTO: 10.9 FL (ref 8.9–12.7)
POTASSIUM SERPL-SCNC: 4 MMOL/L (ref 3.5–5.3)
PROCALCITONIN SERPL-MCNC: 0.12 NG/ML
PROT SERPL-MCNC: 7.6 G/DL (ref 6.4–8.4)
PROT UR STRIP-MCNC: ABNORMAL MG/DL
PROTHROMBIN TIME: 15.7 SECONDS (ref 11.6–14.5)
RBC # BLD AUTO: 4.64 MILLION/UL (ref 3.81–5.12)
RBC #/AREA URNS AUTO: ABNORMAL /HPF
RBC MORPH BLD: PRESENT
RSV RNA RESP QL NAA+PROBE: NEGATIVE
SARS-COV-2 RNA RESP QL NAA+PROBE: NEGATIVE
SODIUM SERPL-SCNC: 135 MMOL/L (ref 135–147)
SP GR UR STRIP.AUTO: 1.01 (ref 1–1.03)
UROBILINOGEN UR STRIP-ACNC: <2 MG/DL
WBC # BLD AUTO: 4.12 THOUSAND/UL (ref 4.31–10.16)
WBC #/AREA URNS AUTO: ABNORMAL /HPF
WBC CLUMPS # UR AUTO: PRESENT /UL

## 2024-05-15 PROCEDURE — 99223 1ST HOSP IP/OBS HIGH 75: CPT | Performed by: NURSE PRACTITIONER

## 2024-05-15 PROCEDURE — 87077 CULTURE AEROBIC IDENTIFY: CPT

## 2024-05-15 PROCEDURE — 87040 BLOOD CULTURE FOR BACTERIA: CPT

## 2024-05-15 PROCEDURE — 83605 ASSAY OF LACTIC ACID: CPT

## 2024-05-15 PROCEDURE — 93005 ELECTROCARDIOGRAM TRACING: CPT

## 2024-05-15 PROCEDURE — 85610 PROTHROMBIN TIME: CPT

## 2024-05-15 PROCEDURE — 81001 URINALYSIS AUTO W/SCOPE: CPT

## 2024-05-15 PROCEDURE — 71045 X-RAY EXAM CHEST 1 VIEW: CPT

## 2024-05-15 PROCEDURE — 84145 PROCALCITONIN (PCT): CPT

## 2024-05-15 PROCEDURE — 83036 HEMOGLOBIN GLYCOSYLATED A1C: CPT | Performed by: NURSE PRACTITIONER

## 2024-05-15 PROCEDURE — 87186 SC STD MICRODIL/AGAR DIL: CPT

## 2024-05-15 PROCEDURE — 85730 THROMBOPLASTIN TIME PARTIAL: CPT

## 2024-05-15 PROCEDURE — 0241U HB NFCT DS VIR RESP RNA 4 TRGT: CPT

## 2024-05-15 PROCEDURE — 36415 COLL VENOUS BLD VENIPUNCTURE: CPT

## 2024-05-15 PROCEDURE — 85025 COMPLETE CBC W/AUTO DIFF WBC: CPT

## 2024-05-15 PROCEDURE — 96365 THER/PROPH/DIAG IV INF INIT: CPT

## 2024-05-15 PROCEDURE — 84484 ASSAY OF TROPONIN QUANT: CPT

## 2024-05-15 PROCEDURE — 99285 EMERGENCY DEPT VISIT HI MDM: CPT | Performed by: EMERGENCY MEDICINE

## 2024-05-15 PROCEDURE — 99285 EMERGENCY DEPT VISIT HI MDM: CPT

## 2024-05-15 PROCEDURE — 87086 URINE CULTURE/COLONY COUNT: CPT

## 2024-05-15 PROCEDURE — 80053 COMPREHEN METABOLIC PANEL: CPT

## 2024-05-15 RX ORDER — APIXABAN 2.5 MG/1
2.5 TABLET, FILM COATED ORAL 2 TIMES DAILY
Qty: 180 TABLET | Refills: 3 | Status: SHIPPED | OUTPATIENT
Start: 2024-05-15

## 2024-05-15 RX ORDER — PRAVASTATIN SODIUM 40 MG
40 TABLET ORAL
Status: DISCONTINUED | OUTPATIENT
Start: 2024-05-16 | End: 2024-05-18 | Stop reason: HOSPADM

## 2024-05-15 RX ORDER — INSULIN LISPRO 100 [IU]/ML
8 INJECTION, SOLUTION INTRAVENOUS; SUBCUTANEOUS
Status: DISCONTINUED | OUTPATIENT
Start: 2024-05-16 | End: 2024-05-18 | Stop reason: HOSPADM

## 2024-05-15 RX ORDER — INSULIN GLARGINE 100 [IU]/ML
14 INJECTION, SOLUTION SUBCUTANEOUS
Status: DISCONTINUED | OUTPATIENT
Start: 2024-05-15 | End: 2024-05-18 | Stop reason: HOSPADM

## 2024-05-15 RX ORDER — EZETIMIBE 10 MG/1
10 TABLET ORAL
Status: DISCONTINUED | OUTPATIENT
Start: 2024-05-16 | End: 2024-05-18 | Stop reason: HOSPADM

## 2024-05-15 RX ORDER — LOSARTAN POTASSIUM 50 MG/1
100 TABLET ORAL DAILY
Status: DISCONTINUED | OUTPATIENT
Start: 2024-05-16 | End: 2024-05-18 | Stop reason: HOSPADM

## 2024-05-15 RX ORDER — METOPROLOL SUCCINATE 25 MG/1
75 TABLET, EXTENDED RELEASE ORAL 2 TIMES DAILY
Qty: 540 TABLET | Refills: 1 | Status: SHIPPED | OUTPATIENT
Start: 2024-05-15

## 2024-05-15 RX ORDER — POTASSIUM CHLORIDE 20 MEQ/1
20 TABLET, EXTENDED RELEASE ORAL DAILY
Status: DISCONTINUED | OUTPATIENT
Start: 2024-05-16 | End: 2024-05-18 | Stop reason: HOSPADM

## 2024-05-15 RX ORDER — INSULIN LISPRO 100 [IU]/ML
1-6 INJECTION, SOLUTION INTRAVENOUS; SUBCUTANEOUS
Status: DISCONTINUED | OUTPATIENT
Start: 2024-05-16 | End: 2024-05-18 | Stop reason: HOSPADM

## 2024-05-15 RX ORDER — INSULIN LISPRO 100 [IU]/ML
1-5 INJECTION, SOLUTION INTRAVENOUS; SUBCUTANEOUS
Status: DISCONTINUED | OUTPATIENT
Start: 2024-05-15 | End: 2024-05-18 | Stop reason: HOSPADM

## 2024-05-15 RX ORDER — ACETAMINOPHEN 325 MG/1
975 TABLET ORAL ONCE
Status: COMPLETED | OUTPATIENT
Start: 2024-05-15 | End: 2024-05-15

## 2024-05-15 RX ORDER — EZETIMIBE AND SIMVASTATIN 10; 20 MG/1; MG/1
1 TABLET ORAL
Qty: 90 TABLET | Refills: 0 | Status: SHIPPED | OUTPATIENT
Start: 2024-05-15

## 2024-05-15 RX ORDER — POTASSIUM CHLORIDE 20 MEQ/1
TABLET, EXTENDED RELEASE ORAL
Qty: 90 TABLET | Refills: 3 | Status: SHIPPED | OUTPATIENT
Start: 2024-05-15

## 2024-05-15 RX ORDER — FUROSEMIDE 80 MG
80 TABLET ORAL DAILY
Status: DISCONTINUED | OUTPATIENT
Start: 2024-05-16 | End: 2024-05-18 | Stop reason: HOSPADM

## 2024-05-15 RX ADMIN — SODIUM CHLORIDE 250 ML: 0.9 INJECTION, SOLUTION INTRAVENOUS at 21:30

## 2024-05-15 RX ADMIN — CEFTRIAXONE SODIUM 1000 MG: 10 INJECTION, POWDER, FOR SOLUTION INTRAVENOUS at 21:00

## 2024-05-15 RX ADMIN — ACETAMINOPHEN 975 MG: 325 TABLET, FILM COATED ORAL at 21:05

## 2024-05-15 NOTE — TELEPHONE ENCOUNTER
Refill must be reviewed and completed by the office or provider. The refill is unable to be approved or denied by the medication management team.    Failed eGfr  Patient also past due for hba1c

## 2024-05-15 NOTE — ED NOTES
Pt aware of ordered UA sample. Pt unable to produce urine sample at present time. Will continue to monitor.     Sherri Farnsworth RN  05/15/24 1957

## 2024-05-16 PROBLEM — N30.00 ACUTE CYSTITIS WITHOUT HEMATURIA: Status: ACTIVE | Noted: 2023-02-13

## 2024-05-16 PROBLEM — R93.89 ABNORMAL CXR: Status: ACTIVE | Noted: 2024-05-16

## 2024-05-16 PROBLEM — R26.2 AMBULATORY DYSFUNCTION: Status: ACTIVE | Noted: 2024-05-16

## 2024-05-16 LAB
ANION GAP SERPL CALCULATED.3IONS-SCNC: 6 MMOL/L (ref 4–13)
ANISOCYTOSIS BLD QL SMEAR: PRESENT
BASOPHILS # BLD AUTO: 0.01 THOUSANDS/ÂΜL (ref 0–0.1)
BASOPHILS NFR BLD AUTO: 0 % (ref 0–1)
BUN SERPL-MCNC: 25 MG/DL (ref 5–25)
CALCIUM SERPL-MCNC: 8.8 MG/DL (ref 8.4–10.2)
CHLORIDE SERPL-SCNC: 100 MMOL/L (ref 96–108)
CO2 SERPL-SCNC: 29 MMOL/L (ref 21–32)
CREAT SERPL-MCNC: 1.24 MG/DL (ref 0.6–1.3)
EOSINOPHIL # BLD AUTO: 0 THOUSAND/ÂΜL (ref 0–0.61)
EOSINOPHIL NFR BLD AUTO: 0 % (ref 0–6)
ERYTHROCYTE [DISTWIDTH] IN BLOOD BY AUTOMATED COUNT: 14.8 % (ref 11.6–15.1)
EST. AVERAGE GLUCOSE BLD GHB EST-MCNC: 140 MG/DL
GFR SERPL CREATININE-BSD FRML MDRD: 38 ML/MIN/1.73SQ M
GLUCOSE SERPL-MCNC: 113 MG/DL (ref 65–140)
GLUCOSE SERPL-MCNC: 117 MG/DL (ref 65–140)
GLUCOSE SERPL-MCNC: 157 MG/DL (ref 65–140)
GLUCOSE SERPL-MCNC: 168 MG/DL (ref 65–140)
GLUCOSE SERPL-MCNC: 204 MG/DL (ref 65–140)
GLUCOSE SERPL-MCNC: 92 MG/DL (ref 65–140)
HBA1C MFR BLD: 6.5 %
HCT VFR BLD AUTO: 38.3 % (ref 34.8–46.1)
HGB BLD-MCNC: 12.1 G/DL (ref 11.5–15.4)
IMM GRANULOCYTES # BLD AUTO: 0.01 THOUSAND/UL (ref 0–0.2)
IMM GRANULOCYTES NFR BLD AUTO: 0 % (ref 0–2)
LYMPHOCYTES # BLD AUTO: 0.24 THOUSANDS/ÂΜL (ref 0.6–4.47)
LYMPHOCYTES NFR BLD AUTO: 7 % (ref 14–44)
MCH RBC QN AUTO: 28.1 PG (ref 26.8–34.3)
MCHC RBC AUTO-ENTMCNC: 31.6 G/DL (ref 31.4–37.4)
MCV RBC AUTO: 89 FL (ref 82–98)
MONOCYTES # BLD AUTO: 0.29 THOUSAND/ÂΜL (ref 0.17–1.22)
MONOCYTES NFR BLD AUTO: 8 % (ref 4–12)
NEUTROPHILS # BLD AUTO: 3.05 THOUSANDS/ÂΜL (ref 1.85–7.62)
NEUTS SEG NFR BLD AUTO: 85 % (ref 43–75)
NRBC BLD AUTO-RTO: 0 /100 WBCS
PLATELET # BLD AUTO: 92 THOUSANDS/UL (ref 149–390)
PLATELET BLD QL SMEAR: ABNORMAL
PMV BLD AUTO: 10.1 FL (ref 8.9–12.7)
POTASSIUM SERPL-SCNC: 4 MMOL/L (ref 3.5–5.3)
PROCALCITONIN SERPL-MCNC: 0.18 NG/ML
RBC # BLD AUTO: 4.31 MILLION/UL (ref 3.81–5.12)
RBC MORPH BLD: PRESENT
SODIUM SERPL-SCNC: 135 MMOL/L (ref 135–147)
WBC # BLD AUTO: 3.6 THOUSAND/UL (ref 4.31–10.16)

## 2024-05-16 PROCEDURE — 99233 SBSQ HOSP IP/OBS HIGH 50: CPT | Performed by: FAMILY MEDICINE

## 2024-05-16 PROCEDURE — 97167 OT EVAL HIGH COMPLEX 60 MIN: CPT

## 2024-05-16 PROCEDURE — 82948 REAGENT STRIP/BLOOD GLUCOSE: CPT

## 2024-05-16 PROCEDURE — 80048 BASIC METABOLIC PNL TOTAL CA: CPT | Performed by: FAMILY MEDICINE

## 2024-05-16 PROCEDURE — 97163 PT EVAL HIGH COMPLEX 45 MIN: CPT

## 2024-05-16 PROCEDURE — 85025 COMPLETE CBC W/AUTO DIFF WBC: CPT | Performed by: FAMILY MEDICINE

## 2024-05-16 PROCEDURE — 84145 PROCALCITONIN (PCT): CPT | Performed by: NURSE PRACTITIONER

## 2024-05-16 RX ORDER — ACETAMINOPHEN 325 MG/1
650 TABLET ORAL EVERY 6 HOURS PRN
Status: DISCONTINUED | OUTPATIENT
Start: 2024-05-16 | End: 2024-05-18 | Stop reason: HOSPADM

## 2024-05-16 RX ADMIN — INSULIN LISPRO 1 UNITS: 100 INJECTION, SOLUTION INTRAVENOUS; SUBCUTANEOUS at 11:50

## 2024-05-16 RX ADMIN — INSULIN GLARGINE 14 UNITS: 100 INJECTION, SOLUTION SUBCUTANEOUS at 00:36

## 2024-05-16 RX ADMIN — APIXABAN 2.5 MG: 2.5 TABLET, FILM COATED ORAL at 17:17

## 2024-05-16 RX ADMIN — METOPROLOL SUCCINATE 75 MG: 25 TABLET, EXTENDED RELEASE ORAL at 22:36

## 2024-05-16 RX ADMIN — CEFTRIAXONE SODIUM 1000 MG: 10 INJECTION, POWDER, FOR SOLUTION INTRAVENOUS at 22:35

## 2024-05-16 RX ADMIN — INSULIN LISPRO 8 UNITS: 100 INJECTION, SOLUTION INTRAVENOUS; SUBCUTANEOUS at 16:59

## 2024-05-16 RX ADMIN — EZETIMIBE 10 MG: 10 TABLET ORAL at 17:17

## 2024-05-16 RX ADMIN — INSULIN LISPRO 1 UNITS: 100 INJECTION, SOLUTION INTRAVENOUS; SUBCUTANEOUS at 00:36

## 2024-05-16 RX ADMIN — POTASSIUM CHLORIDE 20 MEQ: 1500 TABLET, EXTENDED RELEASE ORAL at 08:42

## 2024-05-16 RX ADMIN — ACETAMINOPHEN 650 MG: 325 TABLET, FILM COATED ORAL at 11:49

## 2024-05-16 RX ADMIN — INSULIN LISPRO 8 UNITS: 100 INJECTION, SOLUTION INTRAVENOUS; SUBCUTANEOUS at 11:50

## 2024-05-16 RX ADMIN — METOPROLOL SUCCINATE 75 MG: 25 TABLET, EXTENDED RELEASE ORAL at 08:42

## 2024-05-16 RX ADMIN — ACETAMINOPHEN 650 MG: 325 TABLET, FILM COATED ORAL at 22:34

## 2024-05-16 RX ADMIN — LOSARTAN POTASSIUM 100 MG: 50 TABLET, FILM COATED ORAL at 08:42

## 2024-05-16 RX ADMIN — PRAVASTATIN SODIUM 40 MG: 40 TABLET ORAL at 17:17

## 2024-05-16 RX ADMIN — APIXABAN 2.5 MG: 2.5 TABLET, FILM COATED ORAL at 08:41

## 2024-05-16 RX ADMIN — FUROSEMIDE 80 MG: 80 TABLET ORAL at 08:42

## 2024-05-16 RX ADMIN — INSULIN LISPRO 8 UNITS: 100 INJECTION, SOLUTION INTRAVENOUS; SUBCUTANEOUS at 07:49

## 2024-05-16 RX ADMIN — INSULIN GLARGINE 14 UNITS: 100 INJECTION, SOLUTION SUBCUTANEOUS at 22:35

## 2024-05-16 NOTE — PROGRESS NOTES
Formerly [facility]  Progress Note  Name: Dayana Reagan I  MRN: [id number]  Unit/Bed#: S -01 I Date of Admission: 5/15/2024   Date of Service: 5/16/2024 I Hospital Day: 1    Assessment & Plan   * Sepsis (HCC)  Assessment & Plan  This is an 88-year-old female patient with history of dementia, A-fib, hypertension who presented to ED due to generalized weakness, fatigue and mild increase in confusion compared to baseline per family.   SIRS criteria: Tachycardia, hyperthermia. Today with mild leukopenia WBC 3.6 without neutropenia  Suspected source: UTI.  Chest x-ray with mild right basilar opacification, however without clinical evidence of pneumonia, continue to monitor  Lactic acid: 1.5  End organ damage: None.  IV Fluids: Received 250 mL in ED.  IV antibiotics: IV Rocephin.  Follow up on culture results.  No growth thus far.  Fever curve decreasing.  Monitor vital signs, laboratory studies.    Abnormal CXR  Assessment & Plan  New small subtle right basilar opacity, possibly infectious/inflammatory. Recommend PA and lateral views for further assessment. The lungs are otherwise unremarkable.   Patient thus far without pneumonia symptoms, continue to monitor  She is already on ceftriaxone for sepsis suspect secondary to UTI  Speech therapy evaluation requested      Ambulatory dysfunction  Assessment & Plan  PT/OT input appreciated.  Patient appears at her ambulatory baseline and may return to her prior facility    Chronic diastolic congestive heart failure (HCC)  Assessment & Plan  Wt Readings from Last 3 Encounters:   05/15/24 90 kg (198 lb 6.6 oz)   01/11/24 85.7 kg (189 lb)   12/06/23 83 kg (183 lb)   Appears compensated at this time.  Last echocardiogram (March 2023): LVEF 50-55%.  Monitor intake and output, daily weights.  Low sodium diet and fluid restriction.  Continue home medications.  80 mg of PO Furosemide daily.  Toprol XL.      Type 2 diabetes mellitus with hyperglycemia  ([facility])  Assessment & Plan  Lab Results   Component Value Date    HGBA1C 6.5 (H) 05/15/2024       Recent Labs     05/16/24  0035 05/16/24  0738 05/16/24  1127   POCGLU 168* 117 157*       Blood Sugar Average: Last 72 hrs:  (P) [id number] A1C.  Home regimen includes Metformin, will hold while inpatient.  Continue home insulin regimen of 14 units of Lantus HS and 8 units of Lispro TID with meals.  Accu-checks and SSI.  Hypoglycemia protocol.      Persistent atrial fibrillation ([facility])  Assessment & Plan  Rate/Rhythm Control: Toprol XL.  Anticoagulation: Eliquis.    Acute cystitis without hematuria  Assessment & Plan  UA: Large leuks, positive nitrite, trace protein, glucose 1000 (1%), trace occult blood.  Urine micro: Innumerable WBCs, occasional epithelial cells, innumerable bacteria.  Urine culture pending, prior urine culture grew E.coli.  Continue IV Rocephin.    Dementia ([facility])  Assessment & Plan  Alert and oriented x3. Forgetful.  Supportive care.  Patient's  updated on the phone    Thrombocytopenia ([facility])  Assessment & Plan  Acute on chronic thrombocytopenia, likely reactive  Continue Eliquis for now with platelets of 92 today, monitor CBC.  If platelet count drops below 50 would discontinue anticoagulation    Stage 3a chronic kidney disease ([facility])  Assessment & Plan  Lab Results   Component Value Date    EGFR 38 05/16/2024    EGFR 35 05/15/2024    EGFR 35 (L) 06/27/2023    CREATININE 1.24 05/16/2024    CREATININE 1.33 (H) 05/15/2024    CREATININE 1.43 (H) 06/27/2023     At baseline    Essential hypertension  Assessment & Plan  Blood pressure is reviewed and acceptable.  Continue Toprol XL, Furosemide and ARB with holding parameters  Monitor blood pressure trend               VTE Pharmacologic Prophylaxis: VTE Score: 6 High Risk (Score >/= 5) - Pharmacological DVT Prophylaxis Ordered: apixaban (Eliquis). Sequential Compression Devices Ordered.    Mobility:   Basic Mobility Inpatient Raw Score:  14  JH-HLM Goal: 4: Move to chair/commode  JH-HLM Achieved: 7: Walk 25 feet or more  JH-HLM Goal achieved. Continue to encourage appropriate mobility.    Patient Centered Rounds: I performed bedside rounds with nursing staff today.   Discussions with Specialists or Other Care Team Provider: CM, PT, OT    Education and Discussions with Family / Patient: Updated  () via phone.  Left voicemail for daughter.    Total Time Spent on Date of Encounter in care of patient: 50 mins. This time was spent on one or more of the following: performing physical exam; counseling and coordination of care; obtaining or reviewing history; documenting in the medical record; reviewing/ordering tests, medications or procedures; communicating with other healthcare professionals and discussing with patient's family/caregivers.    Current Length of Stay: 1 day(s)  Current Patient Status: Inpatient   Certification Statement: The patient will continue to require additional inpatient hospital stay due to IV Rx, close monitoring  Discharge Plan: Anticipate discharge in 48 hrs to prior facility    Code Status: Level 3 - DNAR and DNI    Subjective:   Patient reports feeling well and voices no complaints.    Objective:     Vitals:   Temp (24hrs), Av.1 °F (37.8 °C), Min:98.4 °F (36.9 °C), Max:101 °F (38.3 °C)    Temp:  [98.4 °F (36.9 °C)-101 °F (38.3 °C)] 100 °F (37.8 °C)  HR:  [] 83  Resp:  [16-20] 16  BP: (114-154)/(67-95) 141/94  SpO2:  [90 %-97 %] 96 %  Body mass index is 37.49 kg/m².     Input and Output Summary (last 24 hours):     Intake/Output Summary (Last 24 hours) at 2024 1440  Last data filed at 2024 1341  Gross per 24 hour   Intake 50 ml   Output 550 ml   Net -500 ml       Physical Exam:   Physical Exam  Constitutional:       General: She is not in acute distress.  HENT:      Head: Normocephalic and atraumatic.      Nose: No congestion.   Eyes:      Conjunctiva/sclera: Conjunctivae normal.    Cardiovascular:      Rate and Rhythm: Normal rate and regular rhythm.      Heart sounds: No murmur heard.  Pulmonary:      Effort: No respiratory distress.      Breath sounds: No wheezing or rales.   Abdominal:      General: There is no distension.      Tenderness: There is no abdominal tenderness. There is no guarding.   Musculoskeletal:      Right lower leg: No edema.      Left lower leg: No edema.   Skin:     General: Skin is warm and dry.   Neurological:      Mental Status: Mental status is at baseline.          Additional Data:     Labs:  Results from last 7 days   Lab Units 05/16/24  0947   WBC Thousand/uL 3.60*   HEMOGLOBIN g/dL 12.1   HEMATOCRIT % 38.3   PLATELETS Thousands/uL 92*   SEGS PCT % 85*   LYMPHO PCT % 7*   MONO PCT % 8   EOS PCT % 0     Results from last 7 days   Lab Units 05/16/24  0947 05/15/24  1905   SODIUM mmol/L 135 135   POTASSIUM mmol/L 4.0 4.0   CHLORIDE mmol/L 100 95*   CO2 mmol/L 29 30   BUN mg/dL 25 29*   CREATININE mg/dL 1.24 1.33*   ANION GAP mmol/L 6 10   CALCIUM mg/dL 8.8 9.4   ALBUMIN g/dL  --  4.2   TOTAL BILIRUBIN mg/dL  --  0.45   ALK PHOS U/L  --  78   ALT U/L  --  8   AST U/L  --  14   GLUCOSE RANDOM mg/dL 204* 318*     Results from last 7 days   Lab Units 05/15/24  2120   INR  1.18     Results from last 7 days   Lab Units 05/16/24  1127 05/16/24  0738 05/16/24  0035   POC GLUCOSE mg/dl 157* 117 168*     Results from last 7 days   Lab Units 05/15/24  1905   HEMOGLOBIN A1C % 6.5*     Results from last 7 days   Lab Units 05/16/24  0521 05/15/24  2120   LACTIC ACID mmol/L  --  1.5   PROCALCITONIN ng/ml 0.18 0.12       Lines/Drains:  Invasive Devices       Peripheral Intravenous Line  Duration             Peripheral IV 05/15/24 Left Antecubital <1 day    Peripheral IV 05/15/24 Right Antecubital <1 day                      Imaging: Reviewed radiology reports from this admission including: chest xray and Personally reviewed the following imaging: chest xray    Recent Cultures  (last 7 days):   Results from last 7 days   Lab Units 05/15/24  1910 05/15/24  1905   BLOOD CULTURE  Received in Microbiology Lab. Culture in Progress. Received in Microbiology Lab. Culture in Progress.       Last 24 Hours Medication List:   Current Facility-Administered Medications   Medication Dose Route Frequency Provider Last Rate    acetaminophen  650 mg Oral Q6H PRN Phyllis Velasquez MD      apixaban  2.5 mg Oral BID JOÃO Doan      cefTRIAXone  1,000 mg Intravenous Q24H JOÃO Doan      ezetimibe  10 mg Oral Daily With Dinner JOÃO Doan      And    pravastatin  40 mg Oral Daily With Dinner JOÃO Doan      furosemide  80 mg Oral Daily JOÃO Doan      insulin glargine  14 Units Subcutaneous HS JOÃO Doan      insulin lispro  1-5 Units Subcutaneous HS JOÃO Doan      insulin lispro  1-6 Units Subcutaneous TID AC JOÃO Doan      insulin lispro  8 Units Subcutaneous TID With Meals JOÃO Doan      losartan  100 mg Oral Daily JOÃO Doan      metoprolol succinate  75 mg Oral BID JOÃO Doan      potassium chloride  20 mEq Oral Daily JOÃO Doan          Today, Patient Was Seen By: Phyllis Velasquez MD    **Please Note: This note may have been constructed using a voice recognition system.**

## 2024-05-16 NOTE — PLAN OF CARE
Problem: Potential for Falls  Goal: Patient will remain free of falls  Description: INTERVENTIONS:  - Educate patient/family on patient safety including physical limitations  - Instruct patient to call for assistance with activity   - Consult OT/PT to assist with strengthening/mobility   - Keep Call bell within reach  - Keep bed low and locked with side rails adjusted as appropriate  - Keep care items and personal belongings within reach  - Initiate and maintain comfort rounds  - Make Fall Risk Sign visible to staff  - Apply yellow socks and bracelet for high fall risk patients  - Consider moving patient to room near nurses station  Outcome: Progressing     Problem: PAIN - ADULT  Goal: Verbalizes/displays adequate comfort level or baseline comfort level  Description: Interventions:  - Encourage patient to monitor pain and request assistance  - Assess pain using appropriate pain scale  - Administer analgesics based on type and severity of pain and evaluate response  - Implement non-pharmacological measures as appropriate and evaluate response  - Consider cultural and social influences on pain and pain management  - Notify physician/advanced practitioner if interventions unsuccessful or patient reports new pain  Outcome: Progressing     Problem: INFECTION - ADULT  Goal: Absence or prevention of progression during hospitalization  Description: INTERVENTIONS:  - Assess and monitor for signs and symptoms of infection  - Monitor lab/diagnostic results  - Monitor all insertion sites, i.e. indwelling lines, tubes, and drains  - Monitor endotracheal if appropriate and nasal secretions for changes in amount and color  - [place] appropriate cooling/warming therapies per order  - Administer medications as ordered  - Instruct and encourage patient and family to use good hand hygiene technique  - Identify and instruct in appropriate isolation precautions for identified infection/condition  Outcome: Progressing  Goal: Absence  of fever/infection during neutropenic period  Description: INTERVENTIONS:  - Monitor WBC    Outcome: Progressing

## 2024-05-16 NOTE — ASSESSMENT & PLAN NOTE
Lab Results   Component Value Date    EGFR 35 05/15/2024    EGFR 35 (L) 06/27/2023    EGFR 39 (L) 06/23/2023    CREATININE 1.33 (H) 05/15/2024    CREATININE 1.43 (H) 06/27/2023    CREATININE 1.32 (H) 06/23/2023   Creatinine upon admission: 1.33  Baseline: 1.3-1.4  Monitor BMP.

## 2024-05-16 NOTE — H&P
"[facility]  H&P  Name: Dayana Reagan 88 y.o. female I MRN: [id number]  Unit/Bed#: S -01 I Date of Admission: 5/15/2024   Date of Service: 5/16/2024 I Hospital Day: 1      Assessment & Plan   * Sepsis (HCC)  Assessment & Plan  Presentation: Patient presented to ED due to generalized weakness, fatigue and mild increase in confusion compared to baseline per family. Patient has dementia at baseline.  SIRS criteria: Tachycardia, hyperthermia  Suspected source: UTI.  Lactic acid: 1.5  End organ damage: None.  IV Fluids: Received 250 mL in ED.  IV antibiotics: IV Rocephin.  Follow up on culture results.  Monitor vital signs, laboratory studies.    UTI (urinary tract infection)  Assessment & Plan  UA: Large leuks, positive nitrite, trace protein, glucose 1000 (1%), trace occult blood.  Urine micro: Innumerable WBCs, occasional epithelial cells, innumerable bacteria.  Urine culture pending, prior urine culture grew E.coli.  Continue IV Rocephin.    Dementia (HCC)  Assessment & Plan  Alert and oriented x3. Forgetful.  Supportive care.    Persistent atrial fibrillation (HCC)  Assessment & Plan  Rate/Rhythm Control: Toprol XL.  Antiplatelet/Anticoagulation: Eliquis.    Type 2 diabetes mellitus with hyperglycemia (HCC)  Assessment & Plan  Lab Results   Component Value Date    HGBA1C 6.3 07/13/2023       No results for input(s): \"POCGLU\" in the last 72 hours.    Blood Sugar Average: Last 72 hrs:  Obtain A1C.  Home regimen includes Metformin, will hold while inpatient.  Continue home insulin regimen of 14 units of Lantus HS and 8 units of Lispro TID with meals.  Accu-checks and SSI.  Hypoglycemia protocol.      Chronic diastolic congestive heart failure (HCC)  Assessment & Plan  Wt Readings from Last 3 Encounters:   05/15/24 90 kg (198 lb 6.6 oz)   01/11/24 85.7 kg (189 lb)   12/06/23 83 kg (183 lb)   Appears compensated at this time.  Last echocardiogram (March 2023): LVEF 50-55%.  Monitor intake " and output, daily weights.  Low sodium diet and fluid restriction.  Continue home medications.  80 mg of PO Furosemide daily.  Toprol XL.      Essential hypertension  Assessment & Plan  Blood pressure is reviewed and acceptable.  Last recorded pressure: 114/67  Continue Toprol XL, Furosemide and ARB.  Monitor blood pressure.    Stage 3a chronic kidney disease (HCC)  Assessment & Plan  Lab Results   Component Value Date    EGFR 35 05/15/2024    EGFR 35 (L) 06/27/2023    EGFR 39 (L) 06/23/2023    CREATININE 1.33 (H) 05/15/2024    CREATININE 1.43 (H) 06/27/2023    CREATININE 1.32 (H) 06/23/2023   Creatinine upon admission: 1.33  Baseline: 1.3-1.4  Monitor BMP.           VTE Pharmacologic Prophylaxis: VTE Score: 6 High Risk (Score >/= 5) - Pharmacological DVT Prophylaxis Ordered: apixaban (Eliquis). Sequential Compression Devices Ordered.  Code Status: Level 3 - DNAR and DNI prior  Discussion with family:  Updated by ED.     Anticipated Length of Stay: Patient will be admitted on an inpatient basis with an anticipated length of stay of greater than 2 midnights secondary to sepsis 2/2 UTI.    Total Time Spent on Date of Encounter in care of patient: 70 mins. This time was spent on one or more of the following: performing physical exam; counseling and coordination of care; obtaining or reviewing history; documenting in the medical record; reviewing/ordering tests, medications or procedures; communicating with other healthcare professionals and discussing with patient's family/caregivers.    Chief Complaint: Fatigue, generalized weakness    History of Present Illness:  Dayana Reagan is a 88 y.o. female with a PMH of dementia, Afib on Eliquis, IDDM2, CHF, CKD, HTN who presents with generalized weakness, fatigue and mild increase in confusion compared to baseline per family. Patient has dementia at baseline. Patient is a poor historian at baseline due to underlying dementia.     Upon evaluation in the ED, patient met sepsis  criteria due to tachycardia and hyperthermia with suspected source being a UTI. Patient will be admitted for IV antibiotics as well as PT/OT evaluations.    Review of Systems:  Review of Systems   Unable to perform ROS: Dementia       Past Medical and Surgical History:   Past Medical History:   Diagnosis Date    Allergy to sulfa drugs     Arthritis 2000    Closed fracture of third lumbar vertebra (HCC)     Closed T12 spinal fracture (HCC)     Closed wedge compression fracture of T12 vertebra (HCC)     Dementia (HCC)     Diabetes mellitus (HCC) 2000    DM (diabetes mellitus), type 2 (HCC)     H/O multiple allergies     Novocaine,Darvocet, Sulfa, Accupril    HL (hearing loss)     Hyperlipidemia     Hypertension     Lumbar compression fracture (HCC)     Memory loss 2020    Nocturia     Palpitations     Paroxysmal atrial fibrillation (HCC)     Thrombocytopenic disorder (HCC)     Type 2 diabetes mellitus without complication (HCC)     Wears glasses        Past Surgical History:   Procedure Laterality Date    DXA PROCEDURE (HISTORICAL)  05/31/2023    HERNIA REPAIR      HYSTERECTOMY         Meds/Allergies:  Prior to Admission medications    Medication Sig Start Date End Date Taking? Authorizing Provider   apixaban (Eliquis) 2.5 mg TAKE 1 TABLET BY MOUTH TWICE  DAILY 5/15/24  Yes Stefanie Anna MD   ezetimibe-simvastatin (VYTORIN) 10-20 mg per tablet Take 1 tablet by mouth daily at bedtime 5/15/24  Yes Stefanie Anna MD   furosemide (LASIX) 80 mg tablet TAKE 1 TABLET BY MOUTH DAILY 3/14/24  Yes Stefanie Anna MD   glucose blood (FREESTYLE LITE) test strip Use to check blood sugar three times a day 7/27/23  Yes Stefanie Anna MD   insulin glargine (Lantus) 100 units/mL subcutaneous injection Inject 14 Units under the skin daily at bedtime 12/6/23  Yes Stefanie Anna MD   insulin lispro (HumaLOG KwikPen) 100 units/mL injection pen Inject 8 Units under the skin 3 (three) times a day with meals 6/10/23  Yes Doreen Barcenas  MD   Insulin Pen Needle (BD AutoShield Duo) 30G X 5 MM MISC USE FOUR TIMES DAILY WITH INSULIN PEN 3/29/24  Yes Stefanie Anna MD   metFORMIN (GLUCOPHAGE) 500 mg tablet TAKE 1 TABLET BY MOUTH TWICE  DAILY 5/15/24  Yes Stefanie Anna MD   metoprolol succinate (TOPROL-XL) 25 mg 24 hr tablet TAKE 3 TABLETS BY MOUTH TWICE  DAILY 5/15/24  Yes Stefanie Anna MD   potassium chloride (Klor-Con M20) 20 mEq tablet TAKE 1 TABLET BY MOUTH DAILY 5/15/24  Yes Stefanie Anna MD   valsartan (DIOVAN) 160 mg tablet TAKE 1 TABLET BY MOUTH DAILY 3/14/24  Yes Stefanie Anna MD     I have reveiwed home medications using records provided by [facility].    Allergies:   Allergies   Allergen Reactions    Accupril [Quinapril Hcl]     Novocain [Procaine]     Parabens     Sulfa Antibiotics        Social History:  Marital Status: /Civil Union   Occupation: Unknown  Patient Pre-hospital Living Situation: Skilled Nursing Facility:    Patient Pre-hospital Level of Mobility: walks with walker  Patient Pre-hospital Diet Restrictions: Cardiac/Diabetic  Substance Use History:   Social History     Substance and Sexual Activity   Alcohol Use Not Currently    Alcohol/week: 1.0 standard drink of alcohol    Types: 1 Glasses of wine per week    Comment: on holidays add an extra glass of wine     Social History     Tobacco Use   Smoking Status Never    Passive exposure: Never   Smokeless Tobacco Never     Social History     Substance and Sexual Activity   Drug Use Never       Family History:  Family History   Problem Relation Age of Onset    Hypertension Mother     Diabetes Mother     Other Mother     Hypertension Father     Heart disease Father        Physical Exam:     Vitals:   Blood Pressure: 114/67 (05/15/24 2302)  Pulse: 64 (05/15/24 2302)  Temperature: 98.4 °F (36.9 °C) (05/15/24 2302)  Temp Source: Oral (05/15/24 1910)  Respirations: 18 (05/15/24 2302)  Weight - Scale: 90 kg (198 lb 6.6 oz) (05/15/24 1908)  SpO2: 92 % (05/15/24 2302)    Physical  Exam  Vitals and nursing note reviewed.   Constitutional:       General: She is not in acute distress.     Appearance: She is obese. She is not ill-appearing or diaphoretic.   HENT:      Head: Normocephalic.      Nose: Nose normal.      Mouth/Throat:      Pharynx: Oropharynx is clear.   Eyes:      General: No scleral icterus.     Conjunctiva/sclera: Conjunctivae normal.   Cardiovascular:      Rate and Rhythm: Normal rate. Rhythm irregular.      Pulses:           Posterior tibial pulses are 1+ on the right side and 1+ on the left side.      Heart sounds: Normal heart sounds. No murmur heard.  Pulmonary:      Effort: Pulmonary effort is normal. No respiratory distress.      Breath sounds: Normal breath sounds. No wheezing, rhonchi or rales.   Chest:      Chest wall: No tenderness.   Abdominal:      General: Bowel sounds are normal. There is no distension.      Palpations: Abdomen is soft.      Tenderness: There is no abdominal tenderness.   Musculoskeletal:         General: No swelling or deformity. Normal range of motion.      Cervical back: Normal range of motion.   Skin:     General: Skin is warm and dry.   Neurological:      Mental Status: She is alert and oriented to person, place, and time.      Comments: Forgetful          Additional Data:     Lab Results:  Results from last 7 days   Lab Units 05/15/24  1905   WBC Thousand/uL 4.12*   HEMOGLOBIN g/dL 13.0   HEMATOCRIT % 41.8   PLATELETS Thousands/uL 123*   SEGS PCT % 85*   LYMPHO PCT % 8*   MONO PCT % 6   EOS PCT % 0     Results from last 7 days   Lab Units 05/15/24  1905   SODIUM mmol/L 135   POTASSIUM mmol/L 4.0   CHLORIDE mmol/L 95*   CO2 mmol/L 30   BUN mg/dL 29*   CREATININE mg/dL 1.33*   ANION GAP mmol/L 10   CALCIUM mg/dL 9.4   ALBUMIN g/dL 4.2   TOTAL BILIRUBIN mg/dL 0.45   ALK PHOS U/L 78   ALT U/L 8   AST U/L 14   GLUCOSE RANDOM mg/dL 318*     Results from last 7 days   Lab Units 05/15/24  2120   INR  1.18     Results from last 7 days   Lab Units  05/16/24  0035   POC GLUCOSE mg/dl 168*         Results from last 7 days   Lab Units 05/15/24  2120   LACTIC ACID mmol/L 1.5   PROCALCITONIN ng/ml 0.12       Lines/Drains:  Invasive Devices       Peripheral Intravenous Line  Duration             Peripheral IV 05/15/24 Left Antecubital <1 day    Peripheral IV 05/15/24 Right Antecubital <1 day                        Imaging: Personally reviewed the following imaging: chest xray  XR chest 1 view portable    (Results Pending)       EKG and Other Studies Reviewed on Admission:   EKG: Atrial fibrillation. HR 97.    ** Please Note: This note has been constructed using a voice recognition system. **

## 2024-05-16 NOTE — ASSESSMENT & PLAN NOTE
New small subtle right basilar opacity, possibly infectious/inflammatory. Recommend PA and lateral views for further assessment. The lungs are otherwise unremarkable.   Patient thus far without pneumonia symptoms, continue to monitor  She is already on ceftriaxone for sepsis suspect secondary to UTI  Speech therapy evaluation requested

## 2024-05-16 NOTE — PHYSICAL THERAPY NOTE
Physical Therapy Evaluation    Patient's Name: Dayana Reagan    Admitting Diagnosis  UTI (urinary tract infection) [N39.0]    Problem List  Patient Active Problem List   Diagnosis    Essential hypertension    Pure hypercholesterolemia    Microalbuminuria    Stage 3a chronic kidney disease (HCC)    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HCC)    Dementia (HCC)    Osteoarthritis of multiple joints    Bilateral hearing loss    Primary osteoarthritis of left knee    Hypertensive urgency    UTI (urinary tract infection)    Hyponatremia    Metabolic encephalopathy    Persistent atrial fibrillation (HCC)    Depression, recurrent (HCC)    Compression fracture of L3 vertebra (HCC)    Generalized weakness    Type 2 diabetes mellitus with hyperglycemia (HCC)    Knee pain    Fall    Abnormal abdominal CT scan    Chronic diastolic congestive heart failure (HCC)    Baker's cyst of knee, left    Memory loss    Sepsis (HCC)    Ambulatory dysfunction       Past Medical History  Past Medical History:   Diagnosis Date    Allergy to sulfa drugs     Arthritis 2000    Closed fracture of third lumbar vertebra (HCC)     Closed T12 spinal fracture (HCC)     Closed wedge compression fracture of T12 vertebra (HCC)     Dementia (HCC)     Diabetes mellitus (HCC) 2000    DM (diabetes mellitus), type 2 (HCC)     H/O multiple allergies     Novocaine,Darvocet, Sulfa, Accupril    HL (hearing loss)     Hyperlipidemia     Hypertension     Lumbar compression fracture (HCC)     Memory loss 2020    Nocturia     Palpitations     Paroxysmal atrial fibrillation (HCC)     Thrombocytopenic disorder (HCC)     Type 2 diabetes mellitus without complication (HCC)     Wears glasses        Past Surgical History  Past Surgical History:   Procedure Laterality Date    DXA PROCEDURE (HISTORICAL)  05/31/2023    HERNIA REPAIR      HYSTERECTOMY            05/16/24 0914   PT Last Visit   PT Visit Date 05/16/24   Note Type   Note type Evaluation   Pain Assessment   Pain Assessment Tool  0-10   Pain Score No Pain   Restrictions/Precautions   Weight Bearing Precautions Per Order No   Other Precautions Cognitive;Chair Alarm;Bed Alarm;Fall Risk;Hard of hearing   Home Living   Type of Home Other (Comment)  ([place])   Home Layout One level;Performs ADLs on one level;Able to live on main level with bedroom/bathroom   Bathroom Shower/Tub Walk-in shower   Bathroom Equipment Grab bars in shower   Home Equipment Walker;Wheelchair-manual   Prior Function   Lives With Spouse   Receives Help From Family;Personal care attendant   IADLs Family/Friend/Other provides transportation;Family/Friend/Other provides meals;Family/Friend/Other provides medication management   Comments pt is questionable historian, per pt she initally utilizes wheelchair for all aspects of mobility, however later in session states she does ambulate, however unable to quantify distances. Pt also denies falls, however per previous admissions, pt with significant falls history.    General   Family/Caregiver Present No   Cognition   Orientation Level Oriented to person;Oriented to place;Disoriented to time;Disoriented to situation  (knows May and  with cues)   Following Commands Follows one step commands with increased time or repetition   Comments pt ID by wristband, name and    Subjective   Subjective pt agreeable to PT evaluation   RLE Assessment   RLE Assessment X  (4-/5 throughout)   LLE Assessment   LLE Assessment X  (4-/5 throughout hip and ankle, 3+/5 knee flexion and knee extension)   Light Touch   RLE Light Touch Grossly intact   LLE Light Touch Grossly intact   Bed Mobility   Additional Comments pt OOB in recliner pre/post evaluation   Transfers   Sit to Stand 5  Supervision   Additional items Increased time required;Verbal cues;Armrests   Stand to Sit 5  Supervision   Additional items Increased time required;Verbal cues;Armrests   Additional Comments excessive trunk flexion with STS, vc for extension during  transition, vc for hand placement to RW   Ambulation/Elevation   Gait pattern Decreased foot clearance;Short stride;Knees flexed  (L LE crossess midline on 80% of steps)   Gait Assistance 5  Supervision  (with chair follow)   Additional items Assist x 1;Verbal cues   Assistive Device Rolling walker   Distance 35'x1   Ambulation/Elevation Additional Comments vc for increased BHUMI, vc for target to decrease crossing of midline with L LE   Balance   Static Sitting Good   Dynamic Sitting Fair +   Static Standing Fair   Dynamic Standing Fair -   Ambulatory Poor +  (RW)   Activity Tolerance   Activity Tolerance Patient limited by fatigue   Medical Staff Made Aware spoke with CM, OT   Nurse Made Aware RN bo pt for PT   Assessment   Prognosis Fair   Problem List Decreased strength;Decreased endurance;Impaired balance;Decreased mobility   Assessment Pt is a 88 y.o. female seen for PT evaluation s/p admit to [facility] on 5/15/2024. Pt was admitted with a primary dx of: sepsis, UTI.  PT now consulted for assessment of mobility and d/c needs. Pt with Up and OOB as tolerated orders.  Pts current comorbidities and personal factors effecting treatment include: BMI, HTN, CKD, dementia, significant history of falls, afib, DM II. Pts current clinical presentation is Unstable/Unpredictable (high complexity) due to Ongoing medical management for primary dx, Decreased activity tolerance compared to baseline, Fall risk, Increased assistance needed from caregiver at current time, Cog status. Pt true PLOF currently unknown as patient is questionable historian, gave conflicting statements over mobility status, CM to follow up. Upon evaluation, pt currently is requiring  Supervision for transfers and Supervision for ambulation 35 ft w/ RW. Pt presents at PT eval functioning below baseline and currently w/ overall mobility deficits 2* to: BLE weakness, impaired balance, gait deviations, decreased activity tolerance compared  to baseline, decreased functional mobility tolerance compared to baseline, decreased safety awareness, impaired judgement, fall risk, decreased cognition. Pt currently at a fall risk 2* to impairments listed above.  Pt will continue to benefit from skilled acute PT interventions to address stated impairments; to maximize functional mobility; for ongoing pt/ family training; and DME needs. At conclusion of PT session chair alarm engaged, all needs in reach, RN notified of session findings/recommendations, and pt returned back in recliner chair with phone and call bell within reach. Pt denies any further questions at this time. Recommend Level III (Minimum Resource Intensity)  upon hospital D/C.   Goals   Patient Goals no direct therapy goals stated by pt today   STG Expiration Date 05/30/24   Short Term Goal #1 In 14 days pt will be able to: 1. Demonstrate ability to perform all aspects of bed mobility independently to improve functional safety.  2. Perform functional transfers independently to facilitate safe return to previous living environment.  3.  Ambulate at least 75  ft with RW and supervision with stable vitals to improve safety with household distances and reduce fall risk.  4. Improve LE strength grades by 1 to increase ease of functional mobility with transfers and gait. 5. Pt will demonstrate improved balance by one grade in order to decrease risk of falls.   PT Treatment Day 0   Plan   Treatment/Interventions Functional transfer training;LE strengthening/ROM;Elevations;Therapeutic exercise;Cognitive reorientation;Patient/family training;Equipment eval/education;Bed mobility;Gait training;Spoke to nursing;Spoke to case management;OT   PT Frequency 2-3x/wk   Discharge Recommendation   Rehab Resource Intensity Level, PT III (Minimum Resource Intensity)   Additional Comments return to facility with PT services   AM-PAC Basic Mobility Inpatient   Turning in Flat Bed Without Bedrails 2   Lying on Back to  Sitting on Edge of Flat Bed Without Bedrails 2   Moving Bed to Chair 3   Standing Up From Chair Using Arms 3   Walk in Room 3   Climb 3-5 Stairs With Railing 1   Basic Mobility Inpatient Raw Score 14   Basic Mobility Standardized Score 35.55   [facility] Highest Level Of Mobility   -HL Goal 4: Move to chair/commode   -HL Achieved 7: Walk 25 feet or more   End of Consult   Patient Position at End of Consult Bedside chair;Bed/Chair alarm activated;All needs within reach   The patient's AM-PAC Basic Mobility Inpatient Short Form Raw Score is 14. A Raw score of less than or equal to 16 suggests the patient may benefit from discharge to post-acute rehabilitation services. Please also refer to the recommendation of the Physical Therapist for safe discharge planning.  Farzad Pelayo, PT

## 2024-05-16 NOTE — ASSESSMENT & PLAN NOTE
PT/OT input appreciated.  Patient appears at her ambulatory baseline and may return to her prior facility

## 2024-05-16 NOTE — ASSESSMENT & PLAN NOTE
UA: Large leuks, positive nitrite, trace protein, glucose 1000 (1%), trace occult blood.  Urine micro: Innumerable WBCs, occasional epithelial cells, innumerable bacteria.  Urine culture pending, prior urine culture grew E.coli.  Continue IV Rocephin.

## 2024-05-16 NOTE — PLAN OF CARE
Problem: PHYSICAL THERAPY ADULT  Goal: Performs mobility at highest level of function for planned discharge setting.  See evaluation for individualized goals.  Description: Treatment/Interventions: Functional transfer training, LE strengthening/ROM, Elevations, Therapeutic exercise, Cognitive reorientation, Patient/family training, Equipment eval/education, Bed mobility, Gait training, Spoke to nursing, Spoke to case management, OT          See flowsheet documentation for full assessment, interventions and recommendations.  5/16/2024 0952 by Farzad Pelayo PT  Note: Prognosis: Fair  Problem List: Decreased strength, Decreased endurance, Impaired balance, Decreased mobility  Assessment: Pt is a 88 y.o. female seen for PT evaluation s/p admit to [facility] on 5/15/2024. Pt was admitted with a primary dx of: sepsis, UTI.  PT now consulted for assessment of mobility and d/c needs. Pt with Up and OOB as tolerated orders.  Pts current comorbidities and personal factors effecting treatment include: BMI, HTN, CKD, dementia, significant history of falls, afib, DM II. Pts current clinical presentation is Unstable/Unpredictable (high complexity) due to Ongoing medical management for primary dx, Decreased activity tolerance compared to baseline, Fall risk, Increased assistance needed from caregiver at current time, Cog status. Pt true PLOF currently unknown as patient is questionable historian, gave conflicting statements over mobility status, CM to follow up. Upon evaluation, pt currently is requiring  Supervision for transfers and Supervision for ambulation 35 ft w/ RW. Pt presents at PT eval functioning below baseline and currently w/ overall mobility deficits 2* to: BLE weakness, impaired balance, gait deviations, decreased activity tolerance compared to baseline, decreased functional mobility tolerance compared to baseline, decreased safety awareness, impaired judgement, fall risk, decreased cognition. Pt  currently at a fall risk 2* to impairments listed above.  Pt will continue to benefit from skilled acute PT interventions to address stated impairments; to maximize functional mobility; for ongoing pt/ family training; and DME needs. At conclusion of PT session chair alarm engaged, all needs in reach, RN notified of session findings/recommendations, and pt returned back in recliner chair with phone and call bell within reach. Pt denies any further questions at this time. Recommend Level III (Minimum Resource Intensity)  upon hospital D/C.        Rehab Resource Intensity Level, PT: III (Minimum Resource Intensity)    See flowsheet documentation for full assessment.     5/16/2024 0952 by Farzad Pelayo PT  Note: Prognosis: Fair  Problem List: Decreased strength, Decreased endurance, Impaired balance, Decreased mobility  Assessment: Pt is a 88 y.o. female seen for PT evaluation s/p admit to [facility] on 5/15/2024. Pt was admitted with a primary dx of: sepsis, UTI.  PT now consulted for assessment of mobility and d/c needs. Pt with Up and OOB as tolerated orders.  Pts current comorbidities and personal factors effecting treatment include: BMI, HTN, CKD, dementia, significant history of falls, afib, DM II. Pts current clinical presentation is Unstable/Unpredictable (high complexity) due to Ongoing medical management for primary dx, Decreased activity tolerance compared to baseline, Fall risk, Increased assistance needed from caregiver at current time, Cog status. Pt true PLOF currently unknown as patient is questionable historian, gave conflicting statements over mobility status, CM to follow up. Upon evaluation, pt currently is requiring  Supervision for transfers and Supervision for ambulation 35 ft w/ RW. Pt presents at PT eval functioning below baseline and currently w/ overall mobility deficits 2* to: BLE weakness, impaired balance, gait deviations, decreased activity tolerance compared to baseline,  decreased functional mobility tolerance compared to baseline, decreased safety awareness, impaired judgement, fall risk, decreased cognition. Pt currently at a fall risk 2* to impairments listed above.  Pt will continue to benefit from skilled acute PT interventions to address stated impairments; to maximize functional mobility; for ongoing pt/ family training; and DME needs. At conclusion of PT session chair alarm engaged, all needs in reach, RN notified of session findings/recommendations, and pt returned back in recliner chair with phone and call bell within reach. Pt denies any further questions at this time. Recommend Level III (Minimum Resource Intensity)  upon hospital D/C.        Rehab Resource Intensity Level, PT: III (Minimum Resource Intensity)    See flowsheet documentation for full assessment.

## 2024-05-16 NOTE — ED PROVIDER NOTES
History  Chief Complaint   Patient presents with    Extremity Weakness     Family reports feeling unbalanced since about noon today, reports lower extremity weakness, fatigue.   reports slight confusion. Pt denies CP/SOB/N/V/D     Patient is an 88-year-old female with dementia, diabetes, atrial fibrillation for which she takes Eliquis that presents to the emergency department with her  due to fatigue, generalized weakness, and mild increase in confusion from her baseline.  Patient reports that she was well up until this afternoon when symptoms started and she napped most of the afternoon which is very unusual for her.  When her  woke her and got her up she had significant difficulty standing at her walker.  Patient's  reports that she has otherwise been in her usual state of health recently.  He reports that she did have a urinary tract infection about a year ago and was significantly fatigued at that point as well and fell at that time.  Patient has been denies any recent falls or any other injuries.  Patient has been denies any fevers at home.        Prior to Admission Medications   Prescriptions Last Dose Informant Patient Reported? Taking?   Insulin Glargine Solostar (Lantus SoloStar) 100 UNIT/ML SOPN  Self No No   Sig: Inject 0.14 mL (14 Units total) under the skin daily at bedtime   Insulin Pen Needle (BD AutoShield Duo) 30G X 5 MM MISC   No No   Sig: USE FOUR TIMES DAILY WITH INSULIN PEN   Lantus SoloStar 100 units/mL SOPN  Self No No   Sig: Inject 0.14 mL (14 Units total) under the skin daily at bedtime   apixaban (Eliquis) 2.5 mg   No No   Sig: TAKE 1 TABLET BY MOUTH TWICE  DAILY   ezetimibe-simvastatin (VYTORIN) 10-20 mg per tablet   No No   Sig: Take 1 tablet by mouth daily at bedtime   furosemide (LASIX) 80 mg tablet   No No   Sig: TAKE 1 TABLET BY MOUTH DAILY   glucose blood (FREESTYLE LITE) test strip  Self No No   Sig: Use to check blood sugar three times a day   insulin  glargine (Lantus) 100 units/mL subcutaneous injection  Self No No   Sig: Inject 14 Units under the skin daily at bedtime   insulin lispro (HumaLOG KwikPen) 100 units/mL injection pen  Self No No   Sig: Inject 8 Units under the skin 3 (three) times a day with meals   metFORMIN (GLUCOPHAGE) 500 mg tablet   No No   Sig: TAKE 1 TABLET BY MOUTH TWICE  DAILY   metoprolol succinate (TOPROL-XL) 25 mg 24 hr tablet   No No   Sig: TAKE 3 TABLETS BY MOUTH TWICE  DAILY   potassium chloride (Klor-Con M20) 20 mEq tablet   No No   Sig: TAKE 1 TABLET BY MOUTH DAILY   valsartan (DIOVAN) 160 mg tablet   No No   Sig: TAKE 1 TABLET BY MOUTH DAILY      Facility-Administered Medications: None       Past Medical History:   Diagnosis Date    Allergy to sulfa drugs     Arthritis 2000    Closed fracture of third lumbar vertebra (HCC)     Closed T12 spinal fracture (HCC)     Closed wedge compression fracture of T12 vertebra (HCC)     Dementia (HCC)     Diabetes mellitus (HCC) 2000    DM (diabetes mellitus), type 2 (HCC)     H/O multiple allergies     Novocaine,Darvocet, Sulfa, Accupril    HL (hearing loss)     Hyperlipidemia     Hypertension     Lumbar compression fracture (HCC)     Memory loss 2020    Nocturia     Palpitations     Paroxysmal atrial fibrillation (HCC)     Thrombocytopenic disorder (HCC)     Type 2 diabetes mellitus without complication (HCC)     Wears glasses        Past Surgical History:   Procedure Laterality Date    DXA PROCEDURE (HISTORICAL)  05/31/2023    HERNIA REPAIR      HYSTERECTOMY         Family History   Problem Relation Age of Onset    Hypertension Mother     Diabetes Mother     Other Mother     Hypertension Father     Heart disease Father      I have reviewed and agree with the history as documented.    E-Cigarette/Vaping    E-Cigarette Use Never User      E-Cigarette/Vaping Substances    Nicotine No     THC No     CBD No     Flavoring No     Other No     Unknown No      Social History     Tobacco Use    Smoking  status: Never     Passive exposure: Never    Smokeless tobacco: Never   Vaping Use    Vaping status: Never Used   Substance Use Topics    Alcohol use: Not Currently     Alcohol/week: 1.0 standard drink of alcohol     Types: 1 Glasses of wine per week     Comment: on holidays add an extra glass of wine    Drug use: Never        Review of Systems   Constitutional:  Positive for fatigue. Negative for chills and fever.   HENT:  Negative for congestion, ear pain and sore throat.    Eyes:  Negative for pain and visual disturbance.   Respiratory:  Negative for cough and shortness of breath.    Cardiovascular:  Negative for chest pain and palpitations.   Gastrointestinal:  Negative for abdominal pain, constipation, diarrhea, nausea and vomiting.   Genitourinary:  Negative for dysuria, hematuria and urgency.   Musculoskeletal:  Negative for arthralgias and back pain.   Skin:  Negative for color change and rash.   Neurological:  Positive for weakness (Generalized). Negative for dizziness, seizures, syncope, light-headedness and headaches.   Psychiatric/Behavioral:  Positive for confusion (Slightly worsened from baseline).    All other systems reviewed and are negative.      Physical Exam  ED Triage Vitals   Temperature Pulse Respirations Blood Pressure SpO2   05/15/24 1910 05/15/24 1908 05/15/24 1908 05/15/24 1908 05/15/24 1908   100.3 °F (37.9 °C) 62 16 139/87 97 %      Temp Source Heart Rate Source Patient Position - Orthostatic VS BP Location FiO2 (%)   05/15/24 1910 05/15/24 1908 05/15/24 1930 05/15/24 1930 --   Oral Monitor Lying Left arm       Pain Score       05/15/24 1930       No Pain             Orthostatic Vital Signs  Vitals:    05/15/24 1908 05/15/24 1930 05/15/24 2117   BP: 139/87 114/83 140/95   Pulse: 62 101    Patient Position - Orthostatic VS:  Lying        Physical Exam  Vitals and nursing note reviewed.   Constitutional:       General: She is not in acute distress.     Appearance: Normal appearance. She is  well-developed. She is ill-appearing. She is not toxic-appearing or diaphoretic.   HENT:      Head: Normocephalic and atraumatic.      Right Ear: External ear normal.      Left Ear: External ear normal.      Mouth/Throat:      Pharynx: Oropharynx is clear. No oropharyngeal exudate or posterior oropharyngeal erythema.   Eyes:      General:         Right eye: No discharge.         Left eye: No discharge.      Extraocular Movements: Extraocular movements intact.      Conjunctiva/sclera: Conjunctivae normal.   Cardiovascular:      Rate and Rhythm: Normal rate and regular rhythm.      Pulses: Normal pulses.      Heart sounds: Normal heart sounds. No murmur heard.  Pulmonary:      Effort: Pulmonary effort is normal. No respiratory distress.      Breath sounds: Normal breath sounds. No wheezing, rhonchi or rales.   Abdominal:      General: Abdomen is flat.      Palpations: Abdomen is soft.      Tenderness: There is no abdominal tenderness. There is no guarding or rebound.   Musculoskeletal:         General: No swelling or deformity. Normal range of motion.      Cervical back: Normal range of motion and neck supple. No tenderness.      Right lower leg: Edema present.      Left lower leg: Edema present.   Skin:     General: Skin is warm and dry.      Capillary Refill: Capillary refill takes less than 2 seconds.   Neurological:      Mental Status: She is alert.      Cranial Nerves: No cranial nerve deficit.      Sensory: No sensory deficit.         ED Medications  Medications   sodium chloride 0.9 % bolus 250 mL (250 mL Intravenous New Bag 5/15/24 2130)   ceftriaxone (ROCEPHIN) 1 g/50 mL in dextrose IVPB (0 mg Intravenous Stopped 5/15/24 2130)   acetaminophen (TYLENOL) tablet 975 mg (975 mg Oral Given 5/15/24 2105)       Diagnostic Studies  Results Reviewed       Procedure Component Value Units Date/Time    Lactic acid [[id number]] Collected: 05/15/24 2120    Lab Status: In process Specimen: Blood from Arm, Right Updated:  05/15/24 2138    Protime-INR [[id number]] Collected: 05/15/24 2120    Lab Status: In process Specimen: Blood from Arm, Right Updated: 05/15/24 2138    APTT [[id number]] Collected: 05/15/24 2120    Lab Status: In process Specimen: Blood from Arm, Right Updated: 05/15/24 2138    Procalcitonin [[id number]] Collected: 05/15/24 2120    Lab Status: In process Specimen: Blood from Arm, Right Updated: 05/15/24 2138    Urine Microscopic [[id number]]  (Abnormal) Collected: 05/15/24 2035    Lab Status: Final result Specimen: Urine, Clean Catch Updated: 05/15/24 2111     RBC, UA 1-2 /hpf      WBC, UA Innumerable /hpf      Epithelial Cells Occasional /hpf      Bacteria, UA Innumerable /hpf      WBC Clumps Present    Urine culture [[id number]] Collected: 05/15/24 2035    Lab Status: In process Specimen: Urine, Clean Catch Updated: 05/15/24 2111    UA w Reflex to Microscopic w Reflex to Culture [[id number]]  (Abnormal) Collected: 05/15/24 2035    Lab Status: Final result Specimen: Urine, Clean Catch Updated: 05/15/24 2046     Color, UA Light Yellow     Clarity, UA Turbid     Specific Gravity, UA 1.012     pH, UA 6.5     Leukocytes, UA Large     Nitrite, UA Positive     Protein, UA Trace mg/dl      Glucose, UA 1000 (1%) mg/dl      Ketones, UA Negative mg/dl      Urobilinogen, UA <2.0 mg/dl      Bilirubin, UA Negative     Occult Blood, UA Trace    FLU/RSV/COVID - if FLU/RSV clinically relevant [[id number]]  (Normal) Collected: 05/15/24 1905    Lab Status: Final result Specimen: Nares from Nose Updated: 05/15/24 2035     SARS-CoV-2 Negative     INFLUENZA A PCR Negative     INFLUENZA B PCR Negative     RSV PCR Negative    Narrative:      FOR PEDIATRIC PATIENTS - copy/paste COVID Guidelines URL to browser: [email or web address]    SARS-CoV-2 assay is a Nucleic Acid Amplification assay intended for the  qualitative detection of nucleic acid from SARS-CoV-2 in nasopharyngeal  swabs. Results  are for the presumptive identification of SARS-CoV-2 RNA.    Positive results are indicative of infection with SARS-CoV-2, the virus  causing COVID-19, but do not rule out bacterial infection or co-infection  with other viruses. Laboratories within the [place] and its  territories are required to report all positive results to the appropriate  public health authorities. Negative results do not preclude SARS-CoV-2  infection and should not be used as the sole basis for treatment or other  patient management decisions. Negative results must be combined with  clinical observations, patient history, and epidemiological information.  This test has not been FDA cleared or approved.    This test has been authorized by FDA under an Emergency Use Authorization  (EUA). This test is only authorized for the duration of time the  declaration that circumstances exist justifying the authorization of the  emergency use of an in vitro diagnostic tests for detection of SARS-CoV-2  virus and/or diagnosis of COVID-19 infection under section 564(b)(1) of  the Act, 21 U.S.C. 360bbb-3(b)(1), unless the authorization is terminated  or revoked sooner. The test has been validated but independent review by FDA  and CLIA is pending.    Test performed using [employer] GeneXpert: This RT-PCR assay targets N2,  a region unique to SARS-CoV-2. A conserved region in the E-gene was chosen  for pan-Sarbecovirus detection which includes SARS-CoV-2.    According to CMS-2020-01-R, this platform meets the definition of high-throughput technology.    CBC and differential [[id number]]  (Abnormal) Collected: 05/15/24 1905    Lab Status: Final result Specimen: Blood from Arm, Right Updated: 05/15/24 2019     WBC 4.12 Thousand/uL      RBC 4.64 Million/uL      Hemoglobin 13.0 g/dL      Hematocrit 41.8 %      MCV 90 fL      MCH 28.0 pg      MCHC 31.1 g/dL      RDW 14.7 %      MPV 10.9 fL      Platelets 123 Thousands/uL      nRBC 0 /100 WBCs      Segmented % 85  %      Immature Grans % 0 %      Lymphocytes % 8 %      Monocytes % 6 %      Eosinophils Relative 0 %      Basophils Relative 1 %      Absolute Neutrophils 3.51 Thousands/µL      Absolute Immature Grans 0.01 Thousand/uL      Absolute Lymphocytes 0.33 Thousands/µL      Absolute Monocytes 0.24 Thousand/µL      Eosinophils Absolute 0.01 Thousand/µL      Basophils Absolute 0.02 Thousands/µL     Narrative:      This is an appended report.  These results have been appended to a previously verified report.    Smear Review(Phlebs Do Not Order) [[id number]] Collected: 05/15/24 1905    Lab Status: Final result Specimen: Blood from Arm, Right Updated: 05/15/24 2019     RBC Morphology Present     Platelet Estimate Adequate     Large Platelet Present     Anisocytosis Present    HS Troponin I 2hr [[id number]]     Lab Status: No result Specimen: Blood     HS Troponin I 4hr [[id number]]     Lab Status: No result Specimen: Blood     HS Troponin 0hr (reflex protocol) [[id number]]  (Normal) Collected: 05/15/24 1905    Lab Status: Final result Specimen: Blood from Arm, Right Updated: 05/15/24 2014     hs TnI 0hr 15 ng/L     Comprehensive metabolic panel [[id number]]  (Abnormal) Collected: 05/15/24 1905    Lab Status: Final result Specimen: Blood from Arm, Right Updated: 05/15/24 2011     Sodium 135 mmol/L      Potassium 4.0 mmol/L      Chloride 95 mmol/L      CO2 30 mmol/L      ANION GAP 10 mmol/L      BUN 29 mg/dL      Creatinine 1.33 mg/dL      Glucose 318 mg/dL      Calcium 9.4 mg/dL      AST 14 U/L      ALT 8 U/L      Alkaline Phosphatase 78 U/L      Total Protein 7.6 g/dL      Albumin 4.2 g/dL      Total Bilirubin 0.45 mg/dL      eGFR 35 ml/min/1.73sq m     Narrative:      National Kidney Disease Foundation guidelines for Chronic Kidney Disease (CKD):     Stage 1 with normal or high GFR (GFR > 90 mL/min/1.73 square meters)    Stage 2 Mild CKD (GFR = 60-89 mL/min/1.73 square meters)    Stage 3A Moderate CKD (GFR = 45-59 mL/min/1.73  square meters)    Stage 3B Moderate CKD (GFR = 30-44 mL/min/1.73 square meters)    Stage 4 Severe CKD (GFR = 15-29 mL/min/1.73 square meters)    Stage 5 End Stage CKD (GFR <15 mL/min/1.73 square meters)  Note: GFR calculation is accurate only with a steady state creatinine    Blood culture #2 [[id number]] Collected: 05/15/24 1910    Lab Status: No result Specimen: Blood from Arm, Left     Blood culture #1 [[id number]] Collected: 05/15/24 1905    Lab Status: No result Specimen: Blood from Arm, Right                    XR chest 1 view portable    (Results Pending)         Procedures  ECG 12 Lead Documentation Only    Date/Time: 5/15/2024 9:30 PM    Performed by: Sb Waddell DO  Authorized by: Sb Waddell DO    Indications / Diagnosis:  AMS  ECG reviewed by me, the ED Provider: yes    Patient location:  ED  Previous ECG:     Previous ECG:  Compared to current    Comparison ECG info:  More frequent PVCs now noted    Similarity:  No change  Interpretation:     Interpretation: normal    Quality:     Tracing quality:  Limited by artifact  Rate:     ECG rate:  97    ECG rate assessment: normal    Rhythm:     Rhythm: sinus rhythm    Ectopy:     Ectopy: PVCs      PVCs:  Frequent  QRS:     QRS axis:  Left    QRS intervals:  Normal  Conduction:     Conduction: normal    ST segments:     ST segments:  Normal  T waves:     T waves: normal          ED Course                             SBIRT 22yo+      Flowsheet Row Most Recent Value   Initial Alcohol Screen: US AUDIT-C     1. How often do you have a drink containing alcohol? 0 Filed at: 05/15/2024 1920   2. How many drinks containing alcohol do you have on a typical day you are drinking?  0 Filed at: 05/15/2024 1920   3b. FEMALE Any Age, or MALE 65+: How often do you have 4 or more drinks on one occassion? 0 Filed at: 05/15/2024 1920   Audit-C Score 0 Filed at: 05/15/2024 1920   SAVI: How many times in the past year have you...    Used an illegal drug or used a  prescription medication for non-medical reasons? Never Filed at: 05/15/2024 1920                  Medical Decision Making  88F here with mild confusion and generalized fatigue and weakness.    DDx including but not limited to: metabolic abnormality, dehydration, viral illness, anemia, ACS, MI, thyroid disease, intracranial process, other infectious process including UTI; doubt Guillain Landis syndrome or myasthenia gravis or botulism or multiple sclerosis or transverse myelitis.      On physical exam, patient is slightly confused but otherwise well-appearing.  No focal neurologic findings.    Patient with mildly elevated creatinine, consistent with baseline.      Laboratory findings reveal urinary tract infection.  Will treat with ceftriaxone.  Given that patient is having difficulty ambulating at home, will admit under the care of [facility] internal medicine for further evaluation and treatment.    Amount and/or Complexity of Data Reviewed  Labs: ordered.  Radiology: ordered.    Risk  OTC drugs.  Decision regarding hospitalization.          Disposition  Final diagnoses:   UTI (urinary tract infection)     Time reflects when diagnosis was documented in both MDM as applicable and the Disposition within this note       Time User Action Codes Description Comment    5/15/2024  9:20 PM Sb Waddell Add [N39.0] UTI (urinary tract infection)           ED Disposition       ED Disposition   Admit    Condition   Stable    Date/Time   Wed May 15, 2024 2120    Comment   Case was discussed with AMANDA and the patient's admission status was agreed to be Admission Status: inpatient status to the service of Dr. Durand .               Follow-up Information    None         Patient's Medications   Discharge Prescriptions    No medications on file     No discharge procedures on file.    PDMP Review         Value Time User    PDMP Reviewed  Yes 6/8/2023 10:36 PM Edmund Darby MD             ED Provider  Attending physically  available and evaluated Dayana Reagan. I managed the patient along with the ED Attending.    Electronically Signed by           Sb Waddell,   05/15/24 3299

## 2024-05-16 NOTE — ED ATTENDING ATTESTATION
5/15/2024  I, Rosemarie Rodriguez MD, saw and evaluated the patient. I have discussed the patient with the resident/non-physician practitioner and agree with the resident's/non-physician practitioner's findings, Plan of Care, and MDM as documented in the resident's/non-physician practitioner's note, except where noted. All available labs and Radiology studies were reviewed.  I was present for key portions of any procedure(s) performed by the resident/non-physician practitioner and I was immediately available to provide assistance.       At this point I agree with the current assessment done in the Emergency Department.  I have conducted an independent evaluation of this patient a history and physical is as follows:    89 yo female with h/o paroxymal atrial fib, DM, HTN, HLD, dementia presents with increased sleepiness and generalized weakness today.  No f/c/n/v/d/ symptoms/cp or SOB.   reports mild cough.  Lives with  in [place].  No changes in vision/speech;/motor or sensation.  No falls.  NO increased in LE swelling, orthopnea or PND.  History of prior symptoms associated with UTI.  On exam pt fatigued but non-toxic, at neuro baseline, no clinical signs of Vte or vol overload.  Benign belly without CVA TTP.  No jaundice.  Work up significant for infected urine, no significant metabolic derangement.  Given generalized weakness and difficulty ambulating at home will give IV antibiotics and admit.  HD stable throughout.      Past Medical History:   Diagnosis Date    Allergy to sulfa drugs     Arthritis 2000    Closed fracture of third lumbar vertebra (HCC)     Closed T12 spinal fracture (HCC)     Closed wedge compression fracture of T12 vertebra (HCC)     Dementia (HCC)     Diabetes mellitus (HCC) 2000    DM (diabetes mellitus), type 2 (HCC)     H/O multiple allergies     Novocaine,Darvocet, Sulfa, Accupril    HL (hearing loss)     Hyperlipidemia     Hypertension     Lumbar compression fracture  (HCC)     Memory loss 2020    Nocturia     Palpitations     Paroxysmal atrial fibrillation (HCC)     Thrombocytopenic disorder (HCC)     Type 2 diabetes mellitus without complication (HCC)     Wears glasses              ED Course  ED Course as of 05/15/24 2145   Wed May 15, 2024   2112 Comprehensive metabolic panel(!)  Mild PRASHANT, elevated glucose with normal bicarb/AG, otherwise no end organ damage or electrolyte derangement.    2112 CBC and differential(!)  No significant leukocytosis reassuring diff, normal H/H, normal platelets.      2112 FLU/RSV/COVID - if FLU/RSV clinically relevant  wnl   2112 UA w Reflex to Microscopic w Reflex to Culture(!)  C/w UTI   2113 Urine Microscopic(!)  C/w UTI         Critical Care Time  Procedures

## 2024-05-16 NOTE — ASSESSMENT & PLAN NOTE
Acute on chronic thrombocytopenia, likely reactive  Continue Eliquis for now with platelets of 92 today, monitor CBC.  If platelet count drops below 50 would discontinue anticoagulation

## 2024-05-16 NOTE — ASSESSMENT & PLAN NOTE
Blood pressure is reviewed and acceptable.  Last recorded pressure: 114/67  Continue Toprol XL, Furosemide and ARB.  Monitor blood pressure.

## 2024-05-16 NOTE — PLAN OF CARE
Problem: OCCUPATIONAL THERAPY ADULT  Goal: Performs self-care activities at highest level of function for planned discharge setting.  See evaluation for individualized goals.  Description: Treatment Interventions: ADL retraining, Functional transfer training, Endurance training, Cognitive reorientation, Patient/family training, Equipment evaluation/education, Compensatory technique education, Energy conservation, Activityengagement          See flowsheet documentation for full assessment, interventions and recommendations.   Note: Limitation: Decreased ADL status, Decreased Safe judgement during ADL, Decreased cognition, Decreased endurance, Decreased self-care trans, Decreased high-level ADLs  Prognosis: Good  Assessment: Patient is a 88 y.o. female seen for OT evaluation at [facility] following admission on 5/15/2024  s/p Sepsis (HCC). Please see above for comprehensive list of comorbidities and significant PMHx impacting functional performance.  At baseline, Pt lives w/ spouse in an ILF, independent w/ ADLs, assist with IADLs and MOD I for functional mobility w/ use of RW vs WC level. (-) falls. (-) driving. Retired. Upon initial evaluation, pt appears to be performing below baseline functional status. Pt requires supervision for UB ADLs, MADDIE for LB ADLs, supervision for transfers and supervision for functional mobility household distance with RW. The AM-PAC & Barthel Index outcome tools were used to assist in determining pt safety w/self care /mobility and appropriate d/c recommendations, see above for score. Occupational performance is affected by the following deficits: decreased activity tolerance , impaired memory , orientation , impaired safety awareness , and impaired global mental status. Personal/Environmental factors impacting D/C include: Assistance needed for ADL/IADLs, Assistance needed for ADLs and functional mobility, High fall risk , and decreased insight toward deficits .  Supporting factors include: able to maintain FFSU, accessible home environment, support system available, and attitude towards recovery Patient would benefit from OT services within the acute care setting to maximize level of functional independence in the following areas fall prevention , self-care transfers, bed mobility , functional mobility, formal cognitive evaluation, and ADLs.  From OT standpoint, recommendation at time of D/C would be level III (minimum resource intensity).     Rehab Resource Intensity Level, OT: III (Minimum Resource Intensity)     Neema Wilson MS OTR/L   [place] Licensure# [id number]

## 2024-05-16 NOTE — ASSESSMENT & PLAN NOTE
Wt Readings from Last 3 Encounters:   05/15/24 90 kg (198 lb 6.6 oz)   01/11/24 85.7 kg (189 lb)   12/06/23 83 kg (183 lb)   Appears compensated at this time.  Last echocardiogram (March 2023): LVEF 50-55%.  Monitor intake and output, daily weights.  Low sodium diet and fluid restriction.  Continue home medications.  80 mg of PO Furosemide daily.  Toprol XL.

## 2024-05-16 NOTE — ASSESSMENT & PLAN NOTE
Presentation: Patient presented to ED due to generalized weakness, fatigue and mild increase in confusion compared to baseline per family. Patient has dementia at baseline.  SIRS criteria: Tachycardia, hyperthermia  Suspected source: UTI.  Lactic acid: 1.5  End organ damage: None.  IV Fluids: Received 250 mL in ED.  IV antibiotics: IV Rocephin.  Follow up on culture results.  Monitor vital signs, laboratory studies.

## 2024-05-16 NOTE — CASE MANAGEMENT
Case Management Assessment & Discharge Planning Note    Patient name Dayana Reagan  Location S /S -01 MRN [id number]  : 1935 Date 2024       Current Admission Date: 5/15/2024  Current Admission Diagnosis:Sepsis (HCC)   Patient Active Problem List    Diagnosis Date Noted Date Diagnosed    Ambulatory dysfunction 2024     Sepsis (HCC) 05/15/2024     Baker's cyst of knee, left 2023     Chronic diastolic congestive heart failure (HCC) 06/15/2023     Abnormal abdominal CT scan 2023     Knee pain 2023     Type 2 diabetes mellitus with hyperglycemia ([facility]) 2023     Generalized weakness 04/15/2023     Fall 2023     Depression, recurrent ([facility]) 2023     Compression fracture of L3 vertebra ([facility]) 2023     Persistent atrial fibrillation ([facility]) 2023     Metabolic encephalopathy 2023     Hypertensive urgency 2023     UTI (urinary tract infection) 2023     Hyponatremia 2023     Primary osteoarthritis of left knee 2022     Osteoarthritis of multiple joints 2022     Bilateral hearing loss 2022     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[facility]) 03/15/2022     Dementia ([facility])      Stage 3a chronic kidney disease ([facility]) 2021     Essential hypertension 2020     Pure hypercholesterolemia 2020     Microalbuminuria 2020     Memory loss        LOS (days): 1  Geometric Mean LOS (GMLOS) (days): 3.6  Days to GMLOS:3.1     OBJECTIVE:    Risk of Unplanned Readmission Score: 15.88         Current admission status: Inpatient       Preferred Pharmacy:   [id number] - [place] - [address]  [address] [address]  Phone: [phone] Fax: [phone]    [facility] - [facility] [address]  [address]  [address] [address]  Phone: [phone] Fax: [phone]    Primary Care Provider: Stefanie Anna MD    Primary Insurance:  MEDICARE  Secondary Insurance: [facility] HEALTH OPTIONS PROGRAM    ASSESSMENT:  Active Health Care Proxies       RAJ Reagan Health Care Agent - Spouse   Primary Phone: [phone] (Mobile)  Home Phone: [phone]                           Readmission Root Cause  30 Day Readmission: No    Patient Information  Admitted from:: Home ([facility])  Mental Status: Alert  During Assessment patient was accompanied by: Not accompanied during assessment  Assessment information provided by:: Patient  Primary Caregiver: Self  Support Systems: Spouse/significant other  County of Residence: [place]  What city do you live in?: [place]  Home entry access options. Select all that apply.: No steps to enter home  Type of Current Residence: Other (Comment) ([facility])  Living Arrangements: Lives w/ Spouse/significant other  Is patient a ?: No    Activities of Daily Living Prior to Admission  Functional Status: Independent  Completes ADLs independently?: Yes  Ambulates independently?: Yes  Does patient use assisted devices?: Yes  Assisted Devices (DME) used: Walker  Does patient currently own DME?: Yes  What DME does the patient currently own?: Bedside Commode, Shower Chair, Rollator, Walker, Wheelchair  Does patient have a history of Outpatient Therapy (PT/OT)?: Yes  Does the patient have a history of Short-Term Rehab?: Yes ([facility])  Does patient have a history of HHC?: Yes  Does patient currently have HHC?: No         Patient Information Continued  Income Source: SSI/SSD  Does patient have prescription coverage?: Yes  Does patient receive dialysis treatments?: No  Does patient have a history of substance abuse?: No  Does patient have a history of Mental Health Diagnosis?: No    PHQ 2/9 Screening   Reviewed PHQ 2/9 Depression Screening Score?: No    Means of Transportation  Means of Transport to Appts:: Family transport      Social Determinants of Health (SDOH)      Flowsheet Row Most  Recent Value   Housing Stability    In the last 12 months, was there a time when you were not able to pay the mortgage or rent on time? N   In the past 12 months, how many times have you moved where you were living? 1   At any time in the past 12 months, were you homeless or living in a shelter (including now)? N   Transportation Needs    In the past 12 months, has lack of transportation kept you from medical appointments or from getting medications? no   In the past 12 months, has lack of transportation kept you from meetings, work, or from getting things needed for daily living? No   Food Insecurity    Within the past 12 months, you worried that your food would run out before you got the money to buy more. Never true   Within the past 12 months, the food you bought just didn't last and you didn't have money to get more. Never true   Utilities    In the past 12 months has the electric, gas, oil, or water company threatened to shut off services in your home? No            DISCHARGE DETAILS:    Discharge planning discussed with:: Patient  Freedom of Choice: Yes  Comments - Freedom of Choice: Pt confirmed being a resident of [facility] with her spouse, and will be returning upon d/c. Pt reported her spouse will transport upon d/c.      Requested Home Health Care         Is the patient interested in HHC at discharge?: No    DME Referral Provided  Referral made for DME?: No      Treatment Team Recommendation: Home  Discharge Destination Plan:: Home  Transport at Discharge : Family

## 2024-05-16 NOTE — ASSESSMENT & PLAN NOTE
Lab Results   Component Value Date    HGBA1C 6.5 (H) 05/15/2024       Recent Labs     05/16/24  0035 05/16/24  0738 05/16/24  1127   POCGLU 168* 117 157*       Blood Sugar Average: Last 72 hrs:  (P) [id number] A1C.  Home regimen includes Metformin, will hold while inpatient.  Continue home insulin regimen of 14 units of Lantus HS and 8 units of Lispro TID with meals.  Accu-checks and SSI.  Hypoglycemia protocol.

## 2024-05-16 NOTE — ASSESSMENT & PLAN NOTE
"Lab Results   Component Value Date    HGBA1C 6.3 07/13/2023       No results for input(s): \"POCGLU\" in the last 72 hours.    Blood Sugar Average: Last 72 hrs:  Obtain A1C.  Home regimen includes Metformin, will hold while inpatient.  Continue home insulin regimen of 14 units of Lantus HS and 8 units of Lispro TID with meals.  Accu-checks and SSI.  Hypoglycemia protocol.    "

## 2024-05-16 NOTE — ED NOTES
Per Dr Waddell, blood cultures not required prior to antibiotic administration     Sherri Farnsworth, ASPEN  05/15/24 5935

## 2024-05-16 NOTE — ASSESSMENT & PLAN NOTE
Lab Results   Component Value Date    EGFR 38 05/16/2024    EGFR 35 05/15/2024    EGFR 35 (L) 06/27/2023    CREATININE 1.24 05/16/2024    CREATININE 1.33 (H) 05/15/2024    CREATININE 1.43 (H) 06/27/2023     At baseline

## 2024-05-16 NOTE — ASSESSMENT & PLAN NOTE
This is an 88-year-old female patient with history of dementia, A-fib, hypertension who presented to ED due to generalized weakness, fatigue and mild increase in confusion compared to baseline per family.   SIRS criteria: Tachycardia, hyperthermia. Today with mild leukopenia WBC 3.6 without neutropenia  Suspected source: UTI.  Chest x-ray with mild right basilar opacification, however without clinical evidence of pneumonia, continue to monitor  Lactic acid: 1.5  End organ damage: None.  IV Fluids: Received 250 mL in ED.  IV antibiotics: IV Rocephin.  Follow up on culture results.  No growth thus far.  Fever curve decreasing.  Monitor vital signs, laboratory studies.   No

## 2024-05-16 NOTE — ASSESSMENT & PLAN NOTE
Blood pressure is reviewed and acceptable.  Continue Toprol XL, Furosemide and ARB with holding parameters  Monitor blood pressure trend

## 2024-05-16 NOTE — OCCUPATIONAL THERAPY NOTE
Occupational Therapy Evaluation     Patient Name: Dayana Reagan  Today's Date: 5/16/2024  Problem List  Principal Problem:    Sepsis (HCC)  Active Problems:    Essential hypertension    Stage 3a chronic kidney disease (HCC)    Dementia (HCC)    UTI (urinary tract infection)    Persistent atrial fibrillation (HCC)    Type 2 diabetes mellitus with hyperglycemia (HCC)    Chronic diastolic congestive heart failure (HCC)    Ambulatory dysfunction    Past Medical History  Past Medical History:   Diagnosis Date    Allergy to sulfa drugs     Arthritis 2000    Closed fracture of third lumbar vertebra (HCC)     Closed T12 spinal fracture (HCC)     Closed wedge compression fracture of T12 vertebra (HCC)     Dementia (HCC)     Diabetes mellitus (HCC) 2000    DM (diabetes mellitus), type 2 (HCC)     H/O multiple allergies     Novocaine,Darvocet, Sulfa, Accupril    HL (hearing loss)     Hyperlipidemia     Hypertension     Lumbar compression fracture (HCC)     Memory loss 2020    Nocturia     Palpitations     Paroxysmal atrial fibrillation (HCC)     Thrombocytopenic disorder (HCC)     Type 2 diabetes mellitus without complication (HCC)     Wears glasses      Past Surgical History  Past Surgical History:   Procedure Laterality Date    DXA PROCEDURE (HISTORICAL)  05/31/2023    HERNIA REPAIR      HYSTERECTOMY           05/16/24 0913   OT Last Visit   OT Visit Date 05/16/24   Note Type   Note type Evaluation   Pain Assessment   Pain Assessment Tool 0-10   Pain Score No Pain   Restrictions/Precautions   Weight Bearing Precautions Per Order No   Other Precautions Cognitive;Chair Alarm;Bed Alarm;Fall Risk;Hard of hearing   Home Living   Type of Home Other (Comment)  ([facility])   Home Layout One level;Performs ADLs on one level;Able to live on main level with bedroom/bathroom;Access   Bathroom Shower/Tub Walk-in shower   Bathroom Equipment Grab bars in shower;Shower chair;Hand-held shower   Bathroom Accessibility Accessible  "via wheelchair   Home Equipment Walker;Wheelchair-manual;Grab bars   Additional Comments Pt ambulates household distance w/ use of RW vs use of WC for community distances. Independently completes SPT w/ use of RW to/from various self-care surfaces.   Prior Function   Level of [place] Independent with ADLs;Independent with functional mobility;Modified independent with wheelchair;Needs assistance with IADLS   Lives With Spouse   Receives Help From Family;Other (Comment)  (facility staff)   IADLs Family/Friend/Other provides transportation;Family/Friend/Other provides meals;Family/Friend/Other provides medication management  (Spouse drives and assists w/ medication managment, facility provides meals)   Falls in the last 6 months   (pt denies a hx of falls in the past 6 months, questionable accuracy)   Vocational Retired   Lifestyle   Autonomy Pt lives w/ spouse in an ILF, independent w/ ADLs, assist with IADLs and MOD I for functional mobility w/ use of RW vs WC level. (-) falls. (-) driving. Retired   Reciprocal Relationships Supportive spouse, family and facility staff   Service to Others Retired   General   Additional Pertinent History Pt admit w/ generalized weakness, fatigue and mild increase in confusion compared to baseline per family. PMH of dementia, Afib on Eliquis, IDDM2, CHF, CKD, HTN   Family/Caregiver Present No   Subjective   Subjective \"I use the w/c to get to the dinning serna since it is further\"   ADL   Eating Assistance 7  Independent   Grooming Assistance 5  Supervision/Setup   UB Bathing Assistance 5  Supervision/Setup   LB Bathing Assistance 4  Minimal Assistance   UB Dressing Assistance 5  Supervision/Setup   LB Dressing Assistance 4  Minimal Assistance   LB Dressing Deficit Don/doff R shoe;Don/doff L shoe;Tie shoes  (sitting in recliner chair)   Toileting Assistance  4  Minimal Assistance   Additional Comments Unable to formally assess bathing, UB dressing, grooming or toileting at time of " eval. The above levels of assistance are anticipated based on functional performance deficits with use of clinical judgement.   Bed Mobility   Supine to Sit Unable to assess   Additional items Comment  (pt OOB in recliner chair)   Transfers   Sit to Stand 5  Supervision   Additional items Armrests;Increased time required;Verbal cues   Stand to Sit 5  Supervision   Additional items Armrests;Increased time required;Verbal cues   Additional Comments Use of RW for support   Functional Mobility   Functional Mobility 5  Supervision   Additional Comments functional household distance w/ use of RW, denies pain or fatigue. No overt LOB or signs of instability   Additional items Rolling walker   Balance   Static Sitting Fair +   Dynamic Sitting Fair   Static Standing Fair -   Dynamic Standing Fair -   Activity Tolerance   Activity Tolerance Patient tolerated treatment well   Medical Staff Made Aware Spoke with PT, CM   Nurse Made Aware Spoke with RN pre/post   RUE Assessment   RUE Assessment WFL  (MMT 4+/5 grossly)   LUE Assessment   LUE Assessment WFL  (MMT 4+/5 grossly)   Hand Function   Gross Motor Coordination Functional   Fine Motor Coordination Functional   Sensation   Light Touch No apparent deficits  (pt denies)   Cognition   Overall Cognitive Status Impaired  (dementia at baseline per chart review)   Arousal/Participation Alert;Responsive;Cooperative   Attention Attends with cues to redirect   Orientation Level Oriented to person;Oriented to place;Disoriented to time;Disoriented to situation   Memory Decreased recall of recent events;Decreased short term memory   Following Commands Follows one step commands without difficulty   Comments Pt ID via wristband, name and . Pt is pleasent and cooperative, presents w/ overall flat affect.   Assessment   Limitation Decreased ADL status;Decreased Safe judgement during ADL;Decreased cognition;Decreased endurance;Decreased self-care trans;Decreased high-level ADLs    Prognosis Good   Assessment Patient is a 88 y.o. female seen for OT evaluation at [facility] following admission on 5/15/2024  s/p Sepsis (HCC). Please see above for comprehensive list of comorbidities and significant PMHx impacting functional performance.  At baseline, Pt lives w/ spouse in an ILF, independent w/ ADLs, assist with IADLs and MOD I for functional mobility w/ use of RW vs WC level. (-) falls. (-) driving. Retired. Upon initial evaluation, pt appears to be performing below baseline functional status. Pt requires supervision for UB ADLs, MADDIE for LB ADLs, supervision for transfers and supervision for functional mobility household distance with RW. The AM-PAC & Barthel Index outcome tools were used to assist in determining pt safety w/self care /mobility and appropriate d/c recommendations, see above for score. Occupational performance is affected by the following deficits: decreased activity tolerance , impaired memory , orientation , impaired safety awareness , and impaired global mental status. Personal/Environmental factors impacting D/C include: Assistance needed for ADL/IADLs, Assistance needed for ADLs and functional mobility, High fall risk , and decreased insight toward deficits . Supporting factors include: able to maintain FFSU, accessible home environment, support system available, and attitude towards recovery Patient would benefit from OT services within the acute care setting to maximize level of functional independence in the following areas fall prevention , self-care transfers, bed mobility , functional mobility, formal cognitive evaluation, and ADLs.  From OT standpoint, recommendation at time of D/C would be level III (minimum resource intensity).   Goals   Patient Goals to go home   LTG Time Frame 10-14   Long Term Goal See below   Plan   Treatment Interventions ADL retraining;Functional transfer training;Endurance training;Cognitive reorientation;Patient/family  training;Equipment evaluation/education;Compensatory technique education;Energy conservation;Activityengagement   Goal Expiration Date 05/26/24   OT Treatment Day 0   OT Frequency 2-3x/wk   Discharge Recommendation   Rehab Resource Intensity Level, OT III (Minimum Resource Intensity)   Additional Comments  The patient's raw score on the -PAC Daily Activity Inpatient Short Form is 21. A raw score of greater than or equal to 19 suggests the patient may benefit from discharge to home. Please refer to the recommendation of the Occupational Therapist for safe discharge planning.   AM-PAC Daily Activity Inpatient   Lower Body Dressing 3   Bathing 3   Toileting 3   Upper Body Dressing 4   Grooming 4   Eating 4   Daily Activity Raw Score 21   Daily Activity Standardized Score (Calc for Raw Score >=11) 44.27   AM-PAC Applied Cognition Inpatient   Following a Speech/Presentation 3   Understanding Ordinary Conversation 4   Taking Medications 2   Remembering Where Things Are Placed or Put Away 3   Remembering List of 4-5 Errands 2   Taking Care of Complicated Tasks 2   Applied Cognition Raw Score 16   Applied Cognition Standardized Score 35.03   Barthel Index   Feeding 10   Bathing 0   Grooming Score 5   Dressing Score 5   Bladder Score 10   Bowels Score 10   Toilet Use Score 5   Transfers (Bed/Chair) Score 10   Mobility (Level Surface) Score 0   Stairs Score 0   Barthel Index Score 55   End of Consult   Education Provided Yes   Patient Position at End of Consult Bedside chair;Bed/Chair alarm activated;All needs within reach   Nurse Communication Nurse aware of consult       Goals established on initial evaluation in order to achieve pt's goal of going home.      Pt will complete LB ADLs Mod independent  for increased ADL independence within 10 days.     Pt will complete toileting Mod independent  with use of DME for increased ADL independence within 10 days.     Pt will demonstrate proper body mechanics to complete self-care  transfers and functional mobility with Mod independent  and use of LRAD for increased safety and functional independence within 10 days..     Pt will demonstrate standing tolerance of 8-10 min with Mod independent  and use of LRAD for increased activity tolerance during ADL/IADL tasks within 10 days.     Pt will complete bed mobility Mod independent  for increased independence in repositioning, pressure offloading, and managing comfort.     Pt will demonstrate proper body mechanics and fall prevention strategies during 100% of tx sessions for increased safety awareness during ADL/IADLs    Pt will improve functional activity tolerance to 30 mins of sustained functional tasks to increase participation in basic self-care tasks and minimize assistance level.     Pt will participate in ongoing cognitive assessments to assist with safe D/C planning and supervision/assistance recommendations.     Pt benefited from co-session of skilled OT and PT therapists in order to most appropriately address functional deficits d/t regression from functioning level prior to admission .  OT/PT objectives were addressed separately; please see PT note for specific goal areas targeted.    Neema Wilson MS OTR/L   [place] Licensure# [id number]

## 2024-05-17 LAB
ANION GAP SERPL CALCULATED.3IONS-SCNC: 9 MMOL/L (ref 4–13)
ATRIAL RATE: 97 BPM
BACTERIA UR CULT: ABNORMAL
BASOPHILS # BLD AUTO: 0.02 THOUSANDS/ÂΜL (ref 0–0.1)
BASOPHILS NFR BLD AUTO: 1 % (ref 0–1)
BUN SERPL-MCNC: 24 MG/DL (ref 5–25)
CALCIUM SERPL-MCNC: 8.4 MG/DL (ref 8.4–10.2)
CHLORIDE SERPL-SCNC: 101 MMOL/L (ref 96–108)
CO2 SERPL-SCNC: 27 MMOL/L (ref 21–32)
CREAT SERPL-MCNC: 1.15 MG/DL (ref 0.6–1.3)
EOSINOPHIL # BLD AUTO: 0 THOUSAND/ÂΜL (ref 0–0.61)
EOSINOPHIL NFR BLD AUTO: 0 % (ref 0–6)
ERYTHROCYTE [DISTWIDTH] IN BLOOD BY AUTOMATED COUNT: 15.1 % (ref 11.6–15.1)
GFR SERPL CREATININE-BSD FRML MDRD: 42 ML/MIN/1.73SQ M
GLUCOSE SERPL-MCNC: 114 MG/DL (ref 65–140)
GLUCOSE SERPL-MCNC: 129 MG/DL (ref 65–140)
GLUCOSE SERPL-MCNC: 132 MG/DL (ref 65–140)
GLUCOSE SERPL-MCNC: 187 MG/DL (ref 65–140)
GLUCOSE SERPL-MCNC: 75 MG/DL (ref 65–140)
HCT VFR BLD AUTO: 37.6 % (ref 34.8–46.1)
HGB BLD-MCNC: 12 G/DL (ref 11.5–15.4)
IMM GRANULOCYTES # BLD AUTO: 0.01 THOUSAND/UL (ref 0–0.2)
IMM GRANULOCYTES NFR BLD AUTO: 0 % (ref 0–2)
LYMPHOCYTES # BLD AUTO: 0.42 THOUSANDS/ÂΜL (ref 0.6–4.47)
LYMPHOCYTES NFR BLD AUTO: 18 % (ref 14–44)
MCH RBC QN AUTO: 28.5 PG (ref 26.8–34.3)
MCHC RBC AUTO-ENTMCNC: 31.9 G/DL (ref 31.4–37.4)
MCV RBC AUTO: 89 FL (ref 82–98)
MONOCYTES # BLD AUTO: 0.15 THOUSAND/ÂΜL (ref 0.17–1.22)
MONOCYTES NFR BLD AUTO: 7 % (ref 4–12)
NEUTROPHILS # BLD AUTO: 1.7 THOUSANDS/ÂΜL (ref 1.85–7.62)
NEUTS SEG NFR BLD AUTO: 74 % (ref 43–75)
NRBC BLD AUTO-RTO: 0 /100 WBCS
PLATELET # BLD AUTO: 79 THOUSANDS/UL (ref 149–390)
PLATELET BLD QL SMEAR: ABNORMAL
PMV BLD AUTO: 10.9 FL (ref 8.9–12.7)
POTASSIUM SERPL-SCNC: 3.6 MMOL/L (ref 3.5–5.3)
PROCALCITONIN SERPL-MCNC: 0.28 NG/ML
QRS AXIS: -58 DEGREES
QRSD INTERVAL: 78 MS
QT INTERVAL: 368 MS
QTC INTERVAL: 467 MS
RBC # BLD AUTO: 4.21 MILLION/UL (ref 3.81–5.12)
RBC MORPH BLD: NORMAL
SODIUM SERPL-SCNC: 137 MMOL/L (ref 135–147)
T WAVE AXIS: 83 DEGREES
VENTRICULAR RATE: 97 BPM
WBC # BLD AUTO: 2.3 THOUSAND/UL (ref 4.31–10.16)

## 2024-05-17 PROCEDURE — 82948 REAGENT STRIP/BLOOD GLUCOSE: CPT

## 2024-05-17 PROCEDURE — 85025 COMPLETE CBC W/AUTO DIFF WBC: CPT | Performed by: FAMILY MEDICINE

## 2024-05-17 PROCEDURE — 92610 EVALUATE SWALLOWING FUNCTION: CPT

## 2024-05-17 PROCEDURE — 99232 SBSQ HOSP IP/OBS MODERATE 35: CPT | Performed by: FAMILY MEDICINE

## 2024-05-17 PROCEDURE — 80048 BASIC METABOLIC PNL TOTAL CA: CPT | Performed by: FAMILY MEDICINE

## 2024-05-17 PROCEDURE — 84145 PROCALCITONIN (PCT): CPT | Performed by: FAMILY MEDICINE

## 2024-05-17 PROCEDURE — 93010 ELECTROCARDIOGRAM REPORT: CPT | Performed by: INTERNAL MEDICINE

## 2024-05-17 RX ORDER — DOCUSATE SODIUM 100 MG/1
100 CAPSULE, LIQUID FILLED ORAL 2 TIMES DAILY
Status: DISCONTINUED | OUTPATIENT
Start: 2024-05-17 | End: 2024-05-18 | Stop reason: HOSPADM

## 2024-05-17 RX ADMIN — INSULIN LISPRO 8 UNITS: 100 INJECTION, SOLUTION INTRAVENOUS; SUBCUTANEOUS at 16:21

## 2024-05-17 RX ADMIN — LOSARTAN POTASSIUM 100 MG: 50 TABLET, FILM COATED ORAL at 08:57

## 2024-05-17 RX ADMIN — DOCUSATE SODIUM 100 MG: 100 CAPSULE, LIQUID FILLED ORAL at 17:17

## 2024-05-17 RX ADMIN — INSULIN LISPRO 8 UNITS: 100 INJECTION, SOLUTION INTRAVENOUS; SUBCUTANEOUS at 08:58

## 2024-05-17 RX ADMIN — METOPROLOL SUCCINATE 75 MG: 25 TABLET, EXTENDED RELEASE ORAL at 21:38

## 2024-05-17 RX ADMIN — CEFTRIAXONE SODIUM 1000 MG: 10 INJECTION, POWDER, FOR SOLUTION INTRAVENOUS at 21:38

## 2024-05-17 RX ADMIN — FUROSEMIDE 80 MG: 80 TABLET ORAL at 08:57

## 2024-05-17 RX ADMIN — APIXABAN 2.5 MG: 2.5 TABLET, FILM COATED ORAL at 08:57

## 2024-05-17 RX ADMIN — INSULIN LISPRO 1 UNITS: 100 INJECTION, SOLUTION INTRAVENOUS; SUBCUTANEOUS at 11:46

## 2024-05-17 RX ADMIN — INSULIN GLARGINE 14 UNITS: 100 INJECTION, SOLUTION SUBCUTANEOUS at 21:46

## 2024-05-17 RX ADMIN — EZETIMIBE 10 MG: 10 TABLET ORAL at 16:21

## 2024-05-17 RX ADMIN — INSULIN LISPRO 8 UNITS: 100 INJECTION, SOLUTION INTRAVENOUS; SUBCUTANEOUS at 11:45

## 2024-05-17 RX ADMIN — METOPROLOL SUCCINATE 75 MG: 25 TABLET, EXTENDED RELEASE ORAL at 08:57

## 2024-05-17 RX ADMIN — APIXABAN 2.5 MG: 2.5 TABLET, FILM COATED ORAL at 17:18

## 2024-05-17 RX ADMIN — PRAVASTATIN SODIUM 40 MG: 40 TABLET ORAL at 16:21

## 2024-05-17 RX ADMIN — DOCUSATE SODIUM 100 MG: 100 CAPSULE, LIQUID FILLED ORAL at 10:40

## 2024-05-17 RX ADMIN — POTASSIUM CHLORIDE 20 MEQ: 1500 TABLET, EXTENDED RELEASE ORAL at 08:57

## 2024-05-17 NOTE — ASSESSMENT & PLAN NOTE
Lab Results   Component Value Date    EGFR 42 05/17/2024    EGFR 38 05/16/2024    EGFR 35 05/15/2024    CREATININE 1.15 05/17/2024    CREATININE 1.24 05/16/2024    CREATININE 1.33 (H) 05/15/2024     At baseline

## 2024-05-17 NOTE — ASSESSMENT & PLAN NOTE
Lab Results   Component Value Date    HGBA1C 6.5 (H) 05/15/2024       Recent Labs     05/16/24  1127 05/16/24  1542 05/16/24 2038 05/17/24  0730   POCGLU 157* 113 92 129         Blood Sugar Average: Last 72 hrs:  (P) [id number] A1C.  Home regimen includes Metformin, will hold while inpatient.  Continue home insulin regimen of 14 units of Lantus HS and 8 units of Lispro TID with meals.  Accu-checks and SSI.  Hypoglycemia protocol.

## 2024-05-17 NOTE — SPEECH THERAPY NOTE
Speech-Language Pathology Bedside Swallow Evaluation        Patient Name: Daynaa Reagan    Today's Date: 5/17/2024     Problem List  Principal Problem:    Sepsis (HCC)  Active Problems:    Essential hypertension    Stage 3a chronic kidney disease (HCC)    Thrombocytopenia (HCC)    Dementia (HCC)    Acute cystitis without hematuria    Persistent atrial fibrillation (HCC)    Type 2 diabetes mellitus with hyperglycemia (HCC)    Chronic diastolic congestive heart failure (HCC)    Ambulatory dysfunction    Abnormal CXR         Past Medical History  Past Medical History:   Diagnosis Date    Allergy to sulfa drugs     Arthritis 2000    Closed fracture of third lumbar vertebra (HCC)     Closed T12 spinal fracture (HCC)     Closed wedge compression fracture of T12 vertebra (HCC)     Dementia (HCC)     Diabetes mellitus (HCC) 2000    DM (diabetes mellitus), type 2 (HCC)     H/O multiple allergies     Novocaine,Darvocet, Sulfa, Accupril    HL (hearing loss)     Hyperlipidemia     Hypertension     Lumbar compression fracture (HCC)     Memory loss 2020    Nocturia     Palpitations     Paroxysmal atrial fibrillation (HCC)     Thrombocytopenic disorder (HCC)     Type 2 diabetes mellitus without complication (HCC)     Wears glasses          Summary    Pt presents with normal appearing oral and pharyngeal swallowing skills, no c/o food dysphagia and no overt s/s aspiration noted. Nsg stated pt took multiple pills at once w/ water w/o difficulty.     Recommendations:   Diet: regular diet and thin liquids   Meds: whole with liquid   Frequent Oral care: 2x/day  Other Recommendations/ considerations: no follow up tx needed       Current Medical Status  Pt is a 88 y.o. female who presented to [facility]  with sepsis. Pt  presents with generalized weakness, fatigue and mild increase in confusion compared to baseline per family. Patient has dementia at baseline. Patient is a poor historian at baseline due to underlying dementia.      Past medical history:   Please see H&P for details    Special Studies:  CXR: 5/15/24 New small subtle right basilar opacity, possibly infectious/inflammatory. Recommend PA and lateral views for further assessment. The lungs are otherwise unremarkable.     Social/Education/Vocational Hx:  Pt lives in independent living facility    Swallow Information   Prior speech/swallowing tx: none at [facility]  Current Risks for Dysphagia & Aspiration: AMS  Current Symptoms/Concerns:  AMS, R basilar opacity.   Current Diet: regular diet and thin liquids   Baseline Diet: regular diet and thin liquids  Takes pills- multiple pills at once w/ water    Baseline Assessment   Behavior/Cognition: alert  Speech/Language Status: able to participate in basic conversation and able to follow commands  Patient Positioning: upright in chair     Swallow Mechanism Exam   Facial: symmetrical  Labial: WFL  Lingual: WFL  Velum: unable to visualize  Mandible: adequate ROM  Dentition: adequate  Vocal quality:clear/adequate   Volitional Cough: strong/productive -moist  Respiratory: RA    Consistencies Assessed and Performance   Consistencies Administered:  pt seen at lunch w/ turkey, carrots, coffee by cup    Oral Stage: pt able to self feed pcs of turkey- dipped in gravy and carrots.  Mastication,manipulation appeared WNL with timely transfer and no oral residue.  Pt took sips of coffee by cup w/ good oral control.      Pharyngeal Stage: swallows were timely with no overt s/s aspiration noted. Pt denied food dysphagia symptoms.       Esophageal Concerns: none reported      Results Reviewed with: patient, RN, and family   Dysphagia Goals: none at this time      Eleanor Knott MA CCC-SLP  Speech Pathologist  [place] license # [id number]  [place] license # [id number]  Available via [employer]

## 2024-05-17 NOTE — ASSESSMENT & PLAN NOTE
Acute on chronic thrombocytopenia, likely reactive  Continue Eliquis for now with platelets of 92   monitor CBC.  If platelet count drops below 50 would discontinue anticoagulation

## 2024-05-17 NOTE — ASSESSMENT & PLAN NOTE
New small subtle right basilar opacity, possibly infectious/inflammatory.    Patient thus far without pneumonia symptoms, continue to monitor  She is already on ceftriaxone for sepsis suspect secondary to UTI  Speech therapy evaluation requested

## 2024-05-17 NOTE — ASSESSMENT & PLAN NOTE
This is an 88-year-old female patient with history of dementia, A-fib, hypertension who presented to ED due to generalized weakness, fatigue and mild increase in confusion compared to baseline per family.   SIRS criteria: Tachycardia, hyperthermia. Today with mild leukopenia WBC 3.6 without neutropenia  Suspected source: UTI.  Chest x-ray with mild right basilar opacification, however without clinical evidence of pneumonia, continue to monitor  Lactic acid: 1.5  End organ damage: None.  IV Fluids: Received 250 mL in ED.  IV antibiotics: IV Rocephin.  Ucx prelim shows klebsiella Oxytoca, awaiting sensitivities.   Bcx no growth so far.  Had fever 102 last night

## 2024-05-17 NOTE — PLAN OF CARE
Problem: Potential for Falls  Goal: Patient will remain free of falls  Description: INTERVENTIONS:  - Educate patient/family on patient safety including physical limitations  - Instruct patient to call for assistance with activity   - Consult OT/PT to assist with strengthening/mobility   - Keep Call bell within reach  - Keep bed low and locked with side rails adjusted as appropriate  - Keep care items and personal belongings within reach  - Initiate and maintain comfort rounds  - Make Fall Risk Sign visible to staff  - Offer Toileting every 2 Hours, in advance of need  - Initiate/Maintain bed/chair alarm  - Obtain necessary fall risk management equipment: yellow bracelet/socks  - Apply yellow socks and bracelet for high fall risk patients  - Consider moving patient to room near nurses station  Outcome: Progressing     Problem: PAIN - ADULT  Goal: Verbalizes/displays adequate comfort level or baseline comfort level  Description: Interventions:  - Encourage patient to monitor pain and request assistance  - Assess pain using appropriate pain scale  - Administer analgesics based on type and severity of pain and evaluate response  - Implement non-pharmacological measures as appropriate and evaluate response  - Consider cultural and social influences on pain and pain management  - Notify physician/advanced practitioner if interventions unsuccessful or patient reports new pain  Outcome: Progressing     Problem: INFECTION - ADULT  Goal: Absence or prevention of progression during hospitalization  Description: INTERVENTIONS:  - Assess and monitor for signs and symptoms of infection  - Monitor lab/diagnostic results  - Monitor all insertion sites, i.e. indwelling lines, tubes, and drains  - Monitor endotracheal if appropriate and nasal secretions for changes in amount and color  - [place] appropriate cooling/warming therapies per order  - Administer medications as ordered  - Instruct and encourage patient and family to  use good hand hygiene technique  - Identify and instruct in appropriate isolation precautions for identified infection/condition  Outcome: Progressing  Goal: Absence of fever/infection during neutropenic period  Description: INTERVENTIONS:  - Monitor WBC    Outcome: Progressing     Problem: Prexisting or High Potential for Compromised Skin Integrity  Goal: Skin integrity is maintained or improved  Description: INTERVENTIONS:  - Identify patients at risk for skin breakdown  - Assess and monitor skin integrity  - Assess and monitor nutrition and hydration status  - Monitor labs   - Assess for incontinence   - Turn and reposition patient  - Assist with mobility/ambulation  - Relieve pressure over bony prominences  - Avoid friction and shearing  - Provide appropriate hygiene as needed including keeping skin clean and dry  - Evaluate need for skin moisturizer/barrier cream  - Collaborate with interdisciplinary team   - Patient/family teaching  - Consider wound care consult   Outcome: Progressing

## 2024-05-18 VITALS
DIASTOLIC BLOOD PRESSURE: 84 MMHG | TEMPERATURE: 97.7 F | WEIGHT: 198.41 LBS | SYSTOLIC BLOOD PRESSURE: 139 MMHG | RESPIRATION RATE: 17 BRPM | OXYGEN SATURATION: 94 % | BODY MASS INDEX: 37.49 KG/M2 | HEART RATE: 81 BPM

## 2024-05-18 LAB
ANION GAP SERPL CALCULATED.3IONS-SCNC: 7 MMOL/L (ref 4–13)
BUN SERPL-MCNC: 28 MG/DL (ref 5–25)
CALCIUM SERPL-MCNC: 8.3 MG/DL (ref 8.4–10.2)
CHLORIDE SERPL-SCNC: 99 MMOL/L (ref 96–108)
CO2 SERPL-SCNC: 30 MMOL/L (ref 21–32)
CREAT SERPL-MCNC: 1.28 MG/DL (ref 0.6–1.3)
ERYTHROCYTE [DISTWIDTH] IN BLOOD BY AUTOMATED COUNT: 15.2 % (ref 11.6–15.1)
GFR SERPL CREATININE-BSD FRML MDRD: 37 ML/MIN/1.73SQ M
GLUCOSE SERPL-MCNC: 142 MG/DL (ref 65–140)
GLUCOSE SERPL-MCNC: 165 MG/DL (ref 65–140)
GLUCOSE SERPL-MCNC: 187 MG/DL (ref 65–140)
HCT VFR BLD AUTO: 37.2 % (ref 34.8–46.1)
HGB BLD-MCNC: 11.6 G/DL (ref 11.5–15.4)
MCH RBC QN AUTO: 27.6 PG (ref 26.8–34.3)
MCHC RBC AUTO-ENTMCNC: 31.2 G/DL (ref 31.4–37.4)
MCV RBC AUTO: 89 FL (ref 82–98)
PLATELET # BLD AUTO: 80 THOUSANDS/UL (ref 149–390)
PMV BLD AUTO: 11.1 FL (ref 8.9–12.7)
POTASSIUM SERPL-SCNC: 3.7 MMOL/L (ref 3.5–5.3)
RBC # BLD AUTO: 4.2 MILLION/UL (ref 3.81–5.12)
SODIUM SERPL-SCNC: 136 MMOL/L (ref 135–147)
WBC # BLD AUTO: 2.49 THOUSAND/UL (ref 4.31–10.16)

## 2024-05-18 PROCEDURE — 99239 HOSP IP/OBS DSCHRG MGMT >30: CPT | Performed by: PHYSICIAN ASSISTANT

## 2024-05-18 PROCEDURE — 85027 COMPLETE CBC AUTOMATED: CPT | Performed by: FAMILY MEDICINE

## 2024-05-18 PROCEDURE — 80048 BASIC METABOLIC PNL TOTAL CA: CPT | Performed by: FAMILY MEDICINE

## 2024-05-18 PROCEDURE — 82948 REAGENT STRIP/BLOOD GLUCOSE: CPT

## 2024-05-18 RX ADMIN — INSULIN LISPRO 1 UNITS: 100 INJECTION, SOLUTION INTRAVENOUS; SUBCUTANEOUS at 12:05

## 2024-05-18 RX ADMIN — LOSARTAN POTASSIUM 100 MG: 50 TABLET, FILM COATED ORAL at 08:23

## 2024-05-18 RX ADMIN — METOPROLOL SUCCINATE 75 MG: 25 TABLET, EXTENDED RELEASE ORAL at 08:23

## 2024-05-18 RX ADMIN — POTASSIUM CHLORIDE 20 MEQ: 1500 TABLET, EXTENDED RELEASE ORAL at 08:23

## 2024-05-18 RX ADMIN — APIXABAN 2.5 MG: 2.5 TABLET, FILM COATED ORAL at 08:24

## 2024-05-18 RX ADMIN — INSULIN LISPRO 8 UNITS: 100 INJECTION, SOLUTION INTRAVENOUS; SUBCUTANEOUS at 08:24

## 2024-05-18 RX ADMIN — DOCUSATE SODIUM 100 MG: 100 CAPSULE, LIQUID FILLED ORAL at 08:24

## 2024-05-18 RX ADMIN — INSULIN LISPRO 8 UNITS: 100 INJECTION, SOLUTION INTRAVENOUS; SUBCUTANEOUS at 12:06

## 2024-05-18 RX ADMIN — FUROSEMIDE 80 MG: 80 TABLET ORAL at 08:23

## 2024-05-18 NOTE — PLAN OF CARE
Problem: Potential for Falls  Goal: Patient will remain free of falls  Description: INTERVENTIONS:  - Educate patient/family on patient safety including physical limitations  - Instruct patient to call for assistance with activity   - Consult OT/PT to assist with strengthening/mobility   - Keep Call bell within reach  - Keep bed low and locked with side rails adjusted as appropriate  - Keep care items and personal belongings within reach  - Initiate and maintain comfort rounds  - Make Fall Risk Sign visible to staff  - Offer Toileting every 2 Hours, in advance of need  - Initiate/Maintain bed/chair alarm  - Obtain necessary fall risk management equipment: yellow bracelet/socks  - Apply yellow socks and bracelet for high fall risk patients  - Consider moving patient to room near nurses station  Outcome: Progressing     Problem: PAIN - ADULT  Goal: Verbalizes/displays adequate comfort level or baseline comfort level  Description: Interventions:  - Encourage patient to monitor pain and request assistance  - Assess pain using appropriate pain scale  - Administer analgesics based on type and severity of pain and evaluate response  - Implement non-pharmacological measures as appropriate and evaluate response  - Consider cultural and social influences on pain and pain management  - Notify physician/advanced practitioner if interventions unsuccessful or patient reports new pain  Outcome: Progressing     Problem: INFECTION - ADULT  Goal: Absence or prevention of progression during hospitalization  Description: INTERVENTIONS:  - Assess and monitor for signs and symptoms of infection  - Monitor lab/diagnostic results  - Monitor all insertion sites, i.e. indwelling lines, tubes, and drains  - Monitor endotracheal if appropriate and nasal secretions for changes in amount and color  - [place] appropriate cooling/warming therapies per order  - Administer medications as ordered  - Instruct and encourage patient and family to  use good hand hygiene technique  - Identify and instruct in appropriate isolation precautions for identified infection/condition  Outcome: Progressing  Goal: Absence of fever/infection during neutropenic period  Description: INTERVENTIONS:  - Monitor WBC    Outcome: Progressing     Problem: Prexisting or High Potential for Compromised Skin Integrity  Goal: Skin integrity is maintained or improved  Description: INTERVENTIONS:  - Identify patients at risk for skin breakdown  - Assess and monitor skin integrity  - Assess and monitor nutrition and hydration status  - Monitor labs   - Assess for incontinence   - Turn and reposition patient  - Assist with mobility/ambulation  - Relieve pressure over bony prominences  - Avoid friction and shearing  - Provide appropriate hygiene as needed including keeping skin clean and dry  - Evaluate need for skin moisturizer/barrier cream  - Collaborate with interdisciplinary team   - Patient/family teaching  - Consider wound care consult   Outcome: Progressing     Problem: Nutrition/Hydration-ADULT  Goal: Nutrient/Hydration intake appropriate for improving, restoring or maintaining nutritional needs  Description: Monitor and assess patient's nutrition/hydration status for malnutrition. Collaborate with interdisciplinary team and initiate plan and interventions as ordered.  Monitor patient's weight and dietary intake as ordered or per policy. Utilize nutrition screening tool and intervene as necessary. Determine patient's food preferences and provide high-protein, high-caloric foods as appropriate.     INTERVENTIONS:  - Monitor oral intake, urinary output, labs, and treatment plans  - Assess nutrition and hydration status and recommend course of action  - Evaluate amount of meals eaten  - Assist patient with eating if necessary   - Allow adequate time for meals  - Recommend/ encourage appropriate diets, oral nutritional supplements, and vitamin/mineral supplements  - Order, calculate,  and assess calorie counts as needed  - Recommend, monitor, and adjust tube feedings and TPN/PPN based on assessed needs  - Assess need for intravenous fluids  - Provide specific nutrition/hydration education as appropriate  - Include patient/family/caregiver in decisions related to nutrition  Outcome: Progressing

## 2024-05-18 NOTE — ASSESSMENT & PLAN NOTE
P/w generalized weakness, fatigue and mild increase in confusion compared to baseline per family.   SIRS criteria: Tachycardia, hyperthermia with suspected urinary source less likely lung source due to lack of sx  Suspected source: UTI.  Chest x-ray with mild right basilar opacification, however without clinical evidence of pneumonia, continue to monitor  She received IV cfx x 3 days. D/w micro that K oxytoca [s] to ceftriaxone. No additional abx required.   Bcx no growth so far.

## 2024-05-18 NOTE — DISCHARGE SUMMARY
[facility]  Discharge- Dayana Reagan 1935, 88 y.o. female MRN: [id number]  Unit/Bed#: S -01 Encounter: [id number]  Primary Care Provider: Stefanie Anna MD   Date and time admitted to hospital: 5/15/2024  7:01 PM    * Sepsis (HCC)  Assessment & Plan  P/w generalized weakness, fatigue and mild increase in confusion compared to baseline per family.   SIRS criteria: Tachycardia, hyperthermia with suspected urinary source less likely lung source due to lack of sx  Suspected source: UTI.  Chest x-ray with mild right basilar opacification, however without clinical evidence of pneumonia, continue to monitor  She received IV cfx x 3 days. D/w micro that K oxytoca [s] to ceftriaxone. No additional abx required.   Bcx no growth so far.    Type 2 diabetes mellitus with hyperglycemia (HCC)  Assessment & Plan  Lab Results   Component Value Date    HGBA1C 6.5 (H) 05/15/2024     Recent Labs     05/17/24  1113 05/17/24  1611 05/17/24  2042 05/18/24  0726   POCGLU 187* 132 75 142*       Blood Sugar Average: Last 72 hrs:  (P) 131.2  Home regimen includes Metformin, will resume on d/c   Continue home insulin regimen of 14 units of Lantus HS and 8 units of Lispro TID with meals.      Ambulatory dysfunction  Assessment & Plan  PT/OT input appreciated.  Patient appears at her ambulatory baseline and may return to her prior facility    Chronic diastolic congestive heart failure (HCC)  Assessment & Plan  Wt Readings from Last 3 Encounters:   05/15/24 90 kg (198 lb 6.6 oz)   01/11/24 85.7 kg (189 lb)   12/06/23 83 kg (183 lb)     Appears compensated at this time.  Low sodium diet and fluid restriction.  Continue home medications    Persistent atrial fibrillation (HCC)  Assessment & Plan  Rate/Rhythm Control: Toprol XL.  Anticoagulation: Eliquis.    Dementia (HCC)  Assessment & Plan  Alert and oriented x3. Forgetful.  Supportive care.    Thrombocytopenia (HCC)  Assessment & Plan  Acute on chronic  thrombocytopenia, likely reactive  Continue Eliquis for now with platelets stable    Essential hypertension  Assessment & Plan  Continue Toprol XL, Furosemide and ARB with holding parameters      Medical Problems       Resolved Problems  Date Reviewed: 5/18/2024   None       Discharging Physician / Practitioner: Deondre Dugan PA-C  PCP: Stefanie Anna MD  Admission Date:   Admission Orders (From admission, onward)       Ordered        05/15/24 2121  INPATIENT ADMISSION  Once                          Discharge Date: 05/18/24    Consultations During Hospital Stay:  None     Procedures Performed:   None     Significant Findings / Test Results:   CXR unremarkable, possible R basilar opacity   Urine cx + Kleb oxytoca  Blood cx negative     Incidental Findings:   None      Test Results Pending at Discharge (will require follow up):   None      Outpatient Tests Requested:  Follow up with PCP     Complications:  none     Reason for Admission: fever     Hospital Course:   Dayana Reagan is a 88 y.o. female patient who originally presented to the hospital on 5/15/2024 due to with fever and worsening confusion.  Patient met sepsis criteria on admission.  There was concern for possible pneumonia and urinary tract infection.  Patient did not have cough or shortness of breath.  Urine isolated Klebsiella oxytoca.  She received 3 days of IV ceftriaxone.  Fevers resolved.  She clinically improved with concomitant IV fluid hydration.  She is medically stable for discharge back to assisted living facility at this time in the care of her .    Please see above list of diagnoses and related plan for additional information.     Condition at Discharge: good    Discharge Day Visit / Exam:     Subjective:  pt seen and examined at bedside. Feels well. No flank or suprapubic pain. No fevers or chills. No dysuria.     Vitals: Blood Pressure: 139/84 (05/18/24 0728)  Pulse: 81 (05/18/24 0728)  Temperature: 97.7 °F (36.5 °C)  (05/18/24 0728)  Temp Source: Oral (05/15/24 1910)  Respirations: 17 (05/18/24 0728)  Weight - Scale: 90 kg (198 lb 6.6 oz) (05/15/24 1908)  SpO2: 94 % (05/18/24 0728)    Exam:   Physical Exam  Constitutional:       Appearance: Normal appearance. She is not ill-appearing.      Comments: Sitting in bedside recliner    HENT:      Head: Normocephalic and atraumatic.      Mouth/Throat:      Mouth: Mucous membranes are moist.   Eyes:      Extraocular Movements: Extraocular movements intact.   Cardiovascular:      Rate and Rhythm: Normal rate and regular rhythm.   Pulmonary:      Effort: Pulmonary effort is normal.      Breath sounds: Normal breath sounds.   Abdominal:      General: Abdomen is flat.      Palpations: Abdomen is soft.   Musculoskeletal:         General: No swelling. Normal range of motion.      Cervical back: Normal range of motion and neck supple.   Skin:     General: Skin is warm and dry.   Neurological:      General: No focal deficit present.      Mental Status: She is alert. Mental status is at baseline. She is disoriented.   Psychiatric:         Mood and Affect: Mood normal.         Behavior: Behavior normal.       Discussion with Family: Updated  () via phone.    Discharge instructions/Information to patient and family:   See after visit summary for information provided to patient and family.      Provisions for Follow-Up Care:  See after visit summary for information related to follow-up care and any pertinent home health orders.      Mobility at time of Discharge:   Basic Mobility Inpatient Raw Score: 14  JH-HLM Goal: 4: Move to chair/commode  JH-HLM Achieved: 6: Walk 10 steps or more  HLM Goal achieved. Continue to encourage appropriate mobility.     Disposition:   Home    Planned Readmission: none      Discharge Statement:  I spent 45 minutes discharging the patient. This time was spent on the day of discharge. I had direct contact with the patient on the day of discharge.  Greater than 50% of the total time was spent examining patient, answering all patient questions, arranging and discussing plan of care with patient as well as directly providing post-discharge instructions.  Additional time then spent on discharge activities.    Discharge Medications:  See after visit summary for reconciled discharge medications provided to patient and/or family.      **Please Note: This note may have been constructed using a voice recognition system**

## 2024-05-18 NOTE — ASSESSMENT & PLAN NOTE
Wt Readings from Last 3 Encounters:   05/15/24 90 kg (198 lb 6.6 oz)   01/11/24 85.7 kg (189 lb)   12/06/23 83 kg (183 lb)     Appears compensated at this time.  Low sodium diet and fluid restriction.  Continue home medications

## 2024-05-18 NOTE — ASSESSMENT & PLAN NOTE
Lab Results   Component Value Date    HGBA1C 6.5 (H) 05/15/2024     Recent Labs     05/17/24  1113 05/17/24  1611 05/17/24 2042 05/18/24  0726   POCGLU 187* 132 75 142*       Blood Sugar Average: Last 72 hrs:  (P) 131.2  Home regimen includes Metformin, will resume on d/c   Continue home insulin regimen of 14 units of Lantus HS and 8 units of Lispro TID with meals.

## 2024-05-18 NOTE — ASSESSMENT & PLAN NOTE
Acute on chronic thrombocytopenia, likely reactive  Continue Eliquis for now with platelets stable

## 2024-05-20 ENCOUNTER — TRANSITIONAL CARE MANAGEMENT (OUTPATIENT)
Dept: FAMILY MEDICINE CLINIC | Facility: CLINIC | Age: 89
End: 2024-05-20

## 2024-05-20 LAB
BACTERIA BLD CULT: NORMAL
BACTERIA BLD CULT: NORMAL

## 2024-06-13 ENCOUNTER — OFFICE VISIT (OUTPATIENT)
Dept: FAMILY MEDICINE CLINIC | Facility: CLINIC | Age: 89
End: 2024-06-13
Payer: MEDICARE

## 2024-06-13 VITALS
HEART RATE: 70 BPM | RESPIRATION RATE: 16 BRPM | WEIGHT: 198 LBS | BODY MASS INDEX: 37.38 KG/M2 | DIASTOLIC BLOOD PRESSURE: 80 MMHG | OXYGEN SATURATION: 94 % | SYSTOLIC BLOOD PRESSURE: 136 MMHG | TEMPERATURE: 96.9 F | HEIGHT: 61 IN

## 2024-06-13 DIAGNOSIS — R26.2 AMBULATORY DYSFUNCTION: ICD-10-CM

## 2024-06-13 DIAGNOSIS — E11.65 TYPE 2 DIABETES MELLITUS WITH HYPERGLYCEMIA, UNSPECIFIED WHETHER LONG TERM INSULIN USE (HCC): ICD-10-CM

## 2024-06-13 DIAGNOSIS — I50.32 CHRONIC DIASTOLIC CONGESTIVE HEART FAILURE (HCC): Primary | ICD-10-CM

## 2024-06-13 DIAGNOSIS — I10 ESSENTIAL HYPERTENSION: ICD-10-CM

## 2024-06-13 DIAGNOSIS — F03.90 DEMENTIA WITHOUT BEHAVIORAL DISTURBANCE, PSYCHOTIC DISTURBANCE, MOOD DISTURBANCE, OR ANXIETY, UNSPECIFIED DEMENTIA SEVERITY, UNSPECIFIED DEMENTIA TYPE (HCC): ICD-10-CM

## 2024-06-13 DIAGNOSIS — I48.19 PERSISTENT ATRIAL FIBRILLATION (HCC): ICD-10-CM

## 2024-06-13 DIAGNOSIS — E11.9 ENCOUNTER FOR DIABETIC FOOT EXAM (HCC): ICD-10-CM

## 2024-06-13 DIAGNOSIS — N18.31 STAGE 3A CHRONIC KIDNEY DISEASE (HCC): ICD-10-CM

## 2024-06-13 PROCEDURE — G2211 COMPLEX E/M VISIT ADD ON: HCPCS | Performed by: INTERNAL MEDICINE

## 2024-06-13 PROCEDURE — 99214 OFFICE O/P EST MOD 30 MIN: CPT | Performed by: INTERNAL MEDICINE

## 2024-06-13 NOTE — PROGRESS NOTES
Assessment/Plan:Dayana doing pretty good-out of the hospital with recent UTI-blood sugar logs reviewed and look good-her  keeps excellent records of them and when he gives insulin etc-have to monitor kidney function, not in a fib today but is maintained on Eliquis and metoprolol.  Will resee in 6 months, BP pretty well controlled         Problem List Items Addressed This Visit       Essential hypertension    Stage 3a chronic kidney disease (HCC)    Dementia (HCC)    Persistent atrial fibrillation (HCC)    Type 2 diabetes mellitus with hyperglycemia (HCC)    Chronic diastolic congestive heart failure (HCC) - Primary    Ambulatory dysfunction         Subjective:      Patient ID: Dayana Reagan is a 88 y.o. female.    Dayana here with her  Ray. She has a hx of DM II, on insulin, HTN, CKD, dementia, osteoarthritis and ambulatory dysfunction-was recenlty hospitalized with a Klebseila UTI and was on IV antibiotics-totally cleared up now-her A1c as of May was 6.5%, so doing well on her regimen of metformin and insulin, both long and short acting-hx of PAF too and is on Eliquis BID-hx of CHF too-they are living at [facility] now and doing ok there      Hypertension    Atrial Fibrillation  Past medical history includes atrial fibrillation.   Diabetes        The following portions of the patient's history were reviewed and updated as appropriate:   Past Medical History:  She has a past medical history of Allergy to sulfa drugs, Arthritis (2000), Closed fracture of third lumbar vertebra (HCC), Closed T12 spinal fracture ([facility]), Closed wedge compression fracture of T12 vertebra ([facility]), Dementia ([facility]), Diabetes mellitus ([facility]) (2000), DM (diabetes mellitus), type 2 ([facility]), H/O multiple allergies, HL (hearing loss), Hyperlipidemia, Hypertension, Lumbar compression fracture ([facility]), Memory loss (2020), Nocturia, Palpitations, Paroxysmal atrial fibrillation ([facility]), Thrombocytopenic disorder ([facility]), Type 2 diabetes mellitus  without complication (HCC), and Wears glasses.,  _______________________________________________________________________  Medical Problems:  does not have any pertinent problems on file.,  _______________________________________________________________________  Past Surgical History:   has a past surgical history that includes Hysterectomy; Hernia repair; and DXA procedure(historical) (05/31/2023).,  _______________________________________________________________________  Family History:  family history includes Diabetes in her mother; Heart disease in her father; Hypertension in her father and mother; Other in her mother.,  _______________________________________________________________________  Social History:   reports that she has never smoked. She has never been exposed to tobacco smoke. She has never used smokeless tobacco. She reports that she does not currently use alcohol after a past usage of about 1.0 standard drink of alcohol per week. She reports that she does not use drugs.,  _______________________________________________________________________  Allergies:  is allergic to accupril [quinapril hcl], novocain [procaine], parabens, and sulfa antibiotics..  _______________________________________________________________________  Current Outpatient Medications   Medication Sig Dispense Refill    apixaban (Eliquis) 2.5 mg TAKE 1 TABLET BY MOUTH TWICE  DAILY 180 tablet 3    ezetimibe-simvastatin (VYTORIN) 10-20 mg per tablet Take 1 tablet by mouth daily at bedtime 90 tablet 0    furosemide (LASIX) 80 mg tablet TAKE 1 TABLET BY MOUTH DAILY 90 tablet 1    glucose blood (FREESTYLE LITE) test strip Use to check blood sugar three times a day 300 strip 5    insulin glargine (Lantus) 100 units/mL subcutaneous injection Inject 14 Units under the skin daily at bedtime 10 mL 5    insulin lispro (HumaLOG KwikPen) 100 units/mL injection pen Inject 8 Units under the skin 3 (three) times a day with meals      Insulin Pen  "Needle (BD AutoShield Duo) 30G X 5 MM MISC USE FOUR TIMES DAILY WITH INSULIN  each 5    metFORMIN (GLUCOPHAGE) 500 mg tablet TAKE 1 TABLET BY MOUTH TWICE  DAILY 180 tablet 1    metoprolol succinate (TOPROL-XL) 25 mg 24 hr tablet TAKE 3 TABLETS BY MOUTH TWICE  DAILY 540 tablet 1    potassium chloride (Klor-Con M20) 20 mEq tablet TAKE 1 TABLET BY MOUTH DAILY 90 tablet 3    valsartan (DIOVAN) 160 mg tablet TAKE 1 TABLET BY MOUTH DAILY 90 tablet 1     No current facility-administered medications for this visit.     _______________________________________________________________________  Review of Systems   Constitutional: Negative.    Respiratory: Negative.     Cardiovascular: Negative.    Gastrointestinal: Negative.    Genitourinary: Negative.    Musculoskeletal:  Positive for arthralgias and gait problem.         Objective:  Vitals:    06/13/24 1104   BP: 136/80   BP Location: Left arm   Patient Position: Sitting   Cuff Size: Large   Pulse: 70   Resp: 16   Temp: (!) 96.9 °F (36.1 °C)   TempSrc: Tympanic   SpO2: 94%   Weight: 89.8 kg (198 lb)   Height: 5' 1\" (1.549 m)     Body mass index is 37.41 kg/m².     Physical Exam  Constitutional:       Appearance: She is obese.   HENT:      Head: Normocephalic and atraumatic.      Right Ear: External ear normal.      Left Ear: External ear normal.      Nose: Nose normal.      Mouth/Throat:      Mouth: Mucous membranes are moist.   Cardiovascular:      Rate and Rhythm: Normal rate and regular rhythm.      Pulses: no weak pulses.           Dorsalis pedis pulses are 2+ on the right side and 2+ on the left side.      Heart sounds: Normal heart sounds.   Pulmonary:      Effort: Pulmonary effort is normal.      Breath sounds: Normal breath sounds.   Musculoskeletal:         General: Normal range of motion.      Cervical back: Normal range of motion and neck supple.   Feet:      Right foot:      Skin integrity: No ulcer, skin breakdown, erythema, warmth, callus or dry skin.     "  Left foot:      Skin integrity: No ulcer, skin breakdown, erythema, warmth, callus or dry skin.   Skin:     General: Skin is warm.   Neurological:      General: No focal deficit present.      Mental Status: She is alert and oriented to person, place, and time.   Psychiatric:         Mood and Affect: Mood normal.         Behavior: Behavior normal.         Thought Content: Thought content normal.       Patient's shoes and socks removed.    Right Foot/Ankle   Right Foot Inspection  Skin Exam: skin normal and skin intact. No dry skin, no warmth, no callus, no erythema, no maceration, no abnormal color, no pre-ulcer, no ulcer and no callus.     Toe Exam: ROM and strength within normal limits.     Sensory   Proprioception: intact  Monofilament testing: diminished    Vascular  Capillary refills: < 3 seconds  The right DP pulse is 2+.     Left Foot/Ankle  Left Foot Inspection  Skin Exam: skin normal and skin intact. No dry skin, no warmth, no erythema, no maceration, normal color, no pre-ulcer, no ulcer and no callus.     Toe Exam: ROM and strength within normal limits.     Sensory   Proprioception: intact  Monofilament testing: diminished    Vascular  Capillary refills: < 3 seconds  The left DP pulse is 2+.     Assign Risk Category  No deformity present  Loss of protective sensation  No weak pulses  Risk: 1      No

## 2024-06-14 PROBLEM — A41.9 SEPSIS (HCC): Status: RESOLVED | Noted: 2024-05-15 | Resolved: 2024-06-14

## 2024-06-15 PROBLEM — N30.00 ACUTE CYSTITIS WITHOUT HEMATURIA: Status: RESOLVED | Noted: 2023-02-13 | Resolved: 2024-06-15

## 2024-07-24 ENCOUNTER — TELEPHONE (OUTPATIENT)
Dept: FAMILY MEDICINE CLINIC | Facility: CLINIC | Age: 89
End: 2024-07-24

## 2024-07-24 DIAGNOSIS — E11.65 TYPE 2 DIABETES MELLITUS WITH HYPERGLYCEMIA, UNSPECIFIED WHETHER LONG TERM INSULIN USE (HCC): ICD-10-CM

## 2024-07-24 DIAGNOSIS — Z79.4 TYPE 2 DIABETES MELLITUS WITH HYPERGLYCEMIA, WITH LONG-TERM CURRENT USE OF INSULIN (HCC): ICD-10-CM

## 2024-07-24 DIAGNOSIS — Z79.4 TYPE 2 DIABETES MELLITUS WITH HYPERGLYCEMIA, WITH LONG-TERM CURRENT USE OF INSULIN (HCC): Primary | ICD-10-CM

## 2024-07-24 DIAGNOSIS — E11.65 TYPE 2 DIABETES MELLITUS WITH HYPERGLYCEMIA, WITH LONG-TERM CURRENT USE OF INSULIN (HCC): ICD-10-CM

## 2024-07-24 DIAGNOSIS — E11.65 TYPE 2 DIABETES MELLITUS WITH HYPERGLYCEMIA, WITH LONG-TERM CURRENT USE OF INSULIN (HCC): Primary | ICD-10-CM

## 2024-07-24 RX ORDER — INSULIN GLARGINE 100 [IU]/ML
16 INJECTION, SOLUTION SUBCUTANEOUS DAILY
Qty: 30 ML | Refills: 3 | Status: SHIPPED | OUTPATIENT
Start: 2024-07-24 | End: 2024-07-25

## 2024-07-24 RX ORDER — INSULIN LISPRO 100 [IU]/ML
7 INJECTION, SOLUTION INTRAVENOUS; SUBCUTANEOUS
Qty: 15 ML | Refills: 3 | Status: SHIPPED | OUTPATIENT
Start: 2024-07-24

## 2024-07-24 NOTE — TELEPHONE ENCOUNTER
Fax from [facility] on  [facility] requesting refills:    Humalog KwikPen Subcutaneous Solution Pen Injector 100 unit/ml.  Inject 7 units 3xday with meals.  (#30 with 3 refills)    Lantus SoloStar Sub Sol pen-injector 100 unit/ml.  Inject 16 units in the morning.  (#15.0 and 3 refills)

## 2024-07-24 NOTE — TELEPHONE ENCOUNTER
[facility] pharmacy called and wanted clarification on Lantus.      Lantus vial was ordered but no syringes.  Did the doctor want the Lantus Pen?    Please advise.

## 2024-07-25 RX ORDER — INSULIN GLARGINE 100 [IU]/ML
16 INJECTION, SOLUTION SUBCUTANEOUS DAILY
Qty: 15 ML | Refills: 1 | Status: SHIPPED | OUTPATIENT
Start: 2024-07-25

## 2024-07-29 PROBLEM — R93.5 ABNORMAL ABDOMINAL CT SCAN: Status: RESOLVED | Noted: 2023-06-09 | Resolved: 2024-07-29

## 2024-07-29 PROBLEM — I13.0 HYPERTENSIVE HEART AND RENAL DISEASE WITH CONGESTIVE HEART FAILURE (HCC): Status: ACTIVE | Noted: 2024-07-29

## 2024-07-29 PROBLEM — E11.22 DIABETES MELLITUS WITH CHRONIC KIDNEY DISEASE (HCC): Status: ACTIVE | Noted: 2024-07-29

## 2024-07-29 PROBLEM — M25.569 KNEE PAIN: Status: RESOLVED | Noted: 2023-04-18 | Resolved: 2024-07-29

## 2024-07-29 PROBLEM — I16.0 HYPERTENSIVE URGENCY: Status: RESOLVED | Noted: 2023-02-13 | Resolved: 2024-07-29

## 2024-08-22 DIAGNOSIS — E11.9 TYPE 2 DIABETES MELLITUS WITHOUT COMPLICATION, WITHOUT LONG-TERM CURRENT USE OF INSULIN (HCC): ICD-10-CM

## 2024-08-22 RX ORDER — BLOOD-GLUCOSE METER
KIT MISCELLANEOUS
Qty: 300 EACH | Refills: 1 | Status: SHIPPED | OUTPATIENT
Start: 2024-08-22

## 2024-09-26 ENCOUNTER — TELEPHONE (OUTPATIENT)
Dept: FAMILY MEDICINE CLINIC | Facility: CLINIC | Age: 89
End: 2024-09-26

## 2024-10-26 DIAGNOSIS — I50.33 ACUTE ON CHRONIC DIASTOLIC CONGESTIVE HEART FAILURE (HCC): ICD-10-CM

## 2024-10-28 RX ORDER — VALSARTAN 160 MG/1
160 TABLET ORAL DAILY
Qty: 90 TABLET | Refills: 1 | Status: SHIPPED | OUTPATIENT
Start: 2024-10-28

## 2024-11-04 ENCOUNTER — APPOINTMENT (OUTPATIENT)
Dept: RADIOLOGY | Age: 89
End: 2024-11-04
Payer: MEDICARE

## 2024-11-04 ENCOUNTER — OFFICE VISIT (OUTPATIENT)
Dept: URGENT CARE | Age: 89
End: 2024-11-04
Payer: MEDICARE

## 2024-11-04 VITALS
DIASTOLIC BLOOD PRESSURE: 86 MMHG | TEMPERATURE: 97.2 F | WEIGHT: 198 LBS | HEART RATE: 82 BPM | OXYGEN SATURATION: 100 % | RESPIRATION RATE: 22 BRPM | SYSTOLIC BLOOD PRESSURE: 137 MMHG | BODY MASS INDEX: 37.38 KG/M2 | HEIGHT: 61 IN

## 2024-11-04 DIAGNOSIS — R05.3 CHRONIC COUGH: ICD-10-CM

## 2024-11-04 DIAGNOSIS — J40 BRONCHITIS: Primary | ICD-10-CM

## 2024-11-04 PROCEDURE — 71046 X-RAY EXAM CHEST 2 VIEWS: CPT

## 2024-11-04 PROCEDURE — 99214 OFFICE O/P EST MOD 30 MIN: CPT

## 2024-11-04 PROCEDURE — G0463 HOSPITAL OUTPT CLINIC VISIT: HCPCS

## 2024-11-04 PROCEDURE — 94640 AIRWAY INHALATION TREATMENT: CPT

## 2024-11-04 RX ORDER — IPRATROPIUM BROMIDE AND ALBUTEROL SULFATE 2.5; .5 MG/3ML; MG/3ML
3 SOLUTION RESPIRATORY (INHALATION) ONCE
Status: COMPLETED | OUTPATIENT
Start: 2024-11-04 | End: 2024-11-04

## 2024-11-04 RX ORDER — ALBUTEROL SULFATE 90 UG/1
2 INHALANT RESPIRATORY (INHALATION) EVERY 6 HOURS PRN
Qty: 8.5 G | Refills: 0 | Status: SHIPPED | OUTPATIENT
Start: 2024-11-04

## 2024-11-04 RX ADMIN — IPRATROPIUM BROMIDE AND ALBUTEROL SULFATE 3 ML: 2.5; .5 SOLUTION RESPIRATORY (INHALATION) at 20:07

## 2024-11-05 NOTE — PROGRESS NOTES
[facility]        NAME: Dayana Reagan is a 89 y.o. female  : 1935    MRN: [id number]  DATE: 2024  TIME: 8:19 PM    Assessment and Plan   Bronchitis [J40]  1. Bronchitis  ipratropium-albuterol (DUO-NEB) 0.5-2.5 mg/3 mL inhalation solution 3 mL    albuterol (ProAir HFA) 90 mcg/act inhaler      2. Chronic cough  XR chest pa and lateral        Lateral chest x-ray: Negative for acute cardiopulmonary findings.  Please monitor your results in [facility] for final read.  If there are any positive findings, I will contact you with any findings.      The wheezing in both of your lungs was improved with the albuterol treatment given in office today.  Will signs improved post albuterol treatment.  I have prescribed an albuterol inhaler for you to use as needed for cough, shortness of breath and wheezing.    These go to the emergency department if you develop any increased shortness of breath, difficulty breathing, or chest pain.  Please keep your appointment with your PCP on 2024.  Strict ER precautions discussed.  Patient Instructions       Follow up with PCP in 3-5 days.  Proceed to  ER if symptoms worsen.    If tests have been performed at [facility], our office will contact you with results if changes need to be made to the care plan discussed with you at the visit.  You can review your full results on [facility].    Chief Complaint     Chief Complaint   Patient presents with    Cough     Dry cough for about 5 months. Has not sought any tx till now. Has appt in 2 weeks with family doctor. Denies much sob or swelling. Some wheezing per her  says at night.          History of Present Illness       Pt is a 89 year old female presenting with dry cough, wheezing and mild ANDINO for 4 months.  She denies CP or SOB, increased swelling in extremities, no fever or chills. She has not taken anything for her symptoms.    of pt concerned with increased audible wheezing.  Pt voices no  complaints.          Review of Systems   Review of Systems   Constitutional:  Negative for chills, fatigue and fever.   HENT:  Negative for congestion.    Respiratory:  Positive for cough and wheezing. Negative for choking, chest tightness, shortness of breath and stridor.    Cardiovascular:  Negative for chest pain and palpitations.   Neurological:  Negative for dizziness.         Current Medications       Current Outpatient Medications:     albuterol (ProAir HFA) 90 mcg/act inhaler, Inhale 2 puffs every 6 (six) hours as needed for wheezing or shortness of breath, Disp: 8.5 g, Rfl: 0    apixaban (Eliquis) 2.5 mg, TAKE 1 TABLET BY MOUTH TWICE  DAILY, Disp: 180 tablet, Rfl: 3    ezetimibe-simvastatin (VYTORIN) 10-20 mg per tablet, Take 1 tablet by mouth daily at bedtime, Disp: 90 tablet, Rfl: 0    furosemide (LASIX) 80 mg tablet, TAKE 1 TABLET BY MOUTH DAILY, Disp: 90 tablet, Rfl: 1    glucose blood (FREESTYLE LITE) test strip, check blood sugar 3 times daily, Disp: 300 each, Rfl: 1    Insulin Glargine Solostar (Lantus SoloStar) 100 UNIT/ML SOPN, Inject 0.16 mL (16 Units total) under the skin in the morning, Disp: 15 mL, Rfl: 1    insulin lispro (HumaLOG KwikPen) 100 units/mL injection pen, Inject 7 Units under the skin 3 (three) times a day with meals, Disp: 15 mL, Rfl: 3    Insulin Pen Needle (BD AutoShield Duo) 30G X 5 MM MISC, USE FOUR TIMES DAILY WITH INSULIN PEN, Disp: 400 each, Rfl: 5    metFORMIN (GLUCOPHAGE) 500 mg tablet, TAKE 1 TABLET BY MOUTH TWICE  DAILY, Disp: 180 tablet, Rfl: 1    metoprolol succinate (TOPROL-XL) 25 mg 24 hr tablet, TAKE 3 TABLETS BY MOUTH TWICE  DAILY, Disp: 540 tablet, Rfl: 1    potassium chloride (Klor-Con M20) 20 mEq tablet, TAKE 1 TABLET BY MOUTH DAILY, Disp: 90 tablet, Rfl: 3    valsartan (DIOVAN) 160 mg tablet, TAKE 1 TABLET BY MOUTH EVERY DAY, Disp: 90 tablet, Rfl: 1  No current facility-administered medications for this visit.    Current Allergies     Allergies as of  "11/04/2024 - Reviewed 11/04/2024   Allergen Reaction Noted    Accupril [quinapril hcl]  05/08/2020    Novocain [procaine]  06/08/2020    Parabens  11/24/2019    Sulfa antibiotics  05/08/2020            The following portions of the patient's history were reviewed and updated as appropriate: allergies, current medications, past family history, past medical history, past social history, past surgical history and problem list.     Past Medical History:   Diagnosis Date    Abnormal abdominal CT scan 06/09/2023    Allergy to sulfa drugs     Arthritis 2000    Closed fracture of third lumbar vertebra (HCC)     Closed T12 spinal fracture (HCC)     Closed wedge compression fracture of T12 vertebra (HCC)     Dementia (HCC)     Diabetes mellitus (HCC) 2000    DM (diabetes mellitus), type 2 (HCC)     H/O multiple allergies     Novocaine,Darvocet, Sulfa, Accupril    HL (hearing loss)     Hyperlipidemia     Hypertension     Hypertensive urgency 02/13/2023    Knee pain 04/18/2023    Lumbar compression fracture (HCC)     Memory loss 2020    Nocturia     Palpitations     Paroxysmal atrial fibrillation (HCC)     Thrombocytopenic disorder (HCC)     Type 2 diabetes mellitus without complication (HCC)     Wears glasses        Past Surgical History:   Procedure Laterality Date    DXA PROCEDURE (HISTORICAL)  05/31/2023    HERNIA REPAIR      HYSTERECTOMY         Family History   Problem Relation Age of Onset    Hypertension Mother     Diabetes Mother     Other Mother     Hypertension Father     Heart disease Father          Medications have been verified.        Objective   /86   Pulse 82   Temp (!) 97.2 °F (36.2 °C)   Resp 22   Ht 5' 1\" (1.549 m)   Wt 89.8 kg (198 lb)   SpO2 100%   BMI 37.41 kg/m²   No LMP recorded. Patient has had a hysterectomy.       Physical Exam     Physical Exam  Vitals and nursing note reviewed.   Constitutional:       General: She is not in acute distress.     Appearance: Normal appearance. She is " normal weight. She is not ill-appearing.   HENT:      Head: Normocephalic.      Right Ear: Tympanic membrane normal.      Left Ear: Tympanic membrane normal.      Nose: No congestion or rhinorrhea.      Mouth/Throat:      Mouth: Mucous membranes are moist.      Pharynx: No oropharyngeal exudate.   Eyes:      Extraocular Movements: Extraocular movements intact.   Cardiovascular:      Rate and Rhythm: Normal rate.      Pulses: Normal pulses.   Pulmonary:      Effort: Pulmonary effort is normal. No respiratory distress.      Breath sounds: No stridor. Wheezing present. No rhonchi or rales.   Chest:      Chest wall: No tenderness.   Abdominal:      Palpations: Abdomen is soft.   Musculoskeletal:      Cervical back: Normal range of motion.   Skin:     General: Skin is warm.      Capillary Refill: Capillary refill takes less than 2 seconds.   Neurological:      General: No focal deficit present.      Mental Status: She is alert.

## 2024-11-05 NOTE — PATIENT INSTRUCTIONS
Lateral chest x-ray: Negative for acute cardiopulmonary findings.  [facility].  If there are any positive findings, I will contact you.    The wheezing in both of your lungs was improved with the albuterol treatment given in office today.  I have prescribed an albuterol inhaler for you to use as needed for cough, shortness of breath and wheezing.    These go to the emergency department if you develop any increased shortness of breath, difficulty breathing, or chest pain.  Please keep your appointment with your PCP on 11/19/2024.

## 2024-11-08 DIAGNOSIS — I50.33 ACUTE ON CHRONIC DIASTOLIC CONGESTIVE HEART FAILURE (HCC): ICD-10-CM

## 2024-11-08 DIAGNOSIS — Z79.4 TYPE 2 DIABETES MELLITUS WITHOUT COMPLICATION, WITH LONG-TERM CURRENT USE OF INSULIN (HCC): ICD-10-CM

## 2024-11-08 DIAGNOSIS — E11.9 TYPE 2 DIABETES MELLITUS WITHOUT COMPLICATION, WITH LONG-TERM CURRENT USE OF INSULIN (HCC): ICD-10-CM

## 2024-11-08 RX ORDER — FUROSEMIDE 80 MG/1
80 TABLET ORAL DAILY
Qty: 90 TABLET | Refills: 1 | Status: SHIPPED | OUTPATIENT
Start: 2024-11-08

## 2024-11-13 ENCOUNTER — RA CDI HCC (OUTPATIENT)
Dept: OTHER | Facility: HOSPITAL | Age: 89
End: 2024-11-13

## 2024-11-13 NOTE — PROGRESS NOTES
HCC coding opportunities          Chart Reviewed number of suggestions sent to Provider: 2     Patients Insurance   E11.22 and I13.0  Medicare Insurance: Medicare

## 2024-11-14 ENCOUNTER — RESULTS FOLLOW-UP (OUTPATIENT)
Dept: FAMILY MEDICINE CLINIC | Facility: CLINIC | Age: 89
End: 2024-11-14

## 2024-11-14 ENCOUNTER — APPOINTMENT (OUTPATIENT)
Dept: LAB | Facility: CLINIC | Age: 89
End: 2024-11-14
Payer: MEDICARE

## 2024-11-14 LAB
ALBUMIN SERPL BCG-MCNC: 4 G/DL (ref 3.5–5)
ALP SERPL-CCNC: 66 U/L (ref 34–104)
ALT SERPL W P-5'-P-CCNC: 8 U/L (ref 7–52)
ANION GAP SERPL CALCULATED.3IONS-SCNC: 6 MMOL/L (ref 4–13)
AST SERPL W P-5'-P-CCNC: 14 U/L (ref 13–39)
BASOPHILS # BLD AUTO: 0.03 THOUSANDS/ÂΜL (ref 0–0.1)
BASOPHILS NFR BLD AUTO: 1 % (ref 0–1)
BILIRUB SERPL-MCNC: 0.53 MG/DL (ref 0.2–1)
BUN SERPL-MCNC: 25 MG/DL (ref 5–25)
CALCIUM SERPL-MCNC: 9.4 MG/DL (ref 8.4–10.2)
CHLORIDE SERPL-SCNC: 97 MMOL/L (ref 96–108)
CO2 SERPL-SCNC: 33 MMOL/L (ref 21–32)
CREAT SERPL-MCNC: 1.19 MG/DL (ref 0.6–1.3)
CREAT UR-MCNC: 25.6 MG/DL
EOSINOPHIL # BLD AUTO: 0.19 THOUSAND/ÂΜL (ref 0–0.61)
EOSINOPHIL NFR BLD AUTO: 3 % (ref 0–6)
ERYTHROCYTE [DISTWIDTH] IN BLOOD BY AUTOMATED COUNT: 15.7 % (ref 11.6–15.1)
EST. AVERAGE GLUCOSE BLD GHB EST-MCNC: 154 MG/DL
GFR SERPL CREATININE-BSD FRML MDRD: 40 ML/MIN/1.73SQ M
GLUCOSE P FAST SERPL-MCNC: 207 MG/DL (ref 65–99)
HBA1C MFR BLD: 7 %
HCT VFR BLD AUTO: 40.6 % (ref 34.8–46.1)
HGB BLD-MCNC: 12.3 G/DL (ref 11.5–15.4)
IMM GRANULOCYTES # BLD AUTO: 0.01 THOUSAND/UL (ref 0–0.2)
IMM GRANULOCYTES NFR BLD AUTO: 0 % (ref 0–2)
LYMPHOCYTES # BLD AUTO: 0.78 THOUSANDS/ÂΜL (ref 0.6–4.47)
LYMPHOCYTES NFR BLD AUTO: 13 % (ref 14–44)
MCH RBC QN AUTO: 27 PG (ref 26.8–34.3)
MCHC RBC AUTO-ENTMCNC: 30.3 G/DL (ref 31.4–37.4)
MCV RBC AUTO: 89 FL (ref 82–98)
MICROALBUMIN UR-MCNC: 8.8 MG/L
MICROALBUMIN/CREAT 24H UR: 34 MG/G CREATININE (ref 0–30)
MONOCYTES # BLD AUTO: 0.44 THOUSAND/ÂΜL (ref 0.17–1.22)
MONOCYTES NFR BLD AUTO: 7 % (ref 4–12)
NEUTROPHILS # BLD AUTO: 4.68 THOUSANDS/ÂΜL (ref 1.85–7.62)
NEUTS SEG NFR BLD AUTO: 76 % (ref 43–75)
NRBC BLD AUTO-RTO: 0 /100 WBCS
PLATELET # BLD AUTO: 165 THOUSANDS/UL (ref 149–390)
PMV BLD AUTO: 10.8 FL (ref 8.9–12.7)
POTASSIUM SERPL-SCNC: 4.6 MMOL/L (ref 3.5–5.3)
PROT SERPL-MCNC: 7.3 G/DL (ref 6.4–8.4)
RBC # BLD AUTO: 4.55 MILLION/UL (ref 3.81–5.12)
SODIUM SERPL-SCNC: 136 MMOL/L (ref 135–147)
TSH SERPL DL<=0.05 MIU/L-ACNC: 4.52 UIU/ML (ref 0.45–4.5)
WBC # BLD AUTO: 6.13 THOUSAND/UL (ref 4.31–10.16)

## 2024-11-19 ENCOUNTER — OFFICE VISIT (OUTPATIENT)
Dept: FAMILY MEDICINE CLINIC | Facility: CLINIC | Age: 89
End: 2024-11-19
Payer: MEDICARE

## 2024-11-19 VITALS
HEIGHT: 61 IN | OXYGEN SATURATION: 70 % | HEART RATE: 92 BPM | BODY MASS INDEX: 39.46 KG/M2 | WEIGHT: 209 LBS | DIASTOLIC BLOOD PRESSURE: 68 MMHG | SYSTOLIC BLOOD PRESSURE: 128 MMHG

## 2024-11-19 DIAGNOSIS — I48.19 PERSISTENT ATRIAL FIBRILLATION (HCC): ICD-10-CM

## 2024-11-19 DIAGNOSIS — F33.9 DEPRESSION, RECURRENT (HCC): ICD-10-CM

## 2024-11-19 DIAGNOSIS — I50.32 CHRONIC DIASTOLIC CONGESTIVE HEART FAILURE (HCC): ICD-10-CM

## 2024-11-19 DIAGNOSIS — N18.31 STAGE 3A CHRONIC KIDNEY DISEASE (HCC): ICD-10-CM

## 2024-11-19 DIAGNOSIS — I10 ESSENTIAL HYPERTENSION: ICD-10-CM

## 2024-11-19 DIAGNOSIS — I50.33 ACUTE ON CHRONIC DIASTOLIC CONGESTIVE HEART FAILURE (HCC): ICD-10-CM

## 2024-11-19 DIAGNOSIS — R79.89 ABNORMAL TSH: ICD-10-CM

## 2024-11-19 DIAGNOSIS — Z79.4 TYPE 2 DIABETES MELLITUS WITH HYPERGLYCEMIA, WITH LONG-TERM CURRENT USE OF INSULIN (HCC): Primary | ICD-10-CM

## 2024-11-19 DIAGNOSIS — E66.01 OBESITY, MORBID (HCC): ICD-10-CM

## 2024-11-19 DIAGNOSIS — E11.65 TYPE 2 DIABETES MELLITUS WITH HYPERGLYCEMIA, WITH LONG-TERM CURRENT USE OF INSULIN (HCC): Primary | ICD-10-CM

## 2024-11-19 DIAGNOSIS — L72.0 EPIDERMOID CYST: ICD-10-CM

## 2024-11-19 DIAGNOSIS — R26.2 AMBULATORY DYSFUNCTION: ICD-10-CM

## 2024-11-19 PROBLEM — L89.322 PRESSURE ULCER OF LEFT BUTTOCK, STAGE 2 (HCC): Status: ACTIVE | Noted: 2024-11-19

## 2024-11-19 PROCEDURE — 99214 OFFICE O/P EST MOD 30 MIN: CPT | Performed by: INTERNAL MEDICINE

## 2024-11-19 PROCEDURE — G0439 PPPS, SUBSEQ VISIT: HCPCS | Performed by: INTERNAL MEDICINE

## 2024-11-19 PROCEDURE — G2211 COMPLEX E/M VISIT ADD ON: HCPCS | Performed by: INTERNAL MEDICINE

## 2024-11-19 RX ORDER — INSULIN GLARGINE 100 [IU]/ML
14 INJECTION, SOLUTION SUBCUTANEOUS DAILY
Qty: 15 ML | Refills: 1 | Status: SHIPPED | OUTPATIENT
Start: 2024-11-19

## 2024-11-19 NOTE — ASSESSMENT & PLAN NOTE
Lab Results   Component Value Date    EGFR 40 11/14/2024    EGFR 37 05/18/2024    EGFR 42 05/17/2024    CREATININE 1.19 11/14/2024    CREATININE 1.28 05/18/2024    CREATININE 1.15 05/17/2024   Cr and GFR at baseline, holding off on Nephrology referral for now-did discuss use of jardiance

## 2024-11-19 NOTE — PATIENT INSTRUCTIONS

## 2024-11-19 NOTE — ASSESSMENT & PLAN NOTE
Wt Readings from Last 3 Encounters:   11/19/24 94.8 kg (209 lb)   11/04/24 89.8 kg (198 lb)   06/13/24 89.8 kg (198 lb)

## 2024-11-19 NOTE — PROGRESS NOTES
Name: Dayana Reagan      : 1935      MRN: [id number]  Encounter Provider: Stefanie Anna MD  Encounter Date: 2024   Encounter department: [facility]    Assessment & Plan  Type 2 diabetes mellitus with hyperglycemia, with long-term current use of insulin (HCC)    Lab Results   Component Value Date    HGBA1C 7.0 (H) 2024     Dayana's A1c webt up a little bit to 7%, still within range for her age (89)-she is on lantus, humalog and metformin-I did discuss jardiance with them for it's diabetic treatment but also for its ability to slow CKD, but for now, Dayana is going to cut back on sweets and be a bit more mobile  Orders:    Insulin Glargine Solostar (Lantus SoloStar) 100 UNIT/ML SOPN; Inject 0.14 mL (14 Units total) under the skin in the morning    Obesity, morbid (HCC)           Depression, recurrent (HCC)  Depression Screening Follow-up Plan: Patient's depression screening was positive with a PHQ-9 score of 7. Clinically patient does not have depression. No treatment is required.         Acute on chronic diastolic congestive heart failure (HCC)  Wt Readings from Last 3 Encounters:   24 94.8 kg (209 lb)   24 89.8 kg (198 lb)   24 89.8 kg (198 lb)     Stable             Essential hypertension  Well controlled       Persistent atrial fibrillation (HCC)  On Eliquis and is in sinus rhythm right now       Chronic diastolic congestive heart failure (HCC)  Wt Readings from Last 3 Encounters:   24 94.8 kg (209 lb)   24 89.8 kg (198 lb)   24 89.8 kg (198 lb)                    Epidermoid cyst  On her back, referred to Surgery for removal  Orders:    Ambulatory Referral to General Surgery; Future    Stage 3a chronic kidney disease (HCC)  Lab Results   Component Value Date    EGFR 40 2024    EGFR 37 2024    EGFR 42 2024    CREATININE 1.19 2024    CREATININE 1.28 2024    CREATININE 1.15 2024   Cr and GFR at  baseline, holding off on Nephrology referral for now-did discuss use of jardiance         Ambulatory dysfunction            Preventive health issues were discussed with patient, and age appropriate screening tests were ordered as noted in patient's After Visit Summary. Personalized health advice and appropriate referrals for health education or preventive services given if needed, as noted in patient's After Visit Summary.    History of Present Illness     Dayana here for her MAWV, and to touch base on her hx of DM II, HTN, HL, OA, obesity, cognitive decline-doing well, lives at [place] with her -has not fallen recently-her  would like for her to be a bit more mobile-labwork gone over with patient-she already had her covid and flu vaccines       Patient Care Team:  Stefanie Anna MD as PCP - General (Internal Medicine)    Review of Systems   Constitutional: Negative.    HENT: Negative.     Respiratory: Negative.     Cardiovascular: Negative.    Gastrointestinal: Negative.    Musculoskeletal:  Positive for arthralgias and gait problem.   Hematological: Negative.    Psychiatric/Behavioral: Negative.  Negative for dysphoric mood.      Medical History Reviewed by provider this encounter:       Annual Wellness Visit Questionnaire   Dayana is here for her Subsequent Wellness visit.     Health Risk Assessment:   Patient rates overall health as fair. Patient feels that their physical health rating is same. Patient is satisfied with their life. Eyesight was rated as slightly worse. Hearing was rated as slightly worse. Patient feels that their emotional and mental health rating is same. Patients states they are never, rarely angry. Patient states they are sometimes unusually tired/fatigued. Pain experienced in the last 7 days has been some. Patient's pain rating has been 3/10. Patient states that she has experienced weight loss or gain in last 6 months.     Depression Screening:   PHQ-9 Score: 7      Fall  Risk Screening:   In the past year, patient has experienced: history of falling in past year      Urinary Incontinence Screening:   Patient has leaked urine accidently in the last six months.     Home Safety:  Patient does not have trouble with stairs inside or outside of their home. Patient has working smoke alarms and has working carbon monoxide detector. Home safety hazards include: none.     Nutrition:   Current diet is Regular.     Medications:   Patient is not currently taking any over-the-counter supplements. Patient is not able to manage medications. Managed by my     Activities of Daily Living (ADLs)/Instrumental Activities of Daily Living (IADLs):   Walk and transfer into and out of bed and chair?: Yes  Dress and groom yourself?: Yes    Bathe or shower yourself?: Yes    Feed yourself? Yes  Do your laundry/housekeeping?: No  Manage your money, pay your bills and track your expenses?: No  Make your own meals?: No    Do your own shopping?: No    Previous Hospitalizations:   Any hospitalizations or ED visits within the last 12 months?: No      Hospitalization Comments: Infection on back is bothersome    Advance Care Planning:   Living will: Yes    Durable POA for healthcare: Yes    Advanced directive: Yes    Advanced directive counseling given: Yes      PREVENTIVE SCREENINGS      Cardiovascular Screening:    General: Screening Not Indicated and History Lipid Disorder      Diabetes Screening:     General: Screening Not Indicated and History Diabetes      Colorectal Cancer Screening:     General: Screening Not Indicated      Breast Cancer Screening:     General: Screening Not Indicated      Cervical Cancer Screening:    General: Screening Not Indicated      Osteoporosis Screening:    General: Screening Current      Abdominal Aortic Aneurysm (AAA) Screening:        General: Screening Not Indicated      Lung Cancer Screening:     General: Screening Not Indicated    Screening, Brief Intervention, and  "Referral to Treatment (SBIRT)    Screening  Typical number of drinks in a day: 0  Typical number of drinks in a week: 1  Interpretation: Low risk drinking behavior.    AUDIT-C Screenin) How often did you have a drink containing alcohol in the past year? 2 to 4 times a month  2) How many drinks did you have on a typical day when you were drinking in the past year? 1 to 2  3) How often did you have 6 or more drinks on one occasion in the past year? never    AUDIT-C Score: 2  Interpretation: Score 0-2 (female): Negative screen for alcohol misuse    Single Item Drug Screening:  How often have you used an illegal drug (including marijuana) or a prescription medication for non-medical reasons in the past year? never    Single Item Drug Screen Score: 0  Interpretation: Negative screen for possible drug use disorder    Social Drivers of Health     Financial Resource Strain: Low Risk  (2023)    Overall Financial Resource Strain (CARDIA)     Difficulty of Paying Living Expenses: Not very hard   Food Insecurity: No Food Insecurity (2024)    Hunger Vital Sign     Worried About Running Out of Food in the Last Year: Never true     Ran Out of Food in the Last Year: Never true   Transportation Needs: No Transportation Needs (2024)    PRAPARE - Transportation     Lack of Transportation (Medical): No     Lack of Transportation (Non-Medical): No   Housing Stability: Low Risk  (2024)    Housing Stability Vital Sign     Unable to Pay for Housing in the Last Year: No     Number of Times Moved in the Last Year: 0     Homeless in the Last Year: No   Utilities: Not At Risk (2024)    [facility] Utilities     Threatened with loss of utilities: No     No results found.    Objective   /68 (BP Location: Left arm, Patient Position: Sitting, Cuff Size: Standard)   Pulse 92   Ht 5' 1\" (1.549 m)   Wt 94.8 kg (209 lb)   SpO2 (!) 70%   BMI 39.49 kg/m²     Physical Exam  Constitutional:       Appearance: She is " obese.   HENT:      Head: Normocephalic and atraumatic.      Right Ear: External ear normal.      Left Ear: External ear normal.      Nose: Nose normal.      Mouth/Throat:      Mouth: Mucous membranes are moist.   Eyes:      Extraocular Movements: Extraocular movements intact.   Cardiovascular:      Rate and Rhythm: Normal rate and regular rhythm.      Heart sounds: Normal heart sounds.   Pulmonary:      Effort: Pulmonary effort is normal.      Breath sounds: Normal breath sounds.   Musculoskeletal:         General: Normal range of motion.      Cervical back: Normal range of motion.      Right lower leg: Edema present.      Left lower leg: Edema present.   Skin:     Comments: Draining cyst on her back   Neurological:      General: No focal deficit present.      Mental Status: She is alert and oriented to person, place, and time. Mental status is at baseline.   Psychiatric:         Mood and Affect: Mood normal.         Thought Content: Thought content normal.         Depression Screening Follow-up Plan: Patient's depression screening was positive with a PHQ-2 score of . Their PHQ-9 score was 7. Clinically patient does not have depression. No treatment is required.

## 2024-11-19 NOTE — ASSESSMENT & PLAN NOTE
Lab Results   Component Value Date    HGBA1C 7.0 (H) 11/14/2024     Dayana's A1c webt up a little bit to 7%, still within range for her age (89)-she is on lantus, humalog and metformin-I did discuss jardiance with them for it's diabetic treatment but also for its ability to slow CKD, but for now, Dayana is going to cut back on sweets and be a bit more mobile  Orders:    Insulin Glargine Solostar (Lantus SoloStar) 100 UNIT/ML SOPN; Inject 0.14 mL (14 Units total) under the skin in the morning

## 2024-11-19 NOTE — PROGRESS NOTES
"Depression Screening Follow-up Plan: Patient's depression screening was positive with a PHQ-2 score of . Their PHQ-9 score was 7. Clinically patient does not have depression. No treatment is required.  Answers submitted by the patient for this visit:  Medicare Annual Wellness Visit (Submitted on 11/12/2024)  How would you rate your overall health?: fair  Compared to last year, how is your physical health?: same  In general, how satisfied are you with your life?: satisfied  Compared to last year, how is your eyesight?: slightly worse  Compared to last year, how is your hearing?: slightly worse  Compared to last year, how is your emotional/mental health?: same  How often is anger a problem for you?: never, rarely  How often do you feel unusually tired/fatigued?: sometimes  In the past 7 days, how much pain have you experienced?: some  If you answered \"some\" or \"a lot\", please rate the severity of your pain on a scale of 1 to 10 (1 being the least severe pain and 10 being the most intense pain).: 3/10  In the past 6 months, have you lost or gained 10 pounds without trying?: Yes  One or more falls in the last year: Yes  In the past 6 months, have you accidentally leaked urine?: Yes  Do you have trouble with the stairs inside or outside your home?: No  Does your home have working smoke alarms?: Yes  Does your home have a carbon monoxide monitor?: Yes  Which safety hazards (if any) have you experienced in your home? Please select all that apply.: none  How would you describe your current diet? Please select all that apply.: Regular  In addition to prescription medications, are you taking any over-the-counter supplements?: No  Can you manage your medications?: No  Additional comments : Managed by my   Are you currently taking any opioid medications?: No  Can you walk and transfer into and out of your bed and chair?: Yes  Can you dress and groom yourself?: Yes  Can you bathe or shower yourself?: Yes  Can you feed " yourself?: Yes  Can you do your laundry/ housekeeping?: No  Can you manage your money, pay your bills, and track your expenses?: No  Can you make your own meals?: No  Can you do your own shopping?: No  Within the last 12 months, have you had any hospitalizations or Emergency Department visits?: No  Additional Comments: Infection on back is bothersome  Do you have a living will?: Yes  Do you have a Durable POA (Power of ) for healthcare decisions?: Yes  Do you have an Advanced Directive for end of life decisions?: Yes  How often have you used an illegal drug (including marijuana) or a prescription medication for non-medical reasons in the past year?: never  What is the typical number of drinks you consume in a day?: 0  What is the typical number of drinks you consume in a week?: 1  How often did you have a drink containing alcohol in the past year?: 2 to 4 times a month  How many drinks did you have on a typical day  when you were drinking in the past year?: 1 to 2  How often did you have 6 or more drinks on one occasion in the past year?: never

## 2024-11-20 ENCOUNTER — CONSULT (OUTPATIENT)
Dept: SURGERY | Facility: CLINIC | Age: 89
End: 2024-11-20
Payer: MEDICARE

## 2024-11-20 VITALS
SYSTOLIC BLOOD PRESSURE: 116 MMHG | OXYGEN SATURATION: 96 % | TEMPERATURE: 97.8 F | DIASTOLIC BLOOD PRESSURE: 82 MMHG | HEIGHT: 61 IN | WEIGHT: 209 LBS | BODY MASS INDEX: 39.46 KG/M2 | HEART RATE: 54 BPM

## 2024-11-20 DIAGNOSIS — L72.0 EPIDERMOID CYST: Primary | ICD-10-CM

## 2024-11-20 DIAGNOSIS — E11.22 DIABETES MELLITUS WITH CHRONIC KIDNEY DISEASE (HCC): ICD-10-CM

## 2024-11-20 DIAGNOSIS — L89.322 PRESSURE ULCER OF LEFT BUTTOCK, STAGE 2 (HCC): ICD-10-CM

## 2024-11-20 PROCEDURE — 99203 OFFICE O/P NEW LOW 30 MIN: CPT | Performed by: SURGERY

## 2024-11-20 NOTE — PROGRESS NOTES
Name: Dayana Reagan      : 1935      MRN: [id number]  Encounter Provider: Alex Dolan MD  Encounter Date: 2024   Encounter department: 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  This would need excision.  I am scheduling it to be excised under local anesthesia on .  I told her to stop Eliquis 48 hours before the surgery.  I am going to send a prescription for oral antibiotics for 5 days because of some redness around it.  She signed the consent.  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  History was provided by her and her .  Patient tells me that she has had this cyst for many years.  It becomes bigger and then drained spontaneously.  The last time it drained was a 2 months ago.  She still has minimum tenderness in that area.  No fever.  Patient takes Eliquis for atrial fibrillation.  Her last dose was this morning.      Dayana Reagan is a 89 y.o. female who presents   History obtained from: patient    Review of Systems   Constitutional: Negative.    HENT: Negative.     Eyes: Negative.    Respiratory: Negative.     Cardiovascular: Negative.    Gastrointestinal: Negative.    Endocrine: Negative.    Genitourinary: Negative.    Musculoskeletal: Negative.    Skin: Negative.    Allergic/Immunologic: Negative.    Neurological: Negative.    Hematological: Negative.    Psychiatric/Behavioral: Negative.       Past Medical History   Past Medical History:   Diagnosis Date    Abnormal abdominal CT scan 2023     Allergy to sulfa drugs     Arthritis 2000    Closed fracture of third lumbar vertebra (HCC)     Closed T12 spinal fracture (HCC)     Closed wedge compression fracture of T12 vertebra (HCC)     Dementia (HCC)     Diabetes mellitus (HCC) 2000    DM (diabetes mellitus), type 2 (HCC)     H/O multiple allergies     Novocaine,Darvocet, Sulfa, Accupril    HL (hearing loss)     Hyperlipidemia     Hypertension     Hypertensive urgency 02/13/2023    Knee pain 04/18/2023    Lumbar compression fracture (HCC)     Memory loss 2020    Nocturia     Palpitations     Paroxysmal atrial fibrillation (HCC)     Thrombocytopenic disorder (HCC)     Type 2 diabetes mellitus without complication (HCC)     Wears glasses      Past Surgical History:   Procedure Laterality Date    DXA PROCEDURE (HISTORICAL)  05/31/2023    HERNIA REPAIR      HYSTERECTOMY       Family History   Problem Relation Age of Onset    Hypertension Mother     Diabetes Mother     Hypertension Father     Heart disease Father       reports that she has never smoked. She has never been exposed to tobacco smoke. She has never used smokeless tobacco. She reports current alcohol use of about 1.0 standard drink of alcohol per week. She reports that she does not use drugs.  Current Outpatient Medications on File Prior to Visit   Medication Sig Dispense Refill    albuterol (ProAir HFA) 90 mcg/act inhaler Inhale 2 puffs every 6 (six) hours as needed for wheezing or shortness of breath 8.5 g 0    apixaban (Eliquis) 2.5 mg TAKE 1 TABLET BY MOUTH TWICE  DAILY 180 tablet 3    ezetimibe-simvastatin (VYTORIN) 10-20 mg per tablet Take 1 tablet by mouth daily at bedtime 90 tablet 0    furosemide (LASIX) 80 mg tablet TAKE 1 TABLET BY MOUTH EVERY DAY 90 tablet 1    glucose blood (FREESTYLE LITE) test strip check blood sugar 3 times daily 300 each 1    Insulin Glargine Solostar (Lantus SoloStar) 100 UNIT/ML SOPN Inject 0.14 mL (14 Units total) under the skin in the morning 15 mL 1    insulin  lispro (HumaLOG KwikPen) 100 units/mL injection pen Inject 7 Units under the skin 3 (three) times a day with meals 15 mL 3    Insulin Pen Needle (BD AutoShield Duo) 30G X 5 MM MISC USE FOUR TIMES DAILY WITH INSULIN  each 5    metFORMIN (GLUCOPHAGE) 500 mg tablet TAKE 1 TABLET BY MOUTH TWICE DAILY 180 tablet 1    metoprolol succinate (TOPROL-XL) 25 mg 24 hr tablet TAKE 3 TABLETS BY MOUTH TWICE  DAILY 540 tablet 1    potassium chloride (Klor-Con M20) 20 mEq tablet TAKE 1 TABLET BY MOUTH DAILY 90 tablet 3    valsartan (DIOVAN) 160 mg tablet TAKE 1 TABLET BY MOUTH EVERY DAY 90 tablet 1     No current facility-administered medications on file prior to visit.     Allergies   Allergen Reactions    Accupril [Quinapril Hcl]     Novocain [Procaine]     Parabens     Sulfa Antibiotics       Medical History Reviewed by provider this encounter:  Tobacco  Allergies  Meds  Problems  Med Hx  Surg Hx  Fam Hx     .  Past Medical History   Past Medical History:   Diagnosis Date    Abnormal abdominal CT scan 06/09/2023    Allergy to sulfa drugs     Arthritis 2000    Closed fracture of third lumbar vertebra (HCC)     Closed T12 spinal fracture (HCC)     Closed wedge compression fracture of T12 vertebra (HCC)     Dementia (HCC)     Diabetes mellitus (HCC) 2000    DM (diabetes mellitus), type 2 (HCC)     H/O multiple allergies     Novocaine,Darvocet, Sulfa, Accupril    HL (hearing loss)     Hyperlipidemia     Hypertension     Hypertensive urgency 02/13/2023    Knee pain 04/18/2023    Lumbar compression fracture (HCC)     Memory loss 2020    Nocturia     Palpitations     Paroxysmal atrial fibrillation (HCC)     Thrombocytopenic disorder (HCC)     Type 2 diabetes mellitus without complication (HCC)     Wears glasses      Past Surgical History:   Procedure Laterality Date    DXA PROCEDURE (HISTORICAL)  05/31/2023    HERNIA REPAIR      HYSTERECTOMY       Family History   Problem Relation Age of Onset    Hypertension Mother      Diabetes Mother     Hypertension Father     Heart disease Father       reports that she has never smoked. She has never been exposed to tobacco smoke. She has never used smokeless tobacco. She reports current alcohol use of about 1.0 standard drink of alcohol per week. She reports that she does not use drugs.  Current Outpatient Medications on File Prior to Visit   Medication Sig Dispense Refill    albuterol (ProAir HFA) 90 mcg/act inhaler Inhale 2 puffs every 6 (six) hours as needed for wheezing or shortness of breath 8.5 g 0    apixaban (Eliquis) 2.5 mg TAKE 1 TABLET BY MOUTH TWICE  DAILY 180 tablet 3    ezetimibe-simvastatin (VYTORIN) 10-20 mg per tablet Take 1 tablet by mouth daily at bedtime 90 tablet 0    furosemide (LASIX) 80 mg tablet TAKE 1 TABLET BY MOUTH EVERY DAY 90 tablet 1    glucose blood (FREESTYLE LITE) test strip check blood sugar 3 times daily 300 each 1    Insulin Glargine Solostar (Lantus SoloStar) 100 UNIT/ML SOPN Inject 0.14 mL (14 Units total) under the skin in the morning 15 mL 1    insulin lispro (HumaLOG KwikPen) 100 units/mL injection pen Inject 7 Units under the skin 3 (three) times a day with meals 15 mL 3    Insulin Pen Needle (BD AutoShield Duo) 30G X 5 MM MISC USE FOUR TIMES DAILY WITH INSULIN  each 5    metFORMIN (GLUCOPHAGE) 500 mg tablet TAKE 1 TABLET BY MOUTH TWICE DAILY 180 tablet 1    metoprolol succinate (TOPROL-XL) 25 mg 24 hr tablet TAKE 3 TABLETS BY MOUTH TWICE  DAILY 540 tablet 1    potassium chloride (Klor-Con M20) 20 mEq tablet TAKE 1 TABLET BY MOUTH DAILY 90 tablet 3    valsartan (DIOVAN) 160 mg tablet TAKE 1 TABLET BY MOUTH EVERY DAY 90 tablet 1     No current facility-administered medications on file prior to visit.     Allergies   Allergen Reactions    Accupril [Quinapril Hcl]     Novocain [Procaine]     Parabens     Sulfa Antibiotics       Current Outpatient Medications on File Prior to Visit   Medication Sig Dispense Refill    albuterol (ProAir HFA) 90  "mcg/act inhaler Inhale 2 puffs every 6 (six) hours as needed for wheezing or shortness of breath 8.5 g 0    apixaban (Eliquis) 2.5 mg TAKE 1 TABLET BY MOUTH TWICE  DAILY 180 tablet 3    ezetimibe-simvastatin (VYTORIN) 10-20 mg per tablet Take 1 tablet by mouth daily at bedtime 90 tablet 0    furosemide (LASIX) 80 mg tablet TAKE 1 TABLET BY MOUTH EVERY DAY 90 tablet 1    glucose blood (FREESTYLE LITE) test strip check blood sugar 3 times daily 300 each 1    Insulin Glargine Solostar (Lantus SoloStar) 100 UNIT/ML SOPN Inject 0.14 mL (14 Units total) under the skin in the morning 15 mL 1    insulin lispro (HumaLOG KwikPen) 100 units/mL injection pen Inject 7 Units under the skin 3 (three) times a day with meals 15 mL 3    Insulin Pen Needle (BD AutoShield Duo) 30G X 5 MM MISC USE FOUR TIMES DAILY WITH INSULIN  each 5    metFORMIN (GLUCOPHAGE) 500 mg tablet TAKE 1 TABLET BY MOUTH TWICE DAILY 180 tablet 1    metoprolol succinate (TOPROL-XL) 25 mg 24 hr tablet TAKE 3 TABLETS BY MOUTH TWICE  DAILY 540 tablet 1    potassium chloride (Klor-Con M20) 20 mEq tablet TAKE 1 TABLET BY MOUTH DAILY 90 tablet 3    valsartan (DIOVAN) 160 mg tablet TAKE 1 TABLET BY MOUTH EVERY DAY 90 tablet 1     No current facility-administered medications on file prior to visit.      Social History     Tobacco Use    Smoking status: Never     Passive exposure: Never    Smokeless tobacco: Never   Vaping Use    Vaping status: Never Used   Substance and Sexual Activity    Alcohol use: Yes     Alcohol/week: 1.0 standard drink of alcohol     Types: 1 Glasses of wine per week     Comment: on holidays add an extra glass of wine    Drug use: Never    Sexual activity: Not Currently     Partners: Male     Birth control/protection: Post-menopausal, None        Objective   /82 (BP Location: Left arm, Patient Position: Sitting, Cuff Size: Standard)   Pulse (!) 54   Temp 97.8 °F (36.6 °C) (Tympanic)   Ht 5' 1\" (1.549 m)   Wt 94.8 kg (209 lb)   " SpO2 96%   BMI 39.49 kg/m²      Physical Exam  Vitals reviewed.   Constitutional:       Appearance: Normal appearance.   HENT:      Head: Normocephalic and atraumatic.      Mouth/Throat:      Mouth: Mucous membranes are moist.   Eyes:      Pupils: Pupils are equal, round, and reactive to light.   Cardiovascular:      Rate and Rhythm: Normal rate and regular rhythm.   Pulmonary:      Effort: Pulmonary effort is normal.      Breath sounds: Normal breath sounds.   Abdominal:      Palpations: Abdomen is soft.   Musculoskeletal:      Cervical back: Normal range of motion.        Back:       Comments: 2.5 cm area where I can feel the cyst present subcutaneously.  There is no drainage.  There is minimum redness.   Neurological:      Mental Status: She is alert.         Administrative Statements   I have spent a total time of 20 minutes in caring for this patient on the day of the visit/encounter including Prognosis, Risks and benefits of tx options, Instructions for management, Patient and family education, Importance of tx compliance, Risk factor reductions, Impressions, Counseling / Coordination of care, Documenting in the medical record, Reviewing / ordering tests, medicine, procedures  , Obtaining or reviewing history  , and Communicating with other healthcare professionals .

## 2024-11-20 NOTE — H&P (VIEW-ONLY)
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  This would need excision.  I am scheduling it to be excised under local anesthesia on .  I told her to stop Eliquis 48 hours before the surgery.  I am going to send a prescription for oral antibiotics for 5 days because of some redness around it.  She signed the consent.  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  History was provided by her and her .  Patient tells me that she has had this cyst for many years.  It becomes bigger and then drained spontaneously.  The last time it drained was a 2 months ago.  She still has minimum tenderness in that area.  No fever.  Patient takes Eliquis for atrial fibrillation.  Her last dose was this morning.      Dayana Reagan is a 89 y.o. female who presents   History obtained from: patient    Review of Systems   Constitutional: Negative.    HENT: Negative.     Eyes: Negative.    Respiratory: Negative.     Cardiovascular: Negative.    Gastrointestinal: Negative.    Endocrine: Negative.    Genitourinary: Negative.    Musculoskeletal: Negative.    Skin: Negative.    Allergic/Immunologic: Negative.    Neurological: Negative.    Hematological: Negative.    Psychiatric/Behavioral: Negative.       Past Medical History   Past Medical History:   Diagnosis Date    Abnormal abdominal CT scan 2023     Allergy to sulfa drugs     Arthritis 2000    Closed fracture of third lumbar vertebra (HCC)     Closed T12 spinal fracture (HCC)     Closed wedge compression fracture of T12 vertebra (HCC)     Dementia (HCC)     Diabetes mellitus (HCC) 2000    DM (diabetes mellitus), type 2 (HCC)     H/O multiple allergies     Novocaine,Darvocet, Sulfa, Accupril    HL (hearing loss)     Hyperlipidemia     Hypertension     Hypertensive urgency 02/13/2023    Knee pain 04/18/2023    Lumbar compression fracture (HCC)     Memory loss 2020    Nocturia     Palpitations     Paroxysmal atrial fibrillation (HCC)     Thrombocytopenic disorder (HCC)     Type 2 diabetes mellitus without complication (HCC)     Wears glasses      Past Surgical History:   Procedure Laterality Date    DXA PROCEDURE (HISTORICAL)  05/31/2023    HERNIA REPAIR      HYSTERECTOMY       Family History   Problem Relation Age of Onset    Hypertension Mother     Diabetes Mother     Hypertension Father     Heart disease Father       reports that she has never smoked. She has never been exposed to tobacco smoke. She has never used smokeless tobacco. She reports current alcohol use of about 1.0 standard drink of alcohol per week. She reports that she does not use drugs.  Current Outpatient Medications on File Prior to Visit   Medication Sig Dispense Refill    albuterol (ProAir HFA) 90 mcg/act inhaler Inhale 2 puffs every 6 (six) hours as needed for wheezing or shortness of breath 8.5 g 0    apixaban (Eliquis) 2.5 mg TAKE 1 TABLET BY MOUTH TWICE  DAILY 180 tablet 3    ezetimibe-simvastatin (VYTORIN) 10-20 mg per tablet Take 1 tablet by mouth daily at bedtime 90 tablet 0    furosemide (LASIX) 80 mg tablet TAKE 1 TABLET BY MOUTH EVERY DAY 90 tablet 1    glucose blood (FREESTYLE LITE) test strip check blood sugar 3 times daily 300 each 1    Insulin Glargine Solostar (Lantus SoloStar) 100 UNIT/ML SOPN Inject 0.14 mL (14 Units total) under the skin in the morning 15 mL 1    insulin  lispro (HumaLOG KwikPen) 100 units/mL injection pen Inject 7 Units under the skin 3 (three) times a day with meals 15 mL 3    Insulin Pen Needle (BD AutoShield Duo) 30G X 5 MM MISC USE FOUR TIMES DAILY WITH INSULIN  each 5    metFORMIN (GLUCOPHAGE) 500 mg tablet TAKE 1 TABLET BY MOUTH TWICE DAILY 180 tablet 1    metoprolol succinate (TOPROL-XL) 25 mg 24 hr tablet TAKE 3 TABLETS BY MOUTH TWICE  DAILY 540 tablet 1    potassium chloride (Klor-Con M20) 20 mEq tablet TAKE 1 TABLET BY MOUTH DAILY 90 tablet 3    valsartan (DIOVAN) 160 mg tablet TAKE 1 TABLET BY MOUTH EVERY DAY 90 tablet 1     No current facility-administered medications on file prior to visit.     Allergies   Allergen Reactions    Accupril [Quinapril Hcl]     Novocain [Procaine]     Parabens     Sulfa Antibiotics       Medical History Reviewed by provider this encounter:  Tobacco  Allergies  Meds  Problems  Med Hx  Surg Hx  Fam Hx     .  Past Medical History   Past Medical History:   Diagnosis Date    Abnormal abdominal CT scan 06/09/2023    Allergy to sulfa drugs     Arthritis 2000    Closed fracture of third lumbar vertebra (HCC)     Closed T12 spinal fracture (HCC)     Closed wedge compression fracture of T12 vertebra (HCC)     Dementia (HCC)     Diabetes mellitus (HCC) 2000    DM (diabetes mellitus), type 2 (HCC)     H/O multiple allergies     Novocaine,Darvocet, Sulfa, Accupril    HL (hearing loss)     Hyperlipidemia     Hypertension     Hypertensive urgency 02/13/2023    Knee pain 04/18/2023    Lumbar compression fracture (HCC)     Memory loss 2020    Nocturia     Palpitations     Paroxysmal atrial fibrillation (HCC)     Thrombocytopenic disorder (HCC)     Type 2 diabetes mellitus without complication (HCC)     Wears glasses      Past Surgical History:   Procedure Laterality Date    DXA PROCEDURE (HISTORICAL)  05/31/2023    HERNIA REPAIR      HYSTERECTOMY       Family History   Problem Relation Age of Onset    Hypertension Mother      Diabetes Mother     Hypertension Father     Heart disease Father       reports that she has never smoked. She has never been exposed to tobacco smoke. She has never used smokeless tobacco. She reports current alcohol use of about 1.0 standard drink of alcohol per week. She reports that she does not use drugs.  Current Outpatient Medications on File Prior to Visit   Medication Sig Dispense Refill    albuterol (ProAir HFA) 90 mcg/act inhaler Inhale 2 puffs every 6 (six) hours as needed for wheezing or shortness of breath 8.5 g 0    apixaban (Eliquis) 2.5 mg TAKE 1 TABLET BY MOUTH TWICE  DAILY 180 tablet 3    ezetimibe-simvastatin (VYTORIN) 10-20 mg per tablet Take 1 tablet by mouth daily at bedtime 90 tablet 0    furosemide (LASIX) 80 mg tablet TAKE 1 TABLET BY MOUTH EVERY DAY 90 tablet 1    glucose blood (FREESTYLE LITE) test strip check blood sugar 3 times daily 300 each 1    Insulin Glargine Solostar (Lantus SoloStar) 100 UNIT/ML SOPN Inject 0.14 mL (14 Units total) under the skin in the morning 15 mL 1    insulin lispro (HumaLOG KwikPen) 100 units/mL injection pen Inject 7 Units under the skin 3 (three) times a day with meals 15 mL 3    Insulin Pen Needle (BD AutoShield Duo) 30G X 5 MM MISC USE FOUR TIMES DAILY WITH INSULIN  each 5    metFORMIN (GLUCOPHAGE) 500 mg tablet TAKE 1 TABLET BY MOUTH TWICE DAILY 180 tablet 1    metoprolol succinate (TOPROL-XL) 25 mg 24 hr tablet TAKE 3 TABLETS BY MOUTH TWICE  DAILY 540 tablet 1    potassium chloride (Klor-Con M20) 20 mEq tablet TAKE 1 TABLET BY MOUTH DAILY 90 tablet 3    valsartan (DIOVAN) 160 mg tablet TAKE 1 TABLET BY MOUTH EVERY DAY 90 tablet 1     No current facility-administered medications on file prior to visit.     Allergies   Allergen Reactions    Accupril [Quinapril Hcl]     Novocain [Procaine]     Parabens     Sulfa Antibiotics       Current Outpatient Medications on File Prior to Visit   Medication Sig Dispense Refill    albuterol (ProAir HFA) 90  "mcg/act inhaler Inhale 2 puffs every 6 (six) hours as needed for wheezing or shortness of breath 8.5 g 0    apixaban (Eliquis) 2.5 mg TAKE 1 TABLET BY MOUTH TWICE  DAILY 180 tablet 3    ezetimibe-simvastatin (VYTORIN) 10-20 mg per tablet Take 1 tablet by mouth daily at bedtime 90 tablet 0    furosemide (LASIX) 80 mg tablet TAKE 1 TABLET BY MOUTH EVERY DAY 90 tablet 1    glucose blood (FREESTYLE LITE) test strip check blood sugar 3 times daily 300 each 1    Insulin Glargine Solostar (Lantus SoloStar) 100 UNIT/ML SOPN Inject 0.14 mL (14 Units total) under the skin in the morning 15 mL 1    insulin lispro (HumaLOG KwikPen) 100 units/mL injection pen Inject 7 Units under the skin 3 (three) times a day with meals 15 mL 3    Insulin Pen Needle (BD AutoShield Duo) 30G X 5 MM MISC USE FOUR TIMES DAILY WITH INSULIN  each 5    metFORMIN (GLUCOPHAGE) 500 mg tablet TAKE 1 TABLET BY MOUTH TWICE DAILY 180 tablet 1    metoprolol succinate (TOPROL-XL) 25 mg 24 hr tablet TAKE 3 TABLETS BY MOUTH TWICE  DAILY 540 tablet 1    potassium chloride (Klor-Con M20) 20 mEq tablet TAKE 1 TABLET BY MOUTH DAILY 90 tablet 3    valsartan (DIOVAN) 160 mg tablet TAKE 1 TABLET BY MOUTH EVERY DAY 90 tablet 1     No current facility-administered medications on file prior to visit.      Social History     Tobacco Use    Smoking status: Never     Passive exposure: Never    Smokeless tobacco: Never   Vaping Use    Vaping status: Never Used   Substance and Sexual Activity    Alcohol use: Yes     Alcohol/week: 1.0 standard drink of alcohol     Types: 1 Glasses of wine per week     Comment: on holidays add an extra glass of wine    Drug use: Never    Sexual activity: Not Currently     Partners: Male     Birth control/protection: Post-menopausal, None        Objective   /82 (BP Location: Left arm, Patient Position: Sitting, Cuff Size: Standard)   Pulse (!) 54   Temp 97.8 °F (36.6 °C) (Tympanic)   Ht 5' 1\" (1.549 m)   Wt 94.8 kg (209 lb)   " SpO2 96%   BMI 39.49 kg/m²      Physical Exam  Vitals reviewed.   Constitutional:       Appearance: Normal appearance.   HENT:      Head: Normocephalic and atraumatic.      Mouth/Throat:      Mouth: Mucous membranes are moist.   Eyes:      Pupils: Pupils are equal, round, and reactive to light.   Cardiovascular:      Rate and Rhythm: Normal rate and regular rhythm.   Pulmonary:      Effort: Pulmonary effort is normal.      Breath sounds: Normal breath sounds.   Abdominal:      Palpations: Abdomen is soft.   Musculoskeletal:      Cervical back: Normal range of motion.        Back:       Comments: 2.5 cm area where I can feel the cyst present subcutaneously.  There is no drainage.  There is minimum redness.   Neurological:      Mental Status: She is alert.         Administrative Statements   I have spent a total time of 20 minutes in caring for this patient on the day of the visit/encounter including Prognosis, Risks and benefits of tx options, Instructions for management, Patient and family education, Importance of tx compliance, Risk factor reductions, Impressions, Counseling / Coordination of care, Documenting in the medical record, Reviewing / ordering tests, medicine, procedures  , Obtaining or reviewing history  , and Communicating with other healthcare professionals .

## 2024-12-18 DIAGNOSIS — I50.33 ACUTE ON CHRONIC DIASTOLIC CONGESTIVE HEART FAILURE (HCC): ICD-10-CM

## 2024-12-18 NOTE — TELEPHONE ENCOUNTER
Message sent via [employer].   We are moving all prescriptions to [facility]. Can you please send a renewal for Dayana gonzales to [facility] on [address]

## 2024-12-19 ENCOUNTER — HOSPITAL ENCOUNTER (OUTPATIENT)
Facility: HOSPITAL | Age: 89
Setting detail: OUTPATIENT SURGERY
Discharge: HOME | End: 2024-12-19
Attending: SURGERY | Admitting: SURGERY
Payer: MEDICARE

## 2024-12-19 VITALS
WEIGHT: 210.5 LBS | DIASTOLIC BLOOD PRESSURE: 76 MMHG | TEMPERATURE: 98.8 F | OXYGEN SATURATION: 93 % | HEIGHT: 61 IN | SYSTOLIC BLOOD PRESSURE: 158 MMHG | RESPIRATION RATE: 16 BRPM | HEART RATE: 84 BPM | BODY MASS INDEX: 39.74 KG/M2

## 2024-12-19 DIAGNOSIS — I48.91 ATRIAL FIBRILLATION WITH RAPID VENTRICULAR RESPONSE (HCC): ICD-10-CM

## 2024-12-19 DIAGNOSIS — L72.0 EPIDERMOID CYST: ICD-10-CM

## 2024-12-19 LAB — GLUCOSE SERPL-MCNC: 127 MG/DL (ref 65–140)

## 2024-12-19 PROCEDURE — 88304 TISSUE EXAM BY PATHOLOGIST: CPT | Performed by: STUDENT IN AN ORGANIZED HEALTH CARE EDUCATION/TRAINING PROGRAM

## 2024-12-19 PROCEDURE — 11402 EXC TR-EXT B9+MARG 1.1-2 CM: CPT | Performed by: SURGERY

## 2024-12-19 PROCEDURE — 82948 REAGENT STRIP/BLOOD GLUCOSE: CPT

## 2024-12-19 PROCEDURE — 11402 EXC TR-EXT B9+MARG 1.1-2 CM: CPT | Performed by: PHYSICIAN ASSISTANT

## 2024-12-19 RX ORDER — LIDOCAINE HYDROCHLORIDE AND EPINEPHRINE 10; 10 MG/ML; UG/ML
INJECTION, SOLUTION INFILTRATION; PERINEURAL AS NEEDED
Status: DISCONTINUED | OUTPATIENT
Start: 2024-12-19 | End: 2024-12-19 | Stop reason: HOSPADM

## 2024-12-19 RX ORDER — VALSARTAN 160 MG/1
160 TABLET ORAL DAILY
Qty: 90 TABLET | Refills: 1 | Status: SHIPPED | OUTPATIENT
Start: 2024-12-19

## 2024-12-19 NOTE — INTERVAL H&P NOTE
H&P reviewed. After examining the patient I find no changes in the patients condition since the H&P had been written.    Last dose of Eliquis was [place] 12/15.   Area of concern has all but resolved after 5-day course of antibiotics. No palpable mass. Some purplish discoloration appreciated.     Vitals:    12/19/24 1259   BP: (!) 179/83   Pulse: 82   Resp: 18   Temp: 97.8 °F (36.6 °C)   SpO2: 98%

## 2024-12-19 NOTE — DISCHARGE INSTR - AVS FIRST PAGE
Postoperative Care Instructions      1. General: You may feel pulling sensations around the wound or funny aches and pains around the incisions. This is normal. Even minor surgery is a change in your body and this is your body's reaction to it.     2. Wound care:  The glue over the incisions will fall off over the next week or two. If you have staples or stitches, they will be removed by the physician at your follow up appointment.    3. Showering: You may shower. Please do not soak wound in standing water such as a bath, hot tub, pool, lake, etc. Do not scrub or use exfoliants on the surgical wounds.    4. Activity: You may go up and down stairs, walk as much as you are comfortable, but walk at least 3 times each day.     5. Diet: You may resume your regular diet. Please drink lots of water.    6. Medications: Resume Eliquis on Saturday evening, 12/21. Resume all of your other previous medications, unless told otherwise by the doctor. A good option for pain control is to start with acetaminophen (Tylenol) 650mg and ibuprofen (Advil) 600mg and alternate taking them every 3 hours. Ensure that you do not take more than 4000 mg of Tylenol per day.     7. Call the office: If you are experiencing fevers above 101.5° or if the wound develops drainage and/or excessive redness around the wound.

## 2024-12-19 NOTE — OP NOTE
OPERATIVE REPORT  PATIENT NAME: Dayana Reagan    :  1935  MRN: [id number]  Pt Location: [facility] OR ROOM 02    SURGERY DATE: 2024    Surgeons and Role:     * Alex Dolan MD - Primary     * Ava Hoff PA-C - Assisting    Preop Diagnosis:  Epidermoid cyst [L72.0]    Post-Op Diagnosis Codes:     * Epidermoid cyst [L72.0]    Procedure(s):  EXCISION CYST DERMOID back    Specimen(s):  ID Type Source Tests Collected by Time Destination   1 : Sebaceous cyst; Back Tissue Cyst TISSUE EXAM Alex Dolan MD 2024 1527        Estimated Blood Loss:   Minimal    Drains:  * No LDAs found *    Anesthesia Type:   Local    Operative Indications:  Epidermoid cyst [L72.0]      Operative Findings:  2 cm epidermoid cyst      Complications:   None    Procedure and Technique:  Patient was brought to the operating room.  She was placed in right lateral decubitus position.  Parts prepped and draped in standard fashion.  Timeout performed.  Local anesthesia infiltrated around the area of the cyst.  An elliptical incision was made which included the skin on top of the cyst and the cyst itself.  It was excised sharply using #15 scalpel.  Hemostasis achieved using electrocautery.  Closure of the incision was done using 4-0 Monocryl in subcuticular fashion.  Exofin was applied.  Patient was then transferred to the recovery under stable condition.   I was present for the entire procedure., A qualified resident physician was not available., and A physician assistant was required during the procedure for retraction, tissue handling, dissection and suturing.    Patient Disposition:  PACU              SIGNATURE: Alex Dolan MD  DATE: 2024  TIME: 3:31 PM

## 2024-12-23 ENCOUNTER — TELEPHONE (OUTPATIENT)
Dept: FAMILY MEDICINE CLINIC | Facility: CLINIC | Age: 89
End: 2024-12-23

## 2024-12-23 NOTE — TELEPHONE ENCOUNTER
I don't know if with the persistence of the cough it's allergies or something of that nature-they could try a daily loratadine 10 mg once a day and we could get a fresh set of labs -sounds like the Chest Xray was ok at urgent care-we could also run a course of an antibiotic-I don't know if her  trouble walking is a progression of her underlying ambulatory dysfunction or not but we'll have to see her

## 2024-12-23 NOTE — TELEPHONE ENCOUNTER
Dayana has had a persistent cough for about 3 months &  states it's getting progressively worse.  They went to [facility] and she was given a breathing treatment & Xray of lungs was done which was normal.  She's also having occasional ha, and now is having weakness, & trouble walking. (No fever).  They wanted to be squeezed in, but no appts.  What should they do?

## 2024-12-24 DIAGNOSIS — Z76.0 MEDICATION REFILL: Primary | ICD-10-CM

## 2024-12-24 RX ORDER — LORATADINE 10 MG/1
10 TABLET ORAL DAILY
Qty: 30 TABLET | Refills: 3 | Status: SHIPPED | OUTPATIENT
Start: 2024-12-24

## 2024-12-26 ENCOUNTER — TELEPHONE (OUTPATIENT)
Age: 89
End: 2024-12-26

## 2024-12-26 PROCEDURE — 88304 TISSUE EXAM BY PATHOLOGIST: CPT | Performed by: STUDENT IN AN ORGANIZED HEALTH CARE EDUCATION/TRAINING PROGRAM

## 2024-12-26 NOTE — TELEPHONE ENCOUNTER
Pt of Dr. Dolan s/p EXCISION CYST DERMOID back (Abdomen) 12/19/24-  Pt  calling in to report pt has pus drainage and redness around incision x 2 days. Pt  concerned about infection.     Denies fever. Pt does also have cough and upper respiratory illness at this time.     Sent [employer] message for pt/ to respond with photo to review.     Next available appointment noted to be 2 weeks away.     Please advise.   Call back # [phone]

## 2025-01-02 ENCOUNTER — HOSPITAL ENCOUNTER (EMERGENCY)
Facility: HOSPITAL | Age: OVER 89
Discharge: HOME | End: 2025-01-02
Attending: EMERGENCY MEDICINE
Payer: MEDICARE

## 2025-01-02 VITALS
RESPIRATION RATE: 17 BRPM | TEMPERATURE: 98.4 F | DIASTOLIC BLOOD PRESSURE: 65 MMHG | OXYGEN SATURATION: 95 % | SYSTOLIC BLOOD PRESSURE: 139 MMHG | HEART RATE: 96 BPM

## 2025-01-02 DIAGNOSIS — B35.6 TINEA CRURIS: Primary | ICD-10-CM

## 2025-01-02 DIAGNOSIS — T78.40XA ALLERGIC REACTION, INITIAL ENCOUNTER: ICD-10-CM

## 2025-01-02 DIAGNOSIS — R05.3 CHRONIC COUGH: ICD-10-CM

## 2025-01-02 PROCEDURE — 99284 EMERGENCY DEPT VISIT MOD MDM: CPT

## 2025-01-02 PROCEDURE — 99284 EMERGENCY DEPT VISIT MOD MDM: CPT | Performed by: EMERGENCY MEDICINE

## 2025-01-02 RX ORDER — PREDNISONE 20 MG/1
40 TABLET ORAL ONCE
Status: COMPLETED | OUTPATIENT
Start: 2025-01-02 | End: 2025-01-02

## 2025-01-02 RX ORDER — PREDNISONE 20 MG/1
40 TABLET ORAL DAILY
Qty: 15 TABLET | Refills: 0 | Status: SHIPPED | OUTPATIENT
Start: 2025-01-02

## 2025-01-02 RX ORDER — NYSTATIN 100000 [USP'U]/G
POWDER TOPICAL 2 TIMES DAILY
Qty: 60 G | Refills: 0 | Status: SHIPPED | OUTPATIENT
Start: 2025-01-02

## 2025-01-02 RX ORDER — DIPHENHYDRAMINE HCL 25 MG
25 TABLET ORAL ONCE
Status: COMPLETED | OUTPATIENT
Start: 2025-01-02 | End: 2025-01-02

## 2025-01-02 RX ADMIN — DIPHENHYDRAMINE HYDROCHLORIDE 25 MG: 25 TABLET ORAL at 16:35

## 2025-01-02 RX ADMIN — PREDNISONE 40 MG: 20 TABLET ORAL at 16:35

## 2025-01-02 NOTE — ED PROVIDER NOTES
Time reflects when diagnosis was documented in both MDM as applicable and the Disposition within this note       Time User Action Codes Description Comment    1/2/2025  4:34 PM Farzad Cat [B35.6] Tinea cruris     1/2/2025  4:34 PM Farzad Cat [T78.40XA] Allergic reaction, initial encounter     1/2/2025  4:36 PM Farzad Cat [R05.3] Chronic cough           ED Disposition       ED Disposition   Discharge    Condition   Stable    Date/Time   u Jan 2, 2025  4:33 PM    Comment   Dayana CHANNING Reagan discharge to home/self care.                   Assessment & Plan       Medical Decision Making  89-year-old female presents with a rash on her trunk and groin, and facial swelling, with recent new skin care products to the face, symptoms have almost entirely resolved, she will be treated with Benadryl and steroids as her skin appears to be the only body system that is really involved in this suspected allergic reaction, she does not meet criteria at this time for anaphylaxis, and does not need epinephrine.  The rash in her groin does appear consistent with a fungal rash, which she will be given nystatin powder for, and she will be encouraged to follow-up with pulmonology for chronic cough, and to return if any of her swelling worsens or new symptoms occur.  At this time the patient is safe for discharge.    Disposition: Patient stable for outpatient management. Discussed need for follow up with their primary doctor or specialist to review all results, including incidental findings as below. Patient discharged with explanation of ED workup and diagnosis, instructions on how to obtain outpatient follow up, care instructions at home, and strict return precautions if patient develops new or worsening symptoms. Patients questions answered and agreeable with discharge plan.        PLEASE NOTE:  This encounter was completed utilizing the [employer] Direct Speech Voice Recognition Software.  Grammatical errors, random word insertions, pronoun errors and incomplete sentences are occasional inherent consequences of the system due to software limitations, ambient noise and hardware issues.These may be missed by proof reading prior to affixing electronic signature. Any questions or concerns about the content, text or information contained within the body of this dictation should be directly addressed to the physician for clarification. Please do not hesitate to call me directly if you have any questions or concerns.      Risk  OTC drugs.  Prescription drug management.             Medications   predniSONE tablet 40 mg (40 mg Oral Given 1/2/25 1635)   diphenhydrAMINE (BENADRYL) tablet 25 mg (25 mg Oral Given 1/2/25 1635)       ED Risk Strat Scores              History of Present Illness       Chief Complaint   Patient presents with    Facial Swelling     Pt states face is all puffy since she woke up this am. - trouble breathing or swallowing.        Past Medical History:   Diagnosis Date    Abnormal abdominal CT scan 06/09/2023    Allergy to sulfa drugs     Arthritis 2000    Closed fracture of third lumbar vertebra (HCC)     Closed T12 spinal fracture (HCC)     Closed wedge compression fracture of T12 vertebra (HCC)     Dementia (HCC)     Diabetes mellitus (HCC) 2000    DM (diabetes mellitus), type 2 (HCC)     H/O multiple allergies     Novocaine,Darvocet, Sulfa, Accupril    HL (hearing loss)     Hyperlipidemia     Hypertension     Hypertensive urgency 02/13/2023    Knee pain 04/18/2023    Lumbar compression fracture (HCC)     Memory loss 2020    Nocturia     Palpitations     Paroxysmal atrial fibrillation (HCC)     Thrombocytopenic disorder (HCC)     Type 2 diabetes mellitus without complication (HCC)     Wears glasses       Past Surgical History:   Procedure Laterality Date    DERMOID CYST  EXCISION N/A 12/19/2024    Procedure: EXCISION CYST DERMOID back;  Surgeon: Alex Dolan MD;  Location:  MAIN OR;   Service: General    DXA PROCEDURE (HISTORICAL)  05/31/2023    HERNIA REPAIR      HYSTERECTOMY        Family History   Problem Relation Age of Onset    Hypertension Mother     Diabetes Mother     Hypertension Father     Heart disease Father       Social History     Tobacco Use    Smoking status: Never     Passive exposure: Never    Smokeless tobacco: Never   Vaping Use    Vaping status: Never Used   Substance Use Topics    Alcohol use: Yes     Alcohol/week: 1.0 standard drink of alcohol     Types: 1 Glasses of wine per week     Comment: on holidays add an extra glass of wine    Drug use: Never      E-Cigarette/Vaping    E-Cigarette Use Never User       E-Cigarette/Vaping Substances    Nicotine No     THC No     CBD No     Flavoring No     Other No     Unknown No       I have reviewed and agree with the history as documented.     89-year-old female presents with her  at bedside, reporting 3 months of a ongoing cough, but notably when she woke up today her  reports that she had significant facial swelling that was bilateral, left greater than right, and involved her cheeks and chin but did not have any swelling in her oropharynx, no difficulty breathing, wheezing, cough, and no headache or dizziness, she does note that she has been using a new facial cream lately, and she also has had some erythematous patches on her abdomen, lower extremities, she reports that these erythematous patches are pruritic, and she has not had any nausea or vomiting, no constipation or diarrhea, no dysuria, hematuria, no abdominal pain, she notes that the erythematous patches on her abdomen have almost entirely resolved prior to my evaluation in the emergency department.  She does note that the erythematous areas in her groin are distinct from the other patches on her abdomen.  The patient's  reports that she was not given any medications prior to my evaluation.        Review of Systems   Constitutional:  Negative for  fever.   Respiratory:  Positive for cough. Negative for shortness of breath.    Cardiovascular:  Negative for chest pain.   Gastrointestinal:  Negative for abdominal pain, constipation, diarrhea, nausea and vomiting.   Genitourinary:  Negative for dysuria and hematuria.   Skin:  Positive for rash.   Neurological:  Negative for light-headedness and headaches.           Objective       ED Triage Vitals [01/02/25 1328]   Temperature Pulse Blood Pressure Respirations SpO2 Patient Position - Orthostatic VS   98.4 °F (36.9 °C) 96 139/65 17 95 % Sitting      Temp Source Heart Rate Source BP Location FiO2 (%) Pain Score    Oral Monitor Right arm -- --      Vitals      Date and Time Temp Pulse SpO2 Resp BP Pain Score FACES Pain Rating User   01/02/25 1328 98.4 °F (36.9 °C) 96 95 % 17 139/65 -- -- TS            Physical Exam  Vitals and nursing note reviewed.   Constitutional:       General: She is not in acute distress.     Appearance: Normal appearance. She is well-developed. She is obese.   HENT:      Head: Normocephalic and atraumatic.   Eyes:      Extraocular Movements: Extraocular movements intact.      Conjunctiva/sclera: Conjunctivae normal.   Cardiovascular:      Rate and Rhythm: Normal rate and regular rhythm.   Pulmonary:      Effort: Pulmonary effort is normal. No respiratory distress.      Breath sounds: Normal breath sounds.   Abdominal:      General: There is no distension.      Palpations: Abdomen is soft.      Tenderness: There is no abdominal tenderness.   Musculoskeletal:         General: No swelling.      Cervical back: Normal range of motion.   Skin:     General: Skin is warm and dry.      Capillary Refill: Capillary refill takes less than 2 seconds.      Comments: Diffuse areas of faint erythema on the patient's trunk, very minimal swelling to mild swelling in the left side of the face without any swelling inside the oropharynx, no induration, erythema in the face, and no significant tenderness, and  notably a significant well demarcated rash in the patient's groin area with a central area of clearing consistent with tinea cruris with satellite lesions.    Neurological:      General: No focal deficit present.      Mental Status: She is alert and oriented to person, place, and time.   Psychiatric:         Mood and Affect: Mood normal.         Results Reviewed       None            No orders to display       Procedures    ED Medication and Procedure Management   Prior to Admission Medications   Prescriptions Last Dose Informant Patient Reported? Taking?   Insulin Glargine Solostar (Lantus SoloStar) 100 UNIT/ML SOPN   No No   Sig: Inject 0.14 mL (14 Units total) under the skin in the morning   Insulin Pen Needle (BD AutoShield Duo) 30G X 5 MM MISC   No No   Sig: USE FOUR TIMES DAILY WITH INSULIN PEN   albuterol (ProAir HFA) 90 mcg/act inhaler   No No   Sig: Inhale 2 puffs every 6 (six) hours as needed for wheezing or shortness of breath   apixaban (Eliquis) 2.5 mg   No No   Sig: TAKE 1 TABLET BY MOUTH TWICE  DAILY   ezetimibe-simvastatin (VYTORIN) 10-20 mg per tablet   No No   Sig: Take 1 tablet by mouth daily at bedtime   furosemide (LASIX) 80 mg tablet   No No   Sig: TAKE 1 TABLET BY MOUTH EVERY DAY   glucose blood (FREESTYLE LITE) test strip   No No   Sig: check blood sugar 3 times daily   insulin lispro (HumaLOG KwikPen) 100 units/mL injection pen   No No   Sig: Inject 7 Units under the skin 3 (three) times a day with meals   loratadine (CLARITIN) 10 mg tablet   No No   Sig: Take 1 tablet (10 mg total) by mouth daily   metFORMIN (GLUCOPHAGE) 500 mg tablet   No No   Sig: TAKE 1 TABLET BY MOUTH TWICE DAILY   metoprolol succinate (TOPROL-XL) 25 mg 24 hr tablet   No No   Sig: TAKE 3 TABLETS BY MOUTH TWICE  DAILY   potassium chloride (Klor-Con M20) 20 mEq tablet   No No   Sig: TAKE 1 TABLET BY MOUTH DAILY   valsartan (DIOVAN) 160 mg tablet   No No   Sig: Take 1 tablet (160 mg total) by mouth daily       Facility-Administered Medications: None     Discharge Medication List as of 1/2/2025  4:43 PM        START taking these medications    Details   nystatin (MYCOSTATIN) powder Apply topically 2 (two) times a day, Starting Thu 1/2/2025, Normal      predniSONE 20 mg tablet Take 2 tablets (40 mg total) by mouth daily, Starting Thu 1/2/2025, Normal           CONTINUE these medications which have NOT CHANGED    Details   albuterol (ProAir HFA) 90 mcg/act inhaler Inhale 2 puffs every 6 (six) hours as needed for wheezing or shortness of breath, Starting Mon 11/4/2024, Normal      apixaban (Eliquis) 2.5 mg TAKE 1 TABLET BY MOUTH TWICE  DAILY, Starting Wed 5/15/2024, Normal      ezetimibe-simvastatin (VYTORIN) 10-20 mg per tablet Take 1 tablet by mouth daily at bedtime, Starting Wed 5/15/2024, Normal      furosemide (LASIX) 80 mg tablet TAKE 1 TABLET BY MOUTH EVERY DAY, Starting Fri 11/8/2024, Normal      glucose blood (FREESTYLE LITE) test strip check blood sugar 3 times daily, Normal      Insulin Glargine Solostar (Lantus SoloStar) 100 UNIT/ML SOPN Inject 0.14 mL (14 Units total) under the skin in the morning, Starting Tue 11/19/2024, Normal      insulin lispro (HumaLOG KwikPen) 100 units/mL injection pen Inject 7 Units under the skin 3 (three) times a day with meals, Starting Wed 7/24/2024, Normal      Insulin Pen Needle (BD AutoShield Duo) 30G X 5 MM MISC USE FOUR TIMES DAILY WITH INSULIN PEN, Normal      loratadine (CLARITIN) 10 mg tablet Take 1 tablet (10 mg total) by mouth daily, Starting Tue 12/24/2024, Normal      metFORMIN (GLUCOPHAGE) 500 mg tablet TAKE 1 TABLET BY MOUTH TWICE DAILY, Starting Fri 11/8/2024, Normal      metoprolol succinate (TOPROL-XL) 25 mg 24 hr tablet TAKE 3 TABLETS BY MOUTH TWICE  DAILY, Starting Wed 5/15/2024, Normal      potassium chloride (Klor-Con M20) 20 mEq tablet TAKE 1 TABLET BY MOUTH DAILY, Normal      valsartan (DIOVAN) 160 mg tablet Take 1 tablet (160 mg total) by mouth daily,  Starting Thu 12/19/2024, Normal             ED SEPSIS DOCUMENTATION   Time reflects when diagnosis was documented in both MDM as applicable and the Disposition within this note       Time User Action Codes Description Comment    1/2/2025  4:34 PM Farzad Cat [B35.6] Tinea cruris     1/2/2025  4:34 PM Farzad Cat [T78.40XA] Allergic reaction, initial encounter     1/2/2025  4:36 PM Farzad Cat [R05.3] Chronic cough                  Farzad Cat MD  01/02/25 1262

## 2025-01-03 ENCOUNTER — VBI (OUTPATIENT)
Dept: FAMILY MEDICINE CLINIC | Facility: CLINIC | Age: OVER 89
End: 2025-01-03

## 2025-01-03 NOTE — TELEPHONE ENCOUNTER
01/03/25 10:07 AM    Patient contacted post ED visit, [facility] department spoke with patient/caregiver and outreach was successful.    Thank you.  Isabel Andres  [facility]

## 2025-01-06 ENCOUNTER — RA CDI HCC (OUTPATIENT)
Dept: OTHER | Facility: HOSPITAL | Age: OVER 89
End: 2025-01-06

## 2025-01-07 DIAGNOSIS — I10 ESSENTIAL HYPERTENSION: ICD-10-CM

## 2025-01-08 ENCOUNTER — OFFICE VISIT (OUTPATIENT)
Dept: SURGERY | Facility: CLINIC | Age: OVER 89
End: 2025-01-08

## 2025-01-08 VITALS
HEIGHT: 61 IN | BODY MASS INDEX: 39.08 KG/M2 | WEIGHT: 207 LBS | TEMPERATURE: 97.5 F | HEART RATE: 85 BPM | DIASTOLIC BLOOD PRESSURE: 78 MMHG | SYSTOLIC BLOOD PRESSURE: 110 MMHG | OXYGEN SATURATION: 94 %

## 2025-01-08 DIAGNOSIS — L72.0 EPIDERMOID CYST: Primary | ICD-10-CM

## 2025-01-08 PROCEDURE — 99024 POSTOP FOLLOW-UP VISIT: CPT | Performed by: SURGERY

## 2025-01-08 RX ORDER — METOPROLOL SUCCINATE 25 MG/1
75 TABLET, EXTENDED RELEASE ORAL 2 TIMES DAILY
Qty: 540 TABLET | Refills: 1 | Status: SHIPPED | OUTPATIENT
Start: 2025-01-08

## 2025-01-08 NOTE — PROGRESS NOTES
89-year-old who is more than a month status post excision of a dermoid cyst from her back.  She is doing well.  There is occasional drainage from the incision site.  No fever.  No pain.    On clinical exam she is alert awake oriented.  Incision is healing well.  There is 1 mm opening in the middle of the incision.  There is no redness.  I could not express any drainage.  I placed a bandage on top of the incision.    Continue to do dressing with a Band-Aid.  Follow-up with me as needed.

## 2025-01-10 ENCOUNTER — OFFICE VISIT (OUTPATIENT)
Dept: FAMILY MEDICINE CLINIC | Facility: CLINIC | Age: OVER 89
End: 2025-01-10
Payer: MEDICARE

## 2025-01-10 VITALS
BODY MASS INDEX: 39.08 KG/M2 | SYSTOLIC BLOOD PRESSURE: 128 MMHG | WEIGHT: 207 LBS | HEIGHT: 61 IN | DIASTOLIC BLOOD PRESSURE: 84 MMHG

## 2025-01-10 DIAGNOSIS — L89.322 PRESSURE ULCER OF LEFT BUTTOCK, STAGE 2 (HCC): Primary | ICD-10-CM

## 2025-01-10 DIAGNOSIS — E66.01 OBESITY, MORBID (HCC): ICD-10-CM

## 2025-01-10 DIAGNOSIS — E11.22 TYPE 2 DIABETES MELLITUS WITH DIABETIC CHRONIC KIDNEY DISEASE, UNSPECIFIED CKD STAGE, UNSPECIFIED WHETHER LONG TERM INSULIN USE (HCC): ICD-10-CM

## 2025-01-10 DIAGNOSIS — I50.32 CHRONIC DIASTOLIC CONGESTIVE HEART FAILURE (HCC): ICD-10-CM

## 2025-01-10 DIAGNOSIS — N18.31 STAGE 3A CHRONIC KIDNEY DISEASE (HCC): ICD-10-CM

## 2025-01-10 DIAGNOSIS — R32 URINARY INCONTINENCE, UNSPECIFIED TYPE: ICD-10-CM

## 2025-01-10 DIAGNOSIS — I50.33 ACUTE ON CHRONIC DIASTOLIC CONGESTIVE HEART FAILURE (HCC): ICD-10-CM

## 2025-01-10 DIAGNOSIS — F03.90 DEMENTIA WITHOUT BEHAVIORAL DISTURBANCE, PSYCHOTIC DISTURBANCE, MOOD DISTURBANCE, OR ANXIETY, UNSPECIFIED DEMENTIA SEVERITY, UNSPECIFIED DEMENTIA TYPE (HCC): ICD-10-CM

## 2025-01-10 DIAGNOSIS — I48.19 PERSISTENT ATRIAL FIBRILLATION (HCC): ICD-10-CM

## 2025-01-10 DIAGNOSIS — D69.6 THROMBOCYTOPENIA, UNSPECIFIED (HCC): ICD-10-CM

## 2025-01-10 DIAGNOSIS — I48.91 ATRIAL FIBRILLATION WITH RAPID VENTRICULAR RESPONSE (HCC): ICD-10-CM

## 2025-01-10 DIAGNOSIS — S32.030A COMPRESSION FRACTURE OF L3 VERTEBRA, INITIAL ENCOUNTER (HCC): ICD-10-CM

## 2025-01-10 DIAGNOSIS — F33.9 DEPRESSION, RECURRENT (HCC): ICD-10-CM

## 2025-01-10 DIAGNOSIS — M17.12 PRIMARY OSTEOARTHRITIS OF LEFT KNEE: ICD-10-CM

## 2025-01-10 PROBLEM — M00.9 SEPTIC ARTHRITIS, DUE TO UNSPECIFIED ORGANISM, SEPTIC ARTHRITIS OF UNSPECIFIED LOCATION (HCC): Status: ACTIVE | Noted: 2025-01-10

## 2025-01-10 PROCEDURE — 99214 OFFICE O/P EST MOD 30 MIN: CPT | Performed by: INTERNAL MEDICINE

## 2025-01-10 NOTE — ASSESSMENT & PLAN NOTE
Did speak again with Dayana about referring her to orthopedics to see if they have any suggestions-she wants to hold off on that for now

## 2025-01-10 NOTE — ASSESSMENT & PLAN NOTE
Lab Results   Component Value Date    HGBA1C 7.0 (H) 11/14/2024   A1c under decent control, especially given her age-continue metformin and insulin lantus and humalog-family would like to see her be more mobile-sugars ranging from 79 to 230

## 2025-01-10 NOTE — PROGRESS NOTES
Assessment/Plan:         Problem List Items Addressed This Visit       Stage 3a chronic kidney disease (HCC)    Dementia (HCC)    Primary osteoarthritis of left knee    Did speak again with Dayana about referring her to orthopedics to see if they have any suggestions-she wants to hold off on that for now         Persistent atrial fibrillation (HCC)    On Eliquis and is in sinus rhythm today         Depression, recurrent (HCC)    Compression fracture of L3 vertebra (HCC)    Acute on chronic diastolic congestive heart failure (HCC)    Diabetes mellitus with chronic kidney disease ([facility])      Lab Results   Component Value Date    HGBA1C 7.0 (H) 11/14/2024   A1c under decent control, especially given her age-continue metformin and insulin lantus and humalog-family would like to see her be more mobile-sugars ranging from 79 to 230         Pressure ulcer of left buttock, stage 2 (HCC) - Primary    Obesity, morbid (HCC)     Other Visit Diagnoses         Chronic diastolic congestive heart failure (HCC)          Atrial fibrillation with rapid ventricular response (HCC)          Thrombocytopenia, unspecified (HCC)          Urinary incontinence, unspecified type        Will order u/a,c and s and bladder US with PVR-also mentioned timed voiding and being more mobile-offered Urology but she declines    Relevant Orders    Urinalysis with microscopic    Urine culture    US bladder with post void residual              Subjective:      Patient ID: Dayana Reagan is a 89 y.o. female.    Dayana here with her  to touch base on a few things. She has a hx of DM II, most recent A1c% of 7 in November, HTN, PAF, ostseoarthritis, cognitive decline, DDD lower back, ambulatory dysfunction-I had received an email from her daughter who let us know that Dayana has been having a lot of issues with urinary incontinence, either having accidents in her clothing, or not making it to the bathroom in time.  Has been having to use pads to cover chairs  and such so that she doesn't leak urine on to them.  I did mention referring her to Urology but she wants me to order a urine first, along with a bladder ultrasound which I told her I'd order with post void residuals.  She does drink caffeine, enjoys her caffeinated coffee quite a bit. She does have a significant degree of osteoarthritis in her knees, especially left, and it is hard for her to be mobile, although her family feels she should try to be more mobile than she is. Her  says she sleeps a lot during the day-she had had a cough that was causing her urinary symptoms to be worse, but the cough has subsided some with time and claritin    Cough        The following portions of the patient's history were reviewed and updated as appropriate:   Past Medical History:  She has a past medical history of Abnormal abdominal CT scan (06/09/2023), Allergy to sulfa drugs, Arthritis (2000), Closed fracture of third lumbar vertebra (HCC), Closed T12 spinal fracture ([facility]), Closed wedge compression fracture of T12 vertebra ([facility]), Dementia ([facility]), Diabetes mellitus (HCC) (2000), DM (diabetes mellitus), type 2 ([facility]), H/O multiple allergies, HL (hearing loss), Hyperlipidemia, Hypertension, Hypertensive urgency (02/13/2023), Knee pain (04/18/2023), Lumbar compression fracture ([facility]), Memory loss (2020), Nocturia, Palpitations, Paroxysmal atrial fibrillation ([facility]), Thrombocytopenic disorder ([facility]), Type 2 diabetes mellitus without complication ([facility]), and Wears glasses.,  _______________________________________________________________________  Medical Problems:  does not have any pertinent problems on file.,  _______________________________________________________________________  Past Surgical History:   has a past surgical history that includes Hysterectomy; Hernia repair; DXA procedure(historical) (05/31/2023); and Dermoid cyst  excision (N/A,  12/19/2024).,  _______________________________________________________________________  Family History:  family history includes Diabetes in her mother; Heart disease in her father; Hypertension in her father and mother.,  _______________________________________________________________________  Social History:   reports that she has never smoked. She has never been exposed to tobacco smoke. She has never used smokeless tobacco. She reports current alcohol use of about 1.0 standard drink of alcohol per week. She reports that she does not use drugs.,  _______________________________________________________________________  Allergies:  is allergic to accupril [quinapril hcl], novocain [procaine], parabens, sulfa antibiotics, and penicillins..  _______________________________________________________________________  Current Outpatient Medications   Medication Sig Dispense Refill    albuterol (ProAir HFA) 90 mcg/act inhaler Inhale 2 puffs every 6 (six) hours as needed for wheezing or shortness of breath 8.5 g 0    apixaban (Eliquis) 2.5 mg TAKE 1 TABLET BY MOUTH TWICE  DAILY 180 tablet 3    ezetimibe-simvastatin (VYTORIN) 10-20 mg per tablet Take 1 tablet by mouth daily at bedtime 90 tablet 0    furosemide (LASIX) 80 mg tablet TAKE 1 TABLET BY MOUTH EVERY DAY 90 tablet 1    glucose blood (FREESTYLE LITE) test strip check blood sugar 3 times daily 300 each 1    Insulin Glargine Solostar (Lantus SoloStar) 100 UNIT/ML SOPN Inject 0.14 mL (14 Units total) under the skin in the morning 15 mL 1    insulin lispro (HumaLOG KwikPen) 100 units/mL injection pen Inject 7 Units under the skin 3 (three) times a day with meals 15 mL 3    Insulin Pen Needle (BD AutoShield Duo) 30G X 5 MM MISC USE FOUR TIMES DAILY WITH INSULIN  each 5    loratadine (CLARITIN) 10 mg tablet Take 1 tablet (10 mg total) by mouth daily 30 tablet 3    metFORMIN (GLUCOPHAGE) 500 mg tablet TAKE 1 TABLET BY MOUTH TWICE DAILY 180 tablet 1    metoprolol  "succinate (TOPROL-XL) 25 mg 24 hr tablet Take 3 tablets (75 mg total) by mouth 2 (two) times a day 540 tablet 1    nystatin (MYCOSTATIN) powder Apply topically 2 (two) times a day 60 g 0    potassium chloride (Klor-Con M20) 20 mEq tablet TAKE 1 TABLET BY MOUTH DAILY 90 tablet 3    valsartan (DIOVAN) 160 mg tablet Take 1 tablet (160 mg total) by mouth daily 90 tablet 1    predniSONE 20 mg tablet Take 2 tablets (40 mg total) by mouth daily (Patient not taking: Reported on 1/10/2025) 15 tablet 0     No current facility-administered medications for this visit.     _______________________________________________________________________  Review of Systems   Constitutional:  Positive for fatigue.   Respiratory:  Positive for cough.    Genitourinary:  Positive for frequency and urgency.   Musculoskeletal:  Positive for gait problem and joint swelling.   Hematological: Negative.    Psychiatric/Behavioral: Negative.           Objective:  Vitals:    01/10/25 1531   BP: 128/84   BP Location: Left arm   Patient Position: Sitting   Cuff Size: Standard   Weight: 93.9 kg (207 lb)   Height: 5' 1\" (1.549 m)     Body mass index is 39.11 kg/m².     Physical Exam  Constitutional:       Appearance: She is obese.   HENT:      Head: Normocephalic and atraumatic.      Right Ear: External ear normal.      Left Ear: External ear normal.      Nose: Nose normal.      Mouth/Throat:      Mouth: Mucous membranes are moist.   Eyes:      Extraocular Movements: Extraocular movements intact.      Pupils: Pupils are equal, round, and reactive to light.   Cardiovascular:      Rate and Rhythm: Normal rate and regular rhythm.   Pulmonary:      Effort: Pulmonary effort is normal.   Abdominal:      Palpations: Abdomen is soft.   Musculoskeletal:      Cervical back: Neck supple.   Neurological:      General: No focal deficit present.      Mental Status: She is alert and oriented to person, place, and time.   Psychiatric:         Mood and Affect: Mood normal. "         Thought Content: Thought content normal.         Judgment: Judgment normal.

## 2025-01-20 ENCOUNTER — HOSPITAL ENCOUNTER (OUTPATIENT)
Dept: ULTRASOUND IMAGING | Facility: HOSPITAL | Age: OVER 89
Discharge: HOME | End: 2025-01-20
Attending: INTERNAL MEDICINE
Payer: MEDICARE

## 2025-01-20 DIAGNOSIS — R32 URINARY INCONTINENCE, UNSPECIFIED TYPE: ICD-10-CM

## 2025-01-20 PROCEDURE — 51798 US URINE CAPACITY MEASURE: CPT

## 2025-01-23 ENCOUNTER — RESULTS FOLLOW-UP (OUTPATIENT)
Dept: FAMILY MEDICINE CLINIC | Facility: CLINIC | Age: OVER 89
End: 2025-01-23

## 2025-01-23 DIAGNOSIS — E11.65 TYPE 2 DIABETES MELLITUS WITH HYPERGLYCEMIA, UNSPECIFIED WHETHER LONG TERM INSULIN USE (HCC): Primary | ICD-10-CM

## 2025-01-23 RX ORDER — INSULIN ASPART 100 [IU]/ML
8 INJECTION, SOLUTION INTRAVENOUS; SUBCUTANEOUS
Qty: 15 ML | Refills: 5 | Status: SHIPPED | OUTPATIENT
Start: 2025-01-23

## 2025-02-10 DIAGNOSIS — I48.91 ATRIAL FIBRILLATION WITH RAPID VENTRICULAR RESPONSE (HCC): ICD-10-CM

## 2025-02-10 NOTE — TELEPHONE ENCOUNTER
Medication:     apixaban (Eliquis) 2.5 mg       Dose/Frequency: TAKE 1 TABLET BY MOUTH TWICE DAILY     Quantity: 180    Pharmacy: [facility]    Office:   [x] PCP/Provider -   [] Speciality/Provider -     Does the patient have enough for 3 days?   [x] Yes   [] No - Send as HP to POD

## 2025-02-11 ENCOUNTER — APPOINTMENT (EMERGENCY)
Dept: RADIOLOGY | Facility: HOSPITAL | Age: OVER 89
DRG: 178 | End: 2025-02-11
Payer: MEDICARE

## 2025-02-11 ENCOUNTER — HOSPITAL ENCOUNTER (INPATIENT)
Facility: HOSPITAL | Age: OVER 89
LOS: 2 days | Discharge: HOME | DRG: 178 | End: 2025-02-13
Attending: EMERGENCY MEDICINE | Admitting: HOSPITALIST
Payer: MEDICARE

## 2025-02-11 DIAGNOSIS — J96.01 ACUTE HYPOXIC RESPIRATORY FAILURE (HCC): Primary | ICD-10-CM

## 2025-02-11 DIAGNOSIS — R53.1 GENERALIZED WEAKNESS: ICD-10-CM

## 2025-02-11 DIAGNOSIS — E83.42 HYPOMAGNESEMIA: ICD-10-CM

## 2025-02-11 DIAGNOSIS — A41.9 SEPSIS (HCC): ICD-10-CM

## 2025-02-11 DIAGNOSIS — R11.2 NAUSEA, VOMITING, AND DIARRHEA: ICD-10-CM

## 2025-02-11 DIAGNOSIS — F03.90 DEMENTIA WITHOUT BEHAVIORAL DISTURBANCE, PSYCHOTIC DISTURBANCE, MOOD DISTURBANCE, OR ANXIETY, UNSPECIFIED DEMENTIA SEVERITY, UNSPECIFIED DEMENTIA TYPE (HCC): ICD-10-CM

## 2025-02-11 DIAGNOSIS — R19.7 NAUSEA, VOMITING, AND DIARRHEA: ICD-10-CM

## 2025-02-11 DIAGNOSIS — J69.0 ASPIRATION PNEUMONIA (HCC): ICD-10-CM

## 2025-02-11 PROBLEM — J18.9 PNEUMONIA INVOLVING RIGHT LUNG: Status: ACTIVE | Noted: 2025-02-11

## 2025-02-11 LAB
2HR DELTA HS TROPONIN: 5 NG/L
4HR DELTA HS TROPONIN: 12 NG/L
ALBUMIN SERPL BCG-MCNC: 4.1 G/DL (ref 3.5–5)
ALP SERPL-CCNC: 60 U/L (ref 34–104)
ALT SERPL W P-5'-P-CCNC: 8 U/L (ref 7–52)
ANION GAP SERPL CALCULATED.3IONS-SCNC: 11 MMOL/L (ref 4–13)
APTT PPP: 25 SECONDS (ref 23–34)
AST SERPL W P-5'-P-CCNC: 15 U/L (ref 13–39)
BACTERIA UR QL AUTO: ABNORMAL /HPF
BASOPHILS # BLD AUTO: 0.01 THOUSANDS/ÂΜL (ref 0–0.1)
BASOPHILS NFR BLD AUTO: 0 % (ref 0–1)
BILIRUB SERPL-MCNC: 0.72 MG/DL (ref 0.2–1)
BILIRUB UR QL STRIP: NEGATIVE
BUN SERPL-MCNC: 39 MG/DL (ref 5–25)
CALCIUM SERPL-MCNC: 9 MG/DL (ref 8.4–10.2)
CARDIAC TROPONIN I PNL SERPL HS: 22 NG/L (ref ?–50)
CARDIAC TROPONIN I PNL SERPL HS: 27 NG/L (ref ?–50)
CARDIAC TROPONIN I PNL SERPL HS: 34 NG/L (ref ?–50)
CHLORIDE SERPL-SCNC: 102 MMOL/L (ref 96–108)
CLARITY UR: CLEAR
CO2 SERPL-SCNC: 28 MMOL/L (ref 21–32)
COLOR UR: YELLOW
CREAT SERPL-MCNC: 1.38 MG/DL (ref 0.6–1.3)
EOSINOPHIL # BLD AUTO: 0.1 THOUSAND/ÂΜL (ref 0–0.61)
EOSINOPHIL NFR BLD AUTO: 2 % (ref 0–6)
ERYTHROCYTE [DISTWIDTH] IN BLOOD BY AUTOMATED COUNT: 15.8 % (ref 11.6–15.1)
FLUAV RNA RESP QL NAA+PROBE: NEGATIVE
FLUBV RNA RESP QL NAA+PROBE: NEGATIVE
GFR SERPL CREATININE-BSD FRML MDRD: 33 ML/MIN/1.73SQ M
GLUCOSE SERPL-MCNC: 181 MG/DL (ref 65–140)
GLUCOSE SERPL-MCNC: 210 MG/DL (ref 65–140)
GLUCOSE UR STRIP-MCNC: NEGATIVE MG/DL
HCT VFR BLD AUTO: 42.3 % (ref 34.8–46.1)
HGB BLD-MCNC: 12.9 G/DL (ref 11.5–15.4)
HGB UR QL STRIP.AUTO: NEGATIVE
IMM GRANULOCYTES # BLD AUTO: 0.02 THOUSAND/UL (ref 0–0.2)
IMM GRANULOCYTES NFR BLD AUTO: 0 % (ref 0–2)
INR PPP: 1.07 (ref 0.85–1.19)
KETONES UR STRIP-MCNC: NEGATIVE MG/DL
LACTATE SERPL-SCNC: 1.7 MMOL/L (ref 0.5–2)
LEUKOCYTE ESTERASE UR QL STRIP: NEGATIVE
LIPASE SERPL-CCNC: 40 U/L (ref 11–82)
LYMPHOCYTES # BLD AUTO: 0.1 THOUSANDS/ÂΜL (ref 0.6–4.47)
LYMPHOCYTES NFR BLD AUTO: 2 % (ref 14–44)
MAGNESIUM SERPL-MCNC: 1.7 MG/DL (ref 1.9–2.7)
MCH RBC QN AUTO: 26.7 PG (ref 26.8–34.3)
MCHC RBC AUTO-ENTMCNC: 30.5 G/DL (ref 31.4–37.4)
MCV RBC AUTO: 88 FL (ref 82–98)
MONOCYTES # BLD AUTO: 0.3 THOUSAND/ÂΜL (ref 0.17–1.22)
MONOCYTES NFR BLD AUTO: 5 % (ref 4–12)
NEUTROPHILS # BLD AUTO: 5.48 THOUSANDS/ÂΜL (ref 1.85–7.62)
NEUTS SEG NFR BLD AUTO: 91 % (ref 43–75)
NITRITE UR QL STRIP: NEGATIVE
NON-SQ EPI CELLS URNS QL MICRO: ABNORMAL /HPF
NRBC BLD AUTO-RTO: 0 /100 WBCS
OVALOCYTES BLD QL SMEAR: PRESENT
PH UR STRIP.AUTO: 5.5 [PH]
PLATELET # BLD AUTO: 128 THOUSANDS/UL (ref 149–390)
PLATELET BLD QL SMEAR: ABNORMAL
PMV BLD AUTO: 11 FL (ref 8.9–12.7)
POIKILOCYTOSIS BLD QL SMEAR: PRESENT
POLYCHROMASIA BLD QL SMEAR: PRESENT
POTASSIUM SERPL-SCNC: 4 MMOL/L (ref 3.5–5.3)
PROCALCITONIN SERPL-MCNC: 0.16 NG/ML
PROT SERPL-MCNC: 7.4 G/DL (ref 6.4–8.4)
PROT UR STRIP-MCNC: ABNORMAL MG/DL
PROTHROMBIN TIME: 14.6 SECONDS (ref 12.3–15)
RBC # BLD AUTO: 4.83 MILLION/UL (ref 3.81–5.12)
RBC #/AREA URNS AUTO: ABNORMAL /HPF
RBC MORPH BLD: PRESENT
RSV RNA RESP QL NAA+PROBE: NEGATIVE
SARS-COV-2 RNA RESP QL NAA+PROBE: NEGATIVE
SODIUM SERPL-SCNC: 141 MMOL/L (ref 135–147)
SP GR UR STRIP.AUTO: 1.02 (ref 1–1.03)
UROBILINOGEN UR STRIP-ACNC: <2 MG/DL
WBC # BLD AUTO: 6.01 THOUSAND/UL (ref 4.31–10.16)
WBC #/AREA URNS AUTO: ABNORMAL /HPF

## 2025-02-11 PROCEDURE — 85730 THROMBOPLASTIN TIME PARTIAL: CPT

## 2025-02-11 PROCEDURE — 99285 EMERGENCY DEPT VISIT HI MDM: CPT

## 2025-02-11 PROCEDURE — 84484 ASSAY OF TROPONIN QUANT: CPT

## 2025-02-11 PROCEDURE — 87040 BLOOD CULTURE FOR BACTERIA: CPT

## 2025-02-11 PROCEDURE — 83735 ASSAY OF MAGNESIUM: CPT

## 2025-02-11 PROCEDURE — 84145 PROCALCITONIN (PCT): CPT

## 2025-02-11 PROCEDURE — 80053 COMPREHEN METABOLIC PANEL: CPT

## 2025-02-11 PROCEDURE — 96368 THER/DIAG CONCURRENT INF: CPT

## 2025-02-11 PROCEDURE — 83690 ASSAY OF LIPASE: CPT

## 2025-02-11 PROCEDURE — 71045 X-RAY EXAM CHEST 1 VIEW: CPT

## 2025-02-11 PROCEDURE — 85610 PROTHROMBIN TIME: CPT

## 2025-02-11 PROCEDURE — 0241U HB NFCT DS VIR RESP RNA 4 TRGT: CPT

## 2025-02-11 PROCEDURE — 36415 COLL VENOUS BLD VENIPUNCTURE: CPT

## 2025-02-11 PROCEDURE — 96361 HYDRATE IV INFUSION ADD-ON: CPT

## 2025-02-11 PROCEDURE — 82948 REAGENT STRIP/BLOOD GLUCOSE: CPT

## 2025-02-11 PROCEDURE — 96374 THER/PROPH/DIAG INJ IV PUSH: CPT

## 2025-02-11 PROCEDURE — 96375 TX/PRO/DX INJ NEW DRUG ADDON: CPT

## 2025-02-11 PROCEDURE — 83605 ASSAY OF LACTIC ACID: CPT

## 2025-02-11 PROCEDURE — 96365 THER/PROPH/DIAG IV INF INIT: CPT

## 2025-02-11 PROCEDURE — 85025 COMPLETE CBC W/AUTO DIFF WBC: CPT

## 2025-02-11 PROCEDURE — 99223 1ST HOSP IP/OBS HIGH 75: CPT | Performed by: HOSPITALIST

## 2025-02-11 PROCEDURE — 81001 URINALYSIS AUTO W/SCOPE: CPT

## 2025-02-11 PROCEDURE — 99285 EMERGENCY DEPT VISIT HI MDM: CPT | Performed by: EMERGENCY MEDICINE

## 2025-02-11 RX ORDER — ALBUTEROL SULFATE 90 UG/1
2 INHALANT RESPIRATORY (INHALATION) EVERY 6 HOURS PRN
Status: DISCONTINUED | OUTPATIENT
Start: 2025-02-11 | End: 2025-02-13 | Stop reason: HOSPADM

## 2025-02-11 RX ORDER — ACETAMINOPHEN 10 MG/ML
1000 INJECTION, SOLUTION INTRAVENOUS ONCE
Status: COMPLETED | OUTPATIENT
Start: 2025-02-11 | End: 2025-02-11

## 2025-02-11 RX ORDER — SODIUM CHLORIDE, SODIUM GLUCONATE, SODIUM ACETATE, POTASSIUM CHLORIDE, MAGNESIUM CHLORIDE, SODIUM PHOSPHATE, DIBASIC, AND POTASSIUM PHOSPHATE .53; .5; .37; .037; .03; .012; .00082 G/100ML; G/100ML; G/100ML; G/100ML; G/100ML; G/100ML; G/100ML
75 INJECTION, SOLUTION INTRAVENOUS CONTINUOUS
Status: DISCONTINUED | OUTPATIENT
Start: 2025-02-11 | End: 2025-02-13 | Stop reason: HOSPADM

## 2025-02-11 RX ORDER — PRAVASTATIN SODIUM 40 MG
40 TABLET ORAL
Status: DISCONTINUED | OUTPATIENT
Start: 2025-02-11 | End: 2025-02-13 | Stop reason: HOSPADM

## 2025-02-11 RX ORDER — GUAIFENESIN 600 MG/1
600 TABLET, EXTENDED RELEASE ORAL EVERY 12 HOURS SCHEDULED
Status: DISCONTINUED | OUTPATIENT
Start: 2025-02-11 | End: 2025-02-13 | Stop reason: HOSPADM

## 2025-02-11 RX ORDER — ONDANSETRON 2 MG/ML
4 INJECTION INTRAMUSCULAR; INTRAVENOUS EVERY 6 HOURS PRN
Status: DISCONTINUED | OUTPATIENT
Start: 2025-02-11 | End: 2025-02-13 | Stop reason: HOSPADM

## 2025-02-11 RX ORDER — INSULIN LISPRO 100 [IU]/ML
8 INJECTION, SOLUTION INTRAVENOUS; SUBCUTANEOUS
Status: DISCONTINUED | OUTPATIENT
Start: 2025-02-12 | End: 2025-02-11

## 2025-02-11 RX ORDER — INSULIN LISPRO 100 [IU]/ML
4 INJECTION, SOLUTION INTRAVENOUS; SUBCUTANEOUS
Status: DISCONTINUED | OUTPATIENT
Start: 2025-02-12 | End: 2025-02-13 | Stop reason: HOSPADM

## 2025-02-11 RX ORDER — EZETIMIBE 10 MG/1
10 TABLET ORAL
Status: DISCONTINUED | OUTPATIENT
Start: 2025-02-11 | End: 2025-02-13 | Stop reason: HOSPADM

## 2025-02-11 RX ORDER — OLANZAPINE 10 MG/2ML
2.5 INJECTION, POWDER, FOR SOLUTION INTRAMUSCULAR
Status: DISCONTINUED | OUTPATIENT
Start: 2025-02-11 | End: 2025-02-13 | Stop reason: HOSPADM

## 2025-02-11 RX ORDER — INSULIN LISPRO 100 [IU]/ML
1-5 INJECTION, SOLUTION INTRAVENOUS; SUBCUTANEOUS
Status: DISCONTINUED | OUTPATIENT
Start: 2025-02-12 | End: 2025-02-13 | Stop reason: HOSPADM

## 2025-02-11 RX ORDER — MAGNESIUM SULFATE HEPTAHYDRATE 40 MG/ML
2 INJECTION, SOLUTION INTRAVENOUS ONCE
Status: COMPLETED | OUTPATIENT
Start: 2025-02-11 | End: 2025-02-11

## 2025-02-11 RX ORDER — CALCIUM CARBONATE 500 MG/1
1000 TABLET, CHEWABLE ORAL DAILY PRN
Status: DISCONTINUED | OUTPATIENT
Start: 2025-02-11 | End: 2025-02-13 | Stop reason: HOSPADM

## 2025-02-11 RX ORDER — LORATADINE 10 MG/1
10 TABLET ORAL DAILY
Status: DISCONTINUED | OUTPATIENT
Start: 2025-02-12 | End: 2025-02-13 | Stop reason: HOSPADM

## 2025-02-11 RX ORDER — LOSARTAN POTASSIUM 50 MG/1
100 TABLET ORAL DAILY
Status: DISCONTINUED | OUTPATIENT
Start: 2025-02-12 | End: 2025-02-13 | Stop reason: HOSPADM

## 2025-02-11 RX ORDER — POTASSIUM CHLORIDE 1500 MG/1
20 TABLET, EXTENDED RELEASE ORAL DAILY
Status: DISCONTINUED | OUTPATIENT
Start: 2025-02-12 | End: 2025-02-11

## 2025-02-11 RX ORDER — ONDANSETRON 2 MG/ML
4 INJECTION INTRAMUSCULAR; INTRAVENOUS ONCE
Status: COMPLETED | OUTPATIENT
Start: 2025-02-11 | End: 2025-02-11

## 2025-02-11 RX ORDER — INSULIN GLARGINE 100 [IU]/ML
7 INJECTION, SOLUTION SUBCUTANEOUS
Status: DISCONTINUED | OUTPATIENT
Start: 2025-02-11 | End: 2025-02-13 | Stop reason: HOSPADM

## 2025-02-11 RX ORDER — METRONIDAZOLE 500 MG/100ML
500 INJECTION, SOLUTION INTRAVENOUS ONCE
Status: COMPLETED | OUTPATIENT
Start: 2025-02-11 | End: 2025-02-11

## 2025-02-11 RX ORDER — FUROSEMIDE 80 MG/1
80 TABLET ORAL DAILY
Status: DISCONTINUED | OUTPATIENT
Start: 2025-02-12 | End: 2025-02-13 | Stop reason: HOSPADM

## 2025-02-11 RX ORDER — INSULIN GLARGINE 100 [IU]/ML
14 INJECTION, SOLUTION SUBCUTANEOUS
Status: DISCONTINUED | OUTPATIENT
Start: 2025-02-11 | End: 2025-02-11

## 2025-02-11 RX ADMIN — CEFTRIAXONE SODIUM 1000 MG: 10 INJECTION, POWDER, FOR SOLUTION INTRAVENOUS at 18:13

## 2025-02-11 RX ADMIN — ONDANSETRON 4 MG: 2 INJECTION INTRAMUSCULAR; INTRAVENOUS at 17:30

## 2025-02-11 RX ADMIN — ACETAMINOPHEN 1000 MG: 10 INJECTION INTRAVENOUS at 17:30

## 2025-02-11 RX ADMIN — EZETIMIBE 10 MG: 10 TABLET ORAL at 22:45

## 2025-02-11 RX ADMIN — GUAIFENESIN 600 MG: 600 TABLET ORAL at 22:45

## 2025-02-11 RX ADMIN — SODIUM CHLORIDE, SODIUM GLUCONATE, SODIUM ACETATE, POTASSIUM CHLORIDE, MAGNESIUM CHLORIDE, SODIUM PHOSPHATE, DIBASIC, AND POTASSIUM PHOSPHATE 75 ML/HR: .53; .5; .37; .037; .03; .012; .00082 INJECTION, SOLUTION INTRAVENOUS at 23:00

## 2025-02-11 RX ADMIN — APIXABAN 2.5 MG: 2.5 TABLET, FILM COATED ORAL at 22:45

## 2025-02-11 RX ADMIN — MAGNESIUM SULFATE HEPTAHYDRATE 2 G: 40 INJECTION, SOLUTION INTRAVENOUS at 18:09

## 2025-02-11 RX ADMIN — INSULIN GLARGINE 7 UNITS: 100 INJECTION, SOLUTION SUBCUTANEOUS at 22:48

## 2025-02-11 RX ADMIN — METRONIDAZOLE 500 MG: 500 INJECTION, SOLUTION INTRAVENOUS at 18:40

## 2025-02-11 RX ADMIN — PRAVASTATIN SODIUM 40 MG: 40 TABLET ORAL at 22:44

## 2025-02-11 RX ADMIN — SODIUM CHLORIDE 500 ML: 0.9 INJECTION, SOLUTION INTRAVENOUS at 18:14

## 2025-02-11 RX ADMIN — SODIUM CHLORIDE 500 ML: 0.9 INJECTION, SOLUTION INTRAVENOUS at 17:26

## 2025-02-11 NOTE — ED ATTENDING ATTESTATION
2/11/2025  I, Sameer Holman DO, saw and evaluated the patient. I have discussed the patient with the resident/non-physician practitioner and agree with the resident's/non-physician practitioner's findings, Plan of Care, and MDM as documented in the resident's/non-physician practitioner's note, except where noted. All available labs and Radiology studies were reviewed.  I was present for key portions of any procedure(s) performed by the resident/non-physician practitioner and I was immediately available to provide assistance.       At this point I agree with the current assessment done in the Emergency Department.  I have conducted an independent evaluation of this patient a history and physical is as follows:    89-year-old female presenting to the emergency department with  from [facility] independent living,  takes care of patient, she has dementia.  He states yesterday they developed nausea and vomiting and diarrhea, she is still having nausea and vomiting today despite has been getting better.  Today she was less responsive and somewhat altered earlier, she is more at her baseline now.  He denies any blood in her vomit or stool.  No known fevers or chills.  There have been multiple sick contacts with similar symptoms.  No falls.  She has been very weak however.    Upon physical examination, patient is not in acute distress, she does answer questions and follows commands, however is confused although per  currently at baseline, she is requiring supplemental oxygen, was hypoxic on room air, does have crackles to the right side of her lungs, no peripheral lower extremity edema, no abdominal tenderness to palpation.    My independent interpretation of chest x-ray, concern for right sided pneumonia/infiltrate.  This is likely the cause for her hypoxia.  Potentially aspiration due to vomiting.  Started on IV antibiotics.  Minimally elevated creatinine, mild hypomagnesemia, given IV fluids,  given supplemental magnesium.  UA not concerning for infection.  Concern for sepsis.  Discussed with internal medicine for hospitalization.    ED Course         Critical Care Time  Procedures       Azelaic Acid Counseling: Patient counseled that medicine may cause skin irritation and to avoid applying near the eyes.  In the event of skin irritation, the patient was advised to reduce the amount of the drug applied or use it less frequently.   The patient verbalized understanding of the proper use and possible adverse effects of azelaic acid.  All of the patient's questions and concerns were addressed.

## 2025-02-11 NOTE — ED PROVIDER NOTES
Time reflects when diagnosis was documented in both MDM as applicable and the Disposition within this note       Time User Action Codes Description Comment    2/11/2025  6:15 PM Roselia Galeano Amanda Add [J96.01] Acute hypoxic respiratory failure (HCC)     2/11/2025  6:16 PM Roselia Galeano Layla Add [J69.0] Aspiration pneumonia (HCC)     2/11/2025  6:16 PM Roselia Galeano Layla Add [R11.2,  R19.7] Nausea, vomiting, and diarrhea     2/11/2025  6:17 PM Roselia Galeano Layla Add [R53.1] Generalized weakness     2/11/2025  6:17 PM Roselia Galeano Layla Add [F03.90] Dementia without behavioral disturbance, psychotic disturbance, mood disturbance, or anxiety, unspecified dementia severity, unspecified dementia type (HCC)     2/11/2025  6:19 PM Roselia Galeano Layla Add [E83.42] Hypomagnesemia     2/11/2025  6:38 PM Roselia Galeano Layla Add [A41.9] Sepsis (HCC)           ED Disposition       ED Disposition   Admit    Condition   Stable    Date/Time   Tue Feb 11, 2025  6:31 PM    Comment   Case was discussed with [facility] and the patient's admission status was agreed to be Admission Status: inpatient status to the service of Dr. Santos  .               Assessment & Plan       Medical Decision Making  DDX: flu, Covid, electrolyte abnormality, dehydration, chf exacerbation, acs    Pt with increased weakness and confusion following episodes of vomiting and diarrhea per . Pt is alert and following commands.   Pt found to be hypoxic on RA to 88%. 2L NC placed with good increase of O2 sat.   CXR with right sided infiltrates concerning for aspiration PNA in setting of recent vomiting illness.  Viral swab negative  UA without leuks or nitrites, doubt UTI  Pt given 1L IVF, IV tylenol, Rocephin and flagyl   Admitted to [facility] service.     Amount and/or Complexity of Data Reviewed  Independent Historian: spouse and EMS  Labs: ordered. Decision-making details documented in ED Course.  Radiology: ordered and independent interpretation  performed.    Risk  Prescription drug management.  Decision regarding hospitalization.        ED Course as of 02/11/25 1846 Tue Feb 11, 2025 1804 Leukocytes, UA: Negative   1804 Nitrite, UA: Negative   1812 MAGNESIUM(!): 1.7  Will replete    1814 Creatinine(!): 1.38   1814 WBC: 6.01   1821 Reached out to [facility] for admission   1834 SARS-COV-2: Negative   1834 INFLU A PCR: Negative   1834 INFLU B PCR: Negative   1834 RSV PCR: Negative       Medications   magnesium sulfate 2 g/50 mL IVPB (premix) 2 g (2 g Intravenous New Bag 2/11/25 1809)   metroNIDAZOLE (FLAGYL) IVPB (premix) 500 mg 100 mL (500 mg Intravenous New Bag 2/11/25 1840)   sodium chloride 0.9 % bolus 500 mL (500 mL Intravenous New Bag 2/11/25 1814)   sodium chloride 0.9 % bolus 500 mL (0 mL Intravenous Stopped 2/11/25 1829)   ondansetron (ZOFRAN) injection 4 mg (4 mg Intravenous Given 2/11/25 1730)   acetaminophen (Ofirmev) injection 1,000 mg (0 mg Intravenous Stopped 2/11/25 1745)   ceftriaxone (ROCEPHIN) 1 g/50 mL in dextrose IVPB (1,000 mg Intravenous New Bag 2/11/25 1813)       ED Risk Strat Scores                          SBIRT 22yo+      Flowsheet Row Most Recent Value   Initial Alcohol Screen: US AUDIT-C     1. How often do you have a drink containing alcohol? 0 Filed at: 02/11/2025 1704   2. How many drinks containing alcohol do you have on a typical day you are drinking?  0 Filed at: 02/11/2025 1704   3b. FEMALE Any Age, or MALE 65+: How often do you have 4 or more drinks on one occassion? 0 Filed at: 02/11/2025 1704   Audit-C Score 0 Filed at: 02/11/2025 1704   SAVI: How many times in the past year have you...    Used an illegal drug or used a prescription medication for non-medical reasons? Never Filed at: 02/11/2025 1704                            History of Present Illness       Chief Complaint   Patient presents with    Nausea           Vomiting     Pt [facility] via [facility] independent side. EMS reports pt has been N/V and  reports  said pt has been altered from neuro baseline and weak. Blood sugar for . Pt has delayed responses. GCS 14.        Past Medical History:   Diagnosis Date    Abnormal abdominal CT scan 06/09/2023    Allergy to sulfa drugs     Arthritis 2000    Closed fracture of third lumbar vertebra (HCC)     Closed T12 spinal fracture (HCC)     Closed wedge compression fracture of T12 vertebra (HCC)     Dementia (HCC)     Diabetes mellitus (HCC) 2000    DM (diabetes mellitus), type 2 (HCC)     H/O multiple allergies     Novocaine,Darvocet, Sulfa, Accupril    HL (hearing loss)     Hyperlipidemia     Hypertension     Hypertensive urgency 02/13/2023    Knee pain 04/18/2023    Lumbar compression fracture (HCC)     Memory loss 2020    Nocturia     Palpitations     Paroxysmal atrial fibrillation (HCC)     Thrombocytopenic disorder (HCC)     Type 2 diabetes mellitus without complication (HCC)     Wears glasses       Past Surgical History:   Procedure Laterality Date    DERMOID CYST  EXCISION N/A 12/19/2024    Procedure: EXCISION CYST DERMOID back;  Surgeon: Alex Dolan MD;  Location:  MAIN OR;  Service: General    DXA PROCEDURE (HISTORICAL)  05/31/2023    HERNIA REPAIR      HYSTERECTOMY        Family History   Problem Relation Age of Onset    Hypertension Mother     Diabetes Mother     Hypertension Father     Heart disease Father       Social History     Tobacco Use    Smoking status: Never     Passive exposure: Never    Smokeless tobacco: Never   Vaping Use    Vaping status: Never Used   Substance Use Topics    Alcohol use: Yes     Alcohol/week: 1.0 standard drink of alcohol     Types: 1 Glasses of wine per week     Comment: on holidays add an extra glass of wine    Drug use: Never      E-Cigarette/Vaping    E-Cigarette Use Never User       E-Cigarette/Vaping Substances    Nicotine No     THC No     CBD No     Flavoring No     Other No     Unknown No       I have reviewed and agree with the history as  documented.     The patient is an 89 year old female who presents to ED with  for evaluation of weakness. Hx: dementia, afib. History is provided by  due to dementia.  state he and the pt started to have vomiting & diarrhea last night. He states 26 other residents of [facility] also have similar symptoms. He states the pt became very weak today and was unable to stand up out of her chair without assistance and was too weak to walk. He states she has not eaten much today.  also notes the pt seems to be more confused from her baseline. Pt denies chest pain, SOB, abdominal pain, recent injury or trauma        Review of Systems   Unable to perform ROS: Dementia   Constitutional:  Positive for appetite change.   Respiratory:  Negative for shortness of breath.    Cardiovascular:  Negative for chest pain.   Gastrointestinal:  Positive for diarrhea and vomiting. Negative for abdominal pain.   Skin:  Negative for rash.   Neurological:  Positive for weakness.           Objective       ED Triage Vitals [02/11/25 1658]   Temperature Pulse Blood Pressure Respirations SpO2 Patient Position - Orthostatic VS   100.1 °F (37.8 °C) (!) 125 154/66 18 (!) 88 % Sitting      Temp Source Heart Rate Source BP Location FiO2 (%) Pain Score    Oral Monitor Right arm -- --      Vitals      Date and Time Temp Pulse SpO2 Resp BP Pain Score FACES Pain Rating User   02/11/25 1830 -- 119 95 % 18 108/57 -- -- AG   02/11/25 1815 -- 116 96 % 18 118/55 -- -- AG   02/11/25 1811 100.4 °F (38 °C) -- -- -- -- -- -- AG   02/11/25 1800 -- 120 96 % 18 127/72 -- -- AG   02/11/25 1745 -- 113 97 % 18 123/71 -- -- AG   02/11/25 1730 -- 120 96 % 18 156/72 -- -- AG   02/11/25 1715 -- 125 95 % 18 136/84 -- -- AG   02/11/25 1700 -- 129 90 % 18 154/66 -- -- AG   02/11/25 1658 100.1 °F (37.8 °C) 125 88 % 18 154/66 -- -- AG            Physical Exam  Vitals and nursing note reviewed.   Constitutional:       Appearance: Normal appearance.    HENT:      Head: Normocephalic and atraumatic.      Mouth/Throat:      Mouth: Mucous membranes are dry.      Pharynx: Oropharynx is clear.   Eyes:      Conjunctiva/sclera: Conjunctivae normal.      Pupils: Pupils are equal, round, and reactive to light.   Cardiovascular:      Rate and Rhythm: Regular rhythm. Tachycardia present.      Pulses: Normal pulses.      Heart sounds: Normal heart sounds.   Pulmonary:      Breath sounds: Normal breath sounds. No wheezing or rales.   Abdominal:      General: Abdomen is flat. Bowel sounds are increased.      Palpations: Abdomen is soft.      Tenderness: There is no abdominal tenderness.   Musculoskeletal:         General: No tenderness. Normal range of motion.      Cervical back: Neck supple.      Right lower leg: No edema.      Left lower leg: No edema.   Skin:     General: Skin is warm and dry.   Neurological:      General: No focal deficit present.      Mental Status: She is alert and oriented to person, place, and time.   Psychiatric:         Mood and Affect: Mood normal.         Behavior: Behavior normal.         Thought Content: Thought content normal.         Judgment: Judgment normal.         Results Reviewed       Procedure Component Value Units Date/Time    FLU/RSV/COVID - if FLU/RSV clinically relevant [[id number]]  (Normal) Collected: 02/11/25 1721    Lab Status: Final result Specimen: Nares from Nose Updated: 02/11/25 1819     SARS-CoV-2 Negative     INFLUENZA A PCR Negative     INFLUENZA B PCR Negative     RSV PCR Negative    Narrative:      This test has been performed using the CoV-2/Flu/RSV plus assay on the trustedsafe GeneXpert platform. This test has been validated by the  and verified by the performing laboratory.     This test is designed to amplify and detect the following: nucleocapsid (N), envelope (E), and RNA-dependent RNA polymerase (RdRP) genes of the SARS-CoV-2 genome; matrix (M), basic polymerase (PB2), and acidic protein (PA) segments of  the influenza A genome; matrix (M) and non-structural protein (NS) segments of the influenza B genome, and the nucleocapsid genes of RSV A and RSV B.     Positive results are indicative of the presence of Flu A, Flu B, RSV, and/or SARS-CoV-2 RNA. Positive results for SARS-CoV-2 or suspected novel influenza should be reported to state, local, or federal health departments according to local reporting requirements.      All results should be assessed in conjunction with clinical presentation and other laboratory markers for clinical management.     FOR PEDIATRIC PATIENTS - copy/paste COVID Guidelines URL to browser: [email or web address]       Lactic acid [[id number]]  (Normal) Collected: 02/11/25 1721    Lab Status: Final result Specimen: Blood from Arm, Left Updated: 02/11/25 1810     LACTIC ACID 1.7 mmol/L     Narrative:      Result may be elevated if tourniquet was used during collection.    HS Troponin 0hr (reflex protocol) [[id number]]  (Normal) Collected: 02/11/25 1721    Lab Status: Final result Specimen: Blood from Arm, Left Updated: 02/11/25 1810     hs TnI 0hr 22 ng/L     HS Troponin I 2hr [[id number]]     Lab Status: No result Specimen: Blood     Procalcitonin [[id number]]  (Normal) Collected: 02/11/25 1721    Lab Status: Final result Specimen: Blood from Arm, Left Updated: 02/11/25 1807     Procalcitonin 0.16 ng/ml     CBC and differential [[id number]]  (Abnormal) Collected: 02/11/25 1721    Lab Status: Final result Specimen: Blood from Arm, Left Updated: 02/11/25 1807     WBC 6.01 Thousand/uL      RBC 4.83 Million/uL      Hemoglobin 12.9 g/dL      Hematocrit 42.3 %      MCV 88 fL      MCH 26.7 pg      MCHC 30.5 g/dL      RDW 15.8 %      MPV 11.0 fL      Platelets 128 Thousands/uL      nRBC 0 /100 WBCs      Segmented % 91 %      Immature Grans % 0 %      Lymphocytes % 2 %      Monocytes % 5 %      Eosinophils Relative 2 %      Basophils Relative 0 %       Absolute Neutrophils 5.48 Thousands/µL      Absolute Immature Grans 0.02 Thousand/uL      Absolute Lymphocytes 0.10 Thousands/µL      Absolute Monocytes 0.30 Thousand/µL      Eosinophils Absolute 0.10 Thousand/µL      Basophils Absolute 0.01 Thousands/µL     Narrative:      This is an appended report.  These results have been appended to a previously verified report.    Smear Review(Phlebs Do Not Order) [[id number]]  (Abnormal) Collected: 02/11/25 1721    Lab Status: Final result Specimen: Blood from Arm, Left Updated: 02/11/25 1807     RBC Morphology Present     Platelet Estimate Borderline     Ovalocytes Present     Poikilocytes Present     Polychromasia Present    Comprehensive metabolic panel [[id number]]  (Abnormal) Collected: 02/11/25 1721    Lab Status: Final result Specimen: Blood from Arm, Left Updated: 02/11/25 1757     Sodium 141 mmol/L      Potassium 4.0 mmol/L      Chloride 102 mmol/L      CO2 28 mmol/L      ANION GAP 11 mmol/L      BUN 39 mg/dL      Creatinine 1.38 mg/dL      Glucose 210 mg/dL      Calcium 9.0 mg/dL      AST 15 U/L      ALT 8 U/L      Alkaline Phosphatase 60 U/L      Total Protein 7.4 g/dL      Albumin 4.1 g/dL      Total Bilirubin 0.72 mg/dL      eGFR 33 ml/min/1.73sq m     Narrative:      National Kidney Disease Foundation guidelines for Chronic Kidney Disease (CKD):     Stage 1 with normal or high GFR (GFR > 90 mL/min/1.73 square meters)    Stage 2 Mild CKD (GFR = 60-89 mL/min/1.73 square meters)    Stage 3A Moderate CKD (GFR = 45-59 mL/min/1.73 square meters)    Stage 3B Moderate CKD (GFR = 30-44 mL/min/1.73 square meters)    Stage 4 Severe CKD (GFR = 15-29 mL/min/1.73 square meters)    Stage 5 End Stage CKD (GFR <15 mL/min/1.73 square meters)  Note: GFR calculation is accurate only with a steady state creatinine    Lipase [[id number]]  (Normal) Collected: 02/11/25 1721    Lab Status: Final result Specimen: Blood from Arm, Left Updated: 02/11/25 1757     Lipase 40 u/L     Magnesium  [[id number]]  (Abnormal) Collected: 02/11/25 1721    Lab Status: Final result Specimen: Blood from Arm, Left Updated: 02/11/25 1757     Magnesium 1.7 mg/dL     Protime-INR [[id number]]  (Normal) Collected: 02/11/25 1721    Lab Status: Final result Specimen: Blood from Arm, Left Updated: 02/11/25 1753     Protime 14.6 seconds      INR 1.07    Narrative:      INR Therapeutic Range    Indication                                             INR Range      Atrial Fibrillation                                               2.0-3.0  Hypercoagulable State                                    2.0.2.3  Left Ventricular Asist Device                            2.0-3.0  Mechanical Heart Valve                                  -    Aortic(with afib, MI, embolism, HF, LA enlargement,    and/or coagulopathy)                                     2.0-3.0 (2.5-3.5)     Mitral                                                             2.5-3.5  Prosthetic/Bioprosthetic Heart Valve               2.0-3.0  Venous thromboembolism (VTE: VT, PE        2.0-3.0    APTT [[id number]]  (Normal) Collected: 02/11/25 1721    Lab Status: Final result Specimen: Blood from Arm, Left Updated: 02/11/25 1753     PTT 25 seconds     Urine Microscopic [[id number]]  (Abnormal) Collected: 02/11/25 1734    Lab Status: Final result Specimen: Urine, Straight Cath Updated: 02/11/25 1753     RBC, UA 2-4 /hpf      WBC, UA None Seen /hpf      Epithelial Cells Occasional /hpf      Bacteria, UA None Seen /hpf     UA w Reflex to Microscopic w Reflex to Culture [[id number]]  (Abnormal) Collected: 02/11/25 1734    Lab Status: Final result Specimen: Urine, Straight Cath Updated: 02/11/25 1752     Color, UA Yellow     Clarity, UA Clear     Specific Gravity, UA 1.021     pH, UA 5.5     Leukocytes, UA Negative     Nitrite, UA Negative     Protein, UA 30 (1+) mg/dl      Glucose, UA Negative mg/dl      Ketones, UA Negative mg/dl      Urobilinogen, UA <2.0 mg/dl      Bilirubin, UA Negative      Occult Blood, UA Negative    Blood culture #2 [[id number]] Collected: 02/11/25 1744    Lab Status: In process Specimen: Blood from Arm, Right Updated: 02/11/25 1751    Blood culture #1 [[id number]] Collected: 02/11/25 1721    Lab Status: In process Specimen: Blood from Arm, Left Updated: 02/11/25 1751            XR chest portable   ED Interpretation by Roselia Galeano MD (02/11 1811)   Right sided infiltrates          ECG 12 Lead Documentation Only    Date/Time: 2/11/2025 5:18 PM    Performed by: Roselia Galeano MD  Authorized by: Roselia Galeano MD    Indications / Diagnosis:  Weakness  ECG reviewed by me, the ED Provider: yes    Patient location:  ED  Previous ECG:     Previous ECG:  Unavailable  Interpretation:     Interpretation: abnormal    Rate:     ECG rate:  117    ECG rate assessment: tachycardic    Rhythm:     Rhythm: atrial fibrillation    Ectopy:     Ectopy: PVCs    QRS:     QRS axis:  Left    QRS intervals:  Normal  Conduction:     Conduction: normal    ST segments:     ST segments:  Normal  T waves:     T waves: normal        ED Medication and Procedure Management   Prior to Admission Medications   Prescriptions Last Dose Informant Patient Reported? Taking?   Insulin Glargine Solostar (Lantus SoloStar) 100 UNIT/ML SOPN   No No   Sig: Inject 0.14 mL (14 Units total) under the skin in the morning   Insulin Pen Needle (BD AutoShield Duo) 30G X 5 MM MISC   No No   Sig: USE FOUR TIMES DAILY WITH INSULIN PEN   albuterol (ProAir HFA) 90 mcg/act inhaler   No No   Sig: Inhale 2 puffs every 6 (six) hours as needed for wheezing or shortness of breath   apixaban (Eliquis) 2.5 mg   No No   Sig: Take 1 tablet (2.5 mg total) by mouth 2 (two) times a day   ezetimibe-simvastatin (VYTORIN) 10-20 mg per tablet   No No   Sig: Take 1 tablet by mouth daily at bedtime   furosemide (LASIX) 80 mg tablet   No No   Sig: TAKE 1 TABLET BY MOUTH EVERY DAY   glucose blood (FREESTYLE LITE) test strip   No No   Sig:  check blood sugar 3 times daily   insulin aspart (NovoLOG FlexPen) 100 UNIT/ML injection pen   No No   Sig: Inject 8 Units under the skin 3 (three) times a day with meals   loratadine (CLARITIN) 10 mg tablet   No No   Sig: Take 1 tablet (10 mg total) by mouth daily   metFORMIN (GLUCOPHAGE) 500 mg tablet   No No   Sig: TAKE 1 TABLET BY MOUTH TWICE DAILY   metoprolol succinate (TOPROL-XL) 25 mg 24 hr tablet   No No   Sig: Take 3 tablets (75 mg total) by mouth 2 (two) times a day   nystatin (MYCOSTATIN) powder   No No   Sig: Apply topically 2 (two) times a day   potassium chloride (Klor-Con M20) 20 mEq tablet   No No   Sig: TAKE 1 TABLET BY MOUTH DAILY   predniSONE 20 mg tablet   No No   Sig: Take 2 tablets (40 mg total) by mouth daily   Patient not taking: Reported on 1/10/2025   valsartan (DIOVAN) 160 mg tablet   No No   Sig: Take 1 tablet (160 mg total) by mouth daily      Facility-Administered Medications: None     Patient's Medications   Discharge Prescriptions    No medications on file     No discharge procedures on file.  ED SEPSIS DOCUMENTATION   Time reflects when diagnosis was documented in both MDM as applicable and the Disposition within this note       Time User Action Codes Description Comment    2/11/2025  6:15 PM Roselia Galeano Layla Add [J96.01] Acute hypoxic respiratory failure (HCC)     2/11/2025  6:16 PM Kassi Roselia Layla Add [J69.0] Aspiration pneumonia (HCC)     2/11/2025  6:16 PM Froylan Roselia Layla Add [R11.2,  R19.7] Nausea, vomiting, and diarrhea     2/11/2025  6:17 PM Froylan Roselia Layla Add [R53.1] Generalized weakness     2/11/2025  6:17 PM Kassi Roselia Layla Add [F03.90] Dementia without behavioral disturbance, psychotic disturbance, mood disturbance, or anxiety, unspecified dementia severity, unspecified dementia type (HCC)     2/11/2025  6:19 PM Froylan Roselia Layla Add [E83.42] Hypomagnesemia     2/11/2025  6:38 PM Roselia Galeano Layla Add [A41.9] Sepsis (HCC)                  [facility]  Layla Galeano MD  02/11/25 3368

## 2025-02-12 LAB
ANION GAP SERPL CALCULATED.3IONS-SCNC: 11 MMOL/L (ref 4–13)
ANISOCYTOSIS BLD QL SMEAR: PRESENT
BASOPHILS # BLD MANUAL: 0 THOUSAND/UL (ref 0–0.1)
BASOPHILS NFR MAR MANUAL: 0 % (ref 0–1)
BUN SERPL-MCNC: 36 MG/DL (ref 5–25)
CA-I BLD-SCNC: 1.04 MMOL/L (ref 1.12–1.32)
CALCIUM SERPL-MCNC: 7 MG/DL (ref 8.4–10.2)
CHLORIDE SERPL-SCNC: 104 MMOL/L (ref 96–108)
CO2 SERPL-SCNC: 28 MMOL/L (ref 21–32)
CREAT SERPL-MCNC: 1.24 MG/DL (ref 0.6–1.3)
EOSINOPHIL # BLD MANUAL: 0 THOUSAND/UL (ref 0–0.4)
EOSINOPHIL NFR BLD MANUAL: 0 % (ref 0–6)
ERYTHROCYTE [DISTWIDTH] IN BLOOD BY AUTOMATED COUNT: 16 % (ref 11.6–15.1)
GFR SERPL CREATININE-BSD FRML MDRD: 38 ML/MIN/1.73SQ M
GLUCOSE SERPL-MCNC: 140 MG/DL (ref 65–140)
GLUCOSE SERPL-MCNC: 141 MG/DL (ref 65–140)
GLUCOSE SERPL-MCNC: 145 MG/DL (ref 65–140)
GLUCOSE SERPL-MCNC: 150 MG/DL (ref 65–140)
GLUCOSE SERPL-MCNC: 156 MG/DL (ref 65–140)
HCT VFR BLD AUTO: 35.1 % (ref 34.8–46.1)
HGB BLD-MCNC: 10.6 G/DL (ref 11.5–15.4)
LYMPHOCYTES # BLD AUTO: 0.1 THOUSAND/UL (ref 0.6–4.47)
LYMPHOCYTES # BLD AUTO: 2 % (ref 14–44)
MCH RBC QN AUTO: 27.5 PG (ref 26.8–34.3)
MCHC RBC AUTO-ENTMCNC: 30.2 G/DL (ref 31.4–37.4)
MCV RBC AUTO: 91 FL (ref 82–98)
MONOCYTES # BLD AUTO: 0.15 THOUSAND/UL (ref 0–1.22)
MONOCYTES NFR BLD: 3 % (ref 4–12)
NEUTROPHILS # BLD MANUAL: 4.63 THOUSAND/UL (ref 1.85–7.62)
NEUTS BAND NFR BLD MANUAL: 2 % (ref 0–8)
NEUTS SEG NFR BLD AUTO: 93 % (ref 43–75)
OVALOCYTES BLD QL SMEAR: PRESENT
PLATELET # BLD AUTO: 101 THOUSANDS/UL (ref 149–390)
PLATELET BLD QL SMEAR: ABNORMAL
PMV BLD AUTO: 11 FL (ref 8.9–12.7)
POIKILOCYTOSIS BLD QL SMEAR: PRESENT
POTASSIUM SERPL-SCNC: 4.2 MMOL/L (ref 3.5–5.3)
PROCALCITONIN SERPL-MCNC: 0.31 NG/ML
RBC # BLD AUTO: 3.86 MILLION/UL (ref 3.81–5.12)
RBC MORPH BLD: PRESENT
SODIUM SERPL-SCNC: 143 MMOL/L (ref 135–147)
WBC # BLD AUTO: 4.87 THOUSAND/UL (ref 4.31–10.16)

## 2025-02-12 PROCEDURE — 84145 PROCALCITONIN (PCT): CPT

## 2025-02-12 PROCEDURE — 80048 BASIC METABOLIC PNL TOTAL CA: CPT

## 2025-02-12 PROCEDURE — 85027 COMPLETE CBC AUTOMATED: CPT

## 2025-02-12 PROCEDURE — 82330 ASSAY OF CALCIUM: CPT

## 2025-02-12 PROCEDURE — 99232 SBSQ HOSP IP/OBS MODERATE 35: CPT | Performed by: HOSPITALIST

## 2025-02-12 PROCEDURE — 85007 BL SMEAR W/DIFF WBC COUNT: CPT

## 2025-02-12 PROCEDURE — 36415 COLL VENOUS BLD VENIPUNCTURE: CPT

## 2025-02-12 PROCEDURE — 82948 REAGENT STRIP/BLOOD GLUCOSE: CPT

## 2025-02-12 RX ORDER — ACETAMINOPHEN 325 MG/1
650 TABLET ORAL EVERY 6 HOURS PRN
Status: DISCONTINUED | OUTPATIENT
Start: 2025-02-12 | End: 2025-02-13 | Stop reason: HOSPADM

## 2025-02-12 RX ORDER — LEVALBUTEROL INHALATION SOLUTION 1.25 MG/3ML
1.25 SOLUTION RESPIRATORY (INHALATION) ONCE
Status: CANCELLED | OUTPATIENT
Start: 2025-02-12

## 2025-02-12 RX ADMIN — INSULIN GLARGINE 7 UNITS: 100 INJECTION, SOLUTION SUBCUTANEOUS at 22:57

## 2025-02-12 RX ADMIN — FUROSEMIDE 80 MG: 40 TABLET ORAL at 10:30

## 2025-02-12 RX ADMIN — PRAVASTATIN SODIUM 40 MG: 40 TABLET ORAL at 22:57

## 2025-02-12 RX ADMIN — METOPROLOL SUCCINATE ER TABLETS 75 MG: 25 TABLET, FILM COATED, EXTENDED RELEASE ORAL at 10:39

## 2025-02-12 RX ADMIN — INSULIN LISPRO 4 UNITS: 100 INJECTION, SOLUTION INTRAVENOUS; SUBCUTANEOUS at 10:40

## 2025-02-12 RX ADMIN — INSULIN LISPRO 4 UNITS: 100 INJECTION, SOLUTION INTRAVENOUS; SUBCUTANEOUS at 18:18

## 2025-02-12 RX ADMIN — GUAIFENESIN 600 MG: 600 TABLET ORAL at 10:30

## 2025-02-12 RX ADMIN — APIXABAN 2.5 MG: 2.5 TABLET, FILM COATED ORAL at 18:14

## 2025-02-12 RX ADMIN — AMPICILLIN AND SULBACTAM 3 G: 10; 5 INJECTION, POWDER, FOR SOLUTION INTRAVENOUS at 18:41

## 2025-02-12 RX ADMIN — GUAIFENESIN 600 MG: 600 TABLET ORAL at 22:58

## 2025-02-12 RX ADMIN — AMPICILLIN AND SULBACTAM 3 G: 10; 5 INJECTION, POWDER, FOR SOLUTION INTRAVENOUS at 10:41

## 2025-02-12 RX ADMIN — SODIUM CHLORIDE, SODIUM GLUCONATE, SODIUM ACETATE, POTASSIUM CHLORIDE, MAGNESIUM CHLORIDE, SODIUM PHOSPHATE, DIBASIC, AND POTASSIUM PHOSPHATE 75 ML/HR: .53; .5; .37; .037; .03; .012; .00082 INJECTION, SOLUTION INTRAVENOUS at 23:09

## 2025-02-12 RX ADMIN — ALBUTEROL SULFATE 2 PUFF: 90 AEROSOL, METERED RESPIRATORY (INHALATION) at 16:44

## 2025-02-12 RX ADMIN — LOSARTAN POTASSIUM 100 MG: 50 TABLET, FILM COATED ORAL at 10:30

## 2025-02-12 RX ADMIN — APIXABAN 2.5 MG: 2.5 TABLET, FILM COATED ORAL at 10:31

## 2025-02-12 RX ADMIN — LORATADINE 10 MG: 10 TABLET ORAL at 10:31

## 2025-02-12 RX ADMIN — EZETIMIBE 10 MG: 10 TABLET ORAL at 22:57

## 2025-02-12 RX ADMIN — INSULIN LISPRO 1 UNITS: 100 INJECTION, SOLUTION INTRAVENOUS; SUBCUTANEOUS at 18:19

## 2025-02-12 RX ADMIN — METOPROLOL SUCCINATE ER TABLETS 75 MG: 25 TABLET, FILM COATED, EXTENDED RELEASE ORAL at 18:14

## 2025-02-12 NOTE — PROGRESS NOTES
Progress Note - Hospitalist   Name: Dayana Reagan 89 y.o. female I MRN: [id number]  Unit/Bed#: ED-19 I Date of Admission: 2/11/2025   Date of Service: 2/12/2025 I Hospital Day: 1    Assessment & Plan  Pneumonia involving right lung  Symptoms: asymptomatic, discovered incidentally on imaging, believed to be 2/2 aspiration in the setting of recent vomiting    Flu/COVID/RSV negative in ED on 2/11/2025    Recent Labs     02/11/25  1721 02/12/25  0624   WBC 6.01 4.87     Recent Labs     02/11/25  1721 02/12/25  0624   PROCALCITONI 0.16 0.31*        Plan:  Sepsis protocol  Trend procal, CBC with a.m. labs  Ceftriaxone 1 g IV q. 24 hours currently ordered; consider adjustment to Unasyn 3g q6h due to concerns for aspiration PNA  Guaifenesin 600 mg q.12 hours; increase as needed to 1200 mg q12 h  Albuterol Nebs 2 puffs q6 h PRN  Aspiration precautions  Incentive spirometry q1hr while awake  Continue supplemental oxygen, wean as tolerated to maintain O2 saturation greater than 92%  Consider one-time dose of Xopenex nebulized for expiratory wheeze in all lung fields  Essential hypertension  Temp:  [100.1 °F (37.8 °C)-100.4 °F (38 °C)] 100.4 °F (38 °C)  HR:  [] 87  BP: (104-156)/(53-84) 104/53  Resp:  [18-22] 22  SpO2:  [88 %-98 %] 98 %  O2 Device: Nasal cannula  Nasal Cannula O2 Flow Rate (L/min):  [2 L/min] 2 L/min    Continue home regimen toprol XL 75 mg BID, furosemide 80 mg qd, ordered 100 mg daily losartan in place of home 160 mg daily valsartan per standard conversion (non-formulary)  Pure hypercholesterolemia  Lab Results   Component Value Date    CHOLESTEROL 113 12/08/2022     Lab Results   Component Value Date    HDL 53 12/08/2022     Lab Results   Component Value Date    TRIG 84 12/08/2022     Lab Results   Component Value Date    NONHDLC 60 12/08/2022     Ordered ezetimibe 10 mg qhs and pravastatin 40 mg qhs in place of home Vytorin 10-20mg daily dosing  Stage 3a chronic kidney disease (HCC)  Lab Results    Component Value Date    EGFR 33 02/11/2025    EGFR 40 11/14/2024    EGFR 37 05/18/2024    CREATININE 1.38 (H) 02/11/2025    CREATININE 1.19 11/14/2024    CREATININE 1.28 05/18/2024     Baseline Cr appears to be approx 1.1-1.3 per recent labs    Continue gentle hydration with mIVF 75cc/hr isolyte  Avoid hypoperfusion, minimize nephrotoxins  Trend Cr with daily BMP  Thrombocytopenia (HCC)  Lab Results   Component Value Date     (L) 02/12/2025     Patient with known, chronic thrombocytopenia  Trend Plt with daily CBC, monitor for signs of active bleeding    Continue with Eliquis 2.5 mg BID at this time due to hx afib  Dementia (HCC)  Patient alert and oriented to self, place, and month, but not to year at the time of admission.    Delirium precautions  -Patient is high risk of delirium due to dementia with cognitive decline  -Initiate delirium precautions  -Maintain normal sleep/wake cycle  -Minimize overnight interruptions, group overnight vitals/labs/nursing checks as possible  -Dim lights, close blinds and turn off tv to minimize stimulation and encourage sleep environment in evenings  -Ensure that pain is well controlled, consider Tylenol 975mg Q8H scheduled if not already ordered   -Monitor for fecal and urinary retention which may precipitate delirium  -Encourage early mobilization and ambulation  -Provide frequent reorientation and redirection  -Encourage family and friends at the bedside to help help calm patient if anxious  -Avoid medications which may precipitate or worsen delirium   -Encourage hydration and nutrition   -Redirect unwanted behaviors as first line, avoid physical restraints, use chemical restraint only if all other attempts have been unsuccessful, would consider low-dose Zyprexa, monitor for orthostatic hypotension and QTc prolongation with repeat dosing, recommend lowest dose possible for shortest duration possible   Persistent atrial fibrillation (HCC)  History of persistent atrial  fibrillation controlled on metoprolol succinate 75 mg BID on reduced-dose Eliquis 2.5 mg BID for AC    Plan:  Continue home meds  Vitals per unit routine  Type 2 diabetes mellitus with hyperglycemia (HCC)  Lab Results   Component Value Date    HGBA1C 7.0 (H) 11/14/2024       Blood Sugar Average: Last 72 hrs:  (P) 181  Home Regimen: Lantus 14 units qhs, Humalog 8U TID with meals    Plan:  Hold oral antihyperglycemics  Diet Consistent CHO Level 2 (5 CHO servings/75g CHO per meal)  Insulin regimen  Humalog 4 units TID AC (half home dose in the setting of reduced PO intake 2/2 illness)  Lantus 7 units HS (half home dose in the setting of reduced PO intake 2/2 illness)  Insulin correction ACHS, SSI #1  Goal BG  while admitted, adjusting insulin regimen as appropriate  Glucose checks: AC HS  Monitor for hypoglycemia and treat per protocol  Ambulatory dysfunction  Patient ambulates with a walker at baseline, comes [facility] with increased weakness following vomiting and diarrhea    PT/OT eval and treat  Fall precautions    VTE Pharmacologic Prophylaxis: VTE Score: 7 High Risk (Score >/= 5) - Pharmacological DVT Prophylaxis Ordered: apixaban (Eliquis). Sequential Compression Devices Ordered.    Mobility:   Basic Mobility Inpatient Raw Score: 13  JH-HLM Goal: 4: Move to chair/commode  JH-HLM Achieved: 2: Bed activities/Dependent transfer  JH-HLM Goal NOT achieved. Continue with multidisciplinary rounding and encourage appropriate mobility to improve upon JH-HLM goals.    Patient Centered Rounds: I performed bedside rounds with nursing staff today.   Discussions with Specialists or Other Care Team Provider: Dr. Santos    Education and Discussions with Family / Patient: Updated  () at bedside.    Current Length of Stay: 1 day(s)  Current Patient Status: Inpatient   Certification Statement: The patient will continue to require additional inpatient hospital stay due to evaluation of  weakness/ambulatory dysfunction in the setting of pneumonia  Discharge Plan: Anticipate discharge in 48-72 hrs to discharge location to be determined pending rehab evaluations.    Code Status: Level 1 - Full Code    Subjective   Seen and evaluated at bedside.  No overnight events per nursing.  Patient reports she rested comfortably was able to sleep well overnight.  Per chart review, patient was noted to have max temperature of 100.4 in the ED yesterday.  Patient reports she did not perceive she had a fever nor did she have chills at the time of this temperature recording.  Patient continues to require 2 L/min nasal cannula of oxygen supplementation to maintain saturations in the mid to high 90s.    Patient denies headache, chest pain, shortness of breath, abdominal pain, N/V/D/C, lower extremity edema, or any other acute concerns at time of evaluation.    Objective :  Temp:  [100.1 °F (37.8 °C)-100.4 °F (38 °C)] 100.4 °F (38 °C)  HR:  [] 87  BP: (104-156)/(53-84) 104/53  Resp:  [18-22] 22  SpO2:  [88 %-98 %] 98 %  O2 Device: Nasal cannula  Nasal Cannula O2 Flow Rate (L/min):  [2 L/min] 2 L/min    There is no height or weight on file to calculate BMI.     Input and Output Summary (last 24 hours):     Intake/Output Summary (Last 24 hours) at 2/12/2025 0646  Last data filed at 2/12/2025 0400  Gross per 24 hour   Intake 650 ml   Output 375 ml   Net 275 ml       Physical Exam  Vitals reviewed.   Constitutional:       General: She is awake. She is not in acute distress.     Appearance: Normal appearance. She is not ill-appearing or toxic-appearing.      Interventions: Nasal cannula in place.      Comments: 2L NC   HENT:      Head: Normocephalic and atraumatic.      Nose: Nose normal.      Mouth/Throat:      Mouth: Mucous membranes are moist.      Pharynx: Oropharynx is clear.   Eyes:      General:         Right eye: No discharge.         Left eye: No discharge.      Conjunctiva/sclera: Conjunctivae normal.    Cardiovascular:      Rate and Rhythm: Regular rhythm. Tachycardia present.      Heart sounds: Normal heart sounds.   Pulmonary:      Effort: Pulmonary effort is normal. No respiratory distress.      Breath sounds: Wheezing (end-expiratory wheezes in all lung fields) present.   Abdominal:      General: Abdomen is flat. Bowel sounds are normal.   Musculoskeletal:         General: Normal range of motion.   Skin:     General: Skin is warm and dry.      Capillary Refill: Capillary refill takes less than 2 seconds.   Neurological:      Mental Status: She is alert and oriented to person, place, and time. Mental status is at baseline.   Psychiatric:         Mood and Affect: Mood normal.         Behavior: Behavior normal. Behavior is cooperative.         Lines/Drains:              Lab Results: I have reviewed the following results:   Results from last 7 days   Lab Units 02/12/25  0624 02/11/25  1721   WBC Thousand/uL 4.87 6.01   HEMOGLOBIN g/dL 10.6* 12.9   HEMATOCRIT % 35.1 42.3   PLATELETS Thousands/uL 101* 128*   SEGS PCT %  --  91*   LYMPHO PCT %  --  2*   MONO PCT %  --  5   EOS PCT %  --  2     Results from last 7 days   Lab Units 02/11/25  1721   SODIUM mmol/L 141   POTASSIUM mmol/L 4.0   CHLORIDE mmol/L 102   CO2 mmol/L 28   BUN mg/dL 39*   CREATININE mg/dL 1.38*   ANION GAP mmol/L 11   CALCIUM mg/dL 9.0   ALBUMIN g/dL 4.1   TOTAL BILIRUBIN mg/dL 0.72   ALK PHOS U/L 60   ALT U/L 8   AST U/L 15   GLUCOSE RANDOM mg/dL 210*     Results from last 7 days   Lab Units 02/11/25  1721   INR  1.07     Results from last 7 days   Lab Units 02/11/25  2225   POC GLUCOSE mg/dl 181*         Results from last 7 days   Lab Units 02/11/25  1721   LACTIC ACID mmol/L 1.7   PROCALCITONIN ng/ml 0.16       Recent Cultures (last 7 days):   Results from last 7 days   Lab Units 02/11/25  1744 02/11/25  1721   BLOOD CULTURE  Received in Microbiology Lab. Culture in Progress. Received in Microbiology Lab. Culture in Progress.       Imaging  Results Review: I reviewed radiology reports from this admission including: ED wet read of chest x-ray.  Other Study Results Review: EKG was reviewed.  Tachycardic to 117 in atrial fibrillation with PVCs    Last 24 Hours Medication List:     Current Facility-Administered Medications:     albuterol (PROVENTIL HFA,VENTOLIN HFA) inhaler 2 puff, Q6H PRN    apixaban (ELIQUIS) tablet 2.5 mg, BID    calcium carbonate (TUMS) chewable tablet 1,000 mg, Daily PRN    ceftriaxone (ROCEPHIN) 1 g/50 mL in dextrose IVPB, Q24H    ezetimibe (ZETIA) tablet 10 mg, HS **AND** pravastatin (PRAVACHOL) tablet 40 mg, HS    furosemide (LASIX) tablet 80 mg, Daily    guaiFENesin (MUCINEX) 12 hr tablet 600 mg, Q12H PHILIP    insulin glargine (LANTUS) subcutaneous injection 7 Units 0.07 mL, HS    insulin lispro (HumALOG/ADMELOG) 100 units/mL subcutaneous injection 1-5 Units, TID AC **AND** Fingerstick Glucose (POCT), TID AC    insulin lispro (HumALOG/ADMELOG) 100 units/mL subcutaneous injection 4 Units, TID With Meals    loratadine (CLARITIN) tablet 10 mg, Daily    losartan (COZAAR) tablet 100 mg, Daily    metoprolol succinate (TOPROL-XL) 24 hr tablet 75 mg, BID    multi-electrolyte (PLASMALYTE-A/ISOLYTE-S PH 7.4) IV solution, Continuous, Last Rate: 75 mL/hr (02/11/25 2300)    OLANZapine (ZyPREXA) IM injection 2.5 mg, Q4H PRN Max 6/day    ondansetron (ZOFRAN) injection 4 mg, Q6H PRN    Administrative Statements   Today, Patient Was Seen By: Sisi Calderon DO    **Please Note: This note may have been constructed using a voice recognition system.**

## 2025-02-12 NOTE — ASSESSMENT & PLAN NOTE
Patient alert and oriented to self, place, and month, but not to year at the time of admission.    Delirium precautions  -Patient is high risk of delirium due to dementia with cognitive decline  -Initiate delirium precautions  -Maintain normal sleep/wake cycle  -Minimize overnight interruptions, group overnight vitals/labs/nursing checks as possible  -Dim lights, close blinds and turn off tv to minimize stimulation and encourage sleep environment in evenings  -Ensure that pain is well controlled, consider Tylenol 975mg Q8H scheduled if not already ordered   -Monitor for fecal and urinary retention which may precipitate delirium  -Encourage early mobilization and ambulation  -Provide frequent reorientation and redirection  -Encourage family and friends at the bedside to help help calm patient if anxious  -Avoid medications which may precipitate or worsen delirium   -Encourage hydration and nutrition   -Redirect unwanted behaviors as first line, avoid physical restraints, use chemical restraint only if all other attempts have been unsuccessful, would consider low-dose Zyprexa, monitor for orthostatic hypotension and QTc prolongation with repeat dosing, recommend lowest dose possible for shortest duration possible

## 2025-02-12 NOTE — ASSESSMENT & PLAN NOTE
Lab Results   Component Value Date    CHOLESTEROL 113 12/08/2022     Lab Results   Component Value Date    HDL 53 12/08/2022     Lab Results   Component Value Date    TRIG 84 12/08/2022     Lab Results   Component Value Date    NONHDLC 60 12/08/2022     Ordered ezetimibe 10 mg qhs and pravastatin 40 mg qhs in place of home Vytorin 10-20mg daily dosing

## 2025-02-12 NOTE — ASSESSMENT & PLAN NOTE
Patient ambulates with a walker at baseline, comes from [facility] with increased weakness following vomiting and diarrhea    PT/OT eval and treat  Fall precautions

## 2025-02-12 NOTE — ASSESSMENT & PLAN NOTE
Lab Results   Component Value Date    HGBA1C 7.0 (H) 11/14/2024       Blood Sugar Average: Last 72 hrs:  (P) 181  Home Regimen: Lantus 14 units qhs, Humalog 8U TID with meals    Plan:  Hold oral antihyperglycemics  Diet Consistent CHO Level 2 (5 CHO servings/75g CHO per meal)  Insulin regimen  Humalog 4 units TID AC (half home dose in the setting of reduced PO intake 2/2 illness)  Lantus 7 units HS (half home dose in the setting of reduced PO intake 2/2 illness)  Insulin correction ACHS, SSI #1  Goal BG  while admitted, adjusting insulin regimen as appropriate  Glucose checks: AC HS  Monitor for hypoglycemia and treat per protocol

## 2025-02-12 NOTE — ASSESSMENT & PLAN NOTE
Lab Results   Component Value Date     (L) 02/12/2025     Patient with known, chronic thrombocytopenia  Trend Plt with daily CBC, monitor for signs of active bleeding    Continue with Eliquis 2.5 mg BID at this time due to hx afib

## 2025-02-12 NOTE — H&P
H&P - Hospitalist   Name: Dayana Reagan 89 y.o. female I MRN: [id number]  Unit/Bed#: ED-19 I Date of Admission: 2/11/2025   Date of Service: 2/11/2025 I Hospital Day: 0     Assessment & Plan  Pneumonia involving right lung  Symptoms: asymptomatic, discovered incidentally on imaging, believed to be 2/2 aspiration in the setting of recent vomiting    Flu/COVID/RSV negative in ED on 2/11/2025    Recent Labs     02/11/25  1721   WBC 6.01     Recent Labs     02/11/25  1721   PROCALCITONI 0.16        Plan:  Sepsis protocol  Procalcitonin tomorrow a.m.  CBC with diff Tomorrow AM  Ceftriaxone 1 g IV q. 24 hours currently ordered; consider adjustment to Unasyn 3g q6h due to concerns for aspiration PNA  Guaifenesin 600 mg q.12 hours; increase as needed to 1200 mg q12 h  Albuterol Nebs 2 puffs q6 h PRN  Aspiration precautions  Incentive spirometry q1hr while awake  Essential hypertension  Temp:  [100.1 °F (37.8 °C)-100.4 °F (38 °C)] 100.4 °F (38 °C)  HR:  [113-129] 119  BP: (108-156)/(55-84) 108/57  Resp:  [18] 18  SpO2:  [88 %-97 %] 95 %  O2 Device: Nasal cannula  Nasal Cannula O2 Flow Rate (L/min):  [2 L/min] 2 L/min    Continue home regimen toprol XL 75 mg BID, furosemide 80 mg qd, ordered 100 mg daily losartan in place of home 160 mg daily valsartan per standard conversion (non-formulary)  Pure hypercholesterolemia  Lab Results   Component Value Date    CHOLESTEROL 113 12/08/2022     Lab Results   Component Value Date    HDL 53 12/08/2022     Lab Results   Component Value Date    TRIG 84 12/08/2022     Lab Results   Component Value Date    NONHDLC 60 12/08/2022     Ordered ezetimibe 10 mg qhs and pravastatin 40 mg qhs in place of home Vytorin 10-20mg daily dosing  Stage 3a chronic kidney disease (HCC)  Lab Results   Component Value Date    EGFR 33 02/11/2025    EGFR 40 11/14/2024    EGFR 37 05/18/2024    CREATININE 1.38 (H) 02/11/2025    CREATININE 1.19 11/14/2024    CREATININE 1.28 05/18/2024     Baseline Cr appears to be  approx 1.1-1.3 per recent labs    Continue gentle hydration with mIVF 75cc/hr isolyte  Avoid hypoperfusion, minimize nephrotoxins  Trend Cr with daily BMP  Thrombocytopenia (HCC)  Lab Results   Component Value Date     (L) 02/11/2025     Patient with known, chronic thrombocytopenia  Trend Plt with daily CBC, monitor for signs of active bleeding    Continue with Eliquis 2.5 mg BID at this time due to hx afib  Dementia (HCC)  Patient alert and oriented to self, place, and month, but not to year at the time of admission.    Delirium precautions  -Patient is high risk of delirium due to dementia with cognitive decline  -Initiate delirium precautions  -Maintain normal sleep/wake cycle  -Minimize overnight interruptions, group overnight vitals/labs/nursing checks as possible  -Dim lights, close blinds and turn off tv to minimize stimulation and encourage sleep environment in evenings  -Ensure that pain is well controlled, consider Tylenol 975mg Q8H scheduled if not already ordered   -Monitor for fecal and urinary retention which may precipitate delirium  -Encourage early mobilization and ambulation  -Provide frequent reorientation and redirection  -Encourage family and friends at the bedside to help help calm patient if anxious  -Avoid medications which may precipitate or worsen delirium   -Encourage hydration and nutrition   -Redirect unwanted behaviors as first line, avoid physical restraints, use chemical restraint only if all other attempts have been unsuccessful, would consider low-dose Zyprexa, monitor for orthostatic hypotension and QTc prolongation with repeat dosing, recommend lowest dose possible for shortest duration possible   Persistent atrial fibrillation (HCC)  History of persistent atrial fibrillation controlled on metoprolol succinate 75 mg BID on reduced-dose Eliquis 2.5 mg BID for AC    Plan:  Continue home meds  Vitals per unit routine  Type 2 diabetes mellitus with hyperglycemia (HCC)  Lab  "Results   Component Value Date    HGBA1C 7.0 (H) 11/14/2024       Blood Sugar Average: Last 72 hrs:  (P) 181  Home Regimen: Lantus 14 units qhs, Humalog 8U TID with meals    Plan:  Hold oral antihyperglycemics  Diet Consistent CHO Level 2 (5 CHO servings/75g CHO per meal)  Insulin regimen  Humalog 4 units TID AC (half home dose in the setting of reduced PO intake 2/2 illness)  Lantus 7 units HS (half home dose in the setting of reduced PO intake 2/2 illness)  Insulin correction ACHS, SSI #1  Goal BG  while admitted, adjusting insulin regimen as appropriate  Glucose checks: AC HS  Monitor for hypoglycemia and treat per protocol  Ambulatory dysfunction  Patient ambulates with a walker at baseline, comes from [facility] with increased weakness following vomiting and diarrhea    PT/OT eval and treat  Fall precautions      VTE Pharmacologic Prophylaxis: VTE Score: 7 High Risk (Score >/= 5) - Pharmacological DVT Prophylaxis Ordered: apixaban (Eliquis). Sequential Compression Devices Ordered.  Code Status: Level 1 - Full Code  Discussion with family: Updated  () via phone.    Anticipated Length of Stay: Patient will be admitted on an inpatient basis with an anticipated length of stay of greater than 2 midnights secondary to requiring IVF and IV abx.    History of Present Illness   Chief Complaint: \"I was nauseous yesterday\"    Dayana Reagan is a 89 y.o. female with a PMH of dementia, A-fib (on Eliquis), diastolic heart failure, type 2 diabetes, HTN, and CKD stage III.  She presented with increased lethargy and generalized weakness.  She currently resides at [facility] living [facility] with her .  History delivered by wife, , and per chart review.    Both  and patient experienced nausea, vomiting, and diarrheal episodes since last night.  Reportedly, 26 other residents of [facility] also have similar symptoms.  Patient reported having only 2 episodes " of emesis, without blood or bile, initially unable to tolerate p.o.  This morning,  noticed that patient had difficulty rising from her chair and the patient appeared more confused than her baseline mental status.  Patient was subsequently brought to the emergency room via EMS.  Patient denied chest pain, shortness of breath, cough, abdominal pain, or recent injury or trauma.    Upon arrival to ED, patient was tachycardic to the 120s and required 2 L of oxygen through nasal cannula (patient does not require oxygen at baseline).  Patient did not appear to be in acute distress.  Chest x-ray exhibited signs of right-sided pneumonia and infiltrates, concerning for aspiration pneumonia due to vomiting.  Patient was subsequently started on IV antibiotics.  Labs also significant for elevated creatinine, low magnesium.  Leukocytes within normal limits.    Review of Systems   Constitutional:  Positive for activity change, appetite change and fatigue.   HENT:  Negative for congestion, rhinorrhea and sore throat.    Eyes:  Negative for photophobia, redness and visual disturbance.   Respiratory:  Negative for cough, chest tightness, shortness of breath and wheezing.    Cardiovascular:  Negative for chest pain, palpitations and leg swelling.   Gastrointestinal:  Positive for diarrhea, nausea and vomiting. Negative for abdominal pain and blood in stool.   Genitourinary:  Negative for difficulty urinating, dysuria and hematuria.   Musculoskeletal:  Negative for arthralgias, back pain and myalgias.   Neurological:  Negative for dizziness, syncope, light-headedness and headaches.       Historical Information   Past Medical History:   Diagnosis Date    Abnormal abdominal CT scan 06/09/2023    Allergy to sulfa drugs     Arthritis 2000    Closed fracture of third lumbar vertebra (HCC)     Closed T12 spinal fracture ([facility])     Closed wedge compression fracture of T12 vertebra (HCC)     Dementia ([facility])     Diabetes mellitus ([facility])  2000    DM (diabetes mellitus), type 2 (HCC)     H/O multiple allergies     Novocaine,Darvocet, Sulfa, Accupril    HL (hearing loss)     Hyperlipidemia     Hypertension     Hypertensive urgency 02/13/2023    Knee pain 04/18/2023    Lumbar compression fracture (HCC)     Memory loss 2020    Nocturia     Palpitations     Paroxysmal atrial fibrillation (HCC)     Thrombocytopenic disorder (HCC)     Type 2 diabetes mellitus without complication (HCC)     Wears glasses      Past Surgical History:   Procedure Laterality Date    DERMOID CYST  EXCISION N/A 12/19/2024    Procedure: EXCISION CYST DERMOID back;  Surgeon: Alex Dolan MD;  Location:  MAIN OR;  Service: General    DXA PROCEDURE (HISTORICAL)  05/31/2023    HERNIA REPAIR      HYSTERECTOMY       Social History     Tobacco Use    Smoking status: Never     Passive exposure: Never    Smokeless tobacco: Never   Vaping Use    Vaping status: Never Used   Substance and Sexual Activity    Alcohol use: Yes     Alcohol/week: 1.0 standard drink of alcohol     Types: 1 Glasses of wine per week     Comment: on holidays add an extra glass of wine    Drug use: Never    Sexual activity: Not Currently     Partners: Male     Birth control/protection: Post-menopausal, None     E-Cigarette/Vaping    E-Cigarette Use Never User      E-Cigarette/Vaping Substances    Nicotine No     THC No     CBD No     Flavoring No     Other No     Unknown No      Family history non-contributory  Social History:  Marital Status: /Civil Union   Occupation: Retired  Patient Pre-hospital Living Situation: Assisted Living, With spouse  Patient Pre-hospital Level of Mobility: walks with cane  Patient Pre-hospital Diet Restrictions: Diabetic diet    Meds/Allergies   I have reviewed home medications using recent Epic encounter.  Prior to Admission medications    Medication Sig Start Date End Date Taking? Authorizing Provider   albuterol (ProAir HFA) 90 mcg/act inhaler Inhale 2 puffs every 6 (six)  hours as needed for wheezing or shortness of breath 11/4/24   JOÃO Hernandez   apixaban (Eliquis) 2.5 mg Take 1 tablet (2.5 mg total) by mouth 2 (two) times a day 2/11/25   Stefanie Anna MD   ezetimibe-simvastatin (VYTORIN) 10-20 mg per tablet Take 1 tablet by mouth daily at bedtime 5/15/24   Stefanie Anna MD   furosemide (LASIX) 80 mg tablet TAKE 1 TABLET BY MOUTH EVERY DAY 11/8/24   Stefanie Anna MD   glucose blood (FREESTYLE LITE) test strip check blood sugar 3 times daily 8/22/24   Stefanie Anna MD   insulin aspart (NovoLOG FlexPen) 100 UNIT/ML injection pen Inject 8 Units under the skin 3 (three) times a day with meals 1/23/25   Stefanie Anna MD   Insulin Glargine Solostar (Lantus SoloStar) 100 UNIT/ML SOPN Inject 0.14 mL (14 Units total) under the skin in the morning 11/19/24   Stefanie Anna MD   Insulin Pen Needle (BD AutoShield Duo) 30G X 5 MM MISC USE FOUR TIMES DAILY WITH INSULIN PEN 3/29/24   Stefanie Anna MD   loratadine (CLARITIN) 10 mg tablet Take 1 tablet (10 mg total) by mouth daily 12/24/24   Stefanie Anna MD   metFORMIN (GLUCOPHAGE) 500 mg tablet TAKE 1 TABLET BY MOUTH TWICE DAILY 11/8/24   Stefanie Anna MD   metoprolol succinate (TOPROL-XL) 25 mg 24 hr tablet Take 3 tablets (75 mg total) by mouth 2 (two) times a day 1/8/25   Stefanie Anna MD   nystatin (MYCOSTATIN) powder Apply topically 2 (two) times a day 1/2/25   Farzad Cat MD   potassium chloride (Klor-Con M20) 20 mEq tablet TAKE 1 TABLET BY MOUTH DAILY 5/15/24   Stefanie Anna MD   predniSONE 20 mg tablet Take 2 tablets (40 mg total) by mouth daily  Patient not taking: Reported on 1/10/2025 1/2/25   Farzad Cat MD   valsartan (DIOVAN) 160 mg tablet Take 1 tablet (160 mg total) by mouth daily 12/19/24   Stefanie Anna MD     Allergies   Allergen Reactions    Accupril [Quinapril Hcl]     Novocain [Procaine]     Parabens     Sulfa Antibiotics     Penicillins Rash       Objective :  Temp:   [100.1 °F (37.8 °C)-100.4 °F (38 °C)] 100.4 °F (38 °C)  HR:  [113-129] 119  BP: (108-156)/(55-84) 108/57  Resp:  [18] 18  SpO2:  [88 %-97 %] 95 %  O2 Device: Nasal cannula  Nasal Cannula O2 Flow Rate (L/min):  [2 L/min] 2 L/min    Physical Exam  Constitutional:       General: She is not in acute distress.     Appearance: Normal appearance. She is obese. She is ill-appearing. She is not toxic-appearing or diaphoretic.   HENT:      Head: Normocephalic and atraumatic.      Nose: Nose normal. No congestion or rhinorrhea.      Mouth/Throat:      Mouth: Mucous membranes are dry.      Pharynx: Oropharynx is clear.   Eyes:      Extraocular Movements: Extraocular movements intact.      Conjunctiva/sclera: Conjunctivae normal.      Pupils: Pupils are equal, round, and reactive to light.   Cardiovascular:      Rate and Rhythm: Normal rate and regular rhythm.      Pulses: Normal pulses.      Heart sounds: No murmur heard.  Pulmonary:      Effort: Respiratory distress present.      Breath sounds: Rales present. No wheezing.      Comments: Right lung crackles, upper-mid-lower lobes  Abdominal:      General: Bowel sounds are normal.      Palpations: Abdomen is soft.      Tenderness: There is no abdominal tenderness. There is no guarding or rebound.   Musculoskeletal:         General: No swelling or tenderness. Normal range of motion.      Cervical back: Normal range of motion. No rigidity or tenderness.      Right lower leg: No edema.      Left lower leg: No edema.   Skin:     General: Skin is warm and dry.      Capillary Refill: Capillary refill takes less than 2 seconds.      Coloration: Skin is not jaundiced.   Neurological:      General: No focal deficit present.      Mental Status: She is alert. She is disoriented.      Cranial Nerves: No cranial nerve deficit.   Psychiatric:         Mood and Affect: Mood normal.         Behavior: Behavior normal.         Lines/Drains: PIV            Lab Results: I have reviewed the following  results:  Results from last 7 days   Lab Units 02/11/25  1721   WBC Thousand/uL 6.01   HEMOGLOBIN g/dL 12.9   HEMATOCRIT % 42.3   PLATELETS Thousands/uL 128*   SEGS PCT % 91*   LYMPHO PCT % 2*   MONO PCT % 5   EOS PCT % 2     Results from last 7 days   Lab Units 02/11/25  1721   SODIUM mmol/L 141   POTASSIUM mmol/L 4.0   CHLORIDE mmol/L 102   CO2 mmol/L 28   BUN mg/dL 39*   CREATININE mg/dL 1.38*   ANION GAP mmol/L 11   CALCIUM mg/dL 9.0   ALBUMIN g/dL 4.1   TOTAL BILIRUBIN mg/dL 0.72   ALK PHOS U/L 60   ALT U/L 8   AST U/L 15   GLUCOSE RANDOM mg/dL 210*     Results from last 7 days   Lab Units 02/11/25  1721   INR  1.07     Results from last 7 days   Lab Units 02/11/25  2225   POC GLUCOSE mg/dl 181*     Lab Results   Component Value Date    HGBA1C 7.0 (H) 11/14/2024    HGBA1C 6.5 (H) 05/15/2024    HGBA1C 6.3 07/13/2023     Results from last 7 days   Lab Units 02/11/25  1721   LACTIC ACID mmol/L 1.7   PROCALCITONIN ng/ml 0.16       Imaging Results Review: I reviewed radiology reports from this admission including: chest xray.  Other Study Results Review: No additional pertinent studies reviewed.    Administrative Statements   I have spent a total time of 60 minutes in caring for this patient on the day of the visit/encounter including Diagnostic results, Prognosis, Risks and benefits of tx options, Instructions for management, Patient and family education, Importance of tx compliance, Risk factor reductions, Impressions, Counseling / Coordination of care, Documenting in the medical record, Reviewing / ordering tests, medicine, procedures  , Obtaining or reviewing history  , and Communicating with other healthcare professionals . Topics discussed with the patient / family include symptom assessment and management and medication adjustment.    ** Please Note: This note has been constructed using a voice recognition system. **

## 2025-02-12 NOTE — ASSESSMENT & PLAN NOTE
History of persistent atrial fibrillation controlled on metoprolol succinate 75 mg BID on reduced-dose Eliquis 2.5 mg BID for AC    Plan:  Continue home meds  Vitals per unit routine

## 2025-02-12 NOTE — ASSESSMENT & PLAN NOTE
Symptoms: asymptomatic, discovered incidentally on imaging, believed to be 2/2 aspiration in the setting of recent vomiting    Flu/COVID/RSV negative in ED on 2/11/2025    Recent Labs     02/11/25  1721 02/12/25  0624   WBC 6.01 4.87     Recent Labs     02/11/25  1721 02/12/25  0624   PROCALCITONI 0.16 0.31*        Plan:  Sepsis protocol  Trend procal, CBC with a.m. labs  Ceftriaxone 1 g IV q. 24 hours currently ordered; consider adjustment to Unasyn 3g q6h due to concerns for aspiration PNA  Guaifenesin 600 mg q.12 hours; increase as needed to 1200 mg q12 h  Albuterol Nebs 2 puffs q6 h PRN  Aspiration precautions  Incentive spirometry q1hr while awake  Continue supplemental oxygen, wean as tolerated to maintain O2 saturation greater than 92%  Consider one-time dose of Xopenex nebulized for expiratory wheeze in all lung fields

## 2025-02-12 NOTE — ASSESSMENT & PLAN NOTE
Temp:  [100.1 °F (37.8 °C)-100.4 °F (38 °C)] 100.4 °F (38 °C)  HR:  [] 87  BP: (104-156)/(53-84) 104/53  Resp:  [18-22] 22  SpO2:  [88 %-98 %] 98 %  O2 Device: Nasal cannula  Nasal Cannula O2 Flow Rate (L/min):  [2 L/min] 2 L/min    Continue home regimen toprol XL 75 mg BID, furosemide 80 mg qd, ordered 100 mg daily losartan in place of home 160 mg daily valsartan per standard conversion (non-formulary)

## 2025-02-12 NOTE — ASSESSMENT & PLAN NOTE
Patient ambulates with a walker at baseline, comes from [facility] with increased weakness following vomiting and diarrhea    PT/OT eval and treat  Fall precautions

## 2025-02-12 NOTE — ASSESSMENT & PLAN NOTE
Temp:  [100.1 °F (37.8 °C)-100.4 °F (38 °C)] 100.4 °F (38 °C)  HR:  [113-129] 119  BP: (108-156)/(55-84) 108/57  Resp:  [18] 18  SpO2:  [88 %-97 %] 95 %  O2 Device: Nasal cannula  Nasal Cannula O2 Flow Rate (L/min):  [2 L/min] 2 L/min    Continue home regimen toprol XL 75 mg BID, furosemide 80 mg qd, ordered 100 mg daily losartan in place of home 160 mg daily valsartan per standard conversion (non-formulary)

## 2025-02-12 NOTE — ASSESSMENT & PLAN NOTE
Lab Results   Component Value Date     (L) 02/11/2025     Patient with known, chronic thrombocytopenia  Trend Plt with daily CBC, monitor for signs of active bleeding    Continue with Eliquis 2.5 mg BID at this time due to hx afib

## 2025-02-12 NOTE — ASSESSMENT & PLAN NOTE
Symptoms: asymptomatic, discovered incidentally on imaging, believed to be 2/2 aspiration in the setting of recent vomiting    Flu/COVID/RSV negative in ED on 2/11/2025    Recent Labs     02/11/25  1721   WBC 6.01     Recent Labs     02/11/25  1721   PROCALCITONI 0.16        Plan:  Sepsis protocol  Procalcitonin tomorrow a.m.  CBC with diff Tomorrow AM  Ceftriaxone 1 g IV q. 24 hours currently ordered; consider adjustment to Unasyn 3g q6h due to concerns for aspiration PNA  Guaifenesin 600 mg q.12 hours; increase as needed to 1200 mg q12 h  Albuterol Nebs 2 puffs q6 h PRN  Aspiration precautions  Incentive spirometry q1hr while awake

## 2025-02-12 NOTE — ASSESSMENT & PLAN NOTE
Lab Results   Component Value Date    EGFR 33 02/11/2025    EGFR 40 11/14/2024    EGFR 37 05/18/2024    CREATININE 1.38 (H) 02/11/2025    CREATININE 1.19 11/14/2024    CREATININE 1.28 05/18/2024     Baseline Cr appears to be approx 1.1-1.3 per recent labs    Continue gentle hydration with mIVF 75cc/hr isolyte  Avoid hypoperfusion, minimize nephrotoxins  Trend Cr with daily BMP

## 2025-02-13 VITALS
SYSTOLIC BLOOD PRESSURE: 136 MMHG | WEIGHT: 210.54 LBS | OXYGEN SATURATION: 96 % | DIASTOLIC BLOOD PRESSURE: 68 MMHG | TEMPERATURE: 98.9 F | RESPIRATION RATE: 18 BRPM | HEART RATE: 69 BPM | BODY MASS INDEX: 39.78 KG/M2

## 2025-02-13 LAB
ANION GAP SERPL CALCULATED.3IONS-SCNC: 7 MMOL/L (ref 4–13)
BASOPHILS # BLD AUTO: 0.03 THOUSANDS/ÂΜL (ref 0–0.1)
BASOPHILS NFR BLD AUTO: 1 % (ref 0–1)
BUN SERPL-MCNC: 37 MG/DL (ref 5–25)
CALCIUM SERPL-MCNC: 8.1 MG/DL (ref 8.4–10.2)
CHLORIDE SERPL-SCNC: 106 MMOL/L (ref 96–108)
CO2 SERPL-SCNC: 32 MMOL/L (ref 21–32)
CREAT SERPL-MCNC: 1.33 MG/DL (ref 0.6–1.3)
EOSINOPHIL # BLD AUTO: 0.03 THOUSAND/ÂΜL (ref 0–0.61)
EOSINOPHIL NFR BLD AUTO: 1 % (ref 0–6)
ERYTHROCYTE [DISTWIDTH] IN BLOOD BY AUTOMATED COUNT: 16.1 % (ref 11.6–15.1)
GFR SERPL CREATININE-BSD FRML MDRD: 35 ML/MIN/1.73SQ M
GLUCOSE SERPL-MCNC: 130 MG/DL (ref 65–140)
GLUCOSE SERPL-MCNC: 138 MG/DL (ref 65–140)
GLUCOSE SERPL-MCNC: 170 MG/DL (ref 65–140)
HCT VFR BLD AUTO: 37.4 % (ref 34.8–46.1)
HGB BLD-MCNC: 11.1 G/DL (ref 11.5–15.4)
IMM GRANULOCYTES # BLD AUTO: 0.01 THOUSAND/UL (ref 0–0.2)
IMM GRANULOCYTES NFR BLD AUTO: 0 % (ref 0–2)
LYMPHOCYTES # BLD AUTO: 0.57 THOUSANDS/ÂΜL (ref 0.6–4.47)
LYMPHOCYTES NFR BLD AUTO: 12 % (ref 14–44)
MCH RBC QN AUTO: 26.7 PG (ref 26.8–34.3)
MCHC RBC AUTO-ENTMCNC: 29.7 G/DL (ref 31.4–37.4)
MCV RBC AUTO: 90 FL (ref 82–98)
MONOCYTES # BLD AUTO: 0.44 THOUSAND/ÂΜL (ref 0.17–1.22)
MONOCYTES NFR BLD AUTO: 9 % (ref 4–12)
NEUTROPHILS # BLD AUTO: 3.59 THOUSANDS/ÂΜL (ref 1.85–7.62)
NEUTS SEG NFR BLD AUTO: 77 % (ref 43–75)
NRBC BLD AUTO-RTO: 0 /100 WBCS
PLATELET # BLD AUTO: 110 THOUSANDS/UL (ref 149–390)
PMV BLD AUTO: 11.3 FL (ref 8.9–12.7)
POTASSIUM SERPL-SCNC: 3.8 MMOL/L (ref 3.5–5.3)
PROCALCITONIN SERPL-MCNC: 0.22 NG/ML
RBC # BLD AUTO: 4.16 MILLION/UL (ref 3.81–5.12)
SODIUM SERPL-SCNC: 145 MMOL/L (ref 135–147)
WBC # BLD AUTO: 4.67 THOUSAND/UL (ref 4.31–10.16)

## 2025-02-13 PROCEDURE — 85025 COMPLETE CBC W/AUTO DIFF WBC: CPT

## 2025-02-13 PROCEDURE — 84145 PROCALCITONIN (PCT): CPT

## 2025-02-13 PROCEDURE — 80048 BASIC METABOLIC PNL TOTAL CA: CPT

## 2025-02-13 PROCEDURE — 82948 REAGENT STRIP/BLOOD GLUCOSE: CPT

## 2025-02-13 PROCEDURE — 99239 HOSP IP/OBS DSCHRG MGMT >30: CPT | Performed by: HOSPITALIST

## 2025-02-13 RX ORDER — GUAIFENESIN 600 MG/1
600 TABLET, EXTENDED RELEASE ORAL EVERY 12 HOURS SCHEDULED
Qty: 10 TABLET | Refills: 0 | Status: SHIPPED | OUTPATIENT
Start: 2025-02-13 | End: 2025-02-18

## 2025-02-13 RX ADMIN — LOSARTAN POTASSIUM 100 MG: 50 TABLET, FILM COATED ORAL at 08:34

## 2025-02-13 RX ADMIN — INSULIN LISPRO 4 UNITS: 100 INJECTION, SOLUTION INTRAVENOUS; SUBCUTANEOUS at 11:38

## 2025-02-13 RX ADMIN — APIXABAN 2.5 MG: 2.5 TABLET, FILM COATED ORAL at 08:35

## 2025-02-13 RX ADMIN — AMPICILLIN AND SULBACTAM 3 G: 10; 5 INJECTION, POWDER, FOR SOLUTION INTRAVENOUS at 01:36

## 2025-02-13 RX ADMIN — AMPICILLIN AND SULBACTAM 3 G: 10; 5 INJECTION, POWDER, FOR SOLUTION INTRAVENOUS at 08:36

## 2025-02-13 RX ADMIN — LORATADINE 10 MG: 10 TABLET ORAL at 08:35

## 2025-02-13 RX ADMIN — INSULIN LISPRO 1 UNITS: 100 INJECTION, SOLUTION INTRAVENOUS; SUBCUTANEOUS at 11:37

## 2025-02-13 RX ADMIN — METOPROLOL SUCCINATE ER TABLETS 75 MG: 25 TABLET, FILM COATED, EXTENDED RELEASE ORAL at 08:34

## 2025-02-13 RX ADMIN — FUROSEMIDE 80 MG: 40 TABLET ORAL at 08:34

## 2025-02-13 RX ADMIN — GUAIFENESIN 600 MG: 600 TABLET ORAL at 08:36

## 2025-02-13 RX ADMIN — INSULIN LISPRO 4 UNITS: 100 INJECTION, SOLUTION INTRAVENOUS; SUBCUTANEOUS at 08:38

## 2025-02-13 NOTE — PLAN OF CARE
Problem: Potential for Falls  Goal: Patient will remain free of falls  Description: INTERVENTIONS:  - Educate patient/family on patient safety including physical limitations  - Instruct patient to call for assistance with activity   - Consult OT/PT to assist with strengthening/mobility   - Keep Call bell within reach  - Keep bed low and locked with side rails adjusted as appropriate  - Keep care items and personal belongings within reach  - Initiate and maintain comfort rounds  - Make Fall Risk Sign visible to staff  - Offer Toileting every 2 Hours, in advance of need  - Initiate/Maintain alarm  - Obtain necessary fall risk management equipment:   - Apply yellow socks and bracelet for high fall risk patients  - Consider moving patient to room near nurses station  Outcome: Adequate for Discharge     Problem: Prexisting or High Potential for Compromised Skin Integrity  Goal: Skin integrity is maintained or improved  Description: INTERVENTIONS:  - Identify patients at risk for skin breakdown  - Assess and monitor skin integrity  - Assess and monitor nutrition and hydration status  - Monitor labs   - Assess for incontinence   - Turn and reposition patient  - Assist with mobility/ambulation  - Relieve pressure over bony prominences  - Avoid friction and shearing  - Provide appropriate hygiene as needed including keeping skin clean and dry  - Evaluate need for skin moisturizer/barrier cream  - Collaborate with interdisciplinary team   - Patient/family teaching  - Consider wound care consult   Outcome: Adequate for Discharge

## 2025-02-13 NOTE — DISCHARGE INSTR - AVS FIRST PAGE
Dear Dayana Reagan,     It was our pleasure to care for you here at [facility].  It is our hope that we were always able to exceed the expected standards for your care during your stay.  You were hospitalized due to aspiration pneumonia.  You were cared for on the 3rd floor by Ishan Isaac MD under the service of Austin Santos MD with the [facility] who covers for your primary care physician (PCP), Stefanie Anna MD, while you were hospitalized.  If you have any questions or concerns related to this hospitalization, you may contact us at .  For follow up as well as any medication refills, we recommend that you follow up with your primary care physician.  A registered nurse will reach out to you by phone within a few days after your discharge to answer any additional questions that you may have after going home.  However, at this time we provide for you here, the most important instructions / recommendations at discharge:     Notable Medication Adjustments -   Begin antibiotic Augmentin, first dose tonight after discharge, remaining doses twice daily for 4 days  Mucinex 600 mg twice daily as needed for wet coughing  Continue all other home medications as previously instructed  Testing Required after Discharge -   None  ** Please contact your PCP to request testing orders for any of the testing recommended here **  Important follow up information -   Please follow up with primary care provider within 1 week post discharge  Other Instructions -   N/sa  Please review this entire after visit summary as additional general instructions including medication list, appointments, activity, diet, any pertinent wound care, and other additional recommendations from your care team that may be provided for you.      Sincerely,     Ishan Isaac MD

## 2025-02-13 NOTE — ASSESSMENT & PLAN NOTE
Lab Results   Component Value Date     (L) 02/13/2025     Patient with known, chronic thrombocytopenia  Trend Plt with daily CBC, monitor for signs of active bleeding    Continue with Eliquis 2.5 mg BID at this time due to hx afib

## 2025-02-13 NOTE — PLAN OF CARE
Problem: Potential for Falls  Goal: Patient will remain free of falls  Description: INTERVENTIONS:  - Educate patient/family on patient safety including physical limitations  - Instruct patient to call for assistance with activity   - Consult OT/PT to assist with strengthening/mobility   - Keep Call bell within reach  - Keep bed low and locked with side rails adjusted as appropriate  - Keep care items and personal belongings within reach  - Initiate and maintain comfort rounds  - Make Fall Risk Sign visible to staff  - Offer Toileting every 2 Hours, in advance of need  - Initiate/Maintain alarm  - Obtain necessary fall risk management equipment  - Apply yellow socks and bracelet for high fall risk patients  - Consider moving patient to room near nurses station  Outcome: Progressing     Problem: Prexisting or High Potential for Compromised Skin Integrity  Goal: Skin integrity is maintained or improved  Description: INTERVENTIONS:  - Identify patients at risk for skin breakdown  - Assess and monitor skin integrity  - Assess and monitor nutrition and hydration status  - Monitor labs   - Assess for incontinence   - Turn and reposition patient  - Assist with mobility/ambulation  - Relieve pressure over bony prominences  - Avoid friction and shearing  - Provide appropriate hygiene as needed including keeping skin clean and dry  - Evaluate need for skin moisturizer/barrier cream  - Collaborate with interdisciplinary team   - Patient/family teaching  - Consider wound care consult   Outcome: Progressing

## 2025-02-13 NOTE — ASSESSMENT & PLAN NOTE
Symptoms: asymptomatic, discovered incidentally on imaging, believed to be 2/2 aspiration in the setting of recent vomiting    Flu/COVID/RSV negative in ED on 2/11/2025    Recent Labs     02/11/25  1721 02/12/25  0624 02/13/25  0508   WBC 6.01 4.87 4.67     Recent Labs     02/11/25  1721 02/12/25  0624 02/13/25  0508   PROCALCITONI 0.16 0.31* 0.22        Plan:  Sepsis protocol  Trend procal, CBC with a.m. labs  Patient to be discharged on 4-day course of Augmentin  Guaifenesin 600 mg q.12 hours; increase as needed to 1200 mg q12 h  Albuterol Nebs 2 puffs q6 h PRN  Aspiration precautions  Incentive spirometry q1hr while awake  Continue supplemental oxygen, wean as tolerated to maintain O2 saturation greater than 92%  Consider one-time dose of Xopenex nebulized for expiratory wheeze in all lung fields

## 2025-02-13 NOTE — ASSESSMENT & PLAN NOTE
Lab Results   Component Value Date    HGBA1C 7.0 (H) 11/14/2024       Blood Sugar Average: Last 72 hrs:  (P) [id number]  Home Regimen: Lantus 14 units qhs, Humalog 8U TID with meals    Plan:  Hold oral antihyperglycemics  Diet Consistent CHO Level 2 (5 CHO servings/75g CHO per meal)  Insulin regimen  Humalog 4 units TID AC (half home dose in the setting of reduced PO intake 2/2 illness)  Lantus 7 units HS (half home dose in the setting of reduced PO intake 2/2 illness)  Insulin correction ACHS, SSI #1  Goal BG  while admitted, adjusting insulin regimen as appropriate  Glucose checks: AC HS  Monitor for hypoglycemia and treat per protocol

## 2025-02-13 NOTE — ASSESSMENT & PLAN NOTE
Lab Results   Component Value Date    EGFR 35 02/13/2025    EGFR 38 02/12/2025    EGFR 33 02/11/2025    CREATININE 1.33 (H) 02/13/2025    CREATININE 1.24 02/12/2025    CREATININE 1.38 (H) 02/11/2025     Baseline Cr appears to be approx 1.1-1.3 per recent labs    Continue gentle hydration with mIVF 75cc/hr isolyte  Avoid hypoperfusion, minimize nephrotoxins  Trend Cr with daily BMP

## 2025-02-13 NOTE — ASSESSMENT & PLAN NOTE
Patient ambulates with a walker at baseline, comes from [facility] with increased weakness following vomiting and diarrhea    PT/OT eval and treat  Fall precautions

## 2025-02-13 NOTE — DISCHARGE SUMMARY
Discharge Summary - Hospitalist   Name: Dayana Reagan 89 y.o. female I MRN: [id number]  Unit/Bed#: S -01 I Date of Admission: 2/11/2025   Date of Service: 2/13/2025 I Hospital Day: 2     Assessment & Plan  Pneumonia involving right lung  Symptoms: asymptomatic, discovered incidentally on imaging, believed to be 2/2 aspiration in the setting of recent vomiting    Flu/COVID/RSV negative in ED on 2/11/2025    Recent Labs     02/11/25  1721 02/12/25  0624 02/13/25  0508   WBC 6.01 4.87 4.67     Recent Labs     02/11/25  1721 02/12/25  0624 02/13/25  0508   PROCALCITONI 0.16 0.31* 0.22        Plan:  Sepsis protocol  Trend procal, CBC with a.m. labs  Patient to be discharged on 4-day course of Augmentin  Guaifenesin 600 mg q.12 hours; increase as needed to 1200 mg q12 h  Albuterol Nebs 2 puffs q6 h PRN  Aspiration precautions  Incentive spirometry q1hr while awake  Continue supplemental oxygen, wean as tolerated to maintain O2 saturation greater than 92%  Consider one-time dose of Xopenex nebulized for expiratory wheeze in all lung fields  Essential hypertension  Temp:  [98.9 °F (37.2 °C)-100.5 °F (38.1 °C)] 98.9 °F (37.2 °C)  HR:  [] 69  BP: (136-143)/(58-79) 136/68  Resp:  [12-18] 18  SpO2:  [93 %-99 %] 96 %  O2 Device: Nasal cannula  Nasal Cannula O2 Flow Rate (L/min):  [2 L/min] 2 L/min    Continue home regimen toprol XL 75 mg BID, furosemide 80 mg qd, ordered 100 mg daily losartan in place of home 160 mg daily valsartan per standard conversion (non-formulary)  Pure hypercholesterolemia  Lab Results   Component Value Date    CHOLESTEROL 113 12/08/2022     Lab Results   Component Value Date    HDL 53 12/08/2022     Lab Results   Component Value Date    TRIG 84 12/08/2022     Lab Results   Component Value Date    NONHDLC 60 12/08/2022     Ordered ezetimibe 10 mg qhs and pravastatin 40 mg qhs in place of home Vytorin 10-20mg daily dosing  Stage 3a chronic kidney disease (HCC)  Lab Results   Component Value  Date    EGFR 35 02/13/2025    EGFR 38 02/12/2025    EGFR 33 02/11/2025    CREATININE 1.33 (H) 02/13/2025    CREATININE 1.24 02/12/2025    CREATININE 1.38 (H) 02/11/2025     Baseline Cr appears to be approx 1.1-1.3 per recent labs    Continue gentle hydration with mIVF 75cc/hr isolyte  Avoid hypoperfusion, minimize nephrotoxins  Trend Cr with daily BMP  Thrombocytopenia (HCC)  Lab Results   Component Value Date     (L) 02/13/2025     Patient with known, chronic thrombocytopenia  Trend Plt with daily CBC, monitor for signs of active bleeding    Continue with Eliquis 2.5 mg BID at this time due to hx afib  Dementia (HCC)  Patient alert and oriented to self, place, and month, but not to year at the time of admission.    Delirium precautions  -Patient is high risk of delirium due to dementia with cognitive decline  -Initiate delirium precautions  -Maintain normal sleep/wake cycle  -Minimize overnight interruptions, group overnight vitals/labs/nursing checks as possible  -Dim lights, close blinds and turn off tv to minimize stimulation and encourage sleep environment in evenings  -Ensure that pain is well controlled, consider Tylenol 975mg Q8H scheduled if not already ordered   -Monitor for fecal and urinary retention which may precipitate delirium  -Encourage early mobilization and ambulation  -Provide frequent reorientation and redirection  -Encourage family and friends at the bedside to help help calm patient if anxious  -Avoid medications which may precipitate or worsen delirium   -Encourage hydration and nutrition   -Redirect unwanted behaviors as first line, avoid physical restraints, use chemical restraint only if all other attempts have been unsuccessful, would consider low-dose Zyprexa, monitor for orthostatic hypotension and QTc prolongation with repeat dosing, recommend lowest dose possible for shortest duration possible   Persistent atrial fibrillation (HCC)  History of persistent atrial fibrillation  controlled on metoprolol succinate 75 mg BID on reduced-dose Eliquis 2.5 mg BID for AC    Plan:  Continue home meds  Vitals per unit routine  Type 2 diabetes mellitus with hyperglycemia (HCC)  Lab Results   Component Value Date    HGBA1C 7.0 (H) 11/14/2024       Blood Sugar Average: Last 72 hrs:  (P) [id number]  Home Regimen: Lantus 14 units qhs, Humalog 8U TID with meals    Plan:  Hold oral antihyperglycemics  Diet Consistent CHO Level 2 (5 CHO servings/75g CHO per meal)  Insulin regimen  Humalog 4 units TID AC (half home dose in the setting of reduced PO intake 2/2 illness)  Lantus 7 units HS (half home dose in the setting of reduced PO intake 2/2 illness)  Insulin correction ACHS, SSI #1  Goal BG  while admitted, adjusting insulin regimen as appropriate  Glucose checks: AC HS  Monitor for hypoglycemia and treat per protocol  Ambulatory dysfunction  Patient ambulates with a walker at baseline, [facility] and diarrhea    PT/OT eval and treat  Fall precautions     Medical Problems       Resolved Problems  Date Reviewed: 1/8/2025   None       Discharging Physician / Practitioner: Ishan Isaac MD  PCP: Stefanie Anna MD  Admission Date:   Admission Orders (From admission, onward)       Ordered        02/11/25 1832  INPATIENT ADMISSION  Once                          Discharge Date: 02/13/25    Consultations During Hospital Stay:  None    Procedures Performed:   None    Significant Findings / Test Results:   XR chest portable   ED Interpretation by Roselia Galeano MD (02/11 1811)   Right sided infiltrates      Final Result by Sb Gamble MD (02/12 0720)      Patchy right lung opacities likely representing pneumonia.            Workstation performed: [id number]             Incidental Findings:   None     Test Results Pending at Discharge (will require follow up):   None     Outpatient Tests Requested:  None    Complications:   None    Reason for Admission: aspiration PNA    Hospital Course:   Dayana Reagan is a 89 y.o. female patient who originally presented to the hospital on 2/11/2025 due to aspiration pneumonia.  PMH significant for dementia, A-fib on Eliquis, diastolic heart failure, type 2 diabetes, HTN, and CKD stage III.  She is presented with increased lethargy and generalized weakness the setting of 1 day history of vomiting and diarrhea.    Upon arrival to ED from independent living facility, patient was tachycardic to the 120s and required 2 L of oxygen through nasal cannula (patient does not require oxygen at baseline per ).  Chest x-ray exhibited signs of right-sided pneumonia and infiltrates, concerning for aspiration pneumonia due to vomiting.  at that time, symptoms of nausea and vomiting resolved.  Patient was subsequently started on IV antibiotics.  Patient did not meet criteria for sepsis.  Labs were also significant for elevated creatinine, and low magnesium.  Leukocytes were within normal limits.  Though initially started on IV Rocephin, patient on the next day was switched to IV Unasyn for anaerobic coverage.  Of note, although chart reports history of penicillin allergy, patient has been able to tolerate Rocephin and Unasyn therapy without any complications. Patient symptoms of shortness of breath, confusion, and lethargy significantly improved at the time of discharge.  She is to be discharged on a 4-day course of Augmentin and guaifenesin for cough symptoms.  She is to be discharged back to her independent living facility.  Transportation arranged by .  Patient to follow up with PCP within 1 week post discharge.  All questions and concerns have been addressed at bedside.    Please see above list of diagnoses and related plan for additional information.     Condition at Discharge: stable    Discharge Day Visit / Exam:   Subjective: NAEON.  Patient appears more alert and oriented than prior  presentations.  She denies any shortness of breath.  Patient noted to have wet cough.  Patient did not appear to be in any distress. Patient denies any headaches, acute vision changes, chest pain, palpitations, shortness of breath, abdominal pain, NVD, dysuria, or subjective fevers or chills. Patient voices no other acute concerns or complaints at this time.  During rounds, primary team met  who felt that patient has returned to her baseline. All questions and concerns were addressed at bedside.    Vitals: Blood Pressure: 136/68 (02/13/25 0655)  Pulse: 69 (02/13/25 0655)  Temperature: 98.9 °F (37.2 °C) (02/13/25 0655)  Temp Source: Oral (02/12/25 1816)  Respirations: 18 (02/13/25 0655)  Weight - Scale: 95.5 kg (210 lb 8.6 oz) (02/13/25 0933)  SpO2: 96 % (02/13/25 0655)  Physical Exam  Vitals reviewed.   Constitutional:       General: She is not in acute distress.     Appearance: Normal appearance. She is obese. She is not ill-appearing, toxic-appearing or diaphoretic.   HENT:      Head: Normocephalic and atraumatic.      Nose: Nose normal. No congestion or rhinorrhea.      Mouth/Throat:      Mouth: Mucous membranes are moist.      Pharynx: Oropharynx is clear.   Eyes:      Extraocular Movements: Extraocular movements intact.      Conjunctiva/sclera: Conjunctivae normal.      Pupils: Pupils are equal, round, and reactive to light.   Cardiovascular:      Rate and Rhythm: Normal rate. Rhythm irregular.      Pulses: Normal pulses.      Heart sounds: Normal heart sounds. No murmur heard.  Pulmonary:      Effort: Pulmonary effort is normal. No respiratory distress.      Breath sounds: Rales present. No wheezing.      Comments: Faint inspiratory crackles of left base  Abdominal:      General: Bowel sounds are normal.      Palpations: Abdomen is soft.      Tenderness: There is no abdominal tenderness. There is no guarding or rebound.   Musculoskeletal:         General: No tenderness. Normal range of motion.       Cervical back: Normal range of motion. No rigidity or tenderness.      Right lower leg: No edema.      Left lower leg: No edema.   Skin:     General: Skin is warm and dry.      Capillary Refill: Capillary refill takes less than 2 seconds.      Coloration: Skin is not jaundiced.   Neurological:      General: No focal deficit present.      Mental Status: She is alert. Mental status is at baseline.      Cranial Nerves: No cranial nerve deficit.      Motor: No weakness.   Psychiatric:         Mood and Affect: Mood normal.         Behavior: Behavior normal.          Discussion with Family: Updated  () at bedside.    Discharge instructions/Information to patient and family:   See after visit summary for information provided to patient and family.      Provisions for Follow-Up Care:  See after visit summary for information related to follow-up care and any pertinent home health orders.      Mobility at time of Discharge:   Basic Mobility Inpatient Raw Score: 13  JH-HLM Goal: 4: Move to chair/commode  JH-HLM Achieved: 4: Move to chair/commode  HLM Goal achieved. Continue to encourage appropriate mobility.     Disposition:   Home    Planned Readmission: No    Discharge Medications:  See after visit summary for reconciled discharge medications provided to patient and/or family.      Administrative Statements   Discharge Statement:  I have spent a total time of 60 minutes in caring for this patient on the day of the visit/encounter. >30 minutes of time was spent on: Diagnostic results, Prognosis, Risks and benefits of tx options, Instructions for management, Patient and family education, Importance of tx compliance, Risk factor reductions, Impressions, Counseling / Coordination of care, Documenting in the medical record, Reviewing / ordering tests, medicine, procedures  , and Communicating with other healthcare professionals .    **Please Note: This note may have been constructed using a voice recognition  system**

## 2025-02-13 NOTE — ASSESSMENT & PLAN NOTE
Temp:  [98.9 °F (37.2 °C)-100.5 °F (38.1 °C)] 98.9 °F (37.2 °C)  HR:  [] 69  BP: (136-143)/(58-79) 136/68  Resp:  [12-18] 18  SpO2:  [93 %-99 %] 96 %  O2 Device: Nasal cannula  Nasal Cannula O2 Flow Rate (L/min):  [2 L/min] 2 L/min    Continue home regimen toprol XL 75 mg BID, furosemide 80 mg qd, ordered 100 mg daily losartan in place of home 160 mg daily valsartan per standard conversion (non-formulary)

## 2025-02-16 DIAGNOSIS — I10 ESSENTIAL HYPERTENSION: ICD-10-CM

## 2025-02-16 LAB
BACTERIA BLD CULT: NORMAL
BACTERIA BLD CULT: NORMAL

## 2025-02-17 ENCOUNTER — TELEPHONE (OUTPATIENT)
Dept: FAMILY MEDICINE CLINIC | Facility: CLINIC | Age: OVER 89
End: 2025-02-17

## 2025-02-17 RX ORDER — METOPROLOL SUCCINATE 25 MG/1
75 TABLET, EXTENDED RELEASE ORAL 2 TIMES DAILY
Qty: 540 TABLET | Refills: 1 | Status: SHIPPED | OUTPATIENT
Start: 2025-02-17

## 2025-02-19 ENCOUNTER — TRANSITIONAL CARE MANAGEMENT (OUTPATIENT)
Dept: FAMILY MEDICINE CLINIC | Facility: CLINIC | Age: OVER 89
End: 2025-02-19

## 2025-03-01 DIAGNOSIS — E11.9 TYPE 2 DIABETES MELLITUS WITHOUT COMPLICATION, WITHOUT LONG-TERM CURRENT USE OF INSULIN (HCC): ICD-10-CM

## 2025-03-03 RX ORDER — BLOOD-GLUCOSE METER
KIT MISCELLANEOUS 3 TIMES DAILY
Qty: 300 EACH | Refills: 1 | Status: SHIPPED | OUTPATIENT
Start: 2025-03-03

## 2025-03-06 ENCOUNTER — OFFICE VISIT (OUTPATIENT)
Dept: FAMILY MEDICINE CLINIC | Facility: CLINIC | Age: OVER 89
End: 2025-03-06
Payer: MEDICARE

## 2025-03-06 VITALS
HEART RATE: 84 BPM | SYSTOLIC BLOOD PRESSURE: 134 MMHG | DIASTOLIC BLOOD PRESSURE: 78 MMHG | HEIGHT: 61 IN | BODY MASS INDEX: 39.65 KG/M2 | OXYGEN SATURATION: 96 % | WEIGHT: 210 LBS

## 2025-03-06 DIAGNOSIS — E66.01 OBESITY, MORBID (HCC): ICD-10-CM

## 2025-03-06 DIAGNOSIS — I48.19 PERSISTENT ATRIAL FIBRILLATION (HCC): ICD-10-CM

## 2025-03-06 DIAGNOSIS — E11.65 TYPE 2 DIABETES MELLITUS WITH HYPERGLYCEMIA, WITH LONG-TERM CURRENT USE OF INSULIN (HCC): ICD-10-CM

## 2025-03-06 DIAGNOSIS — E11.65 TYPE 2 DIABETES MELLITUS WITH HYPERGLYCEMIA, UNSPECIFIED WHETHER LONG TERM INSULIN USE (HCC): ICD-10-CM

## 2025-03-06 DIAGNOSIS — Z79.4 TYPE 2 DIABETES MELLITUS WITH HYPERGLYCEMIA, WITH LONG-TERM CURRENT USE OF INSULIN (HCC): ICD-10-CM

## 2025-03-06 DIAGNOSIS — N18.31 STAGE 3A CHRONIC KIDNEY DISEASE (HCC): ICD-10-CM

## 2025-03-06 DIAGNOSIS — F03.90 DEMENTIA WITHOUT BEHAVIORAL DISTURBANCE, PSYCHOTIC DISTURBANCE, MOOD DISTURBANCE, OR ANXIETY, UNSPECIFIED DEMENTIA SEVERITY, UNSPECIFIED DEMENTIA TYPE (HCC): ICD-10-CM

## 2025-03-06 DIAGNOSIS — I50.33 ACUTE ON CHRONIC DIASTOLIC CONGESTIVE HEART FAILURE (HCC): ICD-10-CM

## 2025-03-06 DIAGNOSIS — J69.0 ASPIRATION PNEUMONIA OF RIGHT LOWER LOBE DUE TO GASTRIC SECRETIONS (HCC): ICD-10-CM

## 2025-03-06 DIAGNOSIS — E78.00 PURE HYPERCHOLESTEROLEMIA: ICD-10-CM

## 2025-03-06 DIAGNOSIS — M17.12 PRIMARY OSTEOARTHRITIS OF LEFT KNEE: ICD-10-CM

## 2025-03-06 DIAGNOSIS — I10 ESSENTIAL HYPERTENSION: ICD-10-CM

## 2025-03-06 DIAGNOSIS — Z78.9 TRANSITION OF CARE: Primary | ICD-10-CM

## 2025-03-06 LAB — SL AMB POCT HEMOGLOBIN AIC: 7 (ref ?–6.5)

## 2025-03-06 PROCEDURE — 99495 TRANSJ CARE MGMT MOD F2F 14D: CPT | Performed by: INTERNAL MEDICINE

## 2025-03-06 PROCEDURE — 83036 HEMOGLOBIN GLYCOSYLATED A1C: CPT | Performed by: INTERNAL MEDICINE

## 2025-03-06 RX ORDER — INSULIN GLARGINE 100 [IU]/ML
16 INJECTION, SOLUTION SUBCUTANEOUS DAILY
Qty: 15 ML | Refills: 1 | Status: SHIPPED | OUTPATIENT
Start: 2025-03-06

## 2025-03-06 NOTE — ASSESSMENT & PLAN NOTE
Lab Results   Component Value Date    HGBA1C 7 (A) 03/06/2025   Dayana on Lantus which we bumped up a little today to 16 units, and on Novolog with meals and metformin-A1c% today not too bad at 7%-advised watching diet too

## 2025-03-06 NOTE — ASSESSMENT & PLAN NOTE
Lab Results   Component Value Date    HGBA1C 7 (A) 03/06/2025       Orders:    POCT hemoglobin A1c    Insulin Glargine Solostar (Lantus SoloStar) 100 UNIT/ML SOPN; Inject 0.16 mL (16 Units total) under the skin in the morning

## 2025-03-06 NOTE — ASSESSMENT & PLAN NOTE
Lab Results   Component Value Date    EGFR 35 02/13/2025    EGFR 38 02/12/2025    EGFR 33 02/11/2025    CREATININE 1.33 (H) 02/13/2025    CREATININE 1.24 02/12/2025    CREATININE 1.38 (H) 02/11/2025

## 2025-03-06 NOTE — PROGRESS NOTES
Transition of Care Visit  Name: Dayana Reagan      : 1935      MRN: [id number]  Encounter Provider: Stefanie Anna MD  Encounter Date: 3/6/2025   Encounter department: [facility] MEDICINE    Assessment & Plan  Essential hypertension         Persistent atrial fibrillation (HCC)  Rate controlled on Eliquis BID       Acute on chronic diastolic congestive heart failure (HCC)  Wt Readings from Last 3 Encounters:   25 95.3 kg (210 lb)   25 95.5 kg (210 lb 8.6 oz)   01/10/25 93.9 kg (207 lb)                    Type 2 diabetes mellitus with hyperglycemia, unspecified whether long term insulin use (HCC)    Lab Results   Component Value Date    HGBA1C 7 (A) 2025   Dayana on Lantus which we bumped up a little today to 16 units, and on Novolog with meals and metformin-A1c% today not too bad at 7%-advised watching diet too         Dementia without behavioral disturbance, psychotic disturbance, mood disturbance, or anxiety, unspecified dementia severity, unspecified dementia type (HCC)         Primary osteoarthritis of left knee         Stage 3a chronic kidney disease (HCC)  Lab Results   Component Value Date    EGFR 35 2025    EGFR 38 2025    EGFR 33 2025    CREATININE 1.33 (H) 2025    CREATININE 1.24 2025    CREATININE 1.38 (H) 2025            Pure hypercholesterolemia         Obesity, morbid (HCC)           Transition of care         Aspiration pneumonia of right lower lobe due to gastric secretions (HCC)  Pneumonia resolved, and she sounds good today-will hold off on doing another CXR today       Type 2 diabetes mellitus with hyperglycemia, with long-term current use of insulin (HCC)    Lab Results   Component Value Date    HGBA1C 7 (A) 2025       Orders:    POCT hemoglobin A1c    Insulin Glargine Solostar (Lantus SoloStar) 100 UNIT/ML SOPN; Inject 0.16 mL (16 Units total) under the skin in the morning         History of Present  "Illness     Transitional Care Management Review:   Dayana Reagan is a 89 y.o. female here for TCM follow up.     During the TCM phone call patient stated:  TCM Call       Date and time call was made  2/17/2025 11:01 AM    Hospital care reviewed  Records reviewed    Patient was hospitialized at  [facility]    Date of Admission  02/11/25    Date of discharge  02/13/25    Diagnosis  UTI (urinary tract infection)    Disposition  Home    Were the patients medications reviewed and updated  Yes    Current Symptoms  None          TCM Call       Post hospital issues  None    Scheduled for follow up?  Yes    Did you obtain your prescribed medications  Yes    Do you need help managing your prescriptions or medications  No    Is transportation to your appointment needed  No    I have advised the patient to call PCP with any new or worsening symptoms  DEMARCUS Hestercy here for TCM s/p hospital stay for several days for RLL pneumonia, weakness, dehydration-doing better now that she's back home-she has a hx of DM II, obesity, severe knee arthritis, cognitive impairment, HL, HTN, atrial fibrillation. She had weakness, vomiting and diarrhea at home, and EMS brought her to the hospital because of it. Thought maybe the pneumonia was from aspiration, and was given penicillin in the hospital which she ultimately had an allergic reaction to-sent home and doing well now- wishes she were more active at home-has been having some elevated sugars and he adjusts the insulin accordingly. Her A1c% today is 7%      Review of Systems   Constitutional: Negative.    HENT: Negative.     Respiratory: Negative.     Cardiovascular: Negative.    Genitourinary: Negative.    Musculoskeletal:  Positive for gait problem.   Psychiatric/Behavioral: Negative.       Objective   /78 (BP Location: Left arm, Patient Position: Sitting, Cuff Size: Standard)   Pulse 84   Ht 5' 1\" (1.549 m)   Wt 95.3 kg (210 lb)   SpO2 96% "   BMI 39.68 kg/m²     Physical Exam  Constitutional:       Appearance: She is obese.   HENT:      Head: Normocephalic and atraumatic.      Right Ear: External ear normal.      Left Ear: External ear normal.      Nose: Nose normal.      Mouth/Throat:      Mouth: Mucous membranes are moist.   Eyes:      Pupils: Pupils are equal, round, and reactive to light.   Cardiovascular:      Rate and Rhythm: Normal rate and regular rhythm.      Heart sounds: Normal heart sounds.   Pulmonary:      Effort: Pulmonary effort is normal.      Breath sounds: Normal breath sounds.   Musculoskeletal:         General: Normal range of motion.      Cervical back: Normal range of motion and neck supple.   Skin:     General: Skin is warm.   Neurological:      General: No focal deficit present.      Mental Status: She is alert and oriented to person, place, and time.   Psychiatric:         Mood and Affect: Mood normal.         Behavior: Behavior normal.         Thought Content: Thought content normal.       Medications have been reviewed by provider in current encounter

## 2025-03-06 NOTE — ASSESSMENT & PLAN NOTE
Wt Readings from Last 3 Encounters:   03/06/25 95.3 kg (210 lb)   02/13/25 95.5 kg (210 lb 8.6 oz)   01/10/25 93.9 kg (207 lb)

## 2025-03-13 PROBLEM — J18.9 PNEUMONIA INVOLVING RIGHT LUNG: Status: RESOLVED | Noted: 2025-02-11 | Resolved: 2025-03-13

## 2025-03-17 DIAGNOSIS — I48.91 ATRIAL FIBRILLATION WITH RAPID VENTRICULAR RESPONSE (HCC): ICD-10-CM

## 2025-03-17 DIAGNOSIS — E11.9 TYPE 2 DIABETES MELLITUS WITHOUT COMPLICATION, WITH LONG-TERM CURRENT USE OF INSULIN (HCC): ICD-10-CM

## 2025-03-17 DIAGNOSIS — I50.33 ACUTE ON CHRONIC DIASTOLIC CONGESTIVE HEART FAILURE (HCC): ICD-10-CM

## 2025-03-17 DIAGNOSIS — Z79.4 TYPE 2 DIABETES MELLITUS WITHOUT COMPLICATION, WITH LONG-TERM CURRENT USE OF INSULIN (HCC): ICD-10-CM

## 2025-03-17 DIAGNOSIS — E87.1 HYPONATREMIA: ICD-10-CM

## 2025-03-17 DIAGNOSIS — I10 ESSENTIAL HYPERTENSION: ICD-10-CM

## 2025-03-17 RX ORDER — METOPROLOL SUCCINATE 25 MG/1
75 TABLET, EXTENDED RELEASE ORAL 2 TIMES DAILY
Qty: 540 TABLET | Refills: 1 | Status: SHIPPED | OUTPATIENT
Start: 2025-03-17

## 2025-03-17 RX ORDER — FUROSEMIDE 80 MG/1
80 TABLET ORAL DAILY
Qty: 90 TABLET | Refills: 1 | Status: SHIPPED | OUTPATIENT
Start: 2025-03-17

## 2025-03-17 RX ORDER — EZETIMIBE AND SIMVASTATIN 10; 20 MG/1; MG/1
1 TABLET ORAL
Qty: 90 TABLET | Refills: 0 | Status: SHIPPED | OUTPATIENT
Start: 2025-03-17

## 2025-03-17 RX ORDER — POTASSIUM CHLORIDE 1500 MG/1
20 TABLET, EXTENDED RELEASE ORAL DAILY
Qty: 90 TABLET | Refills: 1 | Status: SHIPPED | OUTPATIENT
Start: 2025-03-17

## 2025-04-16 DIAGNOSIS — E11.65 TYPE 2 DIABETES MELLITUS WITH HYPERGLYCEMIA, WITH LONG-TERM CURRENT USE OF INSULIN (HCC): ICD-10-CM

## 2025-04-16 DIAGNOSIS — Z79.4 TYPE 2 DIABETES MELLITUS WITH HYPERGLYCEMIA, WITH LONG-TERM CURRENT USE OF INSULIN (HCC): ICD-10-CM

## 2025-04-17 RX ORDER — PEN NEEDLE, DIABETIC, SAFETY 30 GX3/16"
NEEDLE, DISPOSABLE MISCELLANEOUS
Qty: 400 EACH | Refills: 1 | Status: SHIPPED | OUTPATIENT
Start: 2025-04-17

## 2025-05-20 ENCOUNTER — OFFICE VISIT (OUTPATIENT)
Dept: FAMILY MEDICINE CLINIC | Facility: CLINIC | Age: OVER 89
End: 2025-05-20
Payer: MEDICARE

## 2025-05-20 VITALS
BODY MASS INDEX: 39.87 KG/M2 | DIASTOLIC BLOOD PRESSURE: 90 MMHG | OXYGEN SATURATION: 89 % | HEART RATE: 50 BPM | WEIGHT: 211 LBS | SYSTOLIC BLOOD PRESSURE: 134 MMHG

## 2025-05-20 DIAGNOSIS — M17.12 PRIMARY OSTEOARTHRITIS OF LEFT KNEE: ICD-10-CM

## 2025-05-20 DIAGNOSIS — I48.19 PERSISTENT ATRIAL FIBRILLATION (HCC): ICD-10-CM

## 2025-05-20 DIAGNOSIS — I10 ESSENTIAL HYPERTENSION: Primary | ICD-10-CM

## 2025-05-20 DIAGNOSIS — E78.00 PURE HYPERCHOLESTEROLEMIA: ICD-10-CM

## 2025-05-20 DIAGNOSIS — F03.90 DEMENTIA WITHOUT BEHAVIORAL DISTURBANCE, PSYCHOTIC DISTURBANCE, MOOD DISTURBANCE, OR ANXIETY, UNSPECIFIED DEMENTIA SEVERITY, UNSPECIFIED DEMENTIA TYPE (HCC): ICD-10-CM

## 2025-05-20 DIAGNOSIS — R26.2 AMBULATORY DYSFUNCTION: ICD-10-CM

## 2025-05-20 DIAGNOSIS — E11.9 ENCOUNTER FOR DIABETIC FOOT EXAM (HCC): ICD-10-CM

## 2025-05-20 DIAGNOSIS — E11.65 TYPE 2 DIABETES MELLITUS WITH HYPERGLYCEMIA, WITH LONG-TERM CURRENT USE OF INSULIN (HCC): ICD-10-CM

## 2025-05-20 DIAGNOSIS — N18.31 STAGE 3A CHRONIC KIDNEY DISEASE (HCC): ICD-10-CM

## 2025-05-20 DIAGNOSIS — Z79.4 TYPE 2 DIABETES MELLITUS WITH HYPERGLYCEMIA, WITH LONG-TERM CURRENT USE OF INSULIN (HCC): ICD-10-CM

## 2025-05-20 DIAGNOSIS — E11.22 TYPE 2 DIABETES MELLITUS WITH DIABETIC CHRONIC KIDNEY DISEASE, UNSPECIFIED CKD STAGE, UNSPECIFIED WHETHER LONG TERM INSULIN USE (HCC): ICD-10-CM

## 2025-05-20 PROCEDURE — G2211 COMPLEX E/M VISIT ADD ON: HCPCS | Performed by: INTERNAL MEDICINE

## 2025-05-20 PROCEDURE — 99214 OFFICE O/P EST MOD 30 MIN: CPT | Performed by: INTERNAL MEDICINE

## 2025-05-20 RX ORDER — INSULIN GLARGINE 100 [IU]/ML
18 INJECTION, SOLUTION SUBCUTANEOUS DAILY
Qty: 15 ML | Refills: 3 | Status: SHIPPED | OUTPATIENT
Start: 2025-05-20

## 2025-05-20 NOTE — ASSESSMENT & PLAN NOTE
Rather severe-limits her mobility for sure-recommend 8 hour tylenol, voltaren gel, and quadriceps strengthening exercises

## 2025-05-20 NOTE — ASSESSMENT & PLAN NOTE
Lab Results   Component Value Date    HGBA1C 7 (A) 03/06/2025   Sugars pretty good on Lantus, metformin, and Novolog-would be helpful for her to add some mobility to her day=walking a bit, and doing ankle weight and arm weight exercises

## 2025-05-20 NOTE — PROGRESS NOTES
Assessment/Plan:         Problem List Items Addressed This Visit       Essential hypertension - Primary    Systolic number under good control, diastolic at 90 today, will monitor and continue meds         Pure hypercholesterolemia    Stage 3a chronic kidney disease (HCC)    Dementia (HCC)    Primary osteoarthritis of left knee    Rather severe-limits her mobility for sure-recommend 8 hour tylenol, voltaren gel, and quadriceps strengthening exercises         Persistent atrial fibrillation (HCC)    Very much rate controlled today and is on Eliquis low dose bID         Type 2 diabetes mellitus with hyperglycemia (HCC)      Lab Results   Component Value Date    HGBA1C 7 (A) 03/06/2025   Sugars pretty good on Lantus, metformin, and Novolog-would be helpful for her to add some mobility to her day=walking a bit, and doing ankle weight and arm weight exercises         Relevant Medications    Insulin Glargine Solostar (Lantus SoloStar) 100 UNIT/ML SOPN    Ambulatory dysfunction    Diabetes mellitus with chronic kidney disease (HCC)    Relevant Medications    Insulin Glargine Solostar (Lantus SoloStar) 100 UNIT/ML SOPN    Other Relevant Orders    CBC and differential    Comprehensive metabolic panel    Lipid panel    TSH, 3rd generation    Diabetic foot exam     Other Visit Diagnoses         Encounter for diabetic foot exam ([facility])                  Subjective:      Patient ID: Dayana Reagan is a 89 y.o. female.    Dayana here with her , they live at [facility]. She has a hx of DM II, HTN, HL, cognitive impairment, arthritis, CKD. Doing ok, no major complaints today. Her most recent A1c% done in March was 7%/. Her  says he wishes she moved more, saying if she just walked 50 feet in the AM and 50 feet in the PM that would be a good start-sugar log reviewed      The following portions of the patient's history were reviewed and updated as appropriate:   Past Medical History:  She has a past medical history of  Abnormal abdominal CT scan (06/09/2023), Allergy to sulfa drugs, Arthritis (2000), Closed fracture of third lumbar vertebra (HCC), Closed T12 spinal fracture (HCC), Closed wedge compression fracture of T12 vertebra (HCC), Dementia (HCC), Diabetes mellitus (HCC) (2000), DM (diabetes mellitus), type 2 (HCC), H/O multiple allergies, HL (hearing loss), Hyperlipidemia, Hypertension, Hypertensive urgency (02/13/2023), Knee pain (04/18/2023), Lumbar compression fracture (HCC), Memory loss (2020), Nocturia, Palpitations, Paroxysmal atrial fibrillation (HCC), Thrombocytopenic disorder (HCC), Type 2 diabetes mellitus without complication (HCC), and Wears glasses.,  _______________________________________________________________________  Medical Problems:  does not have any pertinent problems on file.,  _______________________________________________________________________  Past Surgical History:   has a past surgical history that includes Hysterectomy; Hernia repair; DXA procedure(historical) (05/31/2023); and Dermoid cyst  excision (N/A, 12/19/2024).,  _______________________________________________________________________  Family History:  family history includes Diabetes in her mother; Heart disease in her father; Hypertension in her father and mother.,  _______________________________________________________________________  Social History:   reports that she has never smoked. She has never been exposed to tobacco smoke. She has never used smokeless tobacco. She reports current alcohol use of about 1.0 standard drink of alcohol per week. She reports that she does not use drugs.,  _______________________________________________________________________  Allergies:  is allergic to accupril [quinapril hcl], novocain [procaine], parabens, sulfa antibiotics, and penicillins..  _______________________________________________________________________  Current  Medications[1]  _______________________________________________________________________  Review of Systems   Constitutional: Negative.    HENT: Negative.     Cardiovascular: Negative.    Gastrointestinal: Negative.    Musculoskeletal:  Positive for arthralgias, back pain and gait problem.   Hematological: Negative.          Objective:  Vitals:    05/20/25 1020   BP: 134/90   BP Location: Left arm   Patient Position: Sitting   Cuff Size: Standard   Pulse: (!) 50   SpO2: (!) 89%   Weight: 95.7 kg (211 lb)     Body mass index is 39.87 kg/m².     Physical Exam  Constitutional:       Appearance: She is obese.   HENT:      Head: Normocephalic and atraumatic.      Right Ear: External ear normal.      Left Ear: External ear normal.      Nose: Nose normal.      Mouth/Throat:      Mouth: Mucous membranes are moist.     Eyes:      Pupils: Pupils are equal, round, and reactive to light.       Cardiovascular:      Rate and Rhythm: Normal rate and regular rhythm.      Pulses: no weak pulses.           Dorsalis pedis pulses are 2+ on the right side and 2+ on the left side.      Heart sounds: Normal heart sounds.   Pulmonary:      Effort: Pulmonary effort is normal.      Breath sounds: Normal breath sounds.     Musculoskeletal:         General: Normal range of motion.      Cervical back: Normal range of motion.     Neurological:      General: No focal deficit present.      Mental Status: She is alert and oriented to person, place, and time.     Psychiatric:         Mood and Affect: Mood normal.     Patient's shoes and socks removed.    Right Foot/Ankle   Right Foot Inspection  Skin Exam: abnormal color.     Toe Exam: ROM and strength within normal limits.     Sensory   Monofilament testing: intact    Vascular  The right DP pulse is 2+.     Left Foot/Ankle  Left Foot Inspection  Skin Exam: abnormal color.     Toe Exam: ROM and strength within normal limits.     Sensory   Monofilament testing: intact    Vascular  The left DP pulse is  2+.     Assign Risk Category  No deformity present  No loss of protective sensation  No weak pulses  Risk: 0          [1]  Current Outpatient Medications   Medication Sig Dispense Refill   • albuterol (ProAir HFA) 90 mcg/act inhaler Inhale 2 puffs every 6 (six) hours as needed for wheezing or shortness of breath 8.5 g 0   • apixaban (Eliquis) 2.5 mg Take 1 tablet (2.5 mg total) by mouth 2 (two) times a day 180 tablet 1   • ezetimibe-simvastatin (VYTORIN) 10-20 mg per tablet Take 1 tablet by mouth daily at bedtime 90 tablet 0   • furosemide (LASIX) 80 mg tablet Take 1 tablet (80 mg total) by mouth daily 90 tablet 1   • glucose blood (FREESTYLE LITE) test strip check blood sugar 3 times daily 300 each 1   • insulin aspart (NovoLOG FlexPen) 100 UNIT/ML injection pen Inject 8 Units under the skin 3 (three) times a day with meals 15 mL 5   • Insulin Glargine Solostar (Lantus SoloStar) 100 UNIT/ML SOPN Inject 0.18 mL (18 Units total) under the skin in the morning 15 mL 3   • Insulin Pen Needle (BD AutoShield Duo) 30G X 5 MM MISC USE FOUR TIMES DAILY WITH INSULIN  each 1   • loratadine (CLARITIN) 10 mg tablet Take 1 tablet (10 mg total) by mouth daily 30 tablet 3   • metFORMIN (GLUCOPHAGE) 500 mg tablet Take 1 tablet (500 mg total) by mouth 2 (two) times a day 180 tablet 1   • metoprolol succinate (TOPROL-XL) 25 mg 24 hr tablet Take 3 tablets (75 mg total) by mouth 2 (two) times a day 540 tablet 1   • nystatin (MYCOSTATIN) powder Apply topically 2 (two) times a day 60 g 0   • potassium chloride (Klor-Con M20) 20 mEq tablet Take 1 tablet (20 mEq total) by mouth daily 90 tablet 1   • valsartan (DIOVAN) 160 mg tablet Take 1 tablet (160 mg total) by mouth daily 90 tablet 1     No current facility-administered medications for this visit.

## 2025-05-24 DIAGNOSIS — I10 ESSENTIAL HYPERTENSION: ICD-10-CM

## 2025-05-27 RX ORDER — EZETIMIBE AND SIMVASTATIN 10; 20 MG/1; MG/1
1 TABLET ORAL
Qty: 90 TABLET | Refills: 0 | Status: SHIPPED | OUTPATIENT
Start: 2025-05-27

## 2025-07-17 DIAGNOSIS — I50.33 ACUTE ON CHRONIC DIASTOLIC CONGESTIVE HEART FAILURE (HCC): ICD-10-CM

## 2025-07-18 RX ORDER — VALSARTAN 160 MG/1
160 TABLET ORAL DAILY
Qty: 90 TABLET | Refills: 1 | Status: SHIPPED | OUTPATIENT
Start: 2025-07-18

## 2025-08-04 DIAGNOSIS — E87.1 HYPONATREMIA: ICD-10-CM

## 2025-08-04 DIAGNOSIS — E11.9 TYPE 2 DIABETES MELLITUS WITHOUT COMPLICATION, WITH LONG-TERM CURRENT USE OF INSULIN (HCC): ICD-10-CM

## 2025-08-04 DIAGNOSIS — Z79.4 TYPE 2 DIABETES MELLITUS WITHOUT COMPLICATION, WITH LONG-TERM CURRENT USE OF INSULIN (HCC): ICD-10-CM

## 2025-08-04 DIAGNOSIS — I10 ESSENTIAL HYPERTENSION: ICD-10-CM

## 2025-08-04 DIAGNOSIS — I50.33 ACUTE ON CHRONIC DIASTOLIC CONGESTIVE HEART FAILURE (HCC): ICD-10-CM

## 2025-08-06 RX ORDER — FUROSEMIDE 80 MG/1
80 TABLET ORAL DAILY
Qty: 90 TABLET | Refills: 1 | Status: SHIPPED | OUTPATIENT
Start: 2025-08-06

## 2025-08-06 RX ORDER — POTASSIUM CHLORIDE 1500 MG/1
20 TABLET, EXTENDED RELEASE ORAL DAILY
Qty: 90 TABLET | Refills: 1 | Status: SHIPPED | OUTPATIENT
Start: 2025-08-06

## 2025-08-06 RX ORDER — EZETIMIBE AND SIMVASTATIN 10; 20 MG/1; MG/1
1 TABLET ORAL
Qty: 30 TABLET | Refills: 0 | Status: SHIPPED | OUTPATIENT
Start: 2025-08-06

## (undated) DEVICE — MEDI-VAC YANK SUCT HNDL W/TPRD BULBOUS TIP: Brand: CARDINAL HEALTH

## (undated) DEVICE — PAD GROUNDING DUAL ADULT

## (undated) DEVICE — INTENDED FOR TISSUE SEPARATION, AND OTHER PROCEDURES THAT REQUIRE A SHARP SURGICAL BLADE TO PUNCTURE OR CUT.: Brand: BARD-PARKER SAFETY BLADES SIZE 15, STERILE

## (undated) DEVICE — PLUMEPEN PRO 10FT

## (undated) DEVICE — EXOFIN PRECISION PEN HIGH VISCOSITY TOPICAL SKIN ADHESIVE: Brand: EXOFIN PRECISION PEN, 1G

## (undated) DEVICE — SUT VICRYL 2-0 SH 27 IN UNDYED J417H

## (undated) DEVICE — NEEDLE 25G X 1 1/2

## (undated) DEVICE — OCCLUSIVE GAUZE STRIP,3% BISMUTH TRIBROMOPHENATE IN PETROLATUM BLEND: Brand: XEROFORM

## (undated) DEVICE — CHLORAPREP HI-LITE 26ML ORANGE

## (undated) DEVICE — BETHLEHEM UNIVERSAL MINOR GEN: Brand: CARDINAL HEALTH

## (undated) DEVICE — 3M™ TEGADERM™ TRANSPARENT FILM DRESSING FRAME STYLE, 1626W, 4 IN X 4-3/4 IN (10 CM X 12 CM), 50/CT 4CT/CASE: Brand: 3M™ TEGADERM™

## (undated) DEVICE — GLOVE SRG BIOGEL ECLIPSE 7.5

## (undated) DEVICE — GLOVE INDICATOR PI UNDERGLOVE SZ 8 BLUE

## (undated) DEVICE — SUT MONOCRYL 4-0 PS-2 18 IN Y496G